# Patient Record
Sex: FEMALE | Race: WHITE
[De-identification: names, ages, dates, MRNs, and addresses within clinical notes are randomized per-mention and may not be internally consistent; named-entity substitution may affect disease eponyms.]

---

## 2017-09-11 ENCOUNTER — HOSPITAL ENCOUNTER (OUTPATIENT)
Dept: HOSPITAL 22 - ER | Age: 57
Setting detail: OBSERVATION
LOS: 1 days | Discharge: HOME | End: 2017-09-12
Attending: INTERNAL MEDICINE | Admitting: INTERNAL MEDICINE
Payer: MEDICAID

## 2017-09-11 VITALS — SYSTOLIC BLOOD PRESSURE: 133 MMHG | DIASTOLIC BLOOD PRESSURE: 64 MMHG

## 2017-09-11 VITALS — SYSTOLIC BLOOD PRESSURE: 133 MMHG | DIASTOLIC BLOOD PRESSURE: 69 MMHG

## 2017-09-11 VITALS — SYSTOLIC BLOOD PRESSURE: 124 MMHG | DIASTOLIC BLOOD PRESSURE: 55 MMHG

## 2017-09-11 VITALS — DIASTOLIC BLOOD PRESSURE: 65 MMHG | SYSTOLIC BLOOD PRESSURE: 149 MMHG

## 2017-09-11 VITALS — DIASTOLIC BLOOD PRESSURE: 55 MMHG | SYSTOLIC BLOOD PRESSURE: 124 MMHG

## 2017-09-11 VITALS — DIASTOLIC BLOOD PRESSURE: 64 MMHG | SYSTOLIC BLOOD PRESSURE: 133 MMHG

## 2017-09-11 VITALS — BODY MASS INDEX: 35.85 KG/M2 | WEIGHT: 228.44 LBS | HEIGHT: 67 IN

## 2017-09-11 DIAGNOSIS — Z72.0: ICD-10-CM

## 2017-09-11 DIAGNOSIS — E87.6: ICD-10-CM

## 2017-09-11 DIAGNOSIS — J02.9: ICD-10-CM

## 2017-09-11 DIAGNOSIS — J20.9: ICD-10-CM

## 2017-09-11 DIAGNOSIS — J44.0: Primary | ICD-10-CM

## 2017-09-11 LAB
BASOPHILS # BLD AUTO: 3.1 K/MM3 (ref 0.7–4.5)
BUN: 25 MG/DL (ref 7–18)
EOSINOPHIL NFR BLD AUTO: 36.7 % (ref 10–50)
GFR SERPLBLD BASED ON 1.73 SQ M-ARVRAT: 52 ML/MIN (ref 59–?)
HCT VFR BLD CALC: 17.4 G/DL (ref 12.2–16.2)

## 2017-09-11 PROCEDURE — G0378 HOSPITAL OBSERVATION PER HR: HCPCS

## 2017-09-11 NOTE — EMERGENCY ROOM REPORT
History of Present Illness
Time Seen by MD [id number]
Presenting Problem in Triage
Pt arrived:Ambulance Stretcher    
Presenting Problem:PT HAS HX OF COPD AND WAS DIAGNOSED WITH BRONCHITIS EARLIER 
THIS WEEK. ADVISES THIS AM SHE STARTED HAVING INCREASED SOA. EMS REPORTS INITIAL
SATS OF 82% AFTER DUONEB SATS INCREASED TO 98% 
Onset of symptoms date/time:/ or onset unknown for:MEDICAL HX UNKNOWN
Treatment Prior to Arrival: V/S WNL 20G IN THE LEFT AC LABS DRAWN DUONEB AND 
SATS INCREASED   PTA Provided by:PARAMEDIC
 
Sepsis Risk Assessment: 
Temp: 98.4 B/P: 149/65 MAP: 93 Pulse: 70 Resp: 20
Recent fever? N Clinical Suspician of Infection? N 
Mental Status: 1 - Regular (Normal Baseline)   Sepsis Risk:Low Sepsis Risk 
 
Have you (or family members/close friends) recently traveled outside the [place]? N
If Yes, where/when: 
Have you had exposure to infectious disease within the past month? N TB? 
Other?  Specify: 
Patient of Dr. Moe, smoker, with hx COPD, has had tight cough and wheezing, 
no fever. Usually does not use nebulizer treatments but used one this AM. EMS 
stated she did better with duoneb PTA. No calf pain or recent travel. No chest 
pain. 
ALLERGIES
Coded Allergies:
No Known Allergies (07/07/16)
 
Home Medications
Active Scripts
BENZONATATE (Benzonatate) 100 MG PO TID 
     #15 CAP 
     Prov:      10/02/16
Prednisone (Prednisone 20MG*****) 20 MG PO BID 
     #10 TAB 
     Prov:      10/02/16
 
Reported Medications
Levothyroxine Sodium (Synthroid 0.05MG*****) 0.05 MG PO DAILY 
ASPIRIN (Aspirin) 81 MG PO DAILY 
HYDROCHLOROTHIAZIDE (Hydrochlorothiazide) 25 MG PO DAILY 
     #30 
Atorvastatin Calcium 40 MG PO QHS 
     #30 
HYDROCODONE/IBUPROFEN (Hydrocodone-Ibuprofen 7.5-200) 1 TAB PO QID 
     #120 
Albuterol Sulfate (Ventolin Hfa) 0.09 MG IH Q4HP PRN SOA
ALBUTEROL (Albuterol 0.083%  Neb*****) 2.5 MG INH PRN PRN SOA
Metoprolol Tartrate (Lopressor*****) 25 MG FT BID 
     #60 
DOXYCYCLINE MONOHYDRATE (Doxycycline Monohydrate) 100 MG PO DAILY 
     #20 
 
 
 
History
 
Medical History
General
   CAD? No
   Angina: No
   MI: No
   Hypertension? Yes
   Hyperlipidemia? Yes
   CHF? No
   DVT? No
   PE? No
   COPD? Yes
   Asthma? No
   Anemia? No
   GERD? No
   Gastric ulcers? No
   GI Bleed? No
   Hernia? No
   Thyroid Problems? Yes
   Hypothyroidism? Yes
   CVA? No
   Seizures? No
   Diabetes? No
   Insulin Dependent: No
   Insulin Pump: No
   Home FSBS? No
   Renal Insuffiency? No
   End Stage Renal Disease? No
   UTI? No
   Stones? Yes
   BPH? No
   GB Disease: Yes
   Nephritic Syndrome? No
   Asplenia? No
   Hepatitis? No
   Sickle Cell Disease? No
   Arthritis? No
   Migraines? No
   Cataracts? No
   Glaucoma? No
   MRSA? No
   HIV? No
   TB? No
   Anxiety? No
   Depression? No
   Cancer? No
   More? No
Immunization Hx
   DT/Tetanus 1-4 Years Ago
Surgical Hx
Previous Surgery?Y  HYSTERECTOMY   TUBUAL   LITHOTRIPSY   
              
            
 
GYN Hx
   LMP N/A
 
Social History
Smoking Hx
   Smoker: Current Every Day Smoker
   Tobacco: Yes
   Type Cigarettes
   Packs/day 1 1/2 - 2 Packs
Alcohol
   Alcohol: No
 
Review of Systems
All Other Systems Reviewed and Negative
Respiratory
see HPI
 
Physical Exam
Vital Signs
 Vital Signs
 
 
Date Time Temp Pulse Resp B/P Pulse O2 O2 Flow FiO2
 
     Ox Delivery Rate 
 
09/11 1058  65 20 140/74 93  2 
 
09/11 0946 98.4 70 20 149/65 95   
 
 
General Appearance normal appearance, WD/WN, no apparent distress
Eye Exam
   -
   bilateral eye normal exam, bilateral eye PERRL, bilateral eye EOMI
Neck normal inspection, non-tender, supple, full range of motion
Respiratory Status
Yes: trachea midline, chest symmetrical, non tender chest, non productive cough.
 No: respiratory distress, tender on palpation, use of accessory muscles, pain 
on inspiration, pain on expiration, productive cough. 
Lung Sounds
bilateral: normal breath sounds, lungs clear, decreased breath sounds. 
Cardiovascular normal exam, regular rate/rhythm, no peripheral edema, no gallop,
no JVD, no murmur, no rub
Gastrointestinal normal bowel sounds, normal exam, non tender, soft, no 
organomegaly, no pulsatile mass, no guarding, no rebound
Extremities non-tender, normal range of motion, no calf tenderness, no pedal 
edema
Strength
5 Upper Ext (L), 5 Upper Ext (R), 5 Lower Ext (L), 5 Lower Ext (R)
Neurologic alert, normal exam, no motor/sensory deficits, oriented x 3 (speech 
clear and fluent)
Glascow Coma Scale
 
 
Glascow Coma Scale Response Value
 
EYE response: 4 Spontaneously 4
 
MOTOR response: 6 OBEYS 6
 
VERBAL response: 5 Oriented & Converses 5
 
Total   15
 
 
Skin intact, normal color, warm/dry
Lymphatic no adenopathy
 
Medical Decision Making
 
LABS/Meds/Orders
Pt receiving controlled substance in ED? No
Results/Orders
 Laboratory Tests
 
 
09/11/17 1010:
Lactic Acid 1.6
 
09/11/17 0950:
Sodium 140, Potassium 2.9 *L, Chloride 101, Carbon Dioxide 32, BUN 25  H, 
Creatinine 1.1  H, Estimated Creat Clear 90, Estimated GFR (MDRD) 52  L, Glucose
148  H, Calcium 8.4  L, Total Bilirubin 0.7, AST 28,   H, Alkaline 
Phosphatase 128  H, Total Protein 7.6, Albumin 3.4, Globulin 4.2  H, Albumin/
Globulin Ratio 0.8  L, WBC 8.5, RBC 5.67  H, Hgb 17.4  H, Hct 51.5  H, MCV 90.8,
RDW 13.9, Plt Count 206, MPV 7.5, Gran % 53.9, Gran # 4.6, Lymphocytes % 36.7, 
Monocytes % 4.9, Eosinophils % 3.7, Basophils % 0.8, Lymphocytes # 3.1, 
Monocytes # 0.4, Eosinophils # 0.3, Basophils # 0.1, PUBS MCHC 33.8, MCH 30.7
 Current Medication Orders
 
 
  Sig/Kimberly Start time  Last
 
Medication Dose Route Stop Time Status Admin
 
Ceftriaxone Sodium 1 GM ONCE ONE 09/11 1130 AC 
 
Sodium Chloride 50 ML IV 09/11 1159  
 
Methylprednisolone  0 .STK-MED ONE 09/11 1113 DC 
 
Sodium Succinate  .ROUTE   
 
Potassium Chloride 0 .STK-MED ONE 09/11 1113 DC 
 
  PO   
 
Potassium Chloride 20 MEQ ONCE ONE 09/11 1030 DC 09/11
 
  PO 09/11 1031  1115
 
Albuterol 2.5 MG ONCE ONE 09/11 1000 DC 
 
  INH 09/11 1001  
 
Albuterol/Ipratropium 3 ML ONCE ONE 09/11 1000 DC 
 
  INH 09/11 1001  
 
Methylprednisolone  125 MG ONCE ONE 09/11 1000 DC 09/11
 
Sodium Succinate  IV 09/11 1001  1115
 
Sodium Chloride 10 ML PRN PRN 09/11 1000 AC 
 
  IV 09/12 0951  
 
Albuterol/Ipratropium 0 .STK-MED ONE 09/11 0958 DC 
 
  INH   
 
 
 Orders
 
 
Procedure Date/time Status
 
Decision to admit 09/11 1125 Active
 
ELECTROCARDIOGRAM REQUEST 09/11 1029 Active
 
RT REQUEST ALBUTEROL NEB 09/11 0959 Active
 
RT REQUEST DUONEB 09/11 0952 Active
 
CHEST(2 VIEWS-NOT PORTABLE)*** 09/11 0952 Active
 
IV SALINE LOCK 09/11 0952 Active
 
CULTURE, BLOOD 09/11 0952 Active
 
LACTIC ACID 09/11 0952 Complete
 
CBC WITH AUTO DIFF 09/11 0952 Complete
 
CHEM 12 PROFILE 09/11 0952 Complete
 
12 LEAD EKG-MEI (INITIAL) 09/11  UNK Active
 
 
 
CM/EKG
CM/EKG
   EKG rate, rhythm, no evid. of ischemic chgs, no ectopy, normal QRS, normal CT
, normal EKG (SB 59; normal T w; nl QTc)
 
XRAY/CT/US
XRAY/CT/US
   XRAY chest
   XR interpretation by reviewed by me
   Xray Results no infiltrates, normal heart size, normal lung inflation vane (
COPD, older perihilar scarring)
 
Consult MD
Physician Consult
   Time Called 1119
   Reason Pt. Condition, Admission
 
Progress
ED Progress Notes
   Date 09/11/17
   Time 1119
   Comment
Taken off of oxygen, which had been placed on arrival, and has had sats of 89 to
90 per cent while on room air; Dr. Mei thompson. 
 
Departure
 
Departure
Time of Disposition 1120
Disposition Still a Patient
Clinical Impression
Primary Impression: COPD with exacerbation
Secondary Impressions: Hypokalemia
Condition STABLE
Referrals
Harsi Moe MD (Family)
 
ED Critical Care
   Critical Care Yes
   Time spent < 30 min
   Vital system(s) involved: Circulatory Failure (hypoxia)
   I was present at bedside for Coordinating pt's care, Interpreting EKGs/Strips
, During my initial exam, Reviewing lab results, Discussing pt condition, For re
-examinations, Examining radiographs
 
Electronically Signed by Mariah Kurtz MD on 09/11/17 at 1141

## 2017-09-11 NOTE — HISTORY AND PHYSICAL REPORT
Demographics:
Admit date: 09/11/17
Chief complaint:
Cough/sore throat
PRIMARY DIAGNOSIS: COPD EXACERBATION HYPOKALEMIA
Allergies:
Coded Allergies:
No Known Allergies (07/07/16)
 
 
History of present illness:
History of present illness:
56-year-old white female, ex-smoker recently, with emphysema, recently treated 
for exacerbation with doxycycline and prednisone who came to the emergency 
department this morning with shortness of air and sore throat, found to have low
potassium, chronic obstructive pulmonary disease exacerbation hypoxia, admitted 
to hospital.
 
Past medical history:
Family HX
   Family Hx Insignificant Yes
Immunization HX
   DT/Tetanus 1-4 Years Ago
   Pneumonia Never Had
   TB Test in last year No
General
   CAD? No
   Angina: No
   MI: No
   Hypertension? Yes
   Hyperlipidemia? Yes
   CHF? No
   DVT? No
   PE? No
   COPD? Yes
   Asthma? No
   Anemia? No
   GERD? No
   Gastric ulcers? No
   GI Bleed? No
   Hernia? No
   Thyroid Problems? Yes
   Hypothyroidism? Yes
   CVA? No
   Seizures? No
   Diabetes? No
   Insulin Dependent: No
   Insulin Pump: No
   Home FSBS? No
   Renal Insuffiency? No
   UTI? No
   Stones? Yes
   BPH? No
   GB Disease: Yes
   Nephritic Syndrome? No
   Asplenia? No
   Hepatitis? No
   Sickle Cell Disease? No
   Arthritis? No
   Migraines? No
   Cataracts? No
   Glaucoma? No
   MRSA? No
   HIV? No
   TB? No
   Anxiety? No
   Depression? No
   Cancer? No
   More? No
Past Surgical HX
Previous Surgery?Y  HYSTERECTOMY   TUBUAL   LITHOTRIPSY   
              
            
 
Current home meds:
Active Scripts
BENZONATATE (Benzonatate) 100 MG PO TID 
     #15 CAP 
     Prov:      10/02/16
Prednisone (Prednisone 20MG*****) 20 MG PO BID 
     #10 TAB 
     Prov:      10/02/16
 
Reported Medications
FLUTICASONE/VILANTEROL (Breo Ellipta 100-25 Mcg INH) 1 POW IH DAILY 
NICOTINE (Nicotine Patch) 21 MG TD DAILY 
Levothyroxine Sodium (Synthroid 0.05MG*****) 0.05 MG PO DAILY 
ASPIRIN (Aspirin) 81 MG PO DAILY 
HYDROCHLOROTHIAZIDE (Hydrochlorothiazide) 25 MG PO DAILY 
     #30 
HYDROCODONE/IBUPROFEN (Hydrocodone-Ibuprofen 7.5-200) 1 TAB PO QID 
     #120 
Albuterol Sulfate (Ventolin Hfa) 0.09 MG IH Q4HP PRN SOA
ALBUTEROL (Albuterol 0.083%  Neb*****) 2.5 MG INH PRN PRN SOA
Metoprolol Tartrate (Lopressor*****) 25 MG FT BID 
     #60 
DOXYCYCLINE MONOHYDRATE (Doxycycline Monohydrate) 100 MG PO DAILY 
     #20 
 
 
 
Social Hx:
Smoking HX
   Tobacco Yes
   Type Cigarettes
   Packs/day < 1 PACK
   Are you/the child exposed to second-hand smoke: Yes
Alcohol
   Alcohol: No
Hx of Drug Use
   Drug Use? No
Patien't marital status is single
Patient's support system is excellent
 
Review of systems:
Constitutional
fever, weakness. 
Respiratory
cough, shortness of breath, SOB with excertion. 
Cardiovascular
No no symptoms reported
Gastrointestinal/Abdominal
No no symptoms reported
Genitourinary
No: no symptoms reported. 
Musculoskeletal
No: no symptoms reported. 
Neurological
No: see HPI. 
 
Exam:
Lab data for last 24 hours:
Laboratory Tests
 
 
09/11/17 1010:
Lactic Acid 1.6
 
09/11/17 0950:
Sodium 140, Potassium 2.9 *L, Chloride 101, Carbon Dioxide 32, BUN 25  H, 
Creatinine 1.1  H, Estimated Creat Clear 90, Estimated GFR (MDRD) 52  L, Glucose
148  H, Calcium 8.4  L, Total Bilirubin 0.7, AST 28,   H, Alkaline 
Phosphatase 128  H, Total Protein 7.6, Albumin 3.4, Globulin 4.2  H, Albumin/
Globulin Ratio 0.8  L, WBC 8.5, RBC 5.67  H, Hgb 17.4  H, Hct 51.5  H, MCV 90.8,
RDW 13.9, Plt Count 206, MPV 7.5, Gran % 53.9, Gran # 4.6, Lymphocytes % 36.7, 
Monocytes % 4.9, Eosinophils % 3.7, Basophils % 0.8, Lymphocytes # 3.1, 
Monocytes # 0.4, Eosinophils # 0.3, Basophils # 0.1, PUBS MCHC 33.8, MCH 30.7
Microbiology
09/11 1010  BLOOD: Anaerobic Blood Culture - RECD
09/11 1010  BLOOD: Aerobic Blood Culture - RECD
09/11 1010  BLOOD: Anaerobic Blood Culture - RECD
09/11 1010  BLOOD: Aerobic Blood Culture - RECD
 
 
Admission vital signs:
1ST Vital Signs
 
 
 Result Date Time
 
Pulse Ox 95 09/11 0946
 
B/P 149/65 09/11 0946
 
Temp 98.4 09/11 0946
 
Pulse 70 09/11 0946
 
Resp 20 09/11 0946
 
O2 Flow Rate 2 09/11 1058
 
O2 Delivery ROOM AIR 09/11 1320
 
 
 
Additional information:
Very intensely red pharynx, exudate in the RIGHT tonsillar pillar. Tender 
cervical adenitis noted. Lungs have rhonchi, some crackles in the RIGHT lower 
lung field. No wheezing. Fairly good air entry. Low oxygen levels noted. Heart 
rate regular. Abdomen soft, patient's alert, pleasant. Oriented 3.
 
Plan:
Problem List
 1. COPD (chronic obstructive pulmonary disease) with acute bronchitis
 
 2. Hypokalemia
 
 3. COPD with exacerbation
 
 4. Pharyngitis
 
Plan:
Agree with admission, short-term treatment with pulmonary toilet, IV steroids, 
IV antibiotics. Check throat cultures.
 
Electronically Signed by Haris Moe MD on 09/11/17 at 1348

## 2017-09-11 NOTE — RADIOLOGY REPORT PS360
CHEST(2 VIEWS-NOT PORTABLE)***
 
HISTORY: Shortness of air, COPD
SOA
ORDERING PHYSICIAN: Mariah Kurtz MD
PATIENT AGE:  56 years
 
 
COMPARISON: 5/28/2017
 
FINDINGS:
The cardiomediastinal silhouette and pulmonary vascularity are within normal
limits. 
Previously noted atelectatic changes in the left midlung have improved. No lobar
consolidation or collapse. There is thickening of the major fissure inferiorly
seen on the lateral view.. 
No acute bony abnormalities. 
   
 
IMPRESSION:
 
No acute finding.

## 2017-09-11 NOTE — EMERGENCY ROOM REPORT
History of Present Illness
Time Seen by MD [id number]
Presenting Problem in Triage
Pt arrived:Ambulance Stretcher    
Presenting Problem:PT HAS HX OF COPD AND WAS DIAGNOSED WITH BRONCHITIS EARLIER 
THIS WEEK. ADVISES THIS AM SHE STARTED HAVING INCREASED SOA. EMS REPORTS INITIAL
SATS OF 82% AFTER DUONEB SATS INCREASED TO 98% 
Onset of symptoms date/time:/ or onset unknown for:MEDICAL HX UNKNOWN
Treatment Prior to Arrival: V/S WNL 20G IN THE LEFT AC LABS DRAWN DUONEB AND 
SATS INCREASED   PTA Provided by:PARAMEDIC
 
Sepsis Risk Assessment: 
Temp: 98.4 B/P: 149/65 MAP: 93 Pulse: 70 Resp: 20
Recent fever? N Clinical Suspician of Infection? N 
Mental Status: 1 - Regular (Normal Baseline)   Sepsis Risk:Low Sepsis Risk 
 
Have you (or family members/close friends) recently traveled outside the [place]? N
If Yes, where/when: 
Have you had exposure to infectious disease within the past month? N TB? 
Other?  Specify: 
Patient of Dr. Moe, smoker, with hx COPD, has had tight cough and wheezing, 
no fever. Usually does not use nebulizer treatments but used one this AM. EMS 
stated she did better with duoneb PTA. No calf pain or recent travel. No chest 
pain. 
ALLERGIES
Coded Allergies:
No Known Allergies (07/07/16)
 
Home Medications
Active Scripts
BENZONATATE (Benzonatate) 100 MG PO TID 
     #15 CAP 
     Prov:      10/02/16
Prednisone (Prednisone 20MG*****) 20 MG PO BID 
     #10 TAB 
     Prov:      10/02/16
 
Reported Medications
Levothyroxine Sodium (Synthroid 0.05MG*****) 0.05 MG PO DAILY 
ASPIRIN (Aspirin) 81 MG PO DAILY 
HYDROCHLOROTHIAZIDE (Hydrochlorothiazide) 25 MG PO DAILY 
     #30 
Atorvastatin Calcium 40 MG PO QHS 
     #30 
HYDROCODONE/IBUPROFEN (Hydrocodone-Ibuprofen 7.5-200) 1 TAB PO QID 
     #120 
Albuterol Sulfate (Ventolin Hfa) 0.09 MG IH Q4HP PRN SOA
ALBUTEROL (Albuterol 0.083%  Neb*****) 2.5 MG INH PRN PRN SOA
Metoprolol Tartrate (Lopressor*****) 25 MG FT BID 
     #60 
DOXYCYCLINE MONOHYDRATE (Doxycycline Monohydrate) 100 MG PO DAILY 
     #20 
 
 
 
History
 
Medical History
General
   CAD? No
   Angina: No
   MI: No
   Hypertension? Yes
   Hyperlipidemia? Yes
   CHF? No
   DVT? No
   PE? No
   COPD? Yes
   Asthma? No
   Anemia? No
   GERD? No
   Gastric ulcers? No
   GI Bleed? No
   Hernia? No
   Thyroid Problems? Yes
   Hypothyroidism? Yes
   CVA? No
   Seizures? No
   Diabetes? No
   Insulin Dependent: No
   Insulin Pump: No
   Home FSBS? No
   Renal Insuffiency? No
   End Stage Renal Disease? No
   UTI? No
   Stones? Yes
   BPH? No
   GB Disease: Yes
   Nephritic Syndrome? No
   Asplenia? No
   Hepatitis? No
   Sickle Cell Disease? No
   Arthritis? No
   Migraines? No
   Cataracts? No
   Glaucoma? No
   MRSA? No
   HIV? No
   TB? No
   Anxiety? No
   Depression? No
   Cancer? No
   More? No
Immunization Hx
   DT/Tetanus 1-4 Years Ago
Surgical Hx
Previous Surgery?Y  HYSTERECTOMY   TUBUAL   LITHOTRIPSY   
              
            
 
GYN Hx
   LMP N/A
 
Social History
Smoking Hx
   Smoker: Current Every Day Smoker
   Tobacco: Yes
   Type Cigarettes
   Packs/day 1 1/2 - 2 Packs
Alcohol
   Alcohol: No
 
Review of Systems
All Other Systems Reviewed and Negative
Respiratory
see HPI
 
Physical Exam
Vital Signs
 Vital Signs
 
 
Date Time Temp Pulse Resp B/P Pulse O2 O2 Flow FiO2
 
     Ox Delivery Rate 
 
09/11 1058  65 20 140/74 93  2 
 
09/11 0946 98.4 70 20 149/65 95   
 
 
General Appearance normal appearance, WD/WN, no apparent distress
Eye Exam
   -
   bilateral eye normal exam, bilateral eye PERRL, bilateral eye EOMI
Neck normal inspection, non-tender, supple, full range of motion
Respiratory Status
Yes: trachea midline, chest symmetrical, non tender chest, non productive cough.
 No: respiratory distress, tender on palpation, use of accessory muscles, pain 
on inspiration, pain on expiration, productive cough. 
Lung Sounds
bilateral: normal breath sounds, lungs clear, decreased breath sounds. 
Cardiovascular normal exam, regular rate/rhythm, no peripheral edema, no gallop,
no JVD, no murmur, no rub
Gastrointestinal normal bowel sounds, normal exam, non tender, soft, no 
organomegaly, no pulsatile mass, no guarding, no rebound
Extremities non-tender, normal range of motion, no calf tenderness, no pedal 
edema
Strength
5 Upper Ext (L), 5 Upper Ext (R), 5 Lower Ext (L), 5 Lower Ext (R)
Neurologic alert, normal exam, no motor/sensory deficits, oriented x 3 (speech 
clear and fluent)
Glascow Coma Scale
 
 
Glascow Coma Scale Response Value
 
EYE response: 4 Spontaneously 4
 
MOTOR response: 6 OBEYS 6
 
VERBAL response: 5 Oriented & Converses 5
 
Total   15
 
 
Skin intact, normal color, warm/dry
Lymphatic no adenopathy
 
Medical Decision Making
 
LABS/Meds/Orders
Pt receiving controlled substance in ED? No
Results/Orders
 Laboratory Tests
 
 
09/11/17 1010:
Lactic Acid 1.6
 
09/11/17 0950:
Sodium 140, Potassium 2.9 *L, Chloride 101, Carbon Dioxide 32, BUN 25  H, 
Creatinine 1.1  H, Estimated Creat Clear 90, Estimated GFR (MDRD) 52  L, Glucose
148  H, Calcium 8.4  L, Total Bilirubin 0.7, AST 28,   H, Alkaline 
Phosphatase 128  H, Total Protein 7.6, Albumin 3.4, Globulin 4.2  H, Albumin/
Globulin Ratio 0.8  L, WBC 8.5, RBC 5.67  H, Hgb 17.4  H, Hct 51.5  H, MCV 90.8,
RDW 13.9, Plt Count 206, MPV 7.5, Gran % 53.9, Gran # 4.6, Lymphocytes % 36.7, 
Monocytes % 4.9, Eosinophils % 3.7, Basophils % 0.8, Lymphocytes # 3.1, 
Monocytes # 0.4, Eosinophils # 0.3, Basophils # 0.1, PUBS MCHC 33.8, MCH 30.7
 Current Medication Orders
 
 
  Sig/Kimberly Start time  Last
 
Medication Dose Route Stop Time Status Admin
 
Ceftriaxone Sodium 1 GM ONCE ONE 09/11 1130 AC 
 
Sodium Chloride 50 ML IV 09/11 1159  
 
Methylprednisolone  0 .STK-MED ONE 09/11 1113 DC 
 
Sodium Succinate  .ROUTE   
 
Potassium Chloride 0 .STK-MED ONE 09/11 1113 DC 
 
  PO   
 
Potassium Chloride 20 MEQ ONCE ONE 09/11 1030 DC 09/11
 
  PO 09/11 1031  1115
 
Albuterol 2.5 MG ONCE ONE 09/11 1000 DC 
 
  INH 09/11 1001  
 
Albuterol/Ipratropium 3 ML ONCE ONE 09/11 1000 DC 
 
  INH 09/11 1001  
 
Methylprednisolone  125 MG ONCE ONE 09/11 1000 DC 09/11
 
Sodium Succinate  IV 09/11 1001  1115
 
Sodium Chloride 10 ML PRN PRN 09/11 1000 AC 
 
  IV 09/12 0951  
 
Albuterol/Ipratropium 0 .STK-MED ONE 09/11 0958 DC 
 
  INH   
 
 
 Orders
 
 
Procedure Date/time Status
 
Decision to admit 09/11 1125 Active
 
ELECTROCARDIOGRAM REQUEST 09/11 1029 Active
 
RT REQUEST ALBUTEROL NEB 09/11 0959 Active
 
RT REQUEST DUONEB 09/11 0952 Active
 
CHEST(2 VIEWS-NOT PORTABLE)*** 09/11 0952 Active
 
IV SALINE LOCK 09/11 0952 Active
 
CULTURE, BLOOD 09/11 0952 Active
 
LACTIC ACID 09/11 0952 Complete
 
CBC WITH AUTO DIFF 09/11 0952 Complete
 
CHEM 12 PROFILE 09/11 0952 Complete
 
12 LEAD EKG-MEI (INITIAL) 09/11  UNK Active
 
 
 
CM/EKG
CM/EKG
   EKG rate, rhythm, no evid. of ischemic chgs, no ectopy, normal QRS, normal IA
, normal EKG (SB 59; normal T w; nl QTc)
 
XRAY/CT/US
XRAY/CT/US
   XRAY chest
   XR interpretation by reviewed by me
   Xray Results no infiltrates, normal heart size, normal lung inflation vane (
COPD, older perihilar scarring)
 
Consult MD
Physician Consult
   Time Called 1119
   Reason Pt. Condition, Admission
 
Progress
ED Progress Notes
   Date 09/11/17
   Time 1119
   Comment
Taken off of oxygen, which had been placed on arrival, and has had sats of 89 to
90 per cent while on room air; Dr. Mei thompson. 
 
Departure
 
Departure
Time of Disposition 1120
Disposition Still a Patient
Clinical Impression
Primary Impression: COPD with exacerbation
Secondary Impressions: Hypokalemia
Condition STABLE
Referrals
Haris Moe MD (Family)
 
ED Critical Care
   Critical Care Yes
   Time spent < 30 min
   Vital system(s) involved: Circulatory Failure (hypoxia)
   I was present at bedside for Coordinating pt's care, Interpreting EKGs/Strips
, During my initial exam, Reviewing lab results, Discussing pt condition, For re
-examinations, Examining radiographs
 
Electronically Signed by Mariah Kurtz MD on 09/11/17 at 1145

## 2017-09-12 VITALS — SYSTOLIC BLOOD PRESSURE: 123 MMHG | DIASTOLIC BLOOD PRESSURE: 57 MMHG

## 2017-09-12 VITALS — SYSTOLIC BLOOD PRESSURE: 128 MMHG | DIASTOLIC BLOOD PRESSURE: 56 MMHG

## 2017-09-12 LAB
BASOPHILS # BLD AUTO: 1 K/MM3 (ref 0.7–4.5)
BUN: 28 MG/DL (ref 7–18)
EOSINOPHIL NFR BLD AUTO: 6.5 % (ref 10–50)
GFR SERPLBLD BASED ON 1.73 SQ M-ARVRAT: 58 ML/MIN (ref 59–?)
HCT VFR BLD CALC: 15.8 G/DL (ref 12.2–16.2)
LYMPHOCYTES NFR SPEC AUTO: 87 % (ref 42–76)

## 2017-09-12 NOTE — ACUTE CARE PROGRESS NOTE (QUA)
Progress Notes
 
Subjective
Date 17
Time 0740
Note
Patient is sitting up eating breakfast.  Shortness of breath has improved.  
Potassium is normal this morning.  She desires to go home.
 
Alert and oriented x3. Rate and rhythm regular.  Pharynx erythematous with 
exudate on right tonsillar pillar. Tender cervical adenitis noted. Lungs have 
rhonchi, some crackles in the right lower lung field. No wheezing, good air 
movement.  
Patient/family reports: feeling better
Nursing reports: no complaints
 
Objective
Findings
Last VS-Temp:98.3 B/P:123/57  Pulse:80 Resp:20 SaO2:83    ROOM AIR 
Last weight lbs:228 oz:7 [id number] Method:Bed Scales 
 
 
Reviewed: medications, vital signs, lab results, radiology report
 
Assessment/Plan
Problem List
 1. COPD (chronic obstructive pulmonary disease) with acute bronchitis
 
 2. Hypokalemia
 
 3. COPD with exacerbation
 
 4. Pharyngitis
 
Patient condition Improving
Plan: initiate discharge plan
This inpt stay is expected to cross 2 MNs from start of care No
Comments:
Discharge home on zithromax, cefdinir and prednisone.  Will arrange for home 
oxygen as she continues to have low room air saturations.
 
Electronically Signed by BRENDA SULLIVAN on 17 at 0818

## 2017-09-12 NOTE — DISCHARGE SUMMARY STANDARD
Demographics
Admit date: 09/11/17
Discharge date: 09/12/17
 
History of present illness
History of present illness
56-year-old white female, ex-smoker recently, with emphysema, recently treated 
for exacerbation with doxycycline and prednisone who came to the emergency 
department this morning with shortness of air and sore throat, found to have low
potassium, chronic obstructive pulmonary disease exacerbation hypoxia, admitted 
to hospital.
 
Hospital Course
Hospital Course:
Patient was admitted to acute care.  She was given IV antibiotics, steroids and 
potassium replacement.  Potassium was normal at 3.7 this morning.  Her shortness
of breath has improved. Room air saturations remain low at 83%.  Patient desires
to discharge home on oxygen with short term follow up.
 
Discharge home on cefdinir, zithromax and prednisone.  See medication 
reconciliation for complete list.  Short term FU with Dr. Moe in [place] 
office on Thursday.
 
Discharge diagnoses
Problem List
 1. COPD (chronic obstructive pulmonary disease) with acute bronchitis
 
 2. Hypokalemia
 
 3. COPD with exacerbation
 
 4. Pharyngitis
 
 
Medications
Medications:
Discharge meds are as noted.
 
 
Follow up
Follow up in office in: 2 DAYS
with:
Haris Moe MD
 
Electronically Signed by BRENDA SULLIVAN on 09/12/17 at 4867

## 2017-10-09 NOTE — EXTERNAL MEDICAL SUMMARY RPT
Patient Summary
 Created on: 10/06/2017
 
LUIS MANUEL BYRD
External Reference #: [id number]
: 62
Sex: Female
 
Demographics
 
 
 
 Address  [address] [place] [address]
 
 Home Phone  [phone]
 
 Preferred Language  English
 
 Marital Status  Unknown
 
 Scientologist Affiliation  Unknown
 
 Race  White
 
 Ethnic Group  Unknown
 
 
Author
 
 
 
 Author            ,            NINA LEYVA
 
 Address  Unknown
 
 Phone  [email or web address]
 
                                            
 
Purpose
                      Continuity of Care Document - 2017 through 10-06-
2017                                                                            
                     
 
Problems
                      
 
 
 Code         Diagnosis    DOS          Provider     Status     
 
                                                               
 
               
 
 E87.6        HYPOKALEMIA                                       
 
                                                                
 
                                                  
 
              
 
 J40          BRONCHITIS,                                       
 
              NOT                                        
 
  SPECIFIED                                        
 
  AS ACUTE OR             
 
   CHRONIC      
 
                
 
         
 
 J44.0        CHRONIC                                        
 
              OBSTRUCTIVE                                       
 
     PULMON                                        
 
  DISEASE W              
 
  ACUTE LOWER   
 
   RESP INFCT   
 
                
 
            
 
 J44.1        CHRONIC                                        
 
              OBSTRUCTIVE                                       
 
     PULMONARY                                        
 
  DISEASE W              
 
  (ACUTE)    
 
  EXACERBATIO   
 
  N             
 
             
 
 N20.0        CALCULUS OF                                       
 
               KIDNEY                                           
 
                                                      
 
                  
 
 R07.9        CHEST PAIN,                                       
 
                                                      
 
    UNSPECIFIED                                       
 
                          
 
            
 
 S82.61XA     DISP FX OF                                        
 
              LATERAL                                        
 
       MALLEOLUS                                        
 
  OF RIGHT              
 
  FIBULA,    
 
  INIT          
 
                
 
 Z72.0        TOBACCO USE                                       
 
                                                                
 
                                                  
 
              
 
                                                                                
                                                                                
      
 
Results
                      
 
 
 Labs                
 
 
 
 
 Lab   Lab   Date    Result  Refere  Interp  Status  Commen
 
 Order   Detail                  nces   retati          t     
 
                                 Range   on                    
 
                                                            
 
                                  
 
                
 
 
 
 
 Differential panel, method unspecified - (2017 06:15)
 
                 
 
 
 
 
          LYMPH      8 %      10% -  Low      complet
 
                   017             50%            ed     
 
                 06:15                                      
 
                              
 
                  
 
         
 
          Platele    NORMAL                     complet
 
          ts   017                              ed     
 
        [Presen  06:15                                      
 
  ce] in                                       
 
  Blood                              
 
  by             
 
  Light      
 
  microsc     
 
  opy         
 
              
 
              
 
 
 
 
 Streptococcus pyogenes Ag [Presence] in Unspecified specimen 
 
 (2017 15:42)                
 
 
 
 
          Strepto    NEGATIV                    complet
 
          coccus   017   E                          ed     
 
        pyogene  15:42                                      
 
  s Ag                                         
 
  [Presen                             
 
  ce] in             
 
  Unspeci     
 
  fied      
 
  specime     
 
  n           
 
              
 
            
 
 
 
 
 Gas panel in Arterial blood (2017 22:10)
 
                 
 
 
 
 
          Arteria    Y                          complet
 
          l   017                              ed     
 
        patency  22:10                                    
 
   Wrist                                   
 
  artery                              
 
  --pre             
 
  arteria     
 
  l      
 
  punctur     
 
  e           
 
              
 
            
 
          SOURCE     R/R                        complet
 
                   017                              ed     
 
                 22:10

## 2017-10-09 NOTE — EXTERNAL MEDICAL SUMMARY RPT
[facility] Patient Summary
 Created on: 10/05/2017
 
LUIS MANUEL BYRD
External Reference #: [id number]
: 62
Sex: Female
 
Demographics
 
 
 
 Address  [address] [place] [address]
 
 Home Phone  [phone]
 
 Preferred Language  Unknown
 
 Marital Status  Unknown
 
 [employer] Affiliation  Unknown
 
 Race  Unknown
 
 Ethnic Group  Unknown
 
 
Author
 
 
 
 Author  [facility], [facility] 
 
 Organization  [facility]
 
 Address  Unknown
 
 Phone  Unavailable
 
 
 
Results
 
 
 
 CBC W Auto Differential panel in Blood
 
 
 
 
 Observa  Value  Referen  Units  Interpr  Notes  Date
 
 tion   ce    etation  
 
   Range    
 
 
 
 
 Basophils  0 - 0.2  K/MM3  Normal  No   Sep 12 
 
       informati  2017 6:15
 
 [#/volume     on in    AM
 
 ] in      source  
 
 Blood by      data 
 
 Automated     
 
  count     
 
 Basophils  0.1 - 2.0  %  Normal  No   Sep 12 
 
 /100      informati  2017 6:15
 
 leukocyte     on in    AM
 
 s in      source  
 
 Blood by      data 
 
 Automated     
 
  count     
 
 Eosinophi  0.0 - 0.4  K/mm3  Normal  No   Sep 12 
 
 ls      informati  2017 6:15
 
 [#/volume     on in    AM
 
 ] in      source  
 
 Blood by      data 
 
 Automated     
 
  count     
 
 Eosinophi  0.1 -   %  Normal  No   Sep 12 
 
 ls/100   12.0    informati  2017 6:15
 
 leukocyte     on in    AM
 
 s in      source  
 
 Blood by      data 
 
 Automated     
 
  count     
 
 Granulocy  1.8 - 7.8  K/mm3  High  No   Sep 12 
 
 amber      informati  2017 6:15
 
 [#/volume     on in    AM
 
 ] in      source  
 
 Blood by      data 
 
 Automated     
 
  count     
 
 Granulocy  37.0 -   %  High  No   Sep 12 
 
 amber/100   80.0    informati  2017 6:15
 
 leukocyte     on in    AM
 
 s in      source  
 
 Blood by      data 
 
 Automated     
 
  count     
 
 Hematocri  37.0 -   %  High  No   Sep 12 
 
 t [Volume  47.0    informati  2017 6:15
 
       on in    AM
 
 Fraction]     source  
 
  of Blood     data 
 
 Hemoglobi  12.2 -   g/dL  Normal  No   Sep 12 
 
 n   16.2    informati  2017 6:15
 
 [Mass/vol     on in    AM
 
 ume] in      source  
 
 Blood     data 
 
 Lymphocyt  0.7 - 4.5  K/mm3  Normal  No   Sep 12 
 
 es      informati  2017 6:15
 
 [#/volume     on in    AM
 
 ] in      source  
 
 Unspecifi     data 
 
 ed      
 
 specimen      
 
 by      
 
 Automated     
 
  count     
 
 Lymphocyt  10 - 50.0  %  Low  No   Sep 12 
 
 es      informati  2017 6:15
 
 [#/volume     on in    AM
 
 ] in      source  
 
 Unspecifi     data 
 
 ed      
 
 specimen      
 
 by      
 
 Automated     
 
  count     
 
 Erythrocy  27 - 31.2  pg  Normal  No   Sep 12 
 
 te mean      informati  2017 6:15
 
 corpuscul     on in    AM
 
 ar      source  
 
 hemoglobi     data 
 
 n      
 
 [Entitic      
 
 mass]     
 
 Erythrocy  31.8 -   g/dl  Normal  No   Sep 12 
 
 te mean   35.4    informati  2017 6:15
 
 corpuscul     on in    AM
 
 ar      source  
 
 hemoglobi     data 
 
 n      
 
 concentra     
 
 tion      
 
 [Mass/vol     
 
 ume] by      
 
 Automated     
 
  count     
 
 Erythrocy  82.2 -   fl  Normal  No   Sep 12 
 
 te mean   97.8    informati  2017 6:15
 
 corpuscul     on in    AM
 
 ar volume     source  
 
  [Entitic     data 
 
  volume]      
 
 by      
 
 Automated     
 
  count     
 
 Monocytes  0.1 - 1.0  K/mm3  Normal  No   Sep 12 
 
       informati  2017 6:15
 
 [#/volume     on in    AM
 
 ] in      source  
 
 Blood by      data 
 
 Automated     
 
  count     
 
 Monocytes  1.7 - 9.3  %  Low  No   Sep 12 
 
 /100      informati  2017 6:15
 
 leukocyte     on in    AM
 
 s in      source  
 
 Blood by      data 
 
 Automated     
 
  count     
 
 Platelet   7.4 -   fl  Normal  No   Sep 12 
 
 mean   10.4    informati  2017 6:15
 
 volume      on in    AM
 
 [Entitic      source  
 
 volume]      data 
 
 in Blood      
 
 by      
 
 Automated     
 
  count     
 
 Platelets  142 - 424  K/mm3  Normal  No   Sep 12 
 
       informati  2017 6:15
 
 [#/volume     on in    AM
 
 ] in      source  
 
 Blood     data 
 
 Erythrocy  4.2 - 5.4  M/mm3  Normal  No   Sep 12 
 
 amber      informati  2017 6:15
 
 [#/volume     on in    AM
 
 ] in      source  
 
 Amniotic      data 
 
 fluid     
 
 Erythrocy  11.5 -   %  Normal  No   Sep 12 
 
 te   17.5    informati  2017 6:15
 
 distribut     on in    AM
 
 ion width     source  
 
  [Entitic     data 
 
  volume]      
 
 by      
 
 Automated     
 
  count     
 
 Leukocyte  4.8 -   K/MM3  High  No   Sep 12 
 
 s   10.8    informati  2017 6:15
 
 [#/volume     on in    AM
 
 ] in      source  
 
 Blood     data 
 
 
 
 
 Differential panel, method unspecified -
 
 
 
 
 Observa  Value  Referen  Units  Interpr  Notes  Date
 
 tion   ce    etation  
 
   Range    
 
 
 
 
 Neutrophi  0 - 8  %  Normal  No   Sep 12 
 
 ls.band      informati  2017 6:15
 
 form/100      on in    AM
 
 leukocyte     source  
 
 s in      data 
 
 Blood by      
 
 Automated     
 
  count     
 
 
 
 
 LYMPH  8  10 - 50  %  Low  No   Sep 12 
 
      informa  2017 
 
      tion in  6:15 AM
 
       source 
 
       data 
 
 
 
 
 Monocytes  2 - 9  %  Low  No   Sep 12 
 
 /100      informati  2017 6:15
 
 leukocyte     on in    AM
 
 s in      source  
 
 Blood by      data 
 
 Automated     
 
  count     
 
 
 
 
 Platele  NORMAL  No   No   No   No   Sep 12 
 
 ts    informa  informa  informa  informa  2017 
 
 [Presen   tion in  tion in  tion in  tion in  6:15 AM
 
 ce] in     source   source   source   source 
 
 Blood     data   data   data   data 
 
 by       
 
 Light       
 
 microsc      
 
 opy      
 
 
 
 
 Neutrophi  42 - 76  %  High  No   Sep 12 
 
 ls      informati  2017 6:15
 
 [#/volume     on in    AM
 
 ] in      source  
 
 Blood by      data 
 
 Automated     
 
  count     
 
 Cells   No   #CELLS  No   No   Sep 12 
 
 Counted   informati   informati  informati  2017 6:15
 
 Total [#]  on in    on in   on in    AM
 
  in Blood  source    source   source  
 
  data   data  data 
 
 
 
 
 Basic metabolic panel in Blood
 
 
 
 
 Observa  Value  Referen  Units  Interpr  Notes  Date
 
 tion   ce    etation  
 
   Range    
 
 
 
 
 Urea   7 - 18  mg/dL  High  No   Sep 12 
 
 nitrogen      informati  2017 6:15
 
 [Mass/vol     on in    AM
 
 ume] in      source  
 
 Serum or      data 
 
 Plasma     
 
 Calcium   8.5 -   mg/dL  Normal  No   Sep 12 
 
 [Mass/vol  10.1    informati  2017 6:15
 
 ume] in      on in    AM
 
 Serum or      source  
 
 Plasma     data 
 
 Chloride   98 - 107  mmoL/L  Normal  No   Sep 12 
 
 [Moles/vo     informati  2017 6:15
 
 lume] in      on in    AM
 
 Serum or      source  
 
 Plasma     data 
 
 Carbon   21.0 -   mmoL/L  Normal  No   Sep 12 
 
 dioxide,   32.0    informati  2017 6:15
 
 total      on in    AM
 
 [Moles/vo     source  
 
 lume] in      data 
 
 Serum or      
 
 Plasma     
 
 Creatinin  0.55 -   mg/dL  Normal  No   Sep 12 
 
 e   1.02    informati  2017 6:15
 
 [Mass/vol     on in    AM
 
 ume] in      source  
 
 Serum or      data 
 
 Plasma     
 
 Creatinin  50 - 200  ML/MIN  Normal  No   Sep 12 
 
 e renal      informati  2017 6:15
 
 clearance     on in    AM
 
       source  
 
 predicted     data 
 
  by      
 
 Cockcroft     
 
 -Gault      
 
 formula     
 
 Estimated  59-  ML/MIN  Low  REFERENCE  Sep 12 
 
        RANGE:   2017 6:15
 
 glomerula     >60    AM
 
 r      ML/MIN/1. 
 
 filtratio     73 SQUARE 
 
 n rate       METERSIf 
 
 (GF      this  
 
     patient  
 
     is  
 
     -A 
 
     merican,  
 
     then  
 
     multiply  
 
     theresult 
 
      by  
 
     1.210. 
 
 Glucose   74 - 106  mg/dL  High  No   Sep 12 
 
 [Mass/vol     informati  2017 6:15
 
 ume] in      on in    AM
 
 Serum or      source  
 
 Plasma     data 
 
 Potassium  3.5 - 5.1  mmoL/L  Normal  No   Sep 12 
 
       informati  2017 6:15
 
 [Moles/vo     on in    AM
 
 lume] in      source  
 
 Serum or      data 
 
 Plasma     
 
 Sodium   136 - 145  mmoL/L  Normal  No   Sep 12 
 
 [Moles/vo     informati  2017 6:15
 
 lume] in      on in    AM
 
 Serum or      source  
 
 Plasma     data 
 
 
 
 
 Streptococcus pyogenes Ag [Presence] in Unspecified specimen
 
 
 
 
 Observa  Value  Referen  Units  Interpr  Notes  Date
 
 tion   ce    etation  
 
   Range    
 
 
 
 
 Collected by nurse? Y
 
 Hold specimen in OE? N
 
 
 
 
 Strepto  NEGATIV  No   No   No   No   Sep 11 
 
 coccus   E  informa  informa  informa  informa  2017 
 
 pyogene   tion in  tion in  tion in  tion in  3:42 PM
 
 s Ag     source   source   source   source 
 
 [Presen    data   data   data   data 
 
 ce] in       
 
 Unspeci      
 
 fied       
 
 specime      
 
 n      
 
 
 
 
 Lactate [Moles/volume] in Blood
 
 
 
 
 Observa  Value  Referen  Units  Interpr  Notes  Date
 
 tion   ce    etation  
 
   Range    
 
 
 
 
 Lactate   0.4 - 2.0  mmol/L  Normal  No   Sep 11 
 
 [Moles/vo     informati  2017 
 
 lume] in      on in   10:10 AM
 
 Blood     source  
 
     data 
 
 
 
 
 Comprehensive metabolic 2000 panel in Serum or Plasma
 
 
 
 
 Observa  Value  Referen  Units  Interpr  Notes  Date
 
 tion   ce    etation  
 
   Range    
 
 
 
 
 Albumin/G  1.1 - 1.8  No   Low  No   Sep 11 
 
 lobulin    informati   informati  2017 9:50
 
 [Mass    on in    on in    AM
 
 ratio] in   source    source  
 
  Serum or   data   data 
 
  Plasma     
 
 Albumin   3.4 - 5.0  gm/dL  Normal  No   Sep 11 
 
 [Mass/vol     informati  2017 9:50
 
 ume] in      on in    AM
 
 Serum or      source  
 
 Plasma     data 
 
 Alkaline   46 - 116  U/L  High  No   Sep 11 
 
 phosphata     informati  2017 9:50
 
 se      on in    AM
 
 [Enzymati     source  
 
 c      data 
 
 activity/     
 
 volume]      
 
 in Serum      
 
 or Plasma     
 
 Bilirubin  0.2 - 1.0  mg/dL  Normal  No   Sep 11 
 
 .total      informati  2017 9:50
 
 [Mass/vol     on in    AM
 
 ume] in      source  
 
 Serum or      data 
 
 Plasma     
 
 Urea   7 - 18  mg/dL  High  No   Sep 11 
 
 nitrogen      informati  2017 9:50
 
 [Mass/vol     on in    AM
 
 ume] in      source  
 
 Serum or      data 
 
 Plasma     
 
 Calcium   8.5 -   mg/dL  Low  No   Sep 11 
 
 [Mass/vol  10.1    informati  2017 9:50
 
 ume] in      on in    AM
 
 Serum or      source  
 
 Plasma     data 
 
 Chloride   98 - 107  mmoL/L  Normal  No   Sep 11 
 
 [Moles/vo     informati  2017 9:50
 
 lume] in      on in    AM
 
 Serum or      source  
 
 Plasma     data 
 
 Carbon   21.0 -   mmoL/L  Normal  No   Sep 11 
 
 dioxide,   32.0    informati  2017 9:50
 
 total      on in    AM
 
 [Moles/vo     source  
 
 lume] in      data 
 
 Serum or      
 
 Plasma     
 
 Creatinin  0.55 -   mg/dL  High  No   Sep 11 
 
 e   1.02    informati  2017 9:50
 
 [Mass/vol     on in    AM
 
 ume] in      source  
 
 Serum or      data 
 
 Plasma     
 
 Creatinin  50 - 200  ML/MIN  Normal  No   Sep 11 
 
 e renal      informati  2017 9:50
 
 clearance     on in    AM
 
       source  
 
 predicted     data 
 
  by      
 
 Cockcroft     
 
 -Gault      
 
 formula     
 
 Estimated  59-  ML/MIN  Low  REFERENCE  Sep 11 
 
        RANGE:    9:50
 
 glomerula     >60    AM
 
 r      ML/MIN/1. 
 
 filtratio     73 SQUARE 
 
 n rate       METERSIf 
 
 (GF      this  
 
     patient  
 
     is  
 
     -A 
 
     merican,  
 
     then  
 
     multiply  
 
     theresult 
 
      by  
 
     1.210. 
 
 Globulin   1.3 - 3.2  gm/dL  High  No   Sep 11 
 
 [Mass/vol     informati   9:50
 
 ume] in      on in    AM
 
 Serum     source  
 
     data 
 
 Glucose   74 - 106  mg/dL  High  No   Sep 11 
 
 [Mass/vol     informati  2017 9:50
 
 ume] in      on in    AM
 
 Serum or      source  
 
 Plasma     data 
 
 Potassium  3.5 - 5.1  mmoL/L  Low alert  *********  Sep 11 
 
       ****    9:50
 
 [Moles/vo     CRITICAL    AM
 
 lume] in      RESULTS** 
 
 Serum or      ********* 
 
 Plasma     *****RESU 
 
     LTS  
 
     CALLED  
 
     TO:  
 
     CHRISTOPHE  
 
     17  
 
     1027  
 
     Kris Heard NOTE 2+ 
 
       
 
     HEMOLYSIS 
 
      MAY HAVE 
 
      SLIGHTLY 
 
      ELEVATED 
 
      K RESULT 
 
 Sodium   136 - 145  mmoL/L  Normal  No   Sep 11 
 
 [Moles/vo     informati  2017 9:50
 
 lume] in      on in    AM
 
 Serum or      source  
 
 Plasma     data 
 
 Aspartate  15 - 37  U/L  Normal  PLEASE   Sep 11 
 
       NOTE 2+    9:50
 
 aminotran     HEMOLYSIS   AM
 
 sferase       MAY HAVE 
 
 [Enzymati      SLIGHTLY 
 
 c       ELEVATED 
 
 activity/      AST 
 
 volume]      
 
 in Serum      
 
 or Plasma     
 
 Alanine   12 - 78  U/L  High  No   Sep 11 
 
 aminotran     informati  2017 9:50
 
 sferase      on in    AM
 
 [Enzymati     source  
 
 c      data 
 
 activity/     
 
 volume]      
 
 in Serum      
 
 or Plasma     
 
 Protein   6.4 - 8.2  gm/dL  Normal  No   Sep 11 
 
 [Mass/vol     informati  2017 9:50
 
 ume] in      on in    AM
 
 Serum or      source  
 
 Plasma     data 
 
 
 
 
 CBC W Auto Differential panel in Blood
 
 
 
 
 Observa  Value  Referen  Units  Interpr  Notes  Date
 
 tion   ce    etation  
 
   Range    
 
 
 
 
 Basophils  0 - 0.2  K/MM3  Normal  No   Sep 11 
 
       informati   9:50
 
 [#/volume     on in    AM
 
 ] in      source  
 
 Blood by      data 
 
 Automated     
 
  count     
 
 Basophils  0.1 - 2.0  %  Normal  No   Sep 11 
 
 /100      informati  2017 9:50
 
 leukocyte     on in    AM
 
 s in      source  
 
 Blood by      data 
 
 Automated     
 
  count     
 
 Eosinophi  0.0 - 0.4  K/mm3  Normal  No   Sep 11 
 
 ls      informati   9:50
 
 [#/volume     on in    AM
 
 ] in      source  
 
 Blood by      data 
 
 Automated     
 
  count     
 
 Eosinophi  0.1 -   %  Normal  No   Sep 11 
 
 ls/100   12.0    informati   9:50
 
 leukocyte     on in    AM
 
 s in      source  
 
 Blood by      data 
 
 Automated     
 
  count     
 
 Granulocy  1.8 - 7.8  K/mm3  Normal  No   Sep 11 
 
 amber      informati   9:50
 
 [#/volume     on in    AM
 
 ] in      source  
 
 Blood by      data 
 
 Automated     
 
  count     
 
 Granulocy  37.0 -   %  Normal  No   Sep 11 
 
 amber/100   80.0    informati  2017 9:50
 
 leukocyte     on in    AM
 
 s in      source  
 
 Blood by      data 
 
 Automated     
 
  count     
 
 Hematocri  37.0 -   %  High  No   Sep 11 
 
 t [Volume  47.0    informati   9:50
 
       on in    AM
 
 Fraction]     source  
 
  of Blood     data 
 
 Hemoglobi  12.2 -   g/dL  High  No   Sep 11 
 
 n   16.2    informati   9:50
 
 [Mass/vol     on in    AM
 
 ume] in      source  
 
 Blood     data 
 
 Lymphocyt  0.7 - 4.5  K/mm3  Normal  No   Sep 11 
 
 es      informati  2017 9:50
 
 [#/volume     on in    AM
 
 ] in      source  
 
 Unspecifi     data 
 
 ed      
 
 specimen      
 
 by      
 
 Automated     
 
  count     
 
 Lymphocyt  10 - 50.0  %  Normal  No   Sep 11 
 
 es      informati  2017 9:50
 
 [#/volume     on in    AM
 
 ] in      source  
 
 Unspecifi     data 
 
 ed      
 
 specimen      
 
 by      
 
 Automated     
 
  count     
 
 Erythrocy  27 - 31.2  pg  Normal  No   Sep 11 
 
 te mean      informati  2017 9:50
 
 corpuscul     on in    AM
 
 ar      source  
 
 hemoglobi     data 
 
 n      
 
 [Entitic      
 
 mass]     
 
 Erythrocy  31.8 -   g/dl  Normal  No   Sep 11 
 
 te mean   35.4    informati   9:50
 
 corpuscul     on in    AM
 
 ar      source  
 
 hemoglobi     data 
 
 n      
 
 concentra     
 
 tion      
 
 [Mass/vol     
 
 ume] by      
 
 Automated     
 
  count     
 
 Erythrocy  82.2 -   fl  Normal  No   Sep 11 
 
 te mean   97.8    informati   9:50
 
 corpuscul     on in    AM
 
 ar volume     source  
 
  [Entitic     data 
 
  volume]      
 
 by      
 
 Automated     
 
  count     
 
 Monocytes  0.1 - 1.0  K/mm3  Normal  No   Sep 11 
 
       informati   9:50
 
 [#/volume     on in    AM
 
 ] in      source  
 
 Blood by      data 
 
 Automated     
 
  count     
 
 Monocytes  1.7 - 9.3  %  Normal  No   Sep 11 
 
 /100      informati  2017 9:50
 
 leukocyte     on in    AM
 
 s in      source  
 
 Blood by      data 
 
 Automated     
 
  count     
 
 Platelet   7.4 -   fl  Normal  No   Sep 11 
 
 mean   10.4    inform2017 9:50
 
 volume      on in    AM
 
 [Entitic      source  
 
 volume]      data 
 
 in Blood      
 
 by      
 
 Automated     
 
  count     
 
 Platelets  142 - 424  K/mm3  Normal  No   Sep 11 
 
       2017 9:50
 
 [#/volume     on in    AM
 
 ] in      source  
 
 Blood     data 
 
 Erythrocy  4.2 - 5.4  M/mm3  High  No   Sep 11 
 
 amber      2017 9:50
 
 [#/volume     on in    AM
 
 ] in      source  
 
 Amniotic      data 
 
 fluid     
 
 Erythrocy  11.5 -   %  Normal  No   Sep 11 
 
 te   17.5    inform2017 9:50
 
 distribut     on in    AM
 
 ion width     source  
 
  [Entitic     data 
 
  volume]      
 
 by      
 
 Automated     
 
  count     
 
 Leukocyte  4.8 -   K/MM3  Normal  No   Sep 11 
 
 s   10.8    2017 9:50
 
 [#/volume     on in    AM
 
 ] in      source  
 
 Blood     data 
 
 
 
 
 Gas panel in Arterial blood
 
 
 
 
 Observa  Value  Referen  Units  Interpr  Notes  Date
 
 tion   ce    etation  
 
   Range    
 
 
 
 
 Base   -2.4-+2.3  MMOL/L  High  No   May 28 
 
 excess in     2017 
 
  Arterial     on in   10:10 PM
 
  blood     source  
 
     data 
 
 
 
 
 Arteria  Y  No   No   No   No   May 28 
 
 l    informa  informa  informa  informa  2017 
 
 patency   tion in  tion in  tion in  tion in  10:10 
 
  Wrist     source   source   source   source  PM
 
 artery     data   data   data   data 
 
 --pre       
 
 arteria      
 
 l       
 
 punctur      
 
 e      
 
 
 
 
 Bicarbona  22.0 -   MMOL/L  High  No   May 28 
 
 te   26.0    2017 
 
 [Moles/vo     on in   10:10 PM
 
 lume] in      source  
 
 Arterial      data 
 
 blood     
 
 Oxygen   No   No   No   No   May 28 
 
 content   informati  informati  informati  2017 
 
 in   on in   on in   on in   on in   10:10 PM
 
 Arterial   source   source   source   source  
 
 blood  data  data  data  data 
 
 Carbon   35.0 -   MMHG  Normal  No   May 28 
 
 dioxide   45.0    2017 
 
 [Partial      on in   10:10 PM
 
 pressure]     source  
 
  in      data 
 
 Arterial      
 
 blood     
 
 pH of   7.35 -   MMOL/L  High  No   May 28 
 
 Arterial   7.45    2017 
 
 blood     on in   10:10 PM
 
     source  
 
     data 
 
 Oxygen   80 - 100  MMHG  Low  No   May 28 
 
 [Partial      inform2017 
 
 pressure]     on in   10:10 PM
 
  in      source  
 
 Arterial      data 
 
 blood     
 
 Oxygen   90 - 100  %  Normal  No   May 28 
 
 saturatio       2017 
 
 n.calcula     on in   10:10 PM
 
 rené from      source  
 
 oxygen      data 
 
 partial      
 
 pressure      
 
 in      
 
 Arterial      
 
 blood     
 
 
 
 
 SOURCE  R/R  No   No   No   No   May 28 
 
   informa  informa  informa  informa   
 
   tion in  tion in  tion in  tion in  10:10 
 
    source   source   source   source  PM
 
    data   data   data   data 
 
 
 
 
 Carbon   23 - 27  MMOL/L  High  No   May 28 
 
 dioxide,      ati   
 
 total      on in   10:10 PM
 
 [Moles/vo     source  
 
 lume] in      data 
 
 Arterial      
 
 blood     
 
 
 
 
 Lactate [Moles/volume] in Blood
 
 
 
 
 Observa  Value  Referen  Units  Interpr  Notes  Date
 
 tion   ce    etation  
 
   Range    
 
 
 
 
 Lactate   0.4 - 2.0  mmol/L  Normal  No   May 28 
 
 [Moles/vo     informati   
 
 lume] in      on in   10:00 PM
 
 Blood     source  
 
     data 
 
 
 
 
 CBC W Auto Differential panel in Blood
 
 
 
 
 Observa  Value  Referen  Units  Interpr  Notes  Date
 
    ce    etation  
 
   Range    
 
 
 
 
 Basophils  0 - 0.2  K/MM3  Normal  No   May 28 
 
       informati  2017 
 
 [#/volume     on in   10:00 PM
 
 ] in      source  
 
 Blood by      data 
 
 Automated     
 
  count     
 
 Basophils  0.1 - 2.0  %  Normal  No   May 28 
 
 /2017 
 
 leukocyte     on in   10:00 PM
 
 s in      source  
 
 Blood by      data 
 
 Automated     
 
  count     
 
 Eosinophi  0.0 - 0.4  K/mm3  Normal  No   May 28 
 
 ls      ati   
 
 [#/volume     on in   10:00 PM
 
 ] in      source  
 
 Blood by      data 
 
 Automated     
 
  count     
 
 Eosinophi  0.1 -   %  Normal  No   May 28 
 
 ls/100   12.0    2017 
 
 leukocyte     on in   10:00 PM
 
 s in      source  
 
 Blood by      data 
 
 Automated     
 
  count     
 
 Granulocy  1.8 - 7.8  K/mm3  Normal  No   May 28 
 
 amber      2017 
 
 [#/volume     on in   10:00 PM
 
 ] in      source  
 
 Blood by      data 
 
 Automated     
 
  count     
 
 Granulocy  37.0 -   %  Normal  No   May 28 
 
 amber/100   80.0    2017 
 
 leukocyte     on in   10:00 PM
 
 s in      source  
 
 Blood by      data 
 
 Automated     
 
  count     
 
 Hematocri  37.0 -   %  High  No   May 28 
 
 t [Volume  47.0    ati   
 
       on in   10:00 PM
 
 Fraction]     source  
 
  of Blood     data 
 
 Hemoglobi  12.2 -   g/dL  High  No   May 28 
 
 n   16.2    2017 
 
 [Mass/vol     on in   10:00 PM
 
 ume] in      source  
 
 Blood     data 
 
 Lymphocyt  0.7 - 4.5  K/mm3  Normal  No   May 28 
 
 es      2017 
 
 [#/volume     on in   10:00 PM
 
 ] in      source  
 
 Unspecifi     data 
 
 ed      
 
 specimen      
 
 by      
 
 Automated     
 
  count     
 
 Lymphocyt  10 - 50.0  %  Normal  No   May 28 
 
 es      informati   
 
 [#/volume     on in   10:00 PM
 
 ] in      source  
 
 Unspecifi     data 
 
 ed      
 
 specimen      
 
 by      
 
 Automated     
 
  count     
 
 Erythrocy  27 - 31.2  pg  Normal  No   May 28 
 
 te mean      inform2017 
 
 corpuscul     on in   10:00 PM
 
 ar      source  
 
 hemoglobi     data 
 
 n      
 
 [Entitic      
 
 mass]     
 
 Erythrocy  31.8 -   g/dl  Normal  No   May 28 
 
 te mean   35.4    inform2017 
 
 corpuscul     on in   10:00 PM
 
 ar      source  
 
 hemoglobi     data 
 
 n      
 
 concentra     
 
 tion      
 
 [Mass/vol     
 
 ume] by      
 
 Automated     
 
  count     
 
 Erythrocy  82.2 -   fl  Normal  No   May 28 
 
 te mean   97.8    inform2017 
 
 corpuscul     on in   10:00 PM
 
 ar volume     source  
 
  [Entitic     data 
 
  volume]      
 
 by      
 
 Automated     
 
  count     
 
 Monocytes  0.1 - 1.0  K/mm3  Normal  No   May 28 
 
       informati   
 
 [#/volume     on in   10:00 PM
 
 ] in      source  
 
 Blood by      data 
 
 Automated     
 
  count     
 
 Monocytes  1.7 - 9.3  %  Normal  No   May 28 
 
 /100      inform2017 
 
 leukocyte     on in   10:00 PM
 
 s in      source  
 
 Blood by      data 
 
 Automated     
 
  count     
 
 Platelet   7.4 -   fl  Low  No   May 28 
 
 mean   10.4    informati   
 
 volume      on in   10:00 PM
 
 [Entitic      source  
 
 volume]      data 
 
 in Blood      
 
 by      
 
 Automated     
 
  count     
 
 Platelets  142 - 424  K/mm3  Normal  No   May 28 
 
       informati   
 
 [#/volume     on in   10:00 PM
 
 ] in      source  
 
 Blood     data 
 
 Erythrocy  4.2 - 5.4  M/mm3  High  No   May 28 
 
 amber      informati   
 
 [#/volume     on in   10:00 PM
 
 ] in      source  
 
 Amniotic      data 
 
 fluid     
 
 Erythrocy  11.5 -   %  Normal  No   May 28 
 
 te   17.5    informati   
 
 distribut     on in   10:00 PM
 
 ion width     source  
 
  [Entitic     data 
 
  volume]      
 
 by      
 
 Automated     
 
  count     
 
 Leukocyte  4.8 -   K/MM3  Normal  No   May 28 
 
 s   10.8    informati   
 
 [#/volume     on in   10:00 PM
 
 ] in      source  
 
 Blood     data

## 2017-10-09 NOTE — EXTERNAL MEDICAL SUMMARY RPT
Patient Summary
 Created on: 10/06/2017
 
LUIS MANUEL BYRD
External Reference #: [id number]
: 62
Sex: Female
 
Demographics
 
 
 
 Address  [address] [place] [address]
 
 Home Phone  [phone]
 
 Preferred Language  English
 
 Marital Status  Unknown
 
 Tenriism Affiliation  Unknown
 
 Race  White
 
 Ethnic Group  Unknown
 
 
Author
 
 
 
 Author            ,            NINA LEYVA
 
 Address  Unknown
 
 Phone  [email or web address]
 
                                            
 
Purpose
                      Continuity of Care Document - 2017 through 10-06-
2017                                                                            
                     
 
Problems
                      
 
 
 Code         Diagnosis    DOS          Provider     Status     
 
                                                               
 
               
 
 E87.6        HYPOKALEMIA                                       
 
                                                                
 
                                                  
 
              
 
 J40          BRONCHITIS,                                       
 
              NOT                                        
 
  SPECIFIED                                        
 
  AS ACUTE OR             
 
   CHRONIC      
 
                
 
         
 
 J44.0        CHRONIC                                        
 
              OBSTRUCTIVE                                       
 
     PULMON                                        
 
  DISEASE W              
 
  ACUTE LOWER   
 
   RESP INFCT   
 
                
 
            
 
 J44.1        CHRONIC                                        
 
              OBSTRUCTIVE                                       
 
     PULMONARY                                        
 
  DISEASE W              
 
  (ACUTE)    
 
  EXACERBATIO   
 
  N             
 
             
 
 N20.0        CALCULUS OF                                       
 
               KIDNEY                                           
 
                                                      
 
                  
 
 R07.9        CHEST PAIN,                                       
 
                                                      
 
    UNSPECIFIED                                       
 
                          
 
            
 
 S82.61XA     DISP FX OF                                        
 
              LATERAL                                        
 
       MALLEOLUS                                        
 
  OF RIGHT              
 
  FIBULA,    
 
  INIT          
 
                
 
 Z72.0        TOBACCO USE                                       
 
                                                                
 
                                                  
 
              
 
                                                                                
                                                                                
      
 
Results
                      
 
 
 Labs                
 
 
 
 
 Lab   Lab   Date    Result  Refere  Interp  Status  Commen
 
 Order   Detail                  nces   retati          t     
 
                                 Range   on                    
 
                                                            
 
                                  
 
                
 
 
 
 
 Differential panel, method unspecified - (2017 06:15)
 
                 
 
 
 
 
          LYMPH      8 %      10% -  Low      complet
 
                   017             50%            ed     
 
                 06:15                                      
 
                              
 
                  
 
         
 
          Platele    NORMAL                     complet
 
          ts   017                              ed     
 
        [Presen  06:15                                      
 
  ce] in                                       
 
  Blood                              
 
  by             
 
  Light      
 
  microsc     
 
  opy         
 
              
 
              
 
 
 
 
 Streptococcus pyogenes Ag [Presence] in Unspecified specimen 
 
 (2017 15:42)                
 
 
 
 
          Strepto    NEGATIV                    complet
 
          coccus   017   E                          ed     
 
        pyogene  15:42                                      
 
  s Ag                                         
 
  [Presen                             
 
  ce] in             
 
  Unspeci     
 
  fied      
 
  specime     
 
  n           
 
              
 
            
 
 
 
 
 Gas panel in Arterial blood (2017 22:10)
 
                 
 
 
 
 
          Arteria    Y                          complet
 
          l   017                              ed     
 
        patency  22:10                                    
 
   Wrist                                   
 
  artery                              
 
  --pre             
 
  arteria     
 
  l      
 
  punctur     
 
  e           
 
              
 
            
 
          SOURCE     R/R                        complet
 
                   017                              ed     
 
                 22:10

## 2017-10-09 NOTE — EXTERNAL MEDICAL SUMMARY RPT
Patient Summary
 Created on: 10/06/2017
 
LUIS MANUEL BYRD
External Reference #: [id number]
: 62
Sex: Female
 
Demographics
 
 
 
 Address  [address] [place] [address]
 
 Preferred Language  English
 
 Marital Status  Unknown
 
 Spiritism Affiliation  Unknown
 
 Race  W
 
 Ethnic Group  Unknown
 
 
Author
 
 
 
 Author  [employer]
 
 Organization  [employer]
 
 Address  Unknown
 
 Phone  Unavailable
 
                                            
 
Purpose
                      Continuity of Care Document - 1900 through 10-06-
2017

## 2017-10-09 NOTE — EXTERNAL MEDICAL SUMMARY RPT
Patient Summary
 Created on: 10/06/2017
 
LUIS MANUEL BYRD
External Reference #: [id number]
: 62
Sex: Female
 
Demographics
 
 
 
 Address  [address] [place] [address]  [address]
 
 Home Phone  [phone]
 
 Preferred Language  English
 
 Marital Status  Unknown
 
 [employer] Affiliation  Unknown
 
 Race  Unknown
 
 Ethnic Group  Unknown
 
 
Author
 
 
 
 Author            ,            NINA LEYVA
 
 Address  Unknown
 
 Phone  [email or web address]
 
                                                                
 
Immunization
                      
 
 
 Name  Date  Rout  CVX   Reac  Dose  Comm  Prov  Is   Faci
 
          e           tion        ent   ider  Refu  lity
 
       Give                                      sed       
 
       n                                                   
 
                                                           
 
                                                   
 
                           
 
               
 
 Infl  09-2  Intr              0.5   Hist  PD20  No    PD20
 
 uenz  8-20  amus              mL    oric  256         256 
 
 a   17    cula                    al                   
 
 Quad        r                       Info                  
 
                                  rmat                  
 
 W/Pr                                ion             
 
 es                             -      
 
                          Sour   
 
                      ce    
 
            Unsp   
 
            ecif   
 
       ied

## 2017-10-09 NOTE — EXTERNAL MEDICAL SUMMARY RPT
Patient Summary
 Created on: 10/06/2017
 
LUIS MANUEL BRYD
External Reference #: [id number]
: 62
Sex: Female
 
Demographics
 
 
 
 Address  [address] [place] [address]
 
 Preferred Language  English
 
 Marital Status  Unknown
 
 Judaism Affiliation  Unknown
 
 Race  W
 
 Ethnic Group  Unknown
 
 
Author
 
 
 
 Author  [employer]
 
 Organization  [employer]
 
 Address  Unknown
 
 Phone  Unavailable
 
                                            
 
Purpose
                      Continuity of Care Document - 1900 through 10-06-
2017

## 2017-10-09 NOTE — EXTERNAL MEDICAL SUMMARY RPT
Patient Summary
 Created on: 10/06/2017
 
LUIS MANUEL BYRD
External Reference #: [id number]
: 62
Sex: Female
 
Demographics
 
 
 
 Address  [address] [place] [address]  [address]
 
 Home Phone  [phone]
 
 Preferred Language  English
 
 Marital Status  Unknown
 
 Restorationism Affiliation  Unknown
 
 Race  Unknown
 
 Ethnic Group  Unknown
 
 
Author
 
 
 
 Author            ,            NINA LEYVA
 
 Address  Unknown
 
 Phone  [email or web address]
 
                                                                
 
Immunization
                      
 
 
 Name  Date  Rout  CVX   Reac  Dose  Comm  Prov  Is   Faci
 
          e           tion        ent   ider  Refu  lity
 
       Give                                      sed       
 
       n                                                   
 
                                                           
 
                                                   
 
                           
 
               
 
 Infl  09-2  Intr              0.5   Hist  PD20  No    PD20
 
 uenz  8-20  amus              mL    oric  256         256 
 
 a   17    cula                    al                   
 
 Quad        r                       Info                  
 
                                  rmat                  
 
 W/Pr                                ion             
 
 es                             -      
 
                          Sour   
 
                      ce    
 
            Unsp   
 
            ecif   
 
       ied

## 2017-10-09 NOTE — EXTERNAL MEDICAL SUMMARY RPT
[facility] Patient Summary
 Created on: 10/05/2017
 
LUIS MANUEL BYRD
External Reference #: [id number]
: 62
Sex: Female
 
Demographics
 
 
 
 Address  [address] [place] [address]
 
 Home Phone  [phone]
 
 Preferred Language  Unknown
 
 Marital Status  Unknown
 
 Rastafarian Affiliation  Unknown
 
 Race  Unknown
 
 Ethnic Group  Unknown
 
 
Author
 
 
 
 Author  [facility], [facility] 
 
 Organization  [facility]
 
 Address  Unknown
 
 Phone  Unavailable
 
 
 
Results
 
 
 
 CBC W Auto Differential panel in Blood
 
 
 
 
 Observa  Value  Referen  Units  Interpr  Notes  Date
 
 tion   ce    etation  
 
   Range    
 
 
 
 
 Basophils  0 - 0.2  K/MM3  Normal  No   Sep 12 
 
       informati  2017 6:15
 
 [#/volume     on in    AM
 
 ] in      source  
 
 Blood by      data 
 
 Automated     
 
  count     
 
 Basophils  0.1 - 2.0  %  Normal  No   Sep 12 
 
 /100      informati  2017 6:15
 
 leukocyte     on in    AM
 
 s in      source  
 
 Blood by      data 
 
 Automated     
 
  count     
 
 Eosinophi  0.0 - 0.4  K/mm3  Normal  No   Sep 12 
 
 ls      informati  2017 6:15
 
 [#/volume     on in    AM
 
 ] in      source  
 
 Blood by      data 
 
 Automated     
 
  count     
 
 Eosinophi  0.1 -   %  Normal  No   Sep 12 
 
 ls/100   12.0    informati  2017 6:15
 
 leukocyte     on in    AM
 
 s in      source  
 
 Blood by      data 
 
 Automated     
 
  count     
 
 Granulocy  1.8 - 7.8  K/mm3  High  No   Sep 12 
 
 amber      informati  2017 6:15
 
 [#/volume     on in    AM
 
 ] in      source  
 
 Blood by      data 
 
 Automated     
 
  count     
 
 Granulocy  37.0 -   %  High  No   Sep 12 
 
 amber/100   80.0    informati  2017 6:15
 
 leukocyte     on in    AM
 
 s in      source  
 
 Blood by      data 
 
 Automated     
 
  count     
 
 Hematocri  37.0 -   %  High  No   Sep 12 
 
 t [Volume  47.0    informati  2017 6:15
 
       on in    AM
 
 Fraction]     source  
 
  of Blood     data 
 
 Hemoglobi  12.2 -   g/dL  Normal  No   Sep 12 
 
 n   16.2    informati  2017 6:15
 
 [Mass/vol     on in    AM
 
 ume] in      source  
 
 Blood     data 
 
 Lymphocyt  0.7 - 4.5  K/mm3  Normal  No   Sep 12 
 
 es      informati  2017 6:15
 
 [#/volume     on in    AM
 
 ] in      source  
 
 Unspecifi     data 
 
 ed      
 
 specimen      
 
 by      
 
 Automated     
 
  count     
 
 Lymphocyt  10 - 50.0  %  Low  No   Sep 12 
 
 es      informati  2017 6:15
 
 [#/volume     on in    AM
 
 ] in      source  
 
 Unspecifi     data 
 
 ed      
 
 specimen      
 
 by      
 
 Automated     
 
  count     
 
 Erythrocy  27 - 31.2  pg  Normal  No   Sep 12 
 
 te mean      informati  2017 6:15
 
 corpuscul     on in    AM
 
 ar      source  
 
 hemoglobi     data 
 
 n      
 
 [Entitic      
 
 mass]     
 
 Erythrocy  31.8 -   g/dl  Normal  No   Sep 12 
 
 te mean   35.4    informati  2017 6:15
 
 corpuscul     on in    AM
 
 ar      source  
 
 hemoglobi     data 
 
 n      
 
 concentra     
 
 tion      
 
 [Mass/vol     
 
 ume] by      
 
 Automated     
 
  count     
 
 Erythrocy  82.2 -   fl  Normal  No   Sep 12 
 
 te mean   97.8    informati  2017 6:15
 
 corpuscul     on in    AM
 
 ar volume     source  
 
  [Entitic     data 
 
  volume]      
 
 by      
 
 Automated     
 
  count     
 
 Monocytes  0.1 - 1.0  K/mm3  Normal  No   Sep 12 
 
       informati  2017 6:15
 
 [#/volume     on in    AM
 
 ] in      source  
 
 Blood by      data 
 
 Automated     
 
  count     
 
 Monocytes  1.7 - 9.3  %  Low  No   Sep 12 
 
 /100      informati  2017 6:15
 
 leukocyte     on in    AM
 
 s in      source  
 
 Blood by      data 
 
 Automated     
 
  count     
 
 Platelet   7.4 -   fl  Normal  No   Sep 12 
 
 mean   10.4    informati  2017 6:15
 
 volume      on in    AM
 
 [Entitic      source  
 
 volume]      data 
 
 in Blood      
 
 by      
 
 Automated     
 
  count     
 
 Platelets  142 - 424  K/mm3  Normal  No   Sep 12 
 
       informati  2017 6:15
 
 [#/volume     on in    AM
 
 ] in      source  
 
 Blood     data 
 
 Erythrocy  4.2 - 5.4  M/mm3  Normal  No   Sep 12 
 
 amber      informati  2017 6:15
 
 [#/volume     on in    AM
 
 ] in      source  
 
 Amniotic      data 
 
 fluid     
 
 Erythrocy  11.5 -   %  Normal  No   Sep 12 
 
 te   17.5    informati  2017 6:15
 
 distribut     on in    AM
 
 ion width     source  
 
  [Entitic     data 
 
  volume]      
 
 by      
 
 Automated     
 
  count     
 
 Leukocyte  4.8 -   K/MM3  High  No   Sep 12 
 
 s   10.8    informati  2017 6:15
 
 [#/volume     on in    AM
 
 ] in      source  
 
 Blood     data 
 
 
 
 
 Differential panel, method unspecified -
 
 
 
 
 Observa  Value  Referen  Units  Interpr  Notes  Date
 
 tion   ce    etation  
 
   Range    
 
 
 
 
 Neutrophi  0 - 8  %  Normal  No   Sep 12 
 
 ls.band      informati  2017 6:15
 
 form/100      on in    AM
 
 leukocyte     source  
 
 s in      data 
 
 Blood by      
 
 Automated     
 
  count     
 
 
 
 
 LYMPH  8  10 - 50  %  Low  No   Sep 12 
 
      informa  2017 
 
      tion in  6:15 AM
 
       source 
 
       data 
 
 
 
 
 Monocytes  2 - 9  %  Low  No   Sep 12 
 
 /100      informati  2017 6:15
 
 leukocyte     on in    AM
 
 s in      source  
 
 Blood by      data 
 
 Automated     
 
  count     
 
 
 
 
 Platele  NORMAL  No   No   No   No   Sep 12 
 
 ts    informa  informa  informa  informa  2017 
 
 [Presen   tion in  tion in  tion in  tion in  6:15 AM
 
 ce] in     source   source   source   source 
 
 Blood     data   data   data   data 
 
 by       
 
 Light       
 
 microsc      
 
 opy      
 
 
 
 
 Neutrophi  42 - 76  %  High  No   Sep 12 
 
 ls      informati  2017 6:15
 
 [#/volume     on in    AM
 
 ] in      source  
 
 Blood by      data 
 
 Automated     
 
  count     
 
 Cells   No   #CELLS  No   No   Sep 12 
 
 Counted   informati   informati  informati  2017 6:15
 
 Total [#]  on in    on in   on in    AM
 
  in Blood  source    source   source  
 
  data   data  data 
 
 
 
 
 Basic metabolic panel in Blood
 
 
 
 
 Observa  Value  Referen  Units  Interpr  Notes  Date
 
 tion   ce    etation  
 
   Range    
 
 
 
 
 Urea   7 - 18  mg/dL  High  No   Sep 12 
 
 nitrogen      informati  2017 6:15
 
 [Mass/vol     on in    AM
 
 ume] in      source  
 
 Serum or      data 
 
 Plasma     
 
 Calcium   8.5 -   mg/dL  Normal  No   Sep 12 
 
 [Mass/vol  10.1    informati  2017 6:15
 
 ume] in      on in    AM
 
 Serum or      source  
 
 Plasma     data 
 
 Chloride   98 - 107  mmoL/L  Normal  No   Sep 12 
 
 [Moles/vo     informati  2017 6:15
 
 lume] in      on in    AM
 
 Serum or      source  
 
 Plasma     data 
 
 Carbon   21.0 -   mmoL/L  Normal  No   Sep 12 
 
 dioxide,   32.0    informati  2017 6:15
 
 total      on in    AM
 
 [Moles/vo     source  
 
 lume] in      data 
 
 Serum or      
 
 Plasma     
 
 Creatinin  0.55 -   mg/dL  Normal  No   Sep 12 
 
 e   1.02    informati  2017 6:15
 
 [Mass/vol     on in    AM
 
 ume] in      source  
 
 Serum or      data 
 
 Plasma     
 
 Creatinin  50 - 200  ML/MIN  Normal  No   Sep 12 
 
 e renal      informati  2017 6:15
 
 clearance     on in    AM
 
       source  
 
 predicted     data 
 
  by      
 
 Cockcroft     
 
 -Gault      
 
 formula     
 
 Estimated  59-  ML/MIN  Low  REFERENCE  Sep 12 
 
        RANGE:   2017 6:15
 
 glomerula     >60    AM
 
 r      ML/MIN/1. 
 
 filtratio     73 SQUARE 
 
 n rate       METERSIf 
 
 (GF      this  
 
     patient  
 
     is  
 
     -A 
 
     merican,  
 
     then  
 
     multiply  
 
     theresult 
 
      by  
 
     1.210. 
 
 Glucose   74 - 106  mg/dL  High  No   Sep 12 
 
 [Mass/vol     informati  2017 6:15
 
 ume] in      on in    AM
 
 Serum or      source  
 
 Plasma     data 
 
 Potassium  3.5 - 5.1  mmoL/L  Normal  No   Sep 12 
 
       informati  2017 6:15
 
 [Moles/vo     on in    AM
 
 lume] in      source  
 
 Serum or      data 
 
 Plasma     
 
 Sodium   136 - 145  mmoL/L  Normal  No   Sep 12 
 
 [Moles/vo     informati  2017 6:15
 
 lume] in      on in    AM
 
 Serum or      source  
 
 Plasma     data 
 
 
 
 
 Streptococcus pyogenes Ag [Presence] in Unspecified specimen
 
 
 
 
 Observa  Value  Referen  Units  Interpr  Notes  Date
 
 tion   ce    etation  
 
   Range    
 
 
 
 
 Collected by nurse? Y
 
 Hold specimen in OE? N
 
 
 
 
 Strepto  NEGATIV  No   No   No   No   Sep 11 
 
 coccus   E  informa  informa  informa  informa  2017 
 
 pyogene   tion in  tion in  tion in  tion in  3:42 PM
 
 s Ag     source   source   source   source 
 
 [Presen    data   data   data   data 
 
 ce] in       
 
 Unspeci      
 
 fied       
 
 specime      
 
 n      
 
 
 
 
 Lactate [Moles/volume] in Blood
 
 
 
 
 Observa  Value  Referen  Units  Interpr  Notes  Date
 
 tion   ce    etation  
 
   Range    
 
 
 
 
 Lactate   0.4 - 2.0  mmol/L  Normal  No   Sep 11 
 
 [Moles/vo     informati  2017 
 
 lume] in      on in   10:10 AM
 
 Blood     source  
 
     data 
 
 
 
 
 Comprehensive metabolic 2000 panel in Serum or Plasma
 
 
 
 
 Observa  Value  Referen  Units  Interpr  Notes  Date
 
 tion   ce    etation  
 
   Range    
 
 
 
 
 Albumin/G  1.1 - 1.8  No   Low  No   Sep 11 
 
 lobulin    informati   informati  2017 9:50
 
 [Mass    on in    on in    AM
 
 ratio] in   source    source  
 
  Serum or   data   data 
 
  Plasma     
 
 Albumin   3.4 - 5.0  gm/dL  Normal  No   Sep 11 
 
 [Mass/vol     informati  2017 9:50
 
 ume] in      on in    AM
 
 Serum or      source  
 
 Plasma     data 
 
 Alkaline   46 - 116  U/L  High  No   Sep 11 
 
 phosphata     informati  2017 9:50
 
 se      on in    AM
 
 [Enzymati     source  
 
 c      data 
 
 activity/     
 
 volume]      
 
 in Serum      
 
 or Plasma     
 
 Bilirubin  0.2 - 1.0  mg/dL  Normal  No   Sep 11 
 
 .total      informati  2017 9:50
 
 [Mass/vol     on in    AM
 
 ume] in      source  
 
 Serum or      data 
 
 Plasma     
 
 Urea   7 - 18  mg/dL  High  No   Sep 11 
 
 nitrogen      informati  2017 9:50
 
 [Mass/vol     on in    AM
 
 ume] in      source  
 
 Serum or      data 
 
 Plasma     
 
 Calcium   8.5 -   mg/dL  Low  No   Sep 11 
 
 [Mass/vol  10.1    informati  2017 9:50
 
 ume] in      on in    AM
 
 Serum or      source  
 
 Plasma     data 
 
 Chloride   98 - 107  mmoL/L  Normal  No   Sep 11 
 
 [Moles/vo     informati  2017 9:50
 
 lume] in      on in    AM
 
 Serum or      source  
 
 Plasma     data 
 
 Carbon   21.0 -   mmoL/L  Normal  No   Sep 11 
 
 dioxide,   32.0    informati  2017 9:50
 
 total      on in    AM
 
 [Moles/vo     source  
 
 lume] in      data 
 
 Serum or      
 
 Plasma     
 
 Creatinin  0.55 -   mg/dL  High  No   Sep 11 
 
 e   1.02    informati  2017 9:50
 
 [Mass/vol     on in    AM
 
 ume] in      source  
 
 Serum or      data 
 
 Plasma     
 
 Creatinin  50 - 200  ML/MIN  Normal  No   Sep 11 
 
 e renal      informati  2017 9:50
 
 clearance     on in    AM
 
       source  
 
 predicted     data 
 
  by      
 
 Cockcroft     
 
 -Gault      
 
 formula     
 
 Estimated  59-  ML/MIN  Low  REFERENCE  Sep 11 
 
        RANGE:    9:50
 
 glomerula     >60    AM
 
 r      ML/MIN/1. 
 
 filtratio     73 SQUARE 
 
 n rate       METERSIf 
 
 (GF      this  
 
     patient  
 
     is  
 
     -A 
 
     merican,  
 
     then  
 
     multiply  
 
     theresult 
 
      by  
 
     1.210. 
 
 Globulin   1.3 - 3.2  gm/dL  High  No   Sep 11 
 
 [Mass/vol     informati   9:50
 
 ume] in      on in    AM
 
 Serum     source  
 
     data 
 
 Glucose   74 - 106  mg/dL  High  No   Sep 11 
 
 [Mass/vol     informati  2017 9:50
 
 ume] in      on in    AM
 
 Serum or      source  
 
 Plasma     data 
 
 Potassium  3.5 - 5.1  mmoL/L  Low alert  *********  Sep 11 
 
       ****    9:50
 
 [Moles/vo     CRITICAL    AM
 
 lume] in      RESULTS** 
 
 Serum or      ********* 
 
 Plasma     *****RESU 
 
     LTS  
 
     CALLED  
 
     TO:  
 
     CHRISTOPHE  
 
     17  
 
     1027  
 
     Kris Heard NOTE 2+ 
 
       
 
     HEMOLYSIS 
 
      MAY HAVE 
 
      SLIGHTLY 
 
      ELEVATED 
 
      K RESULT 
 
 Sodium   136 - 145  mmoL/L  Normal  No   Sep 11 
 
 [Moles/vo     informati  2017 9:50
 
 lume] in      on in    AM
 
 Serum or      source  
 
 Plasma     data 
 
 Aspartate  15 - 37  U/L  Normal  PLEASE   Sep 11 
 
       NOTE 2+    9:50
 
 aminotran     HEMOLYSIS   AM
 
 sferase       MAY HAVE 
 
 [Enzymati      SLIGHTLY 
 
 c       ELEVATED 
 
 activity/      AST 
 
 volume]      
 
 in Serum      
 
 or Plasma     
 
 Alanine   12 - 78  U/L  High  No   Sep 11 
 
 aminotran     informati  2017 9:50
 
 sferase      on in    AM
 
 [Enzymati     source  
 
 c      data 
 
 activity/     
 
 volume]      
 
 in Serum      
 
 or Plasma     
 
 Protein   6.4 - 8.2  gm/dL  Normal  No   Sep 11 
 
 [Mass/vol     informati  2017 9:50
 
 ume] in      on in    AM
 
 Serum or      source  
 
 Plasma     data 
 
 
 
 
 CBC W Auto Differential panel in Blood
 
 
 
 
 Observa  Value  Referen  Units  Interpr  Notes  Date
 
 tion   ce    etation  
 
   Range    
 
 
 
 
 Basophils  0 - 0.2  K/MM3  Normal  No   Sep 11 
 
       informati   9:50
 
 [#/volume     on in    AM
 
 ] in      source  
 
 Blood by      data 
 
 Automated     
 
  count     
 
 Basophils  0.1 - 2.0  %  Normal  No   Sep 11 
 
 /100      informati  2017 9:50
 
 leukocyte     on in    AM
 
 s in      source  
 
 Blood by      data 
 
 Automated     
 
  count     
 
 Eosinophi  0.0 - 0.4  K/mm3  Normal  No   Sep 11 
 
 ls      informati   9:50
 
 [#/volume     on in    AM
 
 ] in      source  
 
 Blood by      data 
 
 Automated     
 
  count     
 
 Eosinophi  0.1 -   %  Normal  No   Sep 11 
 
 ls/100   12.0    informati   9:50
 
 leukocyte     on in    AM
 
 s in      source  
 
 Blood by      data 
 
 Automated     
 
  count     
 
 Granulocy  1.8 - 7.8  K/mm3  Normal  No   Sep 11 
 
 amber      informati   9:50
 
 [#/volume     on in    AM
 
 ] in      source  
 
 Blood by      data 
 
 Automated     
 
  count     
 
 Granulocy  37.0 -   %  Normal  No   Sep 11 
 
 amber/100   80.0    informati  2017 9:50
 
 leukocyte     on in    AM
 
 s in      source  
 
 Blood by      data 
 
 Automated     
 
  count     
 
 Hematocri  37.0 -   %  High  No   Sep 11 
 
 t [Volume  47.0    informati   9:50
 
       on in    AM
 
 Fraction]     source  
 
  of Blood     data 
 
 Hemoglobi  12.2 -   g/dL  High  No   Sep 11 
 
 n   16.2    informati   9:50
 
 [Mass/vol     on in    AM
 
 ume] in      source  
 
 Blood     data 
 
 Lymphocyt  0.7 - 4.5  K/mm3  Normal  No   Sep 11 
 
 es      informati  2017 9:50
 
 [#/volume     on in    AM
 
 ] in      source  
 
 Unspecifi     data 
 
 ed      
 
 specimen      
 
 by      
 
 Automated     
 
  count     
 
 Lymphocyt  10 - 50.0  %  Normal  No   Sep 11 
 
 es      informati  2017 9:50
 
 [#/volume     on in    AM
 
 ] in      source  
 
 Unspecifi     data 
 
 ed      
 
 specimen      
 
 by      
 
 Automated     
 
  count     
 
 Erythrocy  27 - 31.2  pg  Normal  No   Sep 11 
 
 te mean      informati  2017 9:50
 
 corpuscul     on in    AM
 
 ar      source  
 
 hemoglobi     data 
 
 n      
 
 [Entitic      
 
 mass]     
 
 Erythrocy  31.8 -   g/dl  Normal  No   Sep 11 
 
 te mean   35.4    informati   9:50
 
 corpuscul     on in    AM
 
 ar      source  
 
 hemoglobi     data 
 
 n      
 
 concentra     
 
 tion      
 
 [Mass/vol     
 
 ume] by      
 
 Automated     
 
  count     
 
 Erythrocy  82.2 -   fl  Normal  No   Sep 11 
 
 te mean   97.8    informati   9:50
 
 corpuscul     on in    AM
 
 ar volume     source  
 
  [Entitic     data 
 
  volume]      
 
 by      
 
 Automated     
 
  count     
 
 Monocytes  0.1 - 1.0  K/mm3  Normal  No   Sep 11 
 
       informati   9:50
 
 [#/volume     on in    AM
 
 ] in      source  
 
 Blood by      data 
 
 Automated     
 
  count     
 
 Monocytes  1.7 - 9.3  %  Normal  No   Sep 11 
 
 /100      informati  2017 9:50
 
 leukocyte     on in    AM
 
 s in      source  
 
 Blood by      data 
 
 Automated     
 
  count     
 
 Platelet   7.4 -   fl  Normal  No   Sep 11 
 
 mean   10.4    inform2017 9:50
 
 volume      on in    AM
 
 [Entitic      source  
 
 volume]      data 
 
 in Blood      
 
 by      
 
 Automated     
 
  count     
 
 Platelets  142 - 424  K/mm3  Normal  No   Sep 11 
 
       2017 9:50
 
 [#/volume     on in    AM
 
 ] in      source  
 
 Blood     data 
 
 Erythrocy  4.2 - 5.4  M/mm3  High  No   Sep 11 
 
 amber      2017 9:50
 
 [#/volume     on in    AM
 
 ] in      source  
 
 Amniotic      data 
 
 fluid     
 
 Erythrocy  11.5 -   %  Normal  No   Sep 11 
 
 te   17.5    inform2017 9:50
 
 distribut     on in    AM
 
 ion width     source  
 
  [Entitic     data 
 
  volume]      
 
 by      
 
 Automated     
 
  count     
 
 Leukocyte  4.8 -   K/MM3  Normal  No   Sep 11 
 
 s   10.8    2017 9:50
 
 [#/volume     on in    AM
 
 ] in      source  
 
 Blood     data 
 
 
 
 
 Gas panel in Arterial blood
 
 
 
 
 Observa  Value  Referen  Units  Interpr  Notes  Date
 
 tion   ce    etation  
 
   Range    
 
 
 
 
 Base   -2.4-+2.3  MMOL/L  High  No   May 28 
 
 excess in     2017 
 
  Arterial     on in   10:10 PM
 
  blood     source  
 
     data 
 
 
 
 
 Arteria  Y  No   No   No   No   May 28 
 
 l    informa  informa  informa  informa  2017 
 
 patency   tion in  tion in  tion in  tion in  10:10 
 
  Wrist     source   source   source   source  PM
 
 artery     data   data   data   data 
 
 --pre       
 
 arteria      
 
 l       
 
 punctur      
 
 e      
 
 
 
 
 Bicarbona  22.0 -   MMOL/L  High  No   May 28 
 
 te   26.0    2017 
 
 [Moles/vo     on in   10:10 PM
 
 lume] in      source  
 
 Arterial      data 
 
 blood     
 
 Oxygen   No   No   No   No   May 28 
 
 content   informati  informati  informati  2017 
 
 in   on in   on in   on in   on in   10:10 PM
 
 Arterial   source   source   source   source  
 
 blood  data  data  data  data 
 
 Carbon   35.0 -   MMHG  Normal  No   May 28 
 
 dioxide   45.0    2017 
 
 [Partial      on in   10:10 PM
 
 pressure]     source  
 
  in      data 
 
 Arterial      
 
 blood     
 
 pH of   7.35 -   MMOL/L  High  No   May 28 
 
 Arterial   7.45    2017 
 
 blood     on in   10:10 PM
 
     source  
 
     data 
 
 Oxygen   80 - 100  MMHG  Low  No   May 28 
 
 [Partial      inform2017 
 
 pressure]     on in   10:10 PM
 
  in      source  
 
 Arterial      data 
 
 blood     
 
 Oxygen   90 - 100  %  Normal  No   May 28 
 
 saturatio       2017 
 
 n.calcula     on in   10:10 PM
 
 rené from      source  
 
 oxygen      data 
 
 partial      
 
 pressure      
 
 in      
 
 Arterial      
 
 blood     
 
 
 
 
 SOURCE  R/R  No   No   No   No   May 28 
 
   informa  informa  informa  informa   
 
   tion in  tion in  tion in  tion in  10:10 
 
    source   source   source   source  PM
 
    data   data   data   data 
 
 
 
 
 Carbon   23 - 27  MMOL/L  High  No   May 28 
 
 dioxide,      ati   
 
 total      on in   10:10 PM
 
 [Moles/vo     source  
 
 lume] in      data 
 
 Arterial      
 
 blood     
 
 
 
 
 Lactate [Moles/volume] in Blood
 
 
 
 
 Observa  Value  Referen  Units  Interpr  Notes  Date
 
 tion   ce    etation  
 
   Range    
 
 
 
 
 Lactate   0.4 - 2.0  mmol/L  Normal  No   May 28 
 
 [Moles/vo     informati   
 
 lume] in      on in   10:00 PM
 
 Blood     source  
 
     data 
 
 
 
 
 CBC W Auto Differential panel in Blood
 
 
 
 
 Observa  Value  Referen  Units  Interpr  Notes  Date
 
    ce    etation  
 
   Range    
 
 
 
 
 Basophils  0 - 0.2  K/MM3  Normal  No   May 28 
 
       informati  2017 
 
 [#/volume     on in   10:00 PM
 
 ] in      source  
 
 Blood by      data 
 
 Automated     
 
  count     
 
 Basophils  0.1 - 2.0  %  Normal  No   May 28 
 
 /2017 
 
 leukocyte     on in   10:00 PM
 
 s in      source  
 
 Blood by      data 
 
 Automated     
 
  count     
 
 Eosinophi  0.0 - 0.4  K/mm3  Normal  No   May 28 
 
 ls      ati   
 
 [#/volume     on in   10:00 PM
 
 ] in      source  
 
 Blood by      data 
 
 Automated     
 
  count     
 
 Eosinophi  0.1 -   %  Normal  No   May 28 
 
 ls/100   12.0    2017 
 
 leukocyte     on in   10:00 PM
 
 s in      source  
 
 Blood by      data 
 
 Automated     
 
  count     
 
 Granulocy  1.8 - 7.8  K/mm3  Normal  No   May 28 
 
 amber      2017 
 
 [#/volume     on in   10:00 PM
 
 ] in      source  
 
 Blood by      data 
 
 Automated     
 
  count     
 
 Granulocy  37.0 -   %  Normal  No   May 28 
 
 amber/100   80.0    2017 
 
 leukocyte     on in   10:00 PM
 
 s in      source  
 
 Blood by      data 
 
 Automated     
 
  count     
 
 Hematocri  37.0 -   %  High  No   May 28 
 
 t [Volume  47.0    ati   
 
       on in   10:00 PM
 
 Fraction]     source  
 
  of Blood     data 
 
 Hemoglobi  12.2 -   g/dL  High  No   May 28 
 
 n   16.2    2017 
 
 [Mass/vol     on in   10:00 PM
 
 ume] in      source  
 
 Blood     data 
 
 Lymphocyt  0.7 - 4.5  K/mm3  Normal  No   May 28 
 
 es      2017 
 
 [#/volume     on in   10:00 PM
 
 ] in      source  
 
 Unspecifi     data 
 
 ed      
 
 specimen      
 
 by      
 
 Automated     
 
  count     
 
 Lymphocyt  10 - 50.0  %  Normal  No   May 28 
 
 es      informati   
 
 [#/volume     on in   10:00 PM
 
 ] in      source  
 
 Unspecifi     data 
 
 ed      
 
 specimen      
 
 by      
 
 Automated     
 
  count     
 
 Erythrocy  27 - 31.2  pg  Normal  No   May 28 
 
 te mean      inform2017 
 
 corpuscul     on in   10:00 PM
 
 ar      source  
 
 hemoglobi     data 
 
 n      
 
 [Entitic      
 
 mass]     
 
 Erythrocy  31.8 -   g/dl  Normal  No   May 28 
 
 te mean   35.4    inform2017 
 
 corpuscul     on in   10:00 PM
 
 ar      source  
 
 hemoglobi     data 
 
 n      
 
 concentra     
 
 tion      
 
 [Mass/vol     
 
 ume] by      
 
 Automated     
 
  count     
 
 Erythrocy  82.2 -   fl  Normal  No   May 28 
 
 te mean   97.8    inform2017 
 
 corpuscul     on in   10:00 PM
 
 ar volume     source  
 
  [Entitic     data 
 
  volume]      
 
 by      
 
 Automated     
 
  count     
 
 Monocytes  0.1 - 1.0  K/mm3  Normal  No   May 28 
 
       informati   
 
 [#/volume     on in   10:00 PM
 
 ] in      source  
 
 Blood by      data 
 
 Automated     
 
  count     
 
 Monocytes  1.7 - 9.3  %  Normal  No   May 28 
 
 /100      inform2017 
 
 leukocyte     on in   10:00 PM
 
 s in      source  
 
 Blood by      data 
 
 Automated     
 
  count     
 
 Platelet   7.4 -   fl  Low  No   May 28 
 
 mean   10.4    informati   
 
 volume      on in   10:00 PM
 
 [Entitic      source  
 
 volume]      data 
 
 in Blood      
 
 by      
 
 Automated     
 
  count     
 
 Platelets  142 - 424  K/mm3  Normal  No   May 28 
 
       informati   
 
 [#/volume     on in   10:00 PM
 
 ] in      source  
 
 Blood     data 
 
 Erythrocy  4.2 - 5.4  M/mm3  High  No   May 28 
 
 amber      informati   
 
 [#/volume     on in   10:00 PM
 
 ] in      source  
 
 Amniotic      data 
 
 fluid     
 
 Erythrocy  11.5 -   %  Normal  No   May 28 
 
 te   17.5    informati   
 
 distribut     on in   10:00 PM
 
 ion width     source  
 
  [Entitic     data 
 
  volume]      
 
 by      
 
 Automated     
 
  count     
 
 Leukocyte  4.8 -   K/MM3  Normal  No   May 28 
 
 s   10.8    informati   
 
 [#/volume     on in   10:00 PM
 
 ] in      source  
 
 Blood     data

## 2018-09-12 ENCOUNTER — HOSPITAL ENCOUNTER (OUTPATIENT)
Age: 58
End: 2018-09-12
Payer: MEDICAID

## 2018-09-12 DIAGNOSIS — E78.5: ICD-10-CM

## 2018-09-12 DIAGNOSIS — E03.9: ICD-10-CM

## 2018-09-12 DIAGNOSIS — M54.5: ICD-10-CM

## 2018-09-12 DIAGNOSIS — J43.1: Primary | ICD-10-CM

## 2018-09-12 DIAGNOSIS — G89.29: ICD-10-CM

## 2018-09-12 LAB
ALBUMIN LEVEL: 3.4 GM/DL (ref 3.4–5)
ALBUMIN/GLOB SERPL: 0.9 {RATIO} (ref 1.1–1.8)
ALP ISO SERPL-ACNC: 123 U/L (ref 46–116)
ALT SERPLBLD-CCNC: 35 U/L (ref 12–78)
ANION GAP SERPL CALC-SCNC: 10.3 MEQ/L (ref 5–15)
AST SERPL QL: 14 U/L (ref 15–37)
BACTERIA URNS QL MICRO: (no result) /LPF
BILIRUBIN,TOTAL: 1 MG/DL (ref 0.2–1)
BUN SERPL-MCNC: 16 MG/DL (ref 7–18)
CALCIUM SPEC-MCNC: 8.6 MG/DL (ref 8.5–10.1)
CHLORIDE SPEC-SCNC: 106 MMOL/L (ref 98–107)
CHOLEST SPEC-SCNC: 241 MG/DL (ref 140–200)
CO2 SERPL-SCNC: 33 MMOL/L (ref 21–32)
COLOR UR: YELLOW
CREAT BLD-SCNC: 0.94 MG/DL (ref 0.55–1.02)
ESTIMATED GLOMERULAR FILT RATE: 62 ML/MIN (ref 60–?)
GFR (AFRICAN AMERICAN): 75 ML/MIN (ref 60–?)
GLOBULIN SER CALC-MCNC: 3.7 GM/DL (ref 1.3–3.2)
GLUCOSE: 97 MG/DL (ref 74–106)
HCT VFR BLD CALC: 50.8 % (ref 37–47)
HDLC SERPL-MCNC: 38 MG/DL (ref 29–89)
HGB BLD-MCNC: 16.2 G/DL (ref 12.2–16.2)
MCHC RBC-ENTMCNC: 31.9 G/DL (ref 31.8–35.4)
MCV RBC: 95 FL (ref 81–99)
MEAN CORPUSCULAR HEMOGLOBIN: 30.3 PG (ref 27–31.2)
MICRO URNS: (no result)
PH UR: 6.5 [PH] (ref 5–8.5)
PLATELET # BLD: 218 K/MM3 (ref 142–424)
POTASSIUM: 4.3 MMOL/L (ref 3.5–5.1)
PROT SERPL-MCNC: 7.1 GM/DL (ref 6.4–8.2)
RBC # BLD AUTO: 5.35 M/MM3 (ref 4.2–5.4)
RBC #/AREA URNS HPF: (no result) #/HPF (ref 0–3)
SODIUM SPEC-SCNC: 145 MMOL/L (ref 136–145)
SP GR UR: 1.02 (ref 1–1.03)
SQUAMOUS URNS QL MICRO: (no result) #/HPF (ref 0–5)
TRIGLYCERIDES: 159 MG/DL (ref 30–200)
TSH SERPL-ACNC: 2.21 UIU/ML (ref 0.36–3.74)
UROBILINOGEN UR QL: 0.2 EU/DL
WBC # BLD AUTO: 6.1 K/MM3 (ref 4.8–10.8)
WBC # UR: (no result) #/HPF (ref 0–3)

## 2018-09-12 PROCEDURE — 87086 URINE CULTURE/COLONY COUNT: CPT

## 2018-09-12 PROCEDURE — 80053 COMPREHEN METABOLIC PANEL: CPT

## 2018-09-12 PROCEDURE — 84443 ASSAY THYROID STIM HORMONE: CPT

## 2018-09-12 PROCEDURE — 85025 COMPLETE CBC W/AUTO DIFF WBC: CPT

## 2018-09-12 PROCEDURE — 36415 COLL VENOUS BLD VENIPUNCTURE: CPT

## 2018-09-12 PROCEDURE — 81001 URINALYSIS AUTO W/SCOPE: CPT

## 2018-09-12 PROCEDURE — 80305 DRUG TEST PRSMV DIR OPT OBS: CPT

## 2018-09-12 PROCEDURE — 80061 LIPID PANEL: CPT

## 2019-01-12 ENCOUNTER — APPOINTMENT (OUTPATIENT)
Dept: GENERAL RADIOLOGY | Facility: HOSPITAL | Age: 59
End: 2019-01-12

## 2019-01-12 ENCOUNTER — HOSPITAL ENCOUNTER (INPATIENT)
Facility: HOSPITAL | Age: 59
LOS: 12 days | Discharge: NURSING, REHAB OR LONG-TERM CARE | End: 2019-01-24
Attending: EMERGENCY MEDICINE | Admitting: INTERNAL MEDICINE

## 2019-01-12 ENCOUNTER — APPOINTMENT (OUTPATIENT)
Dept: CT IMAGING | Facility: HOSPITAL | Age: 59
End: 2019-01-12

## 2019-01-12 DIAGNOSIS — N28.82 LEFT URETER DILATED: Primary | ICD-10-CM

## 2019-01-12 DIAGNOSIS — Z74.09 IMPAIRED MOBILITY AND ADLS: ICD-10-CM

## 2019-01-12 DIAGNOSIS — Z74.09 IMPAIRED FUNCTIONAL MOBILITY, BALANCE, GAIT, AND ENDURANCE: ICD-10-CM

## 2019-01-12 DIAGNOSIS — Z78.9 IMPAIRED MOBILITY AND ADLS: ICD-10-CM

## 2019-01-12 DIAGNOSIS — J96.21 ACUTE ON CHRONIC RESPIRATORY FAILURE WITH HYPOXIA (HCC): ICD-10-CM

## 2019-01-12 DIAGNOSIS — R09.02 HYPOXIA: ICD-10-CM

## 2019-01-12 DIAGNOSIS — R53.1 WEAKNESS: ICD-10-CM

## 2019-01-12 DIAGNOSIS — J44.1 COPD EXACERBATION (HCC): ICD-10-CM

## 2019-01-12 DIAGNOSIS — R13.13 PHARYNGEAL DYSPHAGIA: ICD-10-CM

## 2019-01-12 PROBLEM — E03.9 HYPOTHYROIDISM (ACQUIRED): Status: ACTIVE | Noted: 2019-01-12

## 2019-01-12 PROBLEM — N13.5 OBSTRUCTION OF LEFT URETER: Status: ACTIVE | Noted: 2019-01-12

## 2019-01-12 PROBLEM — I10 ESSENTIAL HYPERTENSION: Status: ACTIVE | Noted: 2019-01-12

## 2019-01-12 PROBLEM — F32.A DEPRESSION: Status: ACTIVE | Noted: 2019-01-12

## 2019-01-12 PROBLEM — G58.9 PINCHED NERVE IN NECK: Status: ACTIVE | Noted: 2019-01-12

## 2019-01-12 LAB
ALBUMIN SERPL-MCNC: 4.1 G/DL (ref 3.2–4.8)
ALBUMIN/GLOB SERPL: 1.5 G/DL (ref 1.5–2.5)
ALP SERPL-CCNC: 104 U/L (ref 25–100)
ALT SERPL W P-5'-P-CCNC: 32 U/L (ref 7–40)
ANION GAP SERPL CALCULATED.3IONS-SCNC: 8 MMOL/L (ref 3–11)
ARTERIAL PATENCY WRIST A: ABNORMAL
AST SERPL-CCNC: 23 U/L (ref 0–33)
ATMOSPHERIC PRESS: ABNORMAL MMHG
BASE EXCESS BLDA CALC-SCNC: 8.2 MMOL/L (ref 0–2)
BASOPHILS # BLD AUTO: 0.05 10*3/MM3 (ref 0–0.2)
BASOPHILS NFR BLD AUTO: 0.5 % (ref 0–1)
BDY SITE: ABNORMAL
BILIRUB SERPL-MCNC: 1.3 MG/DL (ref 0.3–1.2)
BILIRUB UR QL STRIP: NEGATIVE
BNP SERPL-MCNC: 9 PG/ML (ref 0–100)
BODY TEMPERATURE: 37 C
BUN BLD-MCNC: 20 MG/DL (ref 9–23)
BUN/CREAT SERPL: 27.4 (ref 7–25)
CALCIUM SPEC-SCNC: 9.3 MG/DL (ref 8.7–10.4)
CHLORIDE SERPL-SCNC: 97 MMOL/L (ref 99–109)
CLARITY UR: CLEAR
CO2 BLDA-SCNC: 35.5 MMOL/L (ref 23–27)
CO2 SERPL-SCNC: 31 MMOL/L (ref 20–31)
COHGB MFR BLD: 3 % (ref 0–2)
COLOR UR: YELLOW
CREAT BLD-MCNC: 0.73 MG/DL (ref 0.6–1.3)
DEPRECATED RDW RBC AUTO: 48.6 FL (ref 37–54)
EOSINOPHIL # BLD AUTO: 0.12 10*3/MM3 (ref 0–0.3)
EOSINOPHIL NFR BLD AUTO: 1.2 % (ref 0–3)
ERYTHROCYTE [DISTWIDTH] IN BLOOD BY AUTOMATED COUNT: 14.3 % (ref 11.3–14.5)
GFR SERPL CREATININE-BSD FRML MDRD: 82 ML/MIN/1.73
GLOBULIN UR ELPH-MCNC: 2.7 GM/DL
GLUCOSE BLD-MCNC: 101 MG/DL (ref 70–100)
GLUCOSE UR STRIP-MCNC: NEGATIVE MG/DL
HCO3 BLDA-SCNC: 34 MMOL/L (ref 20–26)
HCT VFR BLD AUTO: 52.9 % (ref 34.5–44)
HCT VFR BLD CALC: 53.8 %
HGB BLD-MCNC: 17.4 G/DL (ref 11.5–15.5)
HGB BLDA-MCNC: 17.5 G/DL (ref 14–18)
HGB UR QL STRIP.AUTO: NEGATIVE
HOROWITZ INDEX BLD+IHG-RTO: 36 %
IMM GRANULOCYTES # BLD AUTO: 0.05 10*3/MM3 (ref 0–0.03)
IMM GRANULOCYTES NFR BLD AUTO: 0.5 % (ref 0–0.6)
KETONES UR QL STRIP: NEGATIVE
LEUKOCYTE ESTERASE UR QL STRIP.AUTO: NEGATIVE
LYMPHOCYTES # BLD AUTO: 1.91 10*3/MM3 (ref 0.6–4.8)
LYMPHOCYTES NFR BLD AUTO: 18.7 % (ref 24–44)
MCH RBC QN AUTO: 30.3 PG (ref 27–31)
MCHC RBC AUTO-ENTMCNC: 32.9 G/DL (ref 32–36)
MCV RBC AUTO: 92.2 FL (ref 80–99)
METHGB BLD QL: 0.9 % (ref 0–1.5)
MODALITY: ABNORMAL
MONOCYTES # BLD AUTO: 0.53 10*3/MM3 (ref 0–1)
MONOCYTES NFR BLD AUTO: 5.2 % (ref 0–12)
NEUTROPHILS # BLD AUTO: 7.6 10*3/MM3 (ref 1.5–8.3)
NEUTROPHILS NFR BLD AUTO: 74.4 % (ref 41–71)
NITRITE UR QL STRIP: NEGATIVE
NOTE: ABNORMAL
OXYHGB MFR BLDV: 88.6 % (ref 94–99)
PCO2 BLDA: 48.9 MM HG (ref 35–45)
PCO2 TEMP ADJ BLD: 48.9 MM HG (ref 35–45)
PH BLDA: 7.45 PH UNITS (ref 7.35–7.45)
PH UR STRIP.AUTO: 6.5 [PH] (ref 5–8)
PH, TEMP CORRECTED: 7.45 PH UNITS
PLATELET # BLD AUTO: 223 10*3/MM3 (ref 150–450)
PMV BLD AUTO: 10.5 FL (ref 6–12)
PO2 BLDA: 59.4 MM HG (ref 83–108)
PO2 TEMP ADJ BLD: 59.4 MM HG (ref 83–108)
POTASSIUM BLD-SCNC: 3.3 MMOL/L (ref 3.5–5.5)
PROT SERPL-MCNC: 6.8 G/DL (ref 5.7–8.2)
PROT UR QL STRIP: NEGATIVE
RBC # BLD AUTO: 5.74 10*6/MM3 (ref 3.89–5.14)
SODIUM BLD-SCNC: 136 MMOL/L (ref 132–146)
SP GR UR STRIP: 1.01 (ref 1–1.03)
TROPONIN I SERPL-MCNC: 0 NG/ML (ref 0–0.07)
UROBILINOGEN UR QL STRIP: NORMAL
VENTILATOR MODE: ABNORMAL
WBC NRBC COR # BLD: 10.21 10*3/MM3 (ref 3.5–10.8)

## 2019-01-12 PROCEDURE — 0 IOPAMIDOL PER 1 ML: Performed by: EMERGENCY MEDICINE

## 2019-01-12 PROCEDURE — 80053 COMPREHEN METABOLIC PANEL: CPT | Performed by: EMERGENCY MEDICINE

## 2019-01-12 PROCEDURE — 94660 CPAP INITIATION&MGMT: CPT

## 2019-01-12 PROCEDURE — 81003 URINALYSIS AUTO W/O SCOPE: CPT | Performed by: EMERGENCY MEDICINE

## 2019-01-12 PROCEDURE — 83880 ASSAY OF NATRIURETIC PEPTIDE: CPT | Performed by: EMERGENCY MEDICINE

## 2019-01-12 PROCEDURE — 25010000002 ONDANSETRON PER 1 MG: Performed by: EMERGENCY MEDICINE

## 2019-01-12 PROCEDURE — 25010000002 HYDROMORPHONE PER 4 MG: Performed by: EMERGENCY MEDICINE

## 2019-01-12 PROCEDURE — 94799 UNLISTED PULMONARY SVC/PX: CPT

## 2019-01-12 PROCEDURE — 94640 AIRWAY INHALATION TREATMENT: CPT

## 2019-01-12 PROCEDURE — 94760 N-INVAS EAR/PLS OXIMETRY 1: CPT

## 2019-01-12 PROCEDURE — 99285 EMERGENCY DEPT VISIT HI MDM: CPT

## 2019-01-12 PROCEDURE — 84484 ASSAY OF TROPONIN QUANT: CPT

## 2019-01-12 PROCEDURE — 71275 CT ANGIOGRAPHY CHEST: CPT

## 2019-01-12 PROCEDURE — 25010000002 METHYLPREDNISOLONE PER 125 MG: Performed by: EMERGENCY MEDICINE

## 2019-01-12 PROCEDURE — 99223 1ST HOSP IP/OBS HIGH 75: CPT | Performed by: INTERNAL MEDICINE

## 2019-01-12 PROCEDURE — 5A09357 ASSISTANCE WITH RESPIRATORY VENTILATION, LESS THAN 24 CONSECUTIVE HOURS, CONTINUOUS POSITIVE AIRWAY PRESSURE: ICD-10-PCS | Performed by: INTERNAL MEDICINE

## 2019-01-12 PROCEDURE — 93005 ELECTROCARDIOGRAM TRACING: CPT | Performed by: EMERGENCY MEDICINE

## 2019-01-12 PROCEDURE — 71045 X-RAY EXAM CHEST 1 VIEW: CPT

## 2019-01-12 PROCEDURE — 85025 COMPLETE CBC W/AUTO DIFF WBC: CPT | Performed by: EMERGENCY MEDICINE

## 2019-01-12 PROCEDURE — 82805 BLOOD GASES W/O2 SATURATION: CPT

## 2019-01-12 PROCEDURE — 36600 WITHDRAWAL OF ARTERIAL BLOOD: CPT

## 2019-01-12 RX ORDER — SODIUM CHLORIDE 9 MG/ML
100 INJECTION, SOLUTION INTRAVENOUS CONTINUOUS
Status: DISCONTINUED | OUTPATIENT
Start: 2019-01-12 | End: 2019-01-14

## 2019-01-12 RX ORDER — LEVOTHYROXINE SODIUM 0.1 MG/1
100 TABLET ORAL DAILY
Status: DISCONTINUED | OUTPATIENT
Start: 2019-01-13 | End: 2019-01-24 | Stop reason: HOSPADM

## 2019-01-12 RX ORDER — LEVOTHYROXINE SODIUM 0.1 MG/1
100 TABLET ORAL DAILY
COMMUNITY

## 2019-01-12 RX ORDER — GUAIFENESIN 600 MG/1
600 TABLET, EXTENDED RELEASE ORAL EVERY 12 HOURS SCHEDULED
Status: DISCONTINUED | OUTPATIENT
Start: 2019-01-12 | End: 2019-01-16

## 2019-01-12 RX ORDER — POTASSIUM CHLORIDE 750 MG/1
40 CAPSULE, EXTENDED RELEASE ORAL AS NEEDED
Status: DISCONTINUED | OUTPATIENT
Start: 2019-01-12 | End: 2019-01-16

## 2019-01-12 RX ORDER — ASPIRIN 81 MG/1
81 TABLET, CHEWABLE ORAL DAILY
Status: DISCONTINUED | OUTPATIENT
Start: 2019-01-13 | End: 2019-01-24 | Stop reason: HOSPADM

## 2019-01-12 RX ORDER — BUDESONIDE 0.5 MG/2ML
0.5 INHALANT ORAL
Status: DISCONTINUED | OUTPATIENT
Start: 2019-01-13 | End: 2019-01-16

## 2019-01-12 RX ORDER — SODIUM CHLORIDE 0.9 % (FLUSH) 0.9 %
10 SYRINGE (ML) INJECTION AS NEEDED
Status: DISCONTINUED | OUTPATIENT
Start: 2019-01-12 | End: 2019-01-24 | Stop reason: HOSPADM

## 2019-01-12 RX ORDER — ACETAMINOPHEN 325 MG/1
650 TABLET ORAL EVERY 4 HOURS PRN
Status: DISCONTINUED | OUTPATIENT
Start: 2019-01-12 | End: 2019-01-24 | Stop reason: HOSPADM

## 2019-01-12 RX ORDER — METHYLPREDNISOLONE SODIUM SUCCINATE 125 MG/2ML
60 INJECTION, POWDER, LYOPHILIZED, FOR SOLUTION INTRAMUSCULAR; INTRAVENOUS EVERY 8 HOURS
Status: DISCONTINUED | OUTPATIENT
Start: 2019-01-13 | End: 2019-01-16

## 2019-01-12 RX ORDER — HYDROMORPHONE HYDROCHLORIDE 1 MG/ML
0.5 INJECTION, SOLUTION INTRAMUSCULAR; INTRAVENOUS; SUBCUTANEOUS ONCE
Status: COMPLETED | OUTPATIENT
Start: 2019-01-12 | End: 2019-01-12

## 2019-01-12 RX ORDER — HYDROCODONE BITARTRATE AND ACETAMINOPHEN 7.5; 325 MG/1; MG/1
1 TABLET ORAL EVERY 6 HOURS PRN
Status: ON HOLD | COMMUNITY
End: 2019-01-14

## 2019-01-12 RX ORDER — ESCITALOPRAM OXALATE 10 MG/1
10 TABLET ORAL DAILY
COMMUNITY

## 2019-01-12 RX ORDER — ESCITALOPRAM OXALATE 20 MG/1
10 TABLET ORAL DAILY
Status: DISCONTINUED | OUTPATIENT
Start: 2019-01-13 | End: 2019-01-24 | Stop reason: HOSPADM

## 2019-01-12 RX ORDER — HYDROCODONE BITARTRATE AND ACETAMINOPHEN 5; 325 MG/1; MG/1
1 TABLET ORAL EVERY 4 HOURS PRN
Status: DISCONTINUED | OUTPATIENT
Start: 2019-01-12 | End: 2019-01-12

## 2019-01-12 RX ORDER — POTASSIUM CHLORIDE 1.5 G/1.77G
40 POWDER, FOR SOLUTION ORAL AS NEEDED
Status: DISCONTINUED | OUTPATIENT
Start: 2019-01-12 | End: 2019-01-24 | Stop reason: HOSPADM

## 2019-01-12 RX ORDER — METHYLPREDNISOLONE SODIUM SUCCINATE 125 MG/2ML
125 INJECTION, POWDER, LYOPHILIZED, FOR SOLUTION INTRAMUSCULAR; INTRAVENOUS ONCE
Status: COMPLETED | OUTPATIENT
Start: 2019-01-12 | End: 2019-01-12

## 2019-01-12 RX ORDER — HYDROCHLOROTHIAZIDE 25 MG/1
25 TABLET ORAL DAILY
Status: DISCONTINUED | OUTPATIENT
Start: 2019-01-13 | End: 2019-01-24 | Stop reason: HOSPADM

## 2019-01-12 RX ORDER — HYDROCODONE BITARTRATE AND ACETAMINOPHEN 7.5; 325 MG/1; MG/1
1 TABLET ORAL EVERY 6 HOURS PRN
Status: DISCONTINUED | OUTPATIENT
Start: 2019-01-12 | End: 2019-01-16

## 2019-01-12 RX ORDER — IPRATROPIUM BROMIDE AND ALBUTEROL SULFATE 2.5; .5 MG/3ML; MG/3ML
3 SOLUTION RESPIRATORY (INHALATION)
Status: DISCONTINUED | OUTPATIENT
Start: 2019-01-12 | End: 2019-01-24 | Stop reason: HOSPADM

## 2019-01-12 RX ORDER — METOPROLOL SUCCINATE 25 MG/1
25 TABLET, EXTENDED RELEASE ORAL DAILY
Status: DISCONTINUED | OUTPATIENT
Start: 2019-01-13 | End: 2019-01-24 | Stop reason: HOSPADM

## 2019-01-12 RX ORDER — ASPIRIN 81 MG/1
81 TABLET, CHEWABLE ORAL DAILY
COMMUNITY

## 2019-01-12 RX ORDER — METOPROLOL SUCCINATE 25 MG/1
25 TABLET, EXTENDED RELEASE ORAL DAILY
Status: ON HOLD | COMMUNITY
End: 2019-01-14

## 2019-01-12 RX ORDER — POTASSIUM CHLORIDE 7.45 MG/ML
10 INJECTION INTRAVENOUS
Status: DISCONTINUED | OUTPATIENT
Start: 2019-01-12 | End: 2019-01-24 | Stop reason: HOSPADM

## 2019-01-12 RX ORDER — HYDROMORPHONE HYDROCHLORIDE 1 MG/ML
0.5 INJECTION, SOLUTION INTRAMUSCULAR; INTRAVENOUS; SUBCUTANEOUS EVERY 4 HOURS PRN
Status: DISCONTINUED | OUTPATIENT
Start: 2019-01-12 | End: 2019-01-12

## 2019-01-12 RX ORDER — HYDROCHLOROTHIAZIDE 25 MG/1
25 TABLET ORAL DAILY
COMMUNITY

## 2019-01-12 RX ORDER — GABAPENTIN 100 MG/1
100 CAPSULE ORAL 3 TIMES DAILY
Status: DISCONTINUED | OUTPATIENT
Start: 2019-01-12 | End: 2019-01-17

## 2019-01-12 RX ORDER — IPRATROPIUM BROMIDE AND ALBUTEROL SULFATE 2.5; .5 MG/3ML; MG/3ML
3 SOLUTION RESPIRATORY (INHALATION) ONCE
Status: COMPLETED | OUTPATIENT
Start: 2019-01-12 | End: 2019-01-12

## 2019-01-12 RX ORDER — ONDANSETRON 2 MG/ML
4 INJECTION INTRAMUSCULAR; INTRAVENOUS ONCE
Status: COMPLETED | OUTPATIENT
Start: 2019-01-12 | End: 2019-01-12

## 2019-01-12 RX ADMIN — METHYLPREDNISOLONE SODIUM SUCCINATE 125 MG: 125 INJECTION, POWDER, FOR SOLUTION INTRAMUSCULAR; INTRAVENOUS at 17:32

## 2019-01-12 RX ADMIN — ONDANSETRON 4 MG: 2 INJECTION INTRAMUSCULAR; INTRAVENOUS at 13:44

## 2019-01-12 RX ADMIN — HYDROCODONE BITARTRATE AND ACETAMINOPHEN 1 TABLET: 7.5; 325 TABLET ORAL at 20:47

## 2019-01-12 RX ADMIN — POTASSIUM CHLORIDE 40 MEQ: 750 CAPSULE, EXTENDED RELEASE ORAL at 23:22

## 2019-01-12 RX ADMIN — METHYLPREDNISOLONE SODIUM SUCCINATE 60 MG: 125 INJECTION, POWDER, FOR SOLUTION INTRAMUSCULAR; INTRAVENOUS at 23:22

## 2019-01-12 RX ADMIN — IOPAMIDOL 62.7 ML: 755 INJECTION, SOLUTION INTRAVENOUS at 16:18

## 2019-01-12 RX ADMIN — GABAPENTIN 100 MG: 100 CAPSULE ORAL at 23:22

## 2019-01-12 RX ADMIN — GUAIFENESIN 600 MG: 600 TABLET, EXTENDED RELEASE ORAL at 23:22

## 2019-01-12 RX ADMIN — HYDROMORPHONE HYDROCHLORIDE 0.5 MG: 1 INJECTION, SOLUTION INTRAMUSCULAR; INTRAVENOUS; SUBCUTANEOUS at 13:44

## 2019-01-12 RX ADMIN — IPRATROPIUM BROMIDE AND ALBUTEROL SULFATE 3 ML: 2.5; .5 SOLUTION RESPIRATORY (INHALATION) at 13:42

## 2019-01-12 RX ADMIN — HYDROMORPHONE HYDROCHLORIDE 0.5 MG: 1 INJECTION, SOLUTION INTRAMUSCULAR; INTRAVENOUS; SUBCUTANEOUS at 17:29

## 2019-01-12 RX ADMIN — SODIUM CHLORIDE 100 ML/HR: 9 INJECTION, SOLUTION INTRAVENOUS at 23:22

## 2019-01-12 RX ADMIN — HYDROMORPHONE HYDROCHLORIDE 0.5 MG: 1 INJECTION, SOLUTION INTRAMUSCULAR; INTRAVENOUS; SUBCUTANEOUS at 15:12

## 2019-01-12 RX ADMIN — IPRATROPIUM BROMIDE AND ALBUTEROL SULFATE 3 ML: 2.5; .5 SOLUTION RESPIRATORY (INHALATION) at 23:43

## 2019-01-12 NOTE — ED PROVIDER NOTES
Subjective   Cough. Soa. For several days. Admitted to [facility] on [place] and dx with bilateral pneumonia. There for several days. After DC pt started to have bilateral arm and leg numbness and pain. Somehow ended up at  for a MRI of her neck / brain and told it was a pinched nerve. Family report she was seen by a neurologist and cleared to go home. Has had worsening soa, difficulty walking, etc.    No cp. No abd pain.    Hx of copd. Typically on 2L NC. Worsening soa recently. Sats in the upper 80s on NC with minimal exertion.        Cough   Cough characteristics:  Non-productive  Sputum characteristics:  Nondescript  Severity:  Moderate  Onset quality:  Gradual  Duration:  1 week  Timing:  Constant  Progression:  Unchanged  Chronicity:  New  Smoker: yes    Relieved by:  Nothing  Worsened by:  Activity  Associated symptoms: wheezing    Associated symptoms: no chest pain, no chills, no diaphoresis, no fever, no shortness of breath and no sore throat        Review of Systems   Constitutional: Negative for chills, diaphoresis and fever.   HENT: Negative for congestion and sore throat.    Respiratory: Positive for cough and wheezing. Negative for choking, chest tightness and shortness of breath.    Cardiovascular: Negative for chest pain and leg swelling.   Gastrointestinal: Negative for abdominal distention, abdominal pain, anal bleeding, blood in stool, constipation, diarrhea, nausea and vomiting.   Genitourinary: Negative for difficulty urinating, dysuria, flank pain, frequency, hematuria and urgency.   All other systems reviewed and are negative.      Past Medical History:   Diagnosis Date   • COPD (chronic obstructive pulmonary disease) (CMS/HCC)    • Depression    • Disease of thyroid gland    • Hypertension        No Known Allergies    History reviewed. No pertinent surgical history.    History reviewed. No pertinent family history.    Social History     Socioeconomic History   • Marital status:       Spouse name: Not on file   • Number of children: Not on file   • Years of education: Not on file   • Highest education level: Not on file   Tobacco Use   • Smoking status: Current Every Day Smoker   Substance and Sexual Activity   • Alcohol use: No     Frequency: Never   • Drug use: No   • Sexual activity: Defer           Objective   Physical Exam   Constitutional: She is oriented to person, place, and time. She appears well-developed and well-nourished.   HENT:   Head: Normocephalic and atraumatic.   Right Ear: External ear normal.   Left Ear: External ear normal.   Nose: Nose normal.   Mouth/Throat: Oropharynx is clear and moist.   Eyes: Conjunctivae and EOM are normal. Pupils are equal, round, and reactive to light.   Neck: Normal range of motion. Neck supple.   Cardiovascular: Normal rate, regular rhythm, normal heart sounds and intact distal pulses.   Pulmonary/Chest: Effort normal. She has wheezes.   Abdominal: Soft. Bowel sounds are normal.   Musculoskeletal: Normal range of motion.   Neurological: She is alert and oriented to person, place, and time.   Skin: Skin is warm and dry.   Psychiatric: She has a normal mood and affect. Her behavior is normal. Judgment and thought content normal.       Procedures           ED Course  ED Course as of Jan 12 1754   Sat Jan 12, 2019   1714 Hospitalist paged  [JM]   1750 I spoke with Dr. Rider who will admit.   [JM]   1754 Left renal obstruction. I spoke with dr. Connors who recommends no CT now for eval as her contrast would obscure this. Will get ct ap tomorrow.  [SERJIO]      ED Course User Index  [JM] Silvio Maloney APRN          CT Angiogram Chest With Contrast   Final Result   1. No evidence for pulmonary embolism or other acute cardiopulmonary   findings.   2. Left renal collecting system obstruction. Correlation with urinalysis   and CT urogram recommended after the contrast from this bolus has   cleared.       DICTATED:   1/12/2019   EDITED/ls :   1/12/2019         This report was finalized on 1/12/2019 5:22 PM by Javier Connors.          XR Chest 1 View   Final Result   No acute finding.       DICTATED:   1/12/2019   EDITED/ls :   1/12/2019        This report was finalized on 1/12/2019 3:06 PM by Javier Connors.                    [facility]      Final diagnoses:   Left ureter dilated   Hypoxia   COPD exacerbation (CMS/[facility])   Weakness            Silvio Maloney APRN  01/12/19 1754       Silvio Maloney APRN  01/12/19 1754

## 2019-01-13 LAB
ANION GAP SERPL CALCULATED.3IONS-SCNC: 7 MMOL/L (ref 3–11)
BASOPHILS # BLD AUTO: 0 10*3/MM3 (ref 0–0.2)
BASOPHILS NFR BLD AUTO: 0 % (ref 0–1)
BUN BLD-MCNC: 24 MG/DL (ref 9–23)
BUN/CREAT SERPL: 35.3 (ref 7–25)
CALCIUM SPEC-SCNC: 9 MG/DL (ref 8.7–10.4)
CHLORIDE SERPL-SCNC: 102 MMOL/L (ref 99–109)
CO2 SERPL-SCNC: 29 MMOL/L (ref 20–31)
CREAT BLD-MCNC: 0.68 MG/DL (ref 0.6–1.3)
DEPRECATED RDW RBC AUTO: 47.8 FL (ref 37–54)
EOSINOPHIL # BLD AUTO: 0 10*3/MM3 (ref 0–0.3)
EOSINOPHIL NFR BLD AUTO: 0 % (ref 0–3)
ERYTHROCYTE [DISTWIDTH] IN BLOOD BY AUTOMATED COUNT: 14.1 % (ref 11.3–14.5)
GFR SERPL CREATININE-BSD FRML MDRD: 89 ML/MIN/1.73
GLUCOSE BLD-MCNC: 141 MG/DL (ref 70–100)
HBA1C MFR BLD: 6.4 % (ref 4.8–5.6)
HCT VFR BLD AUTO: 53 % (ref 34.5–44)
HGB BLD-MCNC: 17.1 G/DL (ref 11.5–15.5)
IMM GRANULOCYTES # BLD AUTO: 0.02 10*3/MM3 (ref 0–0.03)
IMM GRANULOCYTES NFR BLD AUTO: 0.3 % (ref 0–0.6)
LYMPHOCYTES # BLD AUTO: 0.73 10*3/MM3 (ref 0.6–4.8)
LYMPHOCYTES NFR BLD AUTO: 9.7 % (ref 24–44)
MAGNESIUM SERPL-MCNC: 2.1 MG/DL (ref 1.3–2.7)
MCH RBC QN AUTO: 30 PG (ref 27–31)
MCHC RBC AUTO-ENTMCNC: 32.3 G/DL (ref 32–36)
MCV RBC AUTO: 93 FL (ref 80–99)
MONOCYTES # BLD AUTO: 0.08 10*3/MM3 (ref 0–1)
MONOCYTES NFR BLD AUTO: 1.1 % (ref 0–12)
NEUTROPHILS # BLD AUTO: 6.68 10*3/MM3 (ref 1.5–8.3)
NEUTROPHILS NFR BLD AUTO: 89.2 % (ref 41–71)
PLATELET # BLD AUTO: 203 10*3/MM3 (ref 150–450)
PMV BLD AUTO: 10.4 FL (ref 6–12)
POTASSIUM BLD-SCNC: 4.1 MMOL/L (ref 3.5–5.5)
RBC # BLD AUTO: 5.7 10*6/MM3 (ref 3.89–5.14)
SODIUM BLD-SCNC: 138 MMOL/L (ref 132–146)
TSH SERPL DL<=0.05 MIU/L-ACNC: 0.51 MIU/ML (ref 0.35–5.35)
VIT B12 BLD-MCNC: 690 PG/ML (ref 211–911)
WBC NRBC COR # BLD: 7.49 10*3/MM3 (ref 3.5–10.8)

## 2019-01-13 PROCEDURE — 83735 ASSAY OF MAGNESIUM: CPT | Performed by: NURSE PRACTITIONER

## 2019-01-13 PROCEDURE — 82607 VITAMIN B-12: CPT | Performed by: INTERNAL MEDICINE

## 2019-01-13 PROCEDURE — 97530 THERAPEUTIC ACTIVITIES: CPT

## 2019-01-13 PROCEDURE — 84443 ASSAY THYROID STIM HORMONE: CPT | Performed by: NURSE PRACTITIONER

## 2019-01-13 PROCEDURE — 25010000002 METHYLPREDNISOLONE PER 125 MG: Performed by: NURSE PRACTITIONER

## 2019-01-13 PROCEDURE — 97162 PT EVAL MOD COMPLEX 30 MIN: CPT

## 2019-01-13 PROCEDURE — 94799 UNLISTED PULMONARY SVC/PX: CPT

## 2019-01-13 PROCEDURE — 94640 AIRWAY INHALATION TREATMENT: CPT

## 2019-01-13 PROCEDURE — 99233 SBSQ HOSP IP/OBS HIGH 50: CPT | Performed by: HOSPITALIST

## 2019-01-13 PROCEDURE — 83036 HEMOGLOBIN GLYCOSYLATED A1C: CPT | Performed by: INTERNAL MEDICINE

## 2019-01-13 PROCEDURE — 94760 N-INVAS EAR/PLS OXIMETRY 1: CPT

## 2019-01-13 PROCEDURE — 25010000002 ENOXAPARIN PER 10 MG: Performed by: HOSPITALIST

## 2019-01-13 PROCEDURE — 36415 COLL VENOUS BLD VENIPUNCTURE: CPT | Performed by: NURSE PRACTITIONER

## 2019-01-13 PROCEDURE — 80048 BASIC METABOLIC PNL TOTAL CA: CPT | Performed by: NURSE PRACTITIONER

## 2019-01-13 PROCEDURE — 85025 COMPLETE CBC W/AUTO DIFF WBC: CPT | Performed by: NURSE PRACTITIONER

## 2019-01-13 RX ORDER — NICOTINE 21 MG/24HR
1 PATCH, TRANSDERMAL 24 HOURS TRANSDERMAL DAILY PRN
Status: DISCONTINUED | OUTPATIENT
Start: 2019-01-13 | End: 2019-01-24 | Stop reason: HOSPADM

## 2019-01-13 RX ORDER — DOXYCYCLINE 100 MG/1
100 CAPSULE ORAL EVERY 12 HOURS SCHEDULED
Status: DISCONTINUED | OUTPATIENT
Start: 2019-01-13 | End: 2019-01-16

## 2019-01-13 RX ADMIN — IPRATROPIUM BROMIDE AND ALBUTEROL SULFATE 3 ML: 2.5; .5 SOLUTION RESPIRATORY (INHALATION) at 12:12

## 2019-01-13 RX ADMIN — LEVOTHYROXINE SODIUM 100 MCG: 100 TABLET ORAL at 06:41

## 2019-01-13 RX ADMIN — SODIUM CHLORIDE 100 ML/HR: 9 INJECTION, SOLUTION INTRAVENOUS at 18:27

## 2019-01-13 RX ADMIN — DOXYCYCLINE 100 MG: 100 CAPSULE ORAL at 12:08

## 2019-01-13 RX ADMIN — HYDROCODONE BITARTRATE AND ACETAMINOPHEN 1 TABLET: 7.5; 325 TABLET ORAL at 02:42

## 2019-01-13 RX ADMIN — HYDROCODONE BITARTRATE AND ACETAMINOPHEN 1 TABLET: 7.5; 325 TABLET ORAL at 15:16

## 2019-01-13 RX ADMIN — IPRATROPIUM BROMIDE AND ALBUTEROL SULFATE 3 ML: 2.5; .5 SOLUTION RESPIRATORY (INHALATION) at 16:15

## 2019-01-13 RX ADMIN — HYDROCHLOROTHIAZIDE 25 MG: 25 TABLET ORAL at 08:17

## 2019-01-13 RX ADMIN — BUDESONIDE 0.5 MG: 0.5 INHALANT RESPIRATORY (INHALATION) at 07:34

## 2019-01-13 RX ADMIN — GUAIFENESIN 600 MG: 600 TABLET, EXTENDED RELEASE ORAL at 20:57

## 2019-01-13 RX ADMIN — ASPIRIN 81 MG CHEWABLE TABLET 81 MG: 81 TABLET CHEWABLE at 08:16

## 2019-01-13 RX ADMIN — DOXYCYCLINE 100 MG: 100 CAPSULE ORAL at 20:57

## 2019-01-13 RX ADMIN — ACETAMINOPHEN 650 MG: 325 TABLET ORAL at 06:41

## 2019-01-13 RX ADMIN — METHYLPREDNISOLONE SODIUM SUCCINATE 60 MG: 125 INJECTION, POWDER, FOR SOLUTION INTRAMUSCULAR; INTRAVENOUS at 15:16

## 2019-01-13 RX ADMIN — IPRATROPIUM BROMIDE AND ALBUTEROL SULFATE 3 ML: 2.5; .5 SOLUTION RESPIRATORY (INHALATION) at 07:34

## 2019-01-13 RX ADMIN — SODIUM CHLORIDE 100 ML/HR: 9 INJECTION, SOLUTION INTRAVENOUS at 08:20

## 2019-01-13 RX ADMIN — METOPROLOL SUCCINATE 25 MG: 25 TABLET, EXTENDED RELEASE ORAL at 08:16

## 2019-01-13 RX ADMIN — GABAPENTIN 100 MG: 100 CAPSULE ORAL at 15:16

## 2019-01-13 RX ADMIN — GUAIFENESIN 600 MG: 600 TABLET, EXTENDED RELEASE ORAL at 08:16

## 2019-01-13 RX ADMIN — HYDROCODONE BITARTRATE AND ACETAMINOPHEN 1 TABLET: 7.5; 325 TABLET ORAL at 20:57

## 2019-01-13 RX ADMIN — ESCITALOPRAM OXALATE 10 MG: 20 TABLET, FILM COATED ORAL at 08:16

## 2019-01-13 RX ADMIN — ENOXAPARIN SODIUM 40 MG: 40 INJECTION SUBCUTANEOUS at 12:08

## 2019-01-13 RX ADMIN — GABAPENTIN 100 MG: 100 CAPSULE ORAL at 08:16

## 2019-01-13 RX ADMIN — METHYLPREDNISOLONE SODIUM SUCCINATE 60 MG: 125 INJECTION, POWDER, FOR SOLUTION INTRAMUSCULAR; INTRAVENOUS at 08:17

## 2019-01-13 RX ADMIN — NICOTINE 1 PATCH: 21 PATCH, EXTENDED RELEASE TRANSDERMAL at 12:09

## 2019-01-13 RX ADMIN — IPRATROPIUM BROMIDE AND ALBUTEROL SULFATE 3 ML: 2.5; .5 SOLUTION RESPIRATORY (INHALATION) at 21:16

## 2019-01-13 RX ADMIN — HYDROCODONE BITARTRATE AND ACETAMINOPHEN 1 TABLET: 7.5; 325 TABLET ORAL at 08:16

## 2019-01-13 RX ADMIN — GABAPENTIN 100 MG: 100 CAPSULE ORAL at 20:58

## 2019-01-13 NOTE — PLAN OF CARE
Problem: Patient Care Overview  Goal: Plan of Care Review  Outcome: Ongoing (interventions implemented as appropriate)   01/12/19 1901   Coping/Psychosocial   Plan of Care Reviewed With patient   Plan of Care Review   Progress declining   OTHER   Outcome Summary Stage 1 injury to coccyx-present on admission. COPD exacerbation-now on 4L NC     Goal: Individualization and Mutuality  Outcome: Ongoing (interventions implemented as appropriate)   01/12/19 1901   Individualization   Patient Specific Interventions Monitor resp status q1h and prn       Problem: Fall Risk (Adult)  Goal: Identify Related Risk Factors and Signs and Symptoms  Outcome: Ongoing (interventions implemented as appropriate)   01/12/19 1901   Fall Risk (Adult)   Related Risk Factors (Fall Risk) gait/mobility problems   Signs and Symptoms (Fall Risk) presence of risk factors     Goal: Absence of Fall  Outcome: Ongoing (interventions implemented as appropriate)   01/12/19 1901   Fall Risk (Adult)   Absence of Fall making progress toward outcome       Problem: Chronic Obstructive Pulmonary Disease (Adult)  Goal: Signs and Symptoms of Listed Potential Problems Will be Absent, Minimized or Managed (Chronic Obstructive Pulmonary Disease)  Outcome: Ongoing (interventions implemented as appropriate)   01/12/19 1901   Goal/Outcome Evaluation   Problems Assessed (Chronic Obstructive Pulmonary Disease (COPD)) all   Problems Present (COPD, Bronch/Emphy) respiratory compromise

## 2019-01-13 NOTE — THERAPY EVALUATION
Acute Care - Physical Therapy Initial Evaluation  [facility]     Patient Name: Kristina Heck  : 1960  MRN: [id number]  Today's Date: 2019   Onset of Illness/Injury or Date of Surgery: 19  Date of Referral to PT: 19  Referring Physician: ISMAEL Pelayo DO      Admit Date: 2019    Visit Dx:     ICD-10-CM ICD-9-CM   1. Left ureter dilated N28.82 593.89   2. Hypoxia R09.02 799.02   3. COPD exacerbation (CMS/HCC) J44.1 491.21   4. Weakness R53.1 780.79   5. Impaired functional mobility, balance, gait, and endurance Z74.09 V49.89     Patient Active Problem List   Diagnosis   • Acute on chronic respiratory failure with hypoxia (CMS/HCC)   • COPD with acute exacerbation (CMS/HCC)   • Hypothyroidism (acquired)   • Essential hypertension   • Depression   • Pinched nerve in neck   • left renal collecting system obstruction   • Generalized weakness     Past Medical History:   Diagnosis Date   • COPD (chronic obstructive pulmonary disease) (CMS/HCC)    • Depression    • Disease of thyroid gland    • Hypertension      History reviewed. No pertinent surgical history.     PT ASSESSMENT (last 12 hours)      Physical Therapy Evaluation     Row Name 19 0877          PT Evaluation Time/Intention    Subjective Information  complains of;weakness;fatigue;pain;dyspnea;numbness  -MB     Document Type  evaluation  -MB     Mode of Treatment  physical therapy  -MB     Patient Effort  good  -MB     Symptoms Noted During/After Treatment  fatigue;shortness of breath  -MB     Row Name 19 0830          General Information    Patient Profile Reviewed?  yes  -MB     Onset of Illness/Injury or Date of Surgery  19  -MB     Referring Physician  ISMAEL Pelayo DO  -MB     Patient Observations  alert;cooperative;agree to therapy  -MB     Patient/Family Observations  No family present at bedside.  -MB     General Observations of Patient  Pt. supine, on 4L/min O2, telemetry, IV intact R UE.  RN consent for PT.  -MB  "    Prior Level of Function  independent:;all household mobility;community mobility;gait;transfer;bed mobility;ADL's;home management;cooking;cleaning;driving;using stairs  -MB     Equipment Currently Used at Home  none  -MB     Pertinent History of Current Functional Problem  Pt. is a 57 yo female who presented to ED w/ HE and cough.  Pt. recently adm to OSH w/ B PNA and developed B LE and UE numbness a few days later.  Pt. presented to [facility], diagnosed w/ \"pinched nerve in her neck\", cleared by neuro and D/C'd home.  Pt. developed increased SOA over next few days (on chronic 2L) w/ progressive BLE and BUE weakness.  Pt. adm w/ A/C respiratory failure w/ hypoxia and hypercapnia, COPD exacerbation, generalized weakness.  PMH: HTN, depression, COPD.  -MB     Existing Precautions/Restrictions  fall;oxygen therapy device and L/min  -MB     Limitations/Impairments  insensate body part;sensory  -MB     Risks Reviewed  patient:;LOB;nausea/vomiting;dizziness;increased discomfort;change in vital signs;lines disloged  -MB     Benefits Reviewed  patient:;improve function;increase independence;increase strength;increase balance;decrease pain;decrease risk of DVT;improve skin integrity;increase knowledge  -MB     Barriers to Rehab  medically complex  -MB     Row Name 01/13/19 0830          Relationship/Environment    Primary Source of Support/Comfort  child(selam)  -MB     Lives With  child(selam), dependent 11 and 15 yo grandchildren live w/ pt.  Dtrs live nearby.  -MB     Primary Roles/Responsibilities  caregiver for other(s)  -MB     Row Name 01/13/19 0830          Resource/Environmental Concerns    Current Living Arrangements  home/apartment/condo  -MB     Row Name 01/13/19 0830          Home Main Entrance    Number of Stairs, Main Entrance  two  -MB     Stair Railings, Main Entrance  railing on right side (ascending)  -MB     Row Name 01/13/19 0830          Cognitive Assessment/Intervention- PT/OT    Orientation Status " (Cognition)  oriented x 4  -MB     Follows Commands (Cognition)  follows one step commands;over 90% accuracy;verbal cues/prompting required  -MB     Safety Deficit (Cognitive)  mild deficit;insight into deficits/self awareness;safety precautions awareness  -MB     Row Name 01/13/19 0830          Safety Issues, Functional Mobility    Safety Issues Affecting Function (Mobility)  insight into deficits/self awareness;safety precaution awareness;sequencing abilities  -MB     Impairments Affecting Function (Mobility)  balance;coordination;endurance/activity tolerance;motor planning;sensation/sensory awareness;strength;shortness of breath  -MB     Row Name 01/13/19 0830          Bed Mobility Assessment/Treatment    Bed Mobility Assessment/Treatment  rolling left;sidelying-sit  -MB     Rolling Left [place] (Bed Mobility)  minimum assist (75% patient effort);verbal cues  -MB     Sidelying-Sit [place] (Bed Mobility)  moderate assist (50% patient effort);verbal cues  -MB     Bed Mobility, Safety Issues  decreased use of arms for pushing/pulling;decreased use of legs for bridging/pushing  -MB     Assistive Device (Bed Mobility)  bed rails;head of bed elevated  -MB     Comment (Bed Mobility)  VCs for log roll technique and sequencing, and assist to raise trunk/shoulders to upright sitting EOB.    -MB     Row Name 01/13/19 0830          Transfer Assessment/Treatment    Transfer Assessment/Treatment  sit-stand transfer;stand-sit transfer;toilet transfer;stand pivot/stand step transfer  -MB     Comment (Transfers)  Pt. transferred sit to stand from EOB and BSC, and performed stand pivot transfer bed -> BSC, BSC -> chair.  Pt. very unsteady w/ poor BLE proprioception and coordination.  Pt's R knee tends to buckle.  Recommended lift room to RN for increased safety w/ transfers.   -MB     Sit-Stand [place] (Transfers)  maximum assist (25% patient effort);2 person assist;verbal cues;nonverbal cues (demo/gesture)  -MB      Stand-Sit [place] (Transfers)  maximum assist (25% patient effort);2 person assist;verbal cues;nonverbal cues (demo/gesture)  -MB     [place] Level (Toilet Transfer)  maximum assist (25% patient effort);2 person assist;verbal cues;nonverbal cues (demo/gesture)  -MB     Assistive Device (Toilet Transfer)  commode, bedside without drop arms gait belt, BUE support  -MB     Row Name 01/13/19 0830          Sit-Stand Transfer    Assistive Device (Sit-Stand Transfers)  other (see comments) gait belt, BUE support  -MB     Row Name 01/13/19 0830          Stand-Sit Transfer    Assistive Device (Stand-Sit Transfers)  other (see comments) gait belt, BUE support  -MB     Row Name 01/13/19 0830          Stand Pivot/Stand Step Transfer    Stand Pivot/Stand Step [place]  maximum assist (25% patient effort);2 person assist;verbal cues;nonverbal cues (demo/gesture)  -MB     Assistive Device (Stand Pivot Stand Step Transfer)  other (see comments) gait belt, BUE support  -MB     Row Name 01/13/19 0830          Toilet Transfer    Type (Toilet Transfer)  sit-stand;stand-sit  -[facility] Name 01/13/19 0830          Gait/Stairs Assessment/Training    [place] Level (Gait)  not tested;unable to assess  -[facility] Name 01/13/19 0830          General ROM    GENERAL ROM COMMENTS  BLE and BUE ROM WFL.  -[facility] Name 01/13/19 0830          MMT (Manual Muscle Testing)    General MMT Comments  R LE grossly 4-/5, LLE 3+/5. R  strength>L.    -MB     Row Name 01/13/19 0830          Motor Assessment/Intervention    Additional Documentation  Balance (Group);Fine Motor Testing & Training (Group);Gross Motor Coordination (Group)  -MB     Row Name 01/13/19 0830          Gross Motor Coordination    Gross Motor Impairments  coordination;finger to nose;strength;motor control;sensation;balance  -MB     Gross Motor Skill, Impairments Detail  Finger to nose, moderately impaired BUE; heel to shin moderately impaired BLEs.  -[facility]  Name 01/13/19 0830          Balance    Balance  static standing balance  -[facility] Name 01/13/19 0830          Static Standing Balance    Level of [place] (Supported Standing, Static Balance)  maximal assist, 25 to 49% patient effort  -MB     Time Able to Maintain Position (Supported Standing, Static Balance)  15 to 30 seconds  -MB     Assistive Device Utilized (Supported Standing, Static Balance)  other (see comments) B UE support  -[facility] Name 01/13/19 0830          Sensory Assessment/Intervention    Sensory General Assessment  light touch sensation deficits identified;proprioception deficits identified  -[facility] Name 01/13/19 0830          Light Touch Sensation Assessment    Left Upper Extremity: Light Touch Sensation Assessment  moderate impairment, 50 to 74% correct responses hand/fingers  -MB     Right Upper Extremity: Light Touch Sensation Assessment  moderate impairment, 50 to 74% correct responses hand/fingers  -MB     Left Lower Extremity: Light Touch Sensation Assessment  moderate impairment, 50 to 74% correct responses  -MB     Right Lower Extremity: Light Touch Sensation Assessment  moderate impairment, 50 to 74% correct responses  -[facility] Name 01/13/19 0830          Proprioception Assessment    Left Lower Extremity: Proprioception Assessment  severe impairment, less than 50% correct responses  -MB     Right Lower Extremity: Proprioception Assessment  severe impairment, less than 50% correct responses  -[facility] Name 01/13/19 0830          Pain Assessment    Additional Documentation  Pain Scale: Numbers Pre/Post-Treatment (Group)  -[facility] Name 01/13/19 0830          Pain Scale: Numbers Pre/Post-Treatment    Pain Scale: Numbers, Pretreatment  5/10  -MB     Pain Scale: Numbers, Post-Treatment  5/10  -MB     Pain Location  head headache  -MB     Pre/Post Treatment Pain Comment  tolerated  -MB     Pain Intervention(s)  Repositioned;Ambulation/increased activity  -[facility] Name              Wound 01/12/19 1845 upper coccyx pressure injury    Wound - Properties Group Date first assessed: 01/12/19  -MS Time first assessed: 1845  -MS Present On Admission : yes  -MS Orientation: upper  -MS Location: coccyx  -MS Type: pressure injury  -MS Stage, Pressure Injury: Stage 1  -MS    Row Name 01/13/19 0830          Plan of Care Review    Plan of Care Reviewed With  patient  -MB     Row Name 01/13/19 0830          Physical Therapy Clinical Impression    Date of Referral to PT  01/12/19  -MB     PT Diagnosis (PT Clinical Impression)  Impaired balance, mobility, gait  -MB     Functional Level at Time of Evaluation (PT Clinical Impression)  max A x 2 for transfers  -MB     Patient/Family Goals Statement (PT Clinical Impression)  Pt. agreeable to IPR.  -MB     Criteria for Skilled Interventions Met (PT Clinical Impression)  yes;treatment indicated  -MB     Rehab Potential (PT Clinical Summary)  good, to achieve stated therapy goals  -MB     Care Plan Review (PT)  evaluation/treatment results reviewed;care plan/treatment goals reviewed;risks/benefits reviewed;current/potential barriers reviewed;patient/other agree to care plan  -MB     Row Name 01/13/19 0830          Vital Signs    Pre Systolic BP Rehab  159  -MB     Pre Treatment Diastolic BP  79  -MB     Pretreatment Heart Rate (beats/min)  86  -MB     Posttreatment Heart Rate (beats/min)  88  -MB     Pre SpO2 (%)  92  -MB     O2 Delivery Pre Treatment  supplemental O2 4L  -MB     Intra SpO2 (%)  88  -MB     O2 Delivery Intra Treatment  supplemental O2  -MB     Post SpO2 (%)  92  -MB     O2 Delivery Post Treatment  supplemental O2  -MB     Pre Patient Position  Supine  -MB     Intra Patient Position  Standing  -MB     Post Patient Position  Standing  -MB     Row Name 01/13/19 0830          Physical Therapy Goals    Bed Mobility Goal Selection (PT)  bed mobility, PT goal 1  -MB     Transfer Goal Selection (PT)  transfer, PT goal 1  -MB     Gait Training Goal  Selection (PT)  gait training, PT goal 1  -MB     Row Name 01/13/19 0830          Bed Mobility Goal 1 (PT)    Activity/Assistive Device (Bed Mobility Goal 1, PT)  rolling to left;rolling to right;sidelying to sit/sit to sidelying  -MB     [place] Level/Cues Needed (Bed Mobility Goal 1, PT)  contact guard assist  -MB     Time Frame (Bed Mobility Goal 1, PT)  10 days;2 weeks  -MB     Progress/Outcomes (Bed Mobility Goal 1, PT)  goal ongoing  -MB     Row Name 01/13/19 0830          Transfer Goal 1 (PT)    Activity/Assistive Device (Transfer Goal 1, PT)  sit-to-stand/stand-to-sit;bed-to-chair/chair-to-bed;walker, rolling  -MB     [place] Level/Cues Needed (Transfer Goal 1, PT)  minimum assist (75% or more patient effort)  -MB     Time Frame (Transfer Goal 1, PT)  2 weeks  -MB     Progress/Outcome (Transfer Goal 1, PT)  goal ongoing  -MB     Row Name 01/13/19 0830          Gait Training Goal 1 (PT)    Activity/Assistive Device (Gait Training Goal 1, PT)  gait (walking locomotion);walker, rolling  -MB     [place] Level (Gait Training Goal 1, PT)  moderate assist (50-74% patient effort)  -MB     Distance (Gait Goal 1, PT)  50  -MB     Time Frame (Gait Training Goal 1, PT)  2 weeks  -MB     Progress/Outcome (Gait Training Goal 1, PT)  goal ongoing  -MB     Row Name 01/13/19 0830          Patient Education Goal (PT)    Activity (Patient Education Goal, PT)  HEP for strengthening, coordination, balance  -MB     [place]/Cues/Accuracy (Memory Goal 2, PT)  demonstrates adequately;verbalizes understanding  -MB     Time Frame (Patient Education Goal, PT)  1 week  -MB     Progress/Outcome (Patient Education Goal, PT)  goal ongoing  -MB     Row Name 01/13/19 0830          Positioning and Restraints    Pre-Treatment Position  in bed  -MB     Post Treatment Position  chair  -MB     In Chair  notified nsg;reclined;call light within reach;encouraged to call for assist;exit alarm on;legs elevated;heels elevated  -MB      Row Name 01/13/19 0830          Living Environment    Home Accessibility  stairs to enter home walk in shower  -MB       User Key  (r) = Recorded By, (t) = Taken By, (c) = Cosigned By    Initials Name Provider Type    Bronwyn Swanson, PT Physical Therapist    Fabienne Jim, RN Registered Nurse        Physical Therapy Education     Title: PT OT SLP Therapies (Done)     Topic: Physical Therapy (Done)     Point: Mobility training (Done)     Learning Progress Summary           Patient Acceptance, E,D, VU,NR by MB at 1/13/2019  9:55 AM    Comment:  log roll technique, transfer mechanics/safety, POC progression, benefits of mobility, home safety                   Point: Home exercise program (Done)     Learning Progress Summary           Patient Acceptance, E,D, VU,NR by MB at 1/13/2019  9:55 AM    Comment:  log roll technique, transfer mechanics/safety, POC progression, benefits of mobility, home safety                   Point: Body mechanics (Done)     Learning Progress Summary           Patient Acceptance, E,D, VU,NR by MB at 1/13/2019  9:55 AM    Comment:  log roll technique, transfer mechanics/safety, POC progression, benefits of mobility, home safety                   Point: Precautions (Done)     Learning Progress Summary           Patient Acceptance, E,D, VU,NR by MB at 1/13/2019  9:55 AM    Comment:  log roll technique, transfer mechanics/safety, POC progression, benefits of mobility, home safety                               User Key     Initials Effective Dates Name Provider Type Arnoldo LAGUNAS 03/14/16 -  Bronwyn Calvillo, PT Physical Therapist PT              PT Recommendation and Plan  Anticipated Discharge Disposition (PT): inpatient rehabilitation facility  Planned Therapy Interventions (PT Eval): balance training, bed mobility training, gait training, home exercise program, patient/family education, neuromuscular re-education, motor coordination training, strengthening, transfer  training  Therapy Frequency (PT Clinical Impression): daily  Outcome Summary/Treatment Plan (PT)  Anticipated Equipment Needs at Discharge (PT): (Defer to Rehab.)  Anticipated Discharge Disposition (PT): inpatient rehabilitation facility  Plan of Care Reviewed With: patient  Outcome Summary: PT eval completed.  Pt. presents w/ evolving symptoms including decreased strength, impaired sensation and poor BLE proprioception, impaired balance, and gait instability.  Pt. performed bed mobility w/ mod A and transfers w/ max A x 2 w/ R knee buckling and signficant difficulty shifting weight.  Recommended to RN lift room for improved safety and independence w/ transfers.  Pt. will benefit from skilled IPPT to address impairments and promote return to PLOF.  Recommend IP rehab at D/C for best outcome.   Outcome Measures     Row Name 01/13/19 0830             How much help from another person do you currently need...    Turning from your back to your side while in flat bed without using bedrails?  3  -MB      Moving from lying on back to sitting on the side of a flat bed without bedrails?  2  -MB      Moving to and from a bed to a chair (including a wheelchair)?  2  -MB      Standing up from a chair using your arms (e.g., wheelchair, bedside chair)?  2  -MB      Climbing 3-5 steps with a railing?  1  -MB      To walk in hospital room?  1  -MB      AM-PAC 6 Clicks Score  11  -MB         Functional Assessment    Outcome Measure Options  AM-PAC 6 Clicks Basic Mobility (PT)  -MB        User Key  (r) = Recorded By, (t) = Taken By, (c) = Cosigned By    Initials Name Provider Type    Bronwyn Swanson, PT Physical Therapist         Time Calculation:   PT Charges     Row Name 01/13/19 1001             Time Calculation    Start Time  0830  -MB      PT Received On  01/13/19  -MB      PT Goal Re-Cert Due Date  01/23/19  -MB         Time Calculation- PT    Total Timed Code Minutes- PT  15 minute(s)  -MB         Timed Charges    18837  - PT Therapeutic Exercise Minutes  15  -MB        User Key  (r) = Recorded By, (t) = Taken By, (c) = Cosigned By    Initials Name Provider Type    Bronwyn Swanson, PT Physical Therapist        Therapy Suggested Charges     Code   Minutes Charges    48698 (CPT®) Hc Pt Neuromusc Re Education Ea 15 Min      20891 (CPT®) Hc Pt Ther Proc Ea 15 Min 15 1    55670 (CPT®) Hc Gait Training Ea 15 Min      63555 (CPT®) Hc Pt Therapeutic Act Ea 15 Min      12011 (CPT®) Hc Pt Manual Therapy Ea 15 Min      17031 (CPT®) Hc Pt Iontophoresis Ea 15 Min      44988 (CPT®) Hc Pt Elec Stim Ea-Per 15 Min      15964 (CPT®) Hc Pt Ultrasound Ea 15 Min      61548 (CPT®) Hc Pt Self Care/Mgmt/Train Ea 15 Min      17862 (CPT®) Hc Pt Prosthetic (S) Train Initial Encounter, Each 15 Min      49092 (CPT®) Hc Pt Orthotic(S)/Prosthetic(S) Encounter, Each 15 Min      76221 (CPT®) Hc Orthotic(S) Mgmt/Train Initial Encounter, Each 15min      Total  15 1        Therapy Charges for Today     Code Description Service Date Service Provider Modifiers Qty    65049581396 HC PT EVAL MOD COMPLEXITY 4 1/13/2019 Bronwyn Calvillo, PT GP 1    34860595916 HC PT THERAPEUTIC ACT EA 15 MIN 1/13/2019 Bronwyn Calvillo, PT GP 1          PT G-Codes  Outcome Measure Options: AM-PAC 6 Clicks Basic Mobility (PT)  AM-PAC 6 Clicks Score: 11      Bronwyn Calvillo, PT  1/13/2019

## 2019-01-13 NOTE — PROGRESS NOTES
[facility] Medicine Services  PROGRESS NOTE    Patient Name: Kristina Heck  : 1960  MRN: [id number]    Date of Admission: 2019  Length of Stay: 1  Primary Care Physician: Gio Henderson MD    Subjective   Subjective     CC:  F/u dyspnea    HPI:  Pt sitting up in chair when seen this morning. She reports improved dyspnea and cough productive of purulent sputum last night. Last abx were azithromycin and augmentin.  No fevers or chills. Took off BiPAP after coughing spell, she does use hers at home every night.      She notes persistent bilateral hand and foot numbness with weakness and inability to stand. Onset acute and on . No falls or injury. Symptoms have been stable since onset. Within this past week, pt was able to walk with assistance but that has since worsened.     Review of Systems  Gen- No fevers, chills  CV- No chest pain, palpitations  Resp- As above  GI- No N/V/D, abd pain    Otherwise ROS is negative except as mentioned in the HPI.    Objective   Objective     Vital Signs:   Temp:  [98.2 °F (36.8 °C)-98.5 °F (36.9 °C)] 98.4 °F (36.9 °C)  Heart Rate:  [73-92] 86  Resp:  [17-24] 20  BP: (138-159)/(71-89) 159/79  FiO2 (%):  [40 %-50 %] 50 %     Physical Exam:  Constitutional: No acute distress, awake, alert  HENT: NCAT, mucous membranes moist  Respiratory: No wheezing, diminished breath sounds bilaterally, poor air movement, respiratory effort slightly increased   Cardiovascular: RRR, no murmurs, rubs, or gallops, palpable pedal pulses bilaterally  Gastrointestinal: Positive bowel sounds, soft, nontender, nondistended, obese  Musculoskeletal: No bilateral ankle edema  Psychiatric: Appropriate affect, cooperative  Neurologic: Oriented x 3, strength 4/5 in all extremities, Cranial Nerves grossly intact to confrontation, speech clear. Sensation to fine touch diminished in stocking/glove distribution bilaterally. Toes downgoing bilaterally. Position sense  intact in feet and hands. DTRs absent diffusely.  Skin: No rashes    Results Reviewed:  I have personally reviewed current lab, radiology, and data and agree.    Results from last 7 days   Lab Units  01/13/19   0718  01/12/19   1346   WBC 10*3/mm3  7.49  10.21   HEMOGLOBIN g/dL  17.1*  17.4*   HEMATOCRIT %  53.0*  52.9*   PLATELETS 10*3/mm3  203  223     Results from last 7 days   Lab Units  01/13/19   0718  01/12/19   1419  01/12/19   1346   SODIUM mmol/L  138   --   136   POTASSIUM mmol/L  4.1   --   3.3*   CHLORIDE mmol/L  102   --   97*   CO2 mmol/L  29.0   --   31.0   BUN mg/dL  24*   --   20   CREATININE mg/dL  0.68   --   0.73   GLUCOSE mg/dL  141*   --   101*   CALCIUM mg/dL  9.0   --   9.3   ALT (SGPT) U/L   --    --   32   AST (SGOT) U/L   --    --   23   TROPONIN I ng/mL   --   0.00   --      Estimated Creatinine Clearance: 106.9 mL/min (by C-G formula based on SCr of 0.68 mg/dL).    BNP   Date Value Ref Range Status   01/12/2019 9.0 0.0 - 100.0 pg/mL Final     Comment:     Results may be falsely decreased if patient taking Biotin.       Microbiology Results Abnormal     None          Imaging Results (last 24 hours)     Procedure Component Value Units Date/Time    CT Angiogram Chest With Contrast [[id number]] Collected:  01/12/19 1705     Updated:  01/12/19 1724    Narrative:       EXAMINATION: CT ANGIOGRAM CHEST W/CONTRAST - 1/12/2019      INDICATION: Evaluate for pulmonary embolus.      TECHNIQUE:  Axial CT data of the chest were obtained helically per PE  protocol following IV contrast administration.  Multiplanar reformatted  images and 2D reconstructions (MIPs) were generated and reviewed.   Computer aided detection was utilized in the interpretation. The  radiation dose reduction device was turned on for each scan per the  ALARA (As Low as Reasonably Achievable) protocol     COMPARISONS:  None.     FINDINGS:   No acute pulmonary embolism is seen. Central airways are  patent without endobronchial  lesion or debris.  Heart size is within  normal limits. No suspicious lymph node. The lungs are clear without  focal opacity aside from a couple of calcified granulomas in the left  lower lobe.  No pleural effusion or pneumothorax. Chest wall soft  tissues are normal. Thoracic vertebral body heights are normal. There is  left-sided hydronephrosis and proximal hydroureter, moderate in degree.  There is a nonobstructing right renal calculus and a tiny cyst in the  superior posterior interpolar cortex of the right kidney.       Impression:       1. No evidence for pulmonary embolism or other acute cardiopulmonary  findings.  2. Left renal collecting system obstruction. Correlation with urinalysis  and CT urogram recommended after the contrast from this bolus has  cleared.     DICTATED:   1/12/2019  EDITED/ls :   1/12/2019      This report was finalized on 1/12/2019 5:22 PM by Javier Connors.       XR Chest 1 View [[id number]] Collected:  01/12/19 1419     Updated:  01/12/19 1508    Narrative:       EXAMINATION: XR CHEST 1 VW - 1/12/2019     INDICATION: Shortness of air.     TECHNIQUE: Single-view frontal chest.     COMPARISONS: None.     FINDINGS:  Lungs are without focal abnormality. No pleural effusion or  pneumothorax. Cardiomediastinal silhouette is within normal limits.       Impression:       No acute finding.     DICTATED:   1/12/2019  EDITED/ls :   1/12/2019      This report was finalized on 1/12/2019 3:06 PM by Javier Connors.                  I have reviewed the medications:    Current Facility-Administered Medications:   •  acetaminophen (TYLENOL) tablet 650 mg, 650 mg, Oral, Q4H PRN, Harjinder, Gena, APRN, 650 mg at 01/13/19 0641  •  aspirin chewable tablet 81 mg, 81 mg, Oral, Daily, Harjinder, Gena, APRN, 81 mg at 01/13/19 0816  •  budesonide (PULMICORT) nebulizer solution 0.5 mg, 0.5 mg, Nebulization, Daily - RT, Harjinder, Gena, APRN, 0.5 mg at 01/13/19 0734  •  escitalopram (LEXAPRO) tablet 10 mg, 10 mg,  Oral, Daily, Harjinder, Gena, APRN, 10 mg at 01/13/19 0816  •  gabapentin (NEURONTIN) capsule 100 mg, 100 mg, Oral, TID, Mary Pelayo, DO, 100 mg at 01/13/19 0816  •  guaiFENesin (MUCINEX) 12 hr tablet 600 mg, 600 mg, Oral, Q12H, Mary Pelayo, DO, 600 mg at 01/13/19 0816  •  hydrochlorothiazide (HYDRODIURIL) tablet 25 mg, 25 mg, Oral, Daily, Harjinder, Gena, APRN, 25 mg at 01/13/19 0817  •  HYDROcodone-acetaminophen (NORCO) 7.5-325 MG per tablet 1 tablet, 1 tablet, Oral, Q6H PRN, Subha Rider MD, 1 tablet at 01/13/19 0816  •  ipratropium-albuterol (DUO-NEB) nebulizer solution 3 mL, 3 mL, Nebulization, 4x Daily - RT, Harjinder, Gena, APRN, 3 mL at 01/13/19 0734  •  levothyroxine (SYNTHROID, LEVOTHROID) tablet 100 mcg, 100 mcg, Oral, Daily, Harjinder, Gena, APRN, 100 mcg at 01/13/19 0641  •  methylPREDNISolone sodium succinate (SOLU-Medrol) injection 60 mg, 60 mg, Intravenous, Q8H, Harjinder, Gena, APRN, 60 mg at 01/13/19 0817  •  metoprolol succinate XL (TOPROL-XL) 24 hr tablet 25 mg, 25 mg, Oral, Daily, Harjinder, Gena, APRN, 25 mg at 01/13/19 0816  •  Pharmacy Consult - [place], , Does not apply, Daily, Soha Jerome, [facility], Stopped at 01/13/19 1045  •  potassium chloride (MICRO-K) CR capsule 40 mEq, 40 mEq, Oral, PRN, 40 mEq at 01/12/19 4962 **OR** potassium chloride (KLOR-CON) packet 40 mEq, 40 mEq, Oral, PRN **OR** potassium chloride 10 mEq in 100 mL IVPB, 10 mEq, Intravenous, Q1H PRN, Harjinder, Gena, APRN  •  [COMPLETED] Insert peripheral IV, , , Once **AND** sodium chloride 0.9 % flush 10 mL, 10 mL, Intravenous, PRN, Prasanna, Dequan Maynard MD  •  Sodium chloride 0.9 % infusion, 100 mL/hr, Intravenous, Continuous, Gena Grande APRN, Last Rate: 100 mL/hr at 01/13/19 0820, 100 mL/hr at 01/13/19 0820      Assessment/Plan   Assessment / Plan     Active Hospital Problems    Diagnosis Date Noted   • Acute on chronic respiratory failure with hypoxia (CMS/HCC) [J96.21] 01/12/2019   • COPD with acute  exacerbation (CMS/HCC) [J44.1] 01/12/2019   • Hypothyroidism (acquired) [E03.9] 01/12/2019   • Essential hypertension [I10] 01/12/2019   • Depression [F32.9] 01/12/2019   • Pinched nerve in neck [G58.8] 01/12/2019   • left renal collecting system obstruction [N13.5] 01/12/2019   • Generalized weakness [R53.1] 01/12/2019       Brief Hospital Course to date:  Mrs. Heck is a 58 year-old F with a past medical hx of COPD, depression, hypertension, active smoker, recent dx of pinched nerve in neck, and hypothyroidism who presented with exertional dyspnea and non-productive cough in the setting of recent admission at [facility] on 12/25 for bilateral pneumonia.     Acute on Chronic Hypoxemic Hypercapneic Respiratory Failure (baseline 2LNC)  COPD with Acute Exacerbation  - Continue IV solumedrol, nebs, add doxy for 7 day course   - No infiltrate or PE on CTA Chest   - BiPAP qhs and prn     Bilateral Upper and Lower extremity Numbness, onset 1/2, high risk due to persistence and significant impairment of ability to do ADLs  - Records requested from [facility] (pt had an MRI of brain and spine at  within past several days), ? Cervical myelopathy (pt was told she had a pinched nerve)  - Gabapentin   - Anticipate need for Neurology vs. Neurosurgery consult depending on results of outside imaging    Moderate Left-sided hydronephrosis and hyroureter  - CT urogram  - Anticipate need for Urology consult     Hypokalemia  - replaced, Mg sufficient    Hypertension  - BP reviewed, will monitor and adjust meds tomorrow if no better    Secondary Erythrocytosis   - due to COPD, chronic tobacco abuse    Active Tobacco Abuse   - pt declines nicotine patch at present, available prn    Hx of Nephrolithiasis   Hypothyroidism, controlled, continue home synthroid, TSH normal    DVT Prophylaxis:  SQ lovenox  Disposition: I expect the patient to be discharged home with [facility] vs. Rehab later this week depending on assessment and plan of  bilateral weakness.    CODE STATUS:   Code Status and Medical Interventions:   Ordered at: 01/12/19 2142     Level Of Support Discussed With:    Patient     Code Status:    CPR     Medical Interventions (Level of Support Prior to Arrest):    Full         Electronically signed by Vel Leiva MD, 01/13/19, 10:57 AM.

## 2019-01-13 NOTE — PAYOR COMM NOTE
"Kristina Heck (58 y.o. Female) I    Initial notification and clincals for inpatient admit   please call or fax inpatient AUTH to  [facility]   Case Management   [phone]  [phone]        Date of Birth Social Security Number Address Home Phone MRN    1960  [address] [address] [phone] [id number]    Anabaptist Marital Status          None        Admission Date Admission Type Admitting Provider Attending Provider Department, Room/Bed    19 Emergency Subha Rider MD Roesch, Lyndsey, DO [facility] 2F, S207/    Discharge Date Discharge Disposition Discharge Destination                       Attending Provider:  Mary Pelayo DO    Allergies:  No Known Allergies    Isolation:  None   Infection:  None   Code Status:  CPR    Ht:  170.2 cm (67\")   Wt:  97.5 kg (215 lb)    Admission Cmt:  None   Principal Problem:  None                Active Insurance as of 2019     Primary Coverage     Payor Plan Insurance Group Employer/Plan Group    PASSPORT PASSPORT MEDICAID     Payor Plan Address Payor Plan Phone Number Payor Plan Fax Number Effective Dates    [address] [phone]  1997 - None Entered    [facility] [id number]       Subscriber Name Subscriber Birth Date Member ID       KRISTINA HECK 1960 [id number]                 Emergency Contacts      (Rel.) Home Phone Work Phone Mobile Phone    JOSUE LORA (Daughter) [phone] -- [phone]    Pattie Heck (Daughter) -- -- [phone]               History & Physical      Mary Pelayo DO at 2019  9:25 PM          [facility] Hospital Medicine Services  HISTORY AND PHYSICAL    Patient Name: Kristina Heck  : 1960  MRN: [id number]  Primary Care Physician: Gio Henderson MD    Subjective   Subjective     Chief Complaint:  Cough, shortness of air    HPI:  Kristina Heck is a 58 y.o. female with a past medical history " significant for essential hypertension, hypothyroidism, depression and COPD who presents to the ED with complaints of exertional dyspnea and dry no-productive cough.  Patient states that on Decmeber 25th she was admitted to [facility] with diagnosis of bilateral pneumonia. At discharge she states she began feeling better but a few days later began having lower extremity and upper extremity numbness and presented to [facility].  There she had a MRI of head/neck to rule out CVA.  She was diagnosed with a pinched nerve.  She states she was evaluated by a neurologist whom cleared for discharge.  Over the couple of days she has had increased shortness of air and dry cough.  At baseline she wears 2L of oxygen at home but has had to increase to 4L.  She denies any fever, chills, nausea, vomiting, abdominal pain, dysuria, hematuria, melena or chest pain.  She however notes that prior to being hospitalized she was independent using no assistive devices for ambulation and now cannot walk.  In addition she reports that she feels that the numbness is progressing making her short of breath.  She states that her PCP recommended being evaluated at [facility] by neurosurgery.  On arrival to the ED ABG revealed hypoxia and hypercapnia.  Patient will be admitted to [facility] Under the care of the Hospitalist for further evaluation and treatment.      Review of Systems   Constitutional: Positive for activity change and appetite change. Negative for chills, diaphoresis, fatigue, fever and unexpected weight change.   HENT: Negative.    Eyes: Negative for photophobia and visual disturbance.   Respiratory: Positive for cough and shortness of breath.    Cardiovascular: Negative for chest pain, palpitations and leg swelling.   Gastrointestinal: Negative for abdominal distention, abdominal pain, blood in stool, constipation, diarrhea, nausea and vomiting.   Genitourinary: Negative for difficulty urinating, dysuria, flank pain, frequency and  hematuria.   Musculoskeletal: Positive for back pain and gait problem. Negative for neck pain and neck stiffness.   Skin: Negative.    Neurological: Positive for weakness and numbness. Negative for dizziness, syncope, facial asymmetry, speech difficulty, light-headedness and headaches.   Psychiatric/Behavioral: Negative.         Otherwise 10-system ROS reviewed and is negative except as mentioned in the HPI.    Personal History     Past Medical History:   Diagnosis Date   • COPD (chronic obstructive pulmonary disease) (CMS/HCC)    • Depression    • Disease of thyroid gland    • Hypertension        History reviewed. No pertinent surgical history.    Family History: family history includes Cancer in her father; Heart disease in her mother.     Social History:  reports that she has been smoking cigarettes.  She has been smoking about 0.25 packs per day. she has never used smokeless tobacco. She reports that she does not drink alcohol or use drugs.    Medications:  Medications Prior to Admission   Medication Sig Dispense Refill Last Dose   • aspirin 81 MG chewable tablet Chew 81 mg Daily.      • escitalopram (LEXAPRO) 10 MG tablet Take 10 mg by mouth Daily.      • hydrochlorothiazide (HYDRODIURIL) 25 MG tablet Take 25 mg by mouth Daily.      • HYDROcodone-acetaminophen (NORCO) 7.5-325 MG per tablet Take 1 tablet by mouth Every 6 (Six) Hours As Needed for Moderate Pain .      • levothyroxine (SYNTHROID, LEVOTHROID) 100 MCG tablet Take 100 mcg by mouth Daily.      • metoprolol succinate XL (TOPROL-XL) 25 MG 24 hr tablet Take 25 mg by mouth Daily.          No Known Allergies    Objective   Objective     Vital Signs:   Temp:  [98.2 °F (36.8 °C)-98.3 °F (36.8 °C)] 98.2 °F (36.8 °C)  Heart Rate:  [75-81] 78  Resp:  [20-24] 22  BP: (138-158)/(71-89) 151/89        Physical Exam   Constitutional: She is oriented to person, place, and time. She appears well-developed and well-nourished. No distress.   Alert and oriented x4    HENT:   Head: Normocephalic and atraumatic.   Eyes: Conjunctivae and EOM are normal. Pupils are equal, round, and reactive to light. Right eye exhibits no discharge. Left eye exhibits no discharge. No scleral icterus.   Neck: Normal range of motion. Neck supple. No JVD present. No tracheal deviation present. No thyromegaly present.   Cardiovascular: Normal rate, regular rhythm, normal heart sounds and intact distal pulses. Exam reveals no gallop and no friction rub.   No murmur heard.  Pulmonary/Chest: Effort normal. No stridor. No respiratory distress. She has wheezes. She has no rales. She exhibits no tenderness.   Abdominal: Soft. Bowel sounds are normal. She exhibits no distension and no mass. There is no tenderness. There is no rebound and no guarding. No hernia.   Musculoskeletal: Normal range of motion. She exhibits no edema, tenderness or deformity.   Lymphadenopathy:     She has no cervical adenopathy.   Neurological: She is alert and oriented to person, place, and time.    equal, no pronator drift, speech clear, no facial droop.    Skin: Skin is warm and dry. No rash noted. She is not diaphoretic. No erythema. No pallor.   Psychiatric: She has a normal mood and affect. Her behavior is normal. Judgment and thought content normal.   Nursing note and vitals reviewed.       Results Reviewed:  I have personally reviewed current lab, radiology, and data and agree.    Results from last 7 days   Lab Units  01/12/19   1346   WBC 10*3/mm3  10.21   HEMOGLOBIN g/dL  17.4*   HEMATOCRIT %  52.9*   PLATELETS 10*3/mm3  223     Results from last 7 days   Lab Units  01/12/19   1346   SODIUM mmol/L  136   POTASSIUM mmol/L  3.3*   CHLORIDE mmol/L  97*   CO2 mmol/L  31.0   BUN mg/dL  20   CREATININE mg/dL  0.73   GLUCOSE mg/dL  101*   CALCIUM mg/dL  9.3   ALT (SGPT) U/L  32   AST (SGOT) U/L  23     BNP   Date Value Ref Range Status   01/12/2019 9.0 0.0 - 100.0 pg/mL Final     Comment:     Results may be falsely  decreased if patient taking Biotin.     pH, Arterial   Date Value Ref Range Status   01/12/2019 7.451 (H) 7.350 - 7.450 pH units Final     Comment:     83 Value above reference range     Imaging Results (last 24 hours)     Procedure Component Value Units Date/Time    CT Angiogram Chest With Contrast [[id number]] Collected:  01/12/19 1705     Updated:  01/12/19 1724    Narrative:       EXAMINATION: CT ANGIOGRAM CHEST W/CONTRAST - 1/12/2019      INDICATION: Evaluate for pulmonary embolus.      TECHNIQUE:  Axial CT data of the chest were obtained helically per PE  protocol following IV contrast administration.  Multiplanar reformatted  images and 2D reconstructions (MIPs) were generated and reviewed.   Computer aided detection was utilized in the interpretation. The  radiation dose reduction device was turned on for each scan per the  ALARA (As Low as Reasonably Achievable) protocol     COMPARISONS:  None.     FINDINGS:   No acute pulmonary embolism is seen. Central airways are  patent without endobronchial lesion or debris.  Heart size is within  normal limits. No suspicious lymph node. The lungs are clear without  focal opacity aside from a couple of calcified granulomas in the left  lower lobe.  No pleural effusion or pneumothorax. Chest wall soft  tissues are normal. Thoracic vertebral body heights are normal. There is  left-sided hydronephrosis and proximal hydroureter, moderate in degree.  There is a nonobstructing right renal calculus and a tiny cyst in the  superior posterior interpolar cortex of the right kidney.       Impression:       1. No evidence for pulmonary embolism or other acute cardiopulmonary  findings.  2. Left renal collecting system obstruction. Correlation with urinalysis  and CT urogram recommended after the contrast from this bolus has  cleared.     DICTATED:   1/12/2019  EDITED/ls :   1/12/2019      This report was finalized on 1/12/2019 5:22 PM by Javier Connors.       XR Chest 1 View  [[id number]] Collected:  01/12/19 1419     Updated:  01/12/19 1508    Narrative:       EXAMINATION: XR CHEST 1 VW - 1/12/2019     INDICATION: Shortness of air.     TECHNIQUE: Single-view frontal chest.     COMPARISONS: None.     FINDINGS:  Lungs are without focal abnormality. No pleural effusion or  pneumothorax. Cardiomediastinal silhouette is within normal limits.       Impression:       No acute finding.     DICTATED:   1/12/2019  EDITED/ls :   1/12/2019      This report was finalized on 1/12/2019 3:06 PM by Javier Connors.                Assessment/Plan   Assessment / Plan     Active Hospital Problems    Diagnosis   • Acute on chronic respiratory failure with hypoxia (CMS/HCC)   • COPD with acute exacerbation (CMS/HCC)   • Hypothyroidism (acquired)   • Essential hypertension   • Depression   • Pinched nerve in neck   • left renal collecting system obstruction   • Generalized weakness         Assessment & Plan:  58 year old female presents to the ED with complaints of worsening shortness of air, cough, and generalized weakness.     1) Acute on chronic respiratory failure with hypoxia and hypercapnia.  -ABG as noted above  -CXR unremarkable, likely secondary to COPD exacerbation  -CTA of chest no PE  -will continue IV solumedrol  -continuous pulse ox  -Bipap at HS  -scheduled duo-nebs  -keep o2 sat >90%    2) COPD exacerbation  -CXR unremarkable  -hold abx for now, afebrile, no leukocytosis  -scheduled duo-nebs; mucinex   -keep o2 sat >90%  -continuous pulse ox    3) Generalized weakness/neuropathy   -etiology not completely clear  -obtain records from [facility]  +/- neuro/neurosurg consult  -fall precautions  -TSH, B12 and A1C; consider EMG   - Trial gabapentin 100 mg TID    4) Left renal collecting system obstruction  -noted on CTA  -case discussed by ED physician with Dr. Connors whom recommends CT abdomen and pelvis with contrast in AM  -will obtain CT abd/pelvis in am, may need further assistance from urology     5)  Hypokalemia  -replace per protocol  -check mag    6) essential hypertension  -continue home medications    7) hypothyroidism  -continue synthroid    8) Depression  -continue lexapro     DVT prophylaxis:teds/scds only for now     CODE STATUS:  Full code   There are no questions and answers to display.       Admission Status:  I believe this patient meets INPATIENT status due to the need for care which can only be reasonably provided in an hospital setting such as possible need for aggressive/expedited ancillary services and/or consultation services, IV medications, close physician monitoring, and/or procedures.  In such, I feel patient’s risk for adverse outcomes and need for care warrant INPATIENT evaluation and predict the patient’s care encounter to likely last beyond 2 midnights.    СВЕТЛАНА Carmona   01/12/19   9:25 PM       Brief Attending Admission Attestation     I have seen and examined the patient, performing an independent face-to-face diagnostic evaluation with plan of care reviewed and developed with the advanced practice clinician (APC).      Brief Summary Statement/HPI:   Kristina Heck is a 58 y.o. female with history of COPD, hypothyroid, HTN who presented for increase in SOA, wheezing and cough. Recently treated at [facility] for PNA. She improved after this but developed numbness/tingling in hands and feet. She went to  and was evaluated by neurology and had an MRI and per patient was told that she had a pinched nerve and discharged. States breathing was doing ok until a few days ago when she developed worsening of her cough, wheezing. She has also noted progressive pain/numbness of her hands/feet. States she is weak and has decreased mobility since this time. Per patient, was evaluated by her PCP and told to come to [facility] for NS eval. Patient denies history of DM or ETOH. States her oral pain medication she has been taking is not helping.     Patient requiring 4L of O2 when baseline  is 2L. CT PE showed no PE, no consolidation but finding of left hydro and CT urogram recommended for the AM.     Attending Physical Exam:  Constitutional: No acute distress, awake, alert  Eyes: PERRLA, sclerae anicteric, no conjunctival injection  HENT: NCAT, mucous membranes moist  Neck: Supple, no thyromegaly, no lymphadenopathy, trachea midline  Respiratory: Coarse breath sounds; bilateral wheezing   Cardiovascular: RRR, no murmurs, rubs, or gallops, palpable pedal pulses bilaterally  Gastrointestinal: Positive bowel sounds, soft, nontender, nondistended  Musculoskeletal: No bilateral ankle edema, no clubbing or cyanosis to extremities  Psychiatric: Appropriate affect, cooperative  Neurologic: Oriented x 3, strength symmetric in all extremities, 5/5 upper and lower strength testing when patient in the bed, Cranial Nerves grossly intact to confrontation, speech clear  Skin: No rashes      Brief Assessment/Plan :  See above for further detailed assessment and plan developed with APC which I have reviewed and/or edited.      Electronically signed by Mary Pelayo DO, 01/12/19, 10:51 PM.               Electronically signed by Mary Pelayo DO at 1/12/2019 11:07 PM          Emergency Department Notes      Silvio Maloney APRN at 1/12/2019  1:34 PM     Attestation signed by Dequan Mims MD at 1/12/2019  8:38 PM          For this patient encounter, I reviewed the NP or PA documentation, treatment plan, and medical decision making. Dequan Mims MD 1/12/2019 8:38 PM                  Subjective   Cough. Soa. For several days. Admitted to [facility] on [facility] and dx with bilateral pneumonia. There for several days. After DC pt started to have bilateral arm and leg numbness and pain. Somehow ended up at  for a MRI of her neck / brain and told it was a pinched nerve. Family report she was seen by a neurologist and cleared to go home. Has had worsening soa, difficulty walking, etc.    No cp. No  abd pain.    Hx of copd. Typically on 2L NC. Worsening soa recently. Sats in the upper 80s on NC with minimal exertion.        Cough   Cough characteristics:  Non-productive  Sputum characteristics:  Nondescript  Severity:  Moderate  Onset quality:  Gradual  Duration:  1 week  Timing:  Constant  Progression:  Unchanged  Chronicity:  New  Smoker: yes    Relieved by:  Nothing  Worsened by:  Activity  Associated symptoms: wheezing    Associated symptoms: no chest pain, no chills, no diaphoresis, no fever, no shortness of breath and no sore throat        Review of Systems   Constitutional: Negative for chills, diaphoresis and fever.   HENT: Negative for congestion and sore throat.    Respiratory: Positive for cough and wheezing. Negative for choking, chest tightness and shortness of breath.    Cardiovascular: Negative for chest pain and leg swelling.   Gastrointestinal: Negative for abdominal distention, abdominal pain, anal bleeding, blood in stool, constipation, diarrhea, nausea and vomiting.   Genitourinary: Negative for difficulty urinating, dysuria, flank pain, frequency, hematuria and urgency.   All other systems reviewed and are negative.      Past Medical History:   Diagnosis Date   • COPD (chronic obstructive pulmonary disease) (CMS/formerly [facility])    • Depression    • Disease of thyroid gland    • Hypertension        No Known Allergies    History reviewed. No pertinent surgical history.    History reviewed. No pertinent family history.    Social History     Socioeconomic History   • Marital status:      Spouse name: Not on file   • Number of children: Not on file   • Years of education: Not on file   • Highest education level: Not on file   Tobacco Use   • Smoking status: Current Every Day Smoker   Substance and Sexual Activity   • Alcohol use: No     Frequency: Never   • Drug use: No   • Sexual activity: Defer           Objective   Physical Exam   Constitutional: She is oriented to person, place, and time. She  appears well-developed and well-nourished.   HENT:   Head: Normocephalic and atraumatic.   Right Ear: External ear normal.   Left Ear: External ear normal.   Nose: Nose normal.   Mouth/Throat: Oropharynx is clear and moist.   Eyes: Conjunctivae and EOM are normal. Pupils are equal, round, and reactive to light.   Neck: Normal range of motion. Neck supple.   Cardiovascular: Normal rate, regular rhythm, normal heart sounds and intact distal pulses.   Pulmonary/Chest: Effort normal. She has wheezes.   Abdominal: Soft. Bowel sounds are normal.   Musculoskeletal: Normal range of motion.   Neurological: She is alert and oriented to person, place, and time.   Skin: Skin is warm and dry.   Psychiatric: She has a normal mood and affect. Her behavior is normal. Judgment and thought content normal.       Procedures          ED Course  ED Course as of Jan 12 1754   Sat Jan 12, 2019   1714 Hospitalist paged  [JM]   1750 I spoke with Dr. Rider who will admit.   [SERJIO]   1751 Left renal obstruction. I spoke with dr. Connors who recommends no CT now for eval as her contrast would obscure this. Will get ct ap tomorrow.  []      ED Course User Index  [JM] Silvio Maloney APRN          CT Angiogram Chest With Contrast   Final Result   1. No evidence for pulmonary embolism or other acute cardiopulmonary   findings.   2. Left renal collecting system obstruction. Correlation with urinalysis   and CT urogram recommended after the contrast from this bolus has   cleared.       DICTATED:   1/12/2019   EDITED/ls :   1/12/2019        This report was finalized on 1/12/2019 5:22 PM by Javier Connors.          XR Chest 1 View   Final Result   No acute finding.       DICTATED:   1/12/2019   EDITED/ls :   1/12/2019        This report was finalized on 1/12/2019 3:06 PM by Javier Connors.                    [facility]      Final diagnoses:   Left ureter dilated   Hypoxia   COPD exacerbation (CMS/HCC)   Weakness            Silvio Maloney APRN  01/12/19 7924       Haider  "Silvio, APRN  01/12/19 1754      Electronically signed by Dequan Mims MD at 1/12/2019  8:38 PM       ICU Vital Signs     Row Name 01/12/19 2352 01/12/19 2343 01/12/19 2047 01/12/19 1847 01/12/19 1845       Height and Weight    Weight  --  --  --  97.5 kg (215 lb)  --    Weight Method  --  --  --  Bed scale  --       Vitals    Temp  --  --  --  98.2 °F (36.8 °C)  --    Temp src  --  --  --  Oral  --    Pulse  78  73  --  78  --    Heart Rate Source  Monitor  Monitor  --  Monitor  --    Resp  21  22  --  22  --    Resp Rate Source  Monitor  Visual  --  Visual  --    Resp Rate (Observed) Vent  21  --  --  --  --    BP  --  --  --  151/89  --    Noninvasive MAP (mmHg)  --  --  --  112  --    BP Location  --  --  --  Right arm  --    BP Method  --  --  --  Automatic  --    Patient Position  --  --  --  Lying  --       Oxygen Therapy    SpO2  86 %  (Abnormal)   88 %  (Abnormal)   --  91 %  --    Pulse Oximetry Type  Continuous  Continuous  --  --  --    Device (Oxygen Therapy)  CPAP  nasal cannula;humidified  nasal cannula  --  nasal cannula    Flow (L/min)  --  4  4  --  4    Oxygen Concentration (%)  40  --  --  --  --    Row Name 01/12/19 1800 01/12/19 17:18:42 01/12/19 1530 01/12/19 1401 01/12/19 1400       Vitals    Pulse  75  81  78  76  --    BP  158/79  156/85  140/81  --  140/71    Noninvasive MAP (mmHg)  110  --  96  --  --       Oxygen Therapy    SpO2  90 %  91 %  90 %  91 %  --    Device (Oxygen Therapy)  --  room air  --  --  --    Flow (L/min)  --  4  --  --  --    Row Name 01/12/19 1342 01/12/19 1258 01/12/19 1256             Height and Weight    Height  --  --  170.2 cm (67\")      Height Method  --  --  Stated      Weight  --  --  95.3 kg (210 lb)      Weight Method  --  --  Stated      Ideal Body Weight (IBW) (kg)  --  --  61.86      BSA (Calculated - sq m)  --  --  2.06 sq meters      BMI (Calculated)  --  --  32.9      Weight in (lb) to have BMI = 25  --  --  159.3         Vitals    Temp  --  --  " 98.3 °F (36.8 °C)      Temp src  --  --  Oral      Pulse  75 post HR-79  --  80      Resp  24  --  20      BP  --  138/85  --         Oxygen Therapy    SpO2  91 %  --  91 %      Pulse Oximetry Type  Continuous  --  --      Device (Oxygen Therapy)  nasal cannula  --  nasal cannula      Flow (L/min)  4  --  2          Intake & Output (last day)       01/12 0701 - 01/13 0700         Urine Unmeasured Occurrence 1 x        Lines, Drains & Airways    Active LDAs     Name:   Placement date:   Placement time:   Site:   Days:    Peripheral IV 01/12/19 1304 Left Hand   01/12/19    1304    Hand   less than 1    Peripheral IV 01/12/19 1537 Right Antecubital   01/12/19    1537    Antecubital   less than 1         Inactive LDAs     None                Hospital Medications (all)       Dose Frequency Start End    acetaminophen (TYLENOL) tablet 650 mg 650 mg Every 4 Hours PRN 1/12/2019     Sig - Route: Take 2 tablets by mouth Every 4 (Four) Hours As Needed for Mild Pain . - Oral    aspirin chewable tablet 81 mg 81 mg Daily 1/13/2019     Sig - Route: Chew 1 tablet Daily. - Oral    budesonide (PULMICORT) nebulizer solution 0.5 mg 0.5 mg Daily - RT 1/13/2019     Sig - Route: Take 2 mL by nebulization Daily. - Nebulization    escitalopram (LEXAPRO) tablet 10 mg 10 mg Daily 1/13/2019     Sig - Route: Take 1 tablet by mouth Daily. - Oral    gabapentin (NEURONTIN) capsule 100 mg 100 mg 3 Times Daily 1/12/2019     Sig - Route: Take 1 capsule by mouth 3 (Three) Times a Day. - Oral    guaiFENesin (MUCINEX) 12 hr tablet 600 mg 600 mg Every 12 Hours Scheduled 1/12/2019     Sig - Route: Take 1 tablet by mouth Every 12 (Twelve) Hours. - Oral    hydrochlorothiazide (HYDRODIURIL) tablet 25 mg 25 mg Daily 1/13/2019     Sig - Route: Take 1 tablet by mouth Daily. - Oral    HYDROcodone-acetaminophen (NORCO) 7.5-325 MG per tablet 1 tablet 1 tablet Every 6 Hours PRN 1/12/2019 1/22/2019    Sig - Route: Take 1 tablet by mouth Every 6 (Six) Hours As Needed  for Moderate Pain . - Oral    HYDROmorphone (DILAUDID) injection 0.5 mg 0.5 mg Once 1/12/2019 1/12/2019    Sig - Route: Infuse 0.5 mL into a venous catheter 1 (One) Time. - Intravenous    HYDROmorphone (DILAUDID) injection 0.5 mg 0.5 mg Once 1/12/2019 1/12/2019    Sig - Route: Infuse 0.5 mL into a venous catheter 1 (One) Time. - Intravenous    HYDROmorphone (DILAUDID) injection 0.5 mg 0.5 mg Once 1/12/2019 1/12/2019    Sig - Route: Infuse 0.5 mL into a venous catheter 1 (One) Time. - Intravenous    iopamidol (ISOVUE-370) 76 % injection 100 mL 100 mL Once in Imaging 1/12/2019 1/12/2019    Sig - Route: Infuse 100 mL into a venous catheter Once. - Intravenous    ipratropium-albuterol (DUO-NEB) nebulizer solution 3 mL 3 mL Once 1/12/2019 1/12/2019    Sig - Route: Take 3 mL by nebulization 1 (One) Time. - Nebulization    ipratropium-albuterol (DUO-NEB) nebulizer solution 3 mL 3 mL 4 Times Daily - RT 1/12/2019     Sig - Route: Take 3 mL by nebulization 4 (Four) Times a Day. - Nebulization    levothyroxine (SYNTHROID, LEVOTHROID) tablet 100 mcg 100 mcg Daily 1/13/2019     Sig - Route: Take 1 tablet by mouth Daily. - Oral    methylPREDNISolone sodium succinate (SOLU-Medrol) injection 125 mg 125 mg Once 1/12/2019 1/12/2019    Sig - Route: Infuse 2 mL into a venous catheter 1 (One) Time. - Intravenous    methylPREDNISolone sodium succinate (SOLU-Medrol) injection 60 mg 60 mg Every 8 Hours 1/13/2019     Sig - Route: Infuse 0.96 mL into a venous catheter Every 8 (Eight) Hours. - Intravenous    metoprolol succinate XL (TOPROL-XL) 24 hr tablet 25 mg 25 mg Daily 1/13/2019     Sig - Route: Take 1 tablet by mouth Daily. - Oral    ondansetron (ZOFRAN) injection 4 mg 4 mg Once 1/12/2019 1/12/2019    Sig - Route: Infuse 2 mL into a venous catheter 1 (One) Time. - Intravenous    potassium chloride (KLOR-CON) packet 40 mEq 40 mEq As Needed 1/12/2019     Sig - Route: Take 40 mEq by mouth As Needed (potassium replacement, see admin  "instructions). - Oral    Linked Group 1:  \"Or\" Linked Group Details        potassium chloride (MICRO-K) CR capsule 40 mEq 40 mEq As Needed 1/12/2019     Sig - Route: Take 4 capsules by mouth As Needed (potassium replacement.  see admin instructions). - Oral    Linked Group 1:  \"Or\" Linked Group Details        potassium chloride 10 mEq in 100 mL IVPB 10 mEq Every 1 Hour PRN 1/12/2019     Sig - Route: Infuse 100 mL into a venous catheter Every 1 (One) Hour As Needed (potassium protocol PERIPHERAL - see admin instructions). - Intravenous    Linked Group 1:  \"Or\" Linked Group Details        sodium chloride 0.9 % flush 10 mL 10 mL As Needed 1/12/2019     Sig - Route: Infuse 10 mL into a venous catheter As Needed for Line Care. - Intravenous    Linked Group 2:  \"And\" Linked Group Details        Sodium chloride 0.9 % infusion 100 mL/hr Continuous 1/12/2019     Sig - Route: Infuse 100 mL/hr into a venous catheter Continuous. - Intravenous    HYDROcodone-acetaminophen (NORCO) 5-325 MG per tablet 1 tablet (Discontinued) 1 tablet Every 4 Hours PRN 1/12/2019 1/12/2019    Sig - Route: Take 1 tablet by mouth Every 4 (Four) Hours As Needed for Moderate Pain . - Oral    HYDROmorphone (DILAUDID) injection 0.5 mg (Discontinued) 0.5 mg Every 4 Hours PRN 1/12/2019 1/12/2019    Sig - Route: Infuse 0.5 mL into a venous catheter Every 4 (Four) Hours As Needed for Severe Pain . - Intravenous            Lab Results (last 24 hours)     Procedure Component Value Units Date/Time    POC Troponin, Rapid [[id number]]  (Normal) Collected:  01/12/19 1419    Specimen:  Blood Updated:  01/12/19 1434     Troponin I 0.00 ng/mL      Comment: Serial Number: [id number]:  [id number]       BNP [[id number]]  (Normal) Collected:  01/12/19 1346    Specimen:  Blood Updated:  01/12/19 1421     BNP 9.0 pg/mL      Comment: Results may be falsely decreased if patient taking Biotin.       Urinalysis With Microscopic If Indicated (No Culture) - Urine, Clean Catch " [[id number]]  (Normal) Collected:  01/12/19 1341    Specimen:  Urine, Clean Catch Updated:  01/12/19 1418     Color, UA Yellow     Appearance, UA Clear     pH, UA 6.5     Specific Gravity, UA 1.014     Glucose, UA Negative     Ketones, UA Negative     Bilirubin, UA Negative     Blood, UA Negative     Protein, UA Negative     Leuk Esterase, UA Negative     Nitrite, UA Negative     Urobilinogen, UA 0.2 E.U./dL    Narrative:       Urine microscopic not indicated.    Comprehensive Metabolic Panel [[id number]]  (Abnormal) Collected:  01/12/19 1346    Specimen:  Blood Updated:  01/12/19 1413     Glucose 101 mg/dL      BUN 20 mg/dL      Creatinine 0.73 mg/dL      Sodium 136 mmol/L      Potassium 3.3 mmol/L      Chloride 97 mmol/L      CO2 31.0 mmol/L      Calcium 9.3 mg/dL      Total Protein 6.8 g/dL      Albumin 4.10 g/dL      ALT (SGPT) 32 U/L      AST (SGOT) 23 U/L      Alkaline Phosphatase 104 U/L      Total Bilirubin 1.3 mg/dL      eGFR Non African Amer 82 mL/min/1.73      Globulin 2.7 gm/dL      A/G Ratio 1.5 g/dL      BUN/Creatinine Ratio 27.4     Anion Gap 8.0 mmol/L     Narrative:       [employer] Guidelines    Stage     Description        GFR  1         Normal or High     90+  2         Mild decrease      60-89  3         Moderate decrease  30-59  4         Severe decrease    15-29  5         Kidney failure     <15    The MDRD GFR formula is only valid for adults with stable renal function between ages 18 and 70.    Blood Gas, Arterial With Co-Ox [[id number]]  (Abnormal) Collected:  01/12/19 1409    Specimen:  Arterial Blood Updated:  01/12/19 1411     Site Right Radial     Héctor's Test N/A     pH, Arterial 7.451 pH units      Comment: 83 Value above reference range        pCO2, Arterial 48.9 mm Hg      Comment: 83 Value above reference range        pO2, Arterial 59.4 mm Hg      Comment: 84 Value below reference range        HCO3, Arterial 34.0 mmol/L      Base Excess, Arterial 8.2 mmol/L       Hemoglobin, Blood Gas 17.5 g/dL      Comment: 83 Value above reference range        Hematocrit, Blood Gas 53.8 %      Oxyhemoglobin 88.6 %      Comment: 84 Value below reference range        Methemoglobin 0.90 %      Carboxyhemoglobin 3.0 %      Comment: 83 Value above reference range        CO2 Content 35.5 mmol/L      Temperature 37.0 C      Barometric Pressure for Blood Gas -- mmHg      Comment: N/A        Modality Nasal Cannula     FIO2 36 %      Ventilator Mode       Comment: Meter: [id number]     :  [id number]        Note --     pH, Temp Corrected 7.451 pH Units      pCO2, Temperature Corrected 48.9 mm Hg      pO2, Temperature Corrected 59.4 mm Hg     CBC & Differential [[id number]] Collected:  01/12/19 1346    Specimen:  Blood Updated:  01/12/19 1359    Narrative:       The following orders were created for panel order CBC & Differential.  Procedure                               Abnormality         Status                     ---------                               -----------         ------                     CBC Auto Differential[[id number]]        Abnormal            Final result                 Please view results for these tests on the individual orders.    CBC Auto Differential [[id number]]  (Abnormal) Collected:  01/12/19 1346    Specimen:  Blood Updated:  01/12/19 1359     WBC 10.21 10*3/mm3      RBC 5.74 10*6/mm3      Hemoglobin 17.4 g/dL      Hematocrit 52.9 %      MCV 92.2 fL      MCH 30.3 pg      MCHC 32.9 g/dL      RDW 14.3 %      RDW-SD 48.6 fl      MPV 10.5 fL      Platelets 223 10*3/mm3      Neutrophil % 74.4 %      Lymphocyte % 18.7 %      Monocyte % 5.2 %      Eosinophil % 1.2 %      Basophil % 0.5 %      Immature Grans % 0.5 %      Neutrophils, Absolute 7.60 10*3/mm3      Lymphocytes, Absolute 1.91 10*3/mm3      Monocytes, Absolute 0.53 10*3/mm3      Eosinophils, Absolute 0.12 10*3/mm3      Basophils, Absolute 0.05 10*3/mm3      Immature Grans, Absolute 0.05 10*3/mm3          Imaging Results (last 24 hours)     Procedure Component Value Units Date/Time    CT Angiogram Chest With Contrast [[id number]] Collected:  01/12/19 1705     Updated:  01/12/19 1724    Narrative:       EXAMINATION: CT ANGIOGRAM CHEST W/CONTRAST - 1/12/2019      INDICATION: Evaluate for pulmonary embolus.      TECHNIQUE:  Axial CT data of the chest were obtained helically per PE  protocol following IV contrast administration.  Multiplanar reformatted  images and 2D reconstructions (MIPs) were generated and reviewed.   Computer aided detection was utilized in the interpretation. The  radiation dose reduction device was turned on for each scan per the  ALARA (As Low as Reasonably Achievable) protocol     COMPARISONS:  None.     FINDINGS:   No acute pulmonary embolism is seen. Central airways are  patent without endobronchial lesion or debris.  Heart size is within  normal limits. No suspicious lymph node. The lungs are clear without  focal opacity aside from a couple of calcified granulomas in the left  lower lobe.  No pleural effusion or pneumothorax. Chest wall soft  tissues are normal. Thoracic vertebral body heights are normal. There is  left-sided hydronephrosis and proximal hydroureter, moderate in degree.  There is a nonobstructing right renal calculus and a tiny cyst in the  superior posterior interpolar cortex of the right kidney.       Impression:       1. No evidence for pulmonary embolism or other acute cardiopulmonary  findings.  2. Left renal collecting system obstruction. Correlation with urinalysis  and CT urogram recommended after the contrast from this bolus has  cleared.     DICTATED:   1/12/2019  EDITED/ls :   1/12/2019      This report was finalized on 1/12/2019 5:22 PM by Javier Connors.       XR Chest 1 View [[id number]] Collected:  01/12/19 1419     Updated:  01/12/19 1508    Narrative:       EXAMINATION: XR CHEST 1 VW - 1/12/2019     INDICATION: Shortness of air.     TECHNIQUE: Single-view  frontal chest.     COMPARISONS: None.     FINDINGS:  Lungs are without focal abnormality. No pleural effusion or  pneumothorax. Cardiomediastinal silhouette is within normal limits.       Impression:       No acute finding.     DICTATED:   1/12/2019  EDITED/ls :   1/12/2019      This report was finalized on 1/12/2019 3:06 PM by Javier Connors.           ECG/EMG Results (last 24 hours)     Procedure Component Value Units Date/Time    ECG 12 Lead [[id number]] Collected:  01/12/19 1339     Updated:  01/12/19 1341          Orders (last 24 hrs)     Start     Ordered    01/13/19 0930  budesonide (PULMICORT) nebulizer solution 0.5 mg  Daily - RT      01/12/19 2147 01/13/19 0900  aspirin chewable tablet 81 mg  Daily      01/12/19 2147 01/13/19 0900  escitalopram (LEXAPRO) tablet 10 mg  Daily      01/12/19 2147 01/13/19 0900  hydrochlorothiazide (HYDRODIURIL) tablet 25 mg  Daily      01/12/19 2147 01/13/19 0900  metoprolol succinate XL (TOPROL-XL) 24 hr tablet 25 mg  Daily      01/12/19 2147 01/13/19 0700  CT Abdomen Pelvis Without Contrast  1 Time Imaging,   Status:  Canceled     Comments:  Urogram    01/12/19 2210 01/13/19 0600  levothyroxine (SYNTHROID, LEVOTHROID) tablet 100 mcg  Daily      01/12/19 2147 01/13/19 0600  CBC Auto Differential  Morning Draw      01/12/19 2147 01/13/19 0600  Basic Metabolic Panel  Morning Draw      01/12/19 2147 01/13/19 0600  TSH  Morning Draw      01/12/19 2147 01/13/19 0600  CT Abdomen Pelvis With & Without Contrast  1 Time Imaging,   Status:  Canceled      01/12/19 2147 01/13/19 0600  Magnesium  Morning Draw      01/12/19 2150 01/13/19 0600  Hemoglobin A1c  Morning Draw      01/12/19 2249    01/13/19 0600  Vitamin B12  Morning Draw      01/12/19 2307    01/13/19 0000  Vital Signs  Every 4 Hours      01/12/19 2147    01/13/19 0000  methylPREDNISolone sodium succinate (SOLU-Medrol) injection 60 mg  Every 8 Hours      01/12/19 2147 01/12/19 2960   gabapentin (NEURONTIN) capsule 100 mg  3 Times Daily      01/12/19 2251 01/12/19 2345  guaiFENesin (MUCINEX) 12 hr tablet 600 mg  Every 12 Hours Scheduled      01/12/19 2255 01/12/19 2329  CT Abdomen Pelvis With & Without Contrast  1 Time Imaging     Comments:  Urogram    01/12/19 2210 01/12/19 2251  PT Consult: Eval & Treat  Once      01/12/19 2250 01/12/19 2245  Sodium chloride 0.9 % infusion  Continuous      01/12/19 2147 01/12/19 2245  ipratropium-albuterol (DUO-NEB) nebulizer solution 3 mL  4 Times Daily - RT      01/12/19 2147 01/12/19 2200  Strict Intake & Output  Every Hour      01/12/19 2147 01/12/19 2151  NIPPV (CPAP or BIPAP)  At Bedtime & As Needed-RT      01/12/19 2150 01/12/19 2149  potassium chloride (MICRO-K) CR capsule 40 mEq  As Needed      01/12/19 2150 01/12/19 2149  potassium chloride (KLOR-CON) packet 40 mEq  As Needed      01/12/19 2150 01/12/19 2149  potassium chloride 10 mEq in 100 mL IVPB  Every 1 Hour PRN      01/12/19 2150 01/12/19 2148  HYDROcodone-acetaminophen (NORCO) 5-325 MG per tablet 1 tablet  Every 4 Hours PRN,   Status:  Discontinued      01/12/19 2148 01/12/19 2148  HYDROmorphone (DILAUDID) injection 0.5 mg  Every 4 Hours PRN,   Status:  Discontinued      01/12/19 2148 01/12/19 2148  Daily Weights  Daily      01/12/19 2147 01/12/19 2146  Diet Regular; Cardiac  Diet Effective Now      01/12/19 2147 01/12/19 2143  acetaminophen (TYLENOL) tablet 650 mg  Every 4 Hours PRN      01/12/19 2147 01/12/19 2142  Code Status and Medical Interventions:  Continuous      01/12/19 2147 01/12/19 2142  Intake & Output  Every Shift      01/12/19 2147 01/12/19 2142  Weigh Patient  Once      01/12/19 2147 01/12/19 2142  Oxygen Therapy- Nasal Cannula; Titrate for SPO2: 90%  Continuous      01/12/19 2147 01/12/19 2142  Insert Peripheral IV  Once      01/12/19 2147 01/12/19 2142  Place Sequential Compression Device  Once      01/12/19 2147     01/12/19 2142  Maintain Sequential Compression Device  Continuous      01/12/19 2147    01/12/19 2142  Cardiac Monitoring  Continuous      01/12/19 2147    01/12/19 2142  Pulse Oximetry, Continuous  Continuous      01/12/19 2147    01/12/19 2142  Follow Standard Precautions  Once      01/12/19 2147    01/12/19 2142  Fall Precautions  Continuous      01/12/19 2147    01/12/19 2042  HYDROcodone-acetaminophen (NORCO) 7.5-325 MG per tablet 1 tablet  Every 6 Hours PRN      01/12/19 2043    01/12/19 1856  Case Management  Consult  Once     Provider:  (Not yet assigned)    01/12/19 1856    01/12/19 1853  Wound Ostomy Eval & Treat  Once      01/12/19 1853    01/12/19 1754  Inpatient Admission  Once      01/12/19 1753    01/12/19 1754  Tele Bed Request  Once      01/12/19 1753    01/12/19 1715  methylPREDNISolone sodium succinate (SOLU-Medrol) injection 125 mg  Once      01/12/19 1713    01/12/19 1715  HYDROmorphone (DILAUDID) injection 0.5 mg  Once      01/12/19 1713    01/12/19 1620  iopamidol (ISOVUE-370) 76 % injection 100 mL  Once in Imaging      01/12/19 1618    01/12/19 1508  HYDROmorphone (DILAUDID) injection 0.5 mg  Once      01/12/19 1506    01/12/19 1435  CT Angiogram Chest With Contrast  1 Time Imaging      01/12/19 1434    01/12/19 1435  POC Troponin, Rapid  Once      01/12/19 1419    01/12/19 1412  Blood Gas, Arterial With Co-Ox  Once      01/12/19 1409    01/12/19 1340  Blood Gas, Arterial  STAT      01/12/19 1339    01/12/19 1330  ipratropium-albuterol (DUO-NEB) nebulizer solution 3 mL  Once      01/12/19 1328    01/12/19 1330  HYDROmorphone (DILAUDID) injection 0.5 mg  Once      01/12/19 1328    01/12/19 1330  ondansetron (ZOFRAN) injection 4 mg  Once      01/12/19 1328    01/12/19 1328  POCT Troponin, rapid  Once      01/12/19 1328    01/12/19 1327  Urinalysis With Microscopic If Indicated (No Culture) - Urine, Clean Catch  Once      01/12/19 1328    01/12/19 1327  Comprehensive Metabolic  Panel  Once      01/12/19 1328    01/12/19 1327  Insert peripheral IV  Once      01/12/19 1328    01/12/19 1327  BNP  Once      01/12/19 1328    01/12/19 1327  ECG 12 Lead  Once      01/12/19 1328    01/12/19 1327  XR Chest 1 View  1 Time Imaging      01/12/19 1328    01/12/19 1326  sodium chloride 0.9 % flush 10 mL  As Needed      01/12/19 1328    01/12/19 1326  CBC & Differential  Once      01/12/19 1328    01/12/19 1326  CBC Auto Differential  PROCEDURE ONCE      01/12/19 1328    Unscheduled  Up With Assistance  As Needed      01/12/19 2147    Unscheduled  Magnesium  As Needed      01/12/19 2150    Unscheduled  Potassium  As Needed      01/12/19 2150    --  hydrochlorothiazide (HYDRODIURIL) 25 MG tablet  Daily      01/12/19 1303    --  HYDROcodone-acetaminophen (NORCO) 7.5-325 MG per tablet  Every 6 Hours PRN      01/12/19 1303    --  aspirin 81 MG chewable tablet  Daily      01/12/19 1303    --  metoprolol succinate XL (TOPROL-XL) 25 MG 24 hr tablet  Daily      01/12/19 1303    --  escitalopram (LEXAPRO) 10 MG tablet  Daily      01/12/19 1303    --  levothyroxine (SYNTHROID, LEVOTHROID) 100 MCG tablet  Daily      01/12/19 1303            Operative/Procedure Notes (last 24 hours) (Notes from 1/12/2019 12:06 AM through 1/13/2019 12:06 AM)     No notes of this type exist for this encounter.        Physician Progress Notes (last 24 hours) (Notes from 1/12/2019 12:06 AM through 1/13/2019 12:06 AM)     No notes of this type exist for this encounter.        Consult Notes (last 24 hours) (Notes from 1/12/2019 12:06 AM through 1/13/2019 12:06 AM)     No notes of this type exist for this encounter.           Nursing Assessments (last 24 hours)      Adult PCS Body System     Row Name 01/12/19 2047 01/12/19 1845                Pain/Comfort/Sleep    Presence of Pain  complains of pain/discomfort  denies pain/discomfort       Preferred Pain Scale  number (Numeric Rating Pain Scale)  number (Numeric Rating Pain Scale)        Pain Body Location - Orientation  generalized  --       POSS ([place] Opioid-Induced Sed Scale)  1 - Awake and alert  1 - Awake and alert       Sleep/Rest/Relaxation  awake  awake          Coping/Psychosocial    Observed Emotional State  cooperative;calm;quiet  cooperative;calm       Verbalized Emotional State  acceptance  acceptance       Plan of Care Reviewed With  patient  patient          Psychosocial Support    Trust Relationship/Rapport  care explained;thoughts/feelings acknowledged  care explained       Diversional Activities  television  --          Involvement in Care    Family/Support System, Persons  --  family       Involvement in Care  not present at bedside  not present at bedside          HEENT    HEENT WDL  WDL  WDL          Mouth/Teeth WDL    Mouth/Teeth WDL  WDL  WDL          Cognitive    Cognitive/Neuro/Behavioral WDL  WDL  WDL          [place] Coma Scale    Best Eye Response  4-->(E4) spontaneous  --       Best Motor Response  6-->(M6) obeys commands  --       Best Verbal Response  5-->(V5) oriented  --       [place] Coma Scale Score  15  --          Respiratory    Respiratory WDL  WDL except;breath sounds  WDL except;breath sounds       Cough Type  fair;nonproductive  --          Breath Sounds    Breath Sounds  All Fields  All Fields       All Lung Fields Breath Sounds  Anterior:;Lateral:;coarse  diminished          Oxygen Therapy    Flow (L/min)  4  4       Device (Oxygen Therapy)  nasal cannula  nasal cannula          Cardiac    Cardiac WDL  WDL  WDL       Additional Documentation  --  ECG (Group)          ECG    Lead Monitored  Lead II  Lead II       Rhythm  normal sinus rhythm  normal sinus rhythm          Peripheral Neurovascular    Peripheral Neurovascular WDL  WDL  WDL          Neurovascular Assessment    Neurovascular Assessment  LUE;RUE;LLE;RLE  LUE;RUE;LLE;RLE       LUE Temperature  warm  warm       LUE Color  no discoloration  no discoloration       LUE Sensation  numbness present   numbness present       RUE Temperature  warm  warm       RUE Color  no discoloration  no discoloration       RUE Sensation  numbness present  numbness present       LLE Temperature  warm  warm       LLE Color  no discoloration  no discoloration       LLE Sensation  numbness present  numbness present       RLE Temperature  warm  warm       RLE Color  no discoloration  no discoloration       RLE Sensation  numbness present  numbness present          Pulse Radial    Left Radial Pulse  2+ (normal);palpation  --       Right Radial Pulse  2+ (normal);palpation  --          Gastrointestinal    GI WDL  WDL  WDL       Last Bowel Movement  --  01/09/19          Genitourinary    Genitourinary WDL  WDL  WDL          Skin    Skin WDL  WDL except;characteristics  WDL except;characteristics       Skin Temperature  warm  warm       Skin Moisture  dry;flaky  dry;flaky       Skin Elasticity  quick return to original state  quick return to original state       Skin Integrity  pressure injury  pressure injury          [place] Risk Assessment (If [place] score </= 18, add the appropriate CPG to the care plan)    Sensory Perception  4-->no impairment  4-->no impairment       Moisture  3-->occasionally moist  3-->occasionally moist       Activity  1-->bedfast  1-->bedfast       Mobility  2-->very limited  2-->very limited       Nutrition  3-->adequate  3-->adequate       Friction and Shear  1-->problem  1-->problem       [place] Score  14  14          Wound 01/12/19 1845 upper coccyx pressure injury    Wound - Properties Group Date first assessed: 01/12/19 Time first assessed: 1845 Present On Admission : yes Orientation: upper Location: coccyx Type: pressure injury Stage, Pressure Injury: Stage 1    Dressing Appearance  dry;intact  dry;intact       Closure  Open to air  Open to air       Base  clean;dry  clean;dry          Musculoskeletal    Musculoskeletal WDL  WDL except;mobility  WDL except;mobility       General Mobility  generalized  weakness  generalized weakness          Functional Screen (every 3 days/change)    Ambulation  4 - completely dependent  4 - completely dependent       Transferring  4 - completely dependent  4 - completely dependent       Toileting  4 - completely dependent  4 - completely dependent       Bathing  2 - assistive person  2 - assistive person       Dressing  2 - assistive person  2 - assistive person       Eating  0 - independent  0 - independent       Communication  0 - understands/communicates without difficulty  0 - understands/communicates without difficulty       Swallowing  0 - swallows foods/liquids without difficulty  0 - swallows foods/liquids without difficulty          Peripheral IV 01/12/19 1304 Left Hand    IV Properties Placement Date: 01/12/19 Placement Time: 1304 Placed by External Staff?: EMS Size (Gauge): 20 G Orientation: Left Location: Hand    Site Assessment  Dry;Clean;Intact  Dry;Clean;Intact       Dressing Type  Transparent  Transparent       Line Status  Saline locked  Saline locked       Dressing Status  Clean;Dry;Intact  Clean;Dry;Intact       Reason Not Rotated  Not due  --       Phlebitis  0-->no symptoms  --          Peripheral IV 01/12/19 1537 Right Antecubital    IV Properties Placement Date: 01/12/19 Placement Time: 1537 Hand Hygiene Completed: Yes Size (Gauge): 20 G Orientation: Right Location: Antecubital Site Prep: Chlorhexidine Local Anesthetic: None Technique: Anatomical landmarks Total insertion attempts: 1    Site Assessment  Clean;Dry;Intact  Clean;Dry;Intact       Dressing Type  Transparent  Transparent       Line Status  Saline locked  Saline locked       Dressing Status  Clean;Dry;Intact  Clean;Dry;Intact       Reason Not Rotated  Not due  --       Phlebitis  0-->no symptoms  --          Sepsis Screening Tool    Previous screen positive?  no  no       Are 2 or > of the above criteria present?  no: STOP/negative screen  no: STOP/negative screen          Safety    Safety WDL   WDL  WDL       Safety Factors  ID band on;upper side rails raised x 2;call light in reach;wheels locked;bed in low position  ID band on;upper side rails raised x 2;call light in reach;wheels locked       All Alarms  alarm(s) activated and audible  alarm(s) activated and audible       Safety/Security Measures  bed alarm set  bed alarm set          [facility] High Risk Falls Assessment (If Fall score is >/=13, add the Fall Risk CPG to the care plan)     Fallen in past 6 months  5--> Yes  5--> Yes       Mental Status  0--> no mental status change  0--> no mental status change       Elimination  0--> No elimination issues  0--> No elimination issues       Mobility  2--> Requires assistance- transfer, walker, etc.  2--> Requires assistance- transfer, walker, etc.       Medications  1--> Narcotics  0--> No meds       Nurses' Clinical Judgement  10  10       Total Fall Risk Score  18  17          Safety Management    Safety Promotion/Fall Prevention  safety round/check completed;nonskid shoes/slippers when out of bed;toileting scheduled;fall prevention program maintained;activity supervised  safety round/check completed;nonskid shoes/slippers when out of bed;fall prevention program maintained;activity supervised       Medication Review/Management  medications reviewed  --       Environmental Safety Modification  assistive device/personal items within reach;clutter free environment maintained;lighting adjusted  assistive device/personal items within reach;clutter free environment maintained          Daily Care    Activity Management  activity adjusted per tolerance  bedrest       Activity Assistance Provided  assistance, 2 people  assistance, 3 or more people          Positioning    Body Position  supine;weight shift assistance provided  --           Patient Observations (last day)     None        ADL Documentation (last day)     Date/Time Presence of Pain Transferring Toileting Bathing Dressing Eating Communication  Swallowing    01/12/19 2047  complains of pain/discomfort  4 - completely dependent  4 - completely dependent  2 - assistive person  2 - assistive person  0 - independent  0 - understands/communicates without difficulty  0 - swallows foods/liquids without difficulty    01/12/19 1845  denies pain/discomfort  4 - completely dependent  4 - completely dependent  2 - assistive person  2 - assistive person  0 - independent  0 - understands/communicates without difficulty  0 - swallows foods/liquids without difficulty    01/12/19 1258  complains of pain/discomfort  --  --  --  --  --  --  --

## 2019-01-13 NOTE — H&P
[facility] Medicine Services  HISTORY AND PHYSICAL    Patient Name: Kristina Heck  : 1960  MRN: [id number]  Primary Care Physician: Gio Henderson MD    Subjective   Subjective     Chief Complaint:  Cough, shortness of air    HPI:  Kristina Heck is a 58 y.o. female with a past medical history significant for essential hypertension, hypothyroidism, depression and COPD who presents to the ED with complaints of exertional dyspnea and dry no-productive cough.  Patient states that on  she was admitted to [facility] with diagnosis of bilateral pneumonia. At discharge she states she began feeling better but a few days later began having lower extremity and upper extremity numbness and presented to [facility].  There she had a MRI of head/neck to rule out CVA.  She was diagnosed with a pinched nerve.  She states she was evaluated by a neurologist whom cleared for discharge.  Over the couple of days she has had increased shortness of air and dry cough.  At baseline she wears 2L of oxygen at home but has had to increase to 4L.  She denies any fever, chills, nausea, vomiting, abdominal pain, dysuria, hematuria, melena or chest pain.  She however notes that prior to being hospitalized she was independent using no assistive devices for ambulation and now cannot walk.  In addition she reports that she feels that the numbness is progressing making her short of breath.  She states that her PCP recommended being evaluated at [facility] by neurosurgery.  On arrival to the ED ABG revealed hypoxia and hypercapnia.  Patient will be admitted to [facility] Under the care of the Hospitalist for further evaluation and treatment.      Review of Systems   Constitutional: Positive for activity change and appetite change. Negative for chills, diaphoresis, fatigue, fever and unexpected weight change.   HENT: Negative.    Eyes: Negative for photophobia and visual disturbance.   Respiratory: Positive  for cough and shortness of breath.    Cardiovascular: Negative for chest pain, palpitations and leg swelling.   Gastrointestinal: Negative for abdominal distention, abdominal pain, blood in stool, constipation, diarrhea, nausea and vomiting.   Genitourinary: Negative for difficulty urinating, dysuria, flank pain, frequency and hematuria.   Musculoskeletal: Positive for back pain and gait problem. Negative for neck pain and neck stiffness.   Skin: Negative.    Neurological: Positive for weakness and numbness. Negative for dizziness, syncope, facial asymmetry, speech difficulty, light-headedness and headaches.   Psychiatric/Behavioral: Negative.         Otherwise 10-system ROS reviewed and is negative except as mentioned in the HPI.    Personal History     Past Medical History:   Diagnosis Date   • COPD (chronic obstructive pulmonary disease) (CMS/HCC)    • Depression    • Disease of thyroid gland    • Hypertension        History reviewed. No pertinent surgical history.    Family History: family history includes Cancer in her father; Heart disease in her mother.     Social History:  reports that she has been smoking cigarettes.  She has been smoking about 0.25 packs per day. she has never used smokeless tobacco. She reports that she does not drink alcohol or use drugs.    Medications:  Medications Prior to Admission   Medication Sig Dispense Refill Last Dose   • aspirin 81 MG chewable tablet Chew 81 mg Daily.      • escitalopram (LEXAPRO) 10 MG tablet Take 10 mg by mouth Daily.      • hydrochlorothiazide (HYDRODIURIL) 25 MG tablet Take 25 mg by mouth Daily.      • HYDROcodone-acetaminophen (NORCO) 7.5-325 MG per tablet Take 1 tablet by mouth Every 6 (Six) Hours As Needed for Moderate Pain .      • levothyroxine (SYNTHROID, LEVOTHROID) 100 MCG tablet Take 100 mcg by mouth Daily.      • metoprolol succinate XL (TOPROL-XL) 25 MG 24 hr tablet Take 25 mg by mouth Daily.          No Known Allergies    Objective   Objective      Vital Signs:   Temp:  [98.2 °F (36.8 °C)-98.3 °F (36.8 °C)] 98.2 °F (36.8 °C)  Heart Rate:  [75-81] 78  Resp:  [20-24] 22  BP: (138-158)/(71-89) 151/89        Physical Exam   Constitutional: She is oriented to person, place, and time. She appears well-developed and well-nourished. No distress.   Alert and oriented x4   HENT:   Head: Normocephalic and atraumatic.   Eyes: Conjunctivae and EOM are normal. Pupils are equal, round, and reactive to light. Right eye exhibits no discharge. Left eye exhibits no discharge. No scleral icterus.   Neck: Normal range of motion. Neck supple. No JVD present. No tracheal deviation present. No thyromegaly present.   Cardiovascular: Normal rate, regular rhythm, normal heart sounds and intact distal pulses. Exam reveals no gallop and no friction rub.   No murmur heard.  Pulmonary/Chest: Effort normal. No stridor. No respiratory distress. She has wheezes. She has no rales. She exhibits no tenderness.   Abdominal: Soft. Bowel sounds are normal. She exhibits no distension and no mass. There is no tenderness. There is no rebound and no guarding. No hernia.   Musculoskeletal: Normal range of motion. She exhibits no edema, tenderness or deformity.   Lymphadenopathy:     She has no cervical adenopathy.   Neurological: She is alert and oriented to person, place, and time.    equal, no pronator drift, speech clear, no facial droop.    Skin: Skin is warm and dry. No rash noted. She is not diaphoretic. No erythema. No pallor.   Psychiatric: She has a normal mood and affect. Her behavior is normal. Judgment and thought content normal.   Nursing note and vitals reviewed.       Results Reviewed:  I have personally reviewed current lab, radiology, and data and agree.    Results from last 7 days   Lab Units  01/12/19   1346   WBC 10*3/mm3  10.21   HEMOGLOBIN g/dL  17.4*   HEMATOCRIT %  52.9*   PLATELETS 10*3/mm3  223     Results from last 7 days   Lab Units  01/12/19   1346   SODIUM mmol/L   136   POTASSIUM mmol/L  3.3*   CHLORIDE mmol/L  97*   CO2 mmol/L  31.0   BUN mg/dL  20   CREATININE mg/dL  0.73   GLUCOSE mg/dL  101*   CALCIUM mg/dL  9.3   ALT (SGPT) U/L  32   AST (SGOT) U/L  23     BNP   Date Value Ref Range Status   01/12/2019 9.0 0.0 - 100.0 pg/mL Final     Comment:     Results may be falsely decreased if patient taking Biotin.     pH, Arterial   Date Value Ref Range Status   01/12/2019 7.451 (H) 7.350 - 7.450 pH units Final     Comment:     83 Value above reference range     Imaging Results (last 24 hours)     Procedure Component Value Units Date/Time    CT Angiogram Chest With Contrast [[id number]] Collected:  01/12/19 1705     Updated:  01/12/19 1724    Narrative:       EXAMINATION: CT ANGIOGRAM CHEST W/CONTRAST - 1/12/2019      INDICATION: Evaluate for pulmonary embolus.      TECHNIQUE:  Axial CT data of the chest were obtained helically per PE  protocol following IV contrast administration.  Multiplanar reformatted  images and 2D reconstructions (MIPs) were generated and reviewed.   Computer aided detection was utilized in the interpretation. The  radiation dose reduction device was turned on for each scan per the  ALARA (As Low as Reasonably Achievable) protocol     COMPARISONS:  None.     FINDINGS:   No acute pulmonary embolism is seen. Central airways are  patent without endobronchial lesion or debris.  Heart size is within  normal limits. No suspicious lymph node. The lungs are clear without  focal opacity aside from a couple of calcified granulomas in the left  lower lobe.  No pleural effusion or pneumothorax. Chest wall soft  tissues are normal. Thoracic vertebral body heights are normal. There is  left-sided hydronephrosis and proximal hydroureter, moderate in degree.  There is a nonobstructing right renal calculus and a tiny cyst in the  superior posterior interpolar cortex of the right kidney.       Impression:       1. No evidence for pulmonary embolism or other acute  cardiopulmonary  findings.  2. Left renal collecting system obstruction. Correlation with urinalysis  and CT urogram recommended after the contrast from this bolus has  cleared.     DICTATED:   1/12/2019  EDITED/ls :   1/12/2019      This report was finalized on 1/12/2019 5:22 PM by Javier Connors.       XR Chest 1 View [[id number]] Collected:  01/12/19 1419     Updated:  01/12/19 1508    Narrative:       EXAMINATION: XR CHEST 1 VW - 1/12/2019     INDICATION: Shortness of air.     TECHNIQUE: Single-view frontal chest.     COMPARISONS: None.     FINDINGS:  Lungs are without focal abnormality. No pleural effusion or  pneumothorax. Cardiomediastinal silhouette is within normal limits.       Impression:       No acute finding.     DICTATED:   1/12/2019  EDITED/ls :   1/12/2019      This report was finalized on 1/12/2019 3:06 PM by Javier Connors.                Assessment/Plan   Assessment / Plan     Active Hospital Problems    Diagnosis   • Acute on chronic respiratory failure with hypoxia (CMS/HCC)   • COPD with acute exacerbation (CMS/HCC)   • Hypothyroidism (acquired)   • Essential hypertension   • Depression   • Pinched nerve in neck   • left renal collecting system obstruction   • Generalized weakness         Assessment & Plan:  58 year old female presents to the ED with complaints of worsening shortness of air, cough, and generalized weakness.     1) Acute on chronic respiratory failure with hypoxia and hypercapnia.  -ABG as noted above  -CXR unremarkable, likely secondary to COPD exacerbation  -CTA of chest no PE  -will continue IV solumedrol  -continuous pulse ox  -Bipap at HS  -scheduled duo-nebs  -keep o2 sat >90%    2) COPD exacerbation  -CXR unremarkable  -hold abx for now, afebrile, no leukocytosis  -scheduled duo-nebs; mucinex   -keep o2 sat >90%  -continuous pulse ox    3) Generalized weakness/neuropathy   -etiology not completely clear  -obtain records from [facility]  +/- neuro/neurosurg consult  -fall  precautions  -TSH, B12 and A1C; consider EMG   - Trial gabapentin 100 mg TID    4) Left renal collecting system obstruction  -noted on CTA  -case discussed by ED physician with Dr. Connors whom recommends CT abdomen and pelvis with contrast in AM  -will obtain CT abd/pelvis in am, may need further assistance from urology     5) Hypokalemia  -replace per protocol  -check mag    6) essential hypertension  -continue home medications    7) hypothyroidism  -continue synthroid    8) Depression  -continue lexapro     DVT prophylaxis:teds/scds only for now     CODE STATUS:  Full code   There are no questions and answers to display.       Admission Status:  I believe this patient meets INPATIENT status due to the need for care which can only be reasonably provided in an hospital setting such as possible need for aggressive/expedited ancillary services and/or consultation services, IV medications, close physician monitoring, and/or procedures.  In such, I feel patient’s risk for adverse outcomes and need for care warrant INPATIENT evaluation and predict the patient’s care encounter to likely last beyond 2 midnights.    СВЕТЛАНА Carmona   01/12/19   9:25 PM       Brief Attending Admission Attestation     I have seen and examined the patient, performing an independent face-to-face diagnostic evaluation with plan of care reviewed and developed with the advanced practice clinician (APC).      Brief Summary Statement/HPI:   Kristina Heck is a 58 y.o. female with history of COPD, hypothyroid, HTN who presented for increase in SOA, wheezing and cough. Recently treated at [facility] for PNA. She improved after this but developed numbness/tingling in hands and feet. She went to  and was evaluated by neurology and had an MRI and per patient was told that she had a pinched nerve and discharged. States breathing was doing ok until a few days ago when she developed worsening of her cough, wheezing. She has also noted  progressive pain/numbness of her hands/feet. States she is weak and has decreased mobility since this time. Per patient, was evaluated by her PCP and told to come to [facility] for NS eval. Patient denies history of DM or ETOH. States her oral pain medication she has been taking is not helping.     Patient requiring 4L of O2 when baseline is 2L. CT PE showed no PE, no consolidation but finding of left hydro and CT urogram recommended for the AM.     Attending Physical Exam:  Constitutional: No acute distress, awake, alert  Eyes: PERRLA, sclerae anicteric, no conjunctival injection  HENT: NCAT, mucous membranes moist  Neck: Supple, no thyromegaly, no lymphadenopathy, trachea midline  Respiratory: Coarse breath sounds; bilateral wheezing   Cardiovascular: RRR, no murmurs, rubs, or gallops, palpable pedal pulses bilaterally  Gastrointestinal: Positive bowel sounds, soft, nontender, nondistended  Musculoskeletal: No bilateral ankle edema, no clubbing or cyanosis to extremities  Psychiatric: Appropriate affect, cooperative  Neurologic: Oriented x 3, strength symmetric in all extremities, 5/5 upper and lower strength testing when patient in the bed, Cranial Nerves grossly intact to confrontation, speech clear  Skin: No rashes      Brief Assessment/Plan :  See above for further detailed assessment and plan developed with APC which I have reviewed and/or edited.      Electronically signed by Mary Pelayo DO, 01/12/19, 10:51 PM.

## 2019-01-13 NOTE — PLAN OF CARE
Problem: Patient Care Overview  Goal: Plan of Care Review  Outcome: Ongoing (interventions implemented as appropriate)   01/13/19 1509   Coping/Psychosocial   Plan of Care Reviewed With patient;daughter   Plan of Care Review   Progress no change       Problem: Fall Risk (Adult)  Goal: Identify Related Risk Factors and Signs and Symptoms  Outcome: Ongoing (interventions implemented as appropriate)   01/13/19 1509   Fall Risk (Adult)   Related Risk Factors (Fall Risk) age-related changes;gait/mobility problems;history of falls;fatigue/slow reaction;inadequate lighting   Signs and Symptoms (Fall Risk) presence of risk factors       Problem: Chronic Obstructive Pulmonary Disease (Adult)  Goal: Signs and Symptoms of Listed Potential Problems Will be Absent, Minimized or Managed (Chronic Obstructive Pulmonary Disease)  Outcome: Ongoing (interventions implemented as appropriate)   01/13/19 1509   Goal/Outcome Evaluation   Problems Assessed (Chronic Obstructive Pulmonary Disease (COPD)) all   Problems Present (COPD, Bronch/Emphy) respiratory compromise

## 2019-01-13 NOTE — PLAN OF CARE
Problem: Patient Care Overview  Goal: Plan of Care Review  Outcome: Ongoing (interventions implemented as appropriate)   01/13/19 0929   Coping/Psychosocial   Plan of Care Reviewed With patient   OTHER   Outcome Summary PT sadiq completed. Pt. presents w/ evolving symptoms including decreased strength, impaired sensation and poor BLE proprioception, impaired balance, and gait instability. Pt. performed bed mobility w/ mod A and transfers w/ max A x 2 w/ R knee buckling and signficant difficulty shifting weight. Recommend to RN lift room for improved safety and independence w/ transfers. Pt. will benefit from skilled IPPT to address impairments and promote return to PLOF. Recommend IP rehab at D/C for best outcome.       01/13/19 0918   Coping/Psychosocial   Plan of Care Reviewed With patient   OTHER   Outcome Summary PT sadiq completed. Pt. presents w/ evolving symptoms including decreased strength, impaired sensation and poor BLE proprioception, impaired balance, and gait instability. Pt. performed bed mobility w/ mod A and transfers w/ max A x 2 w/ R knee buckling and signficant difficulty shifting weight. Recommended to RN lift room for improved safety and independence w/ transfers. Pt. will benefit from skilled IPPT to address impairments and promote return to PLOF. Recommend IP rehab at D/C for best outcome.

## 2019-01-14 ENCOUNTER — APPOINTMENT (OUTPATIENT)
Dept: CT IMAGING | Facility: HOSPITAL | Age: 59
End: 2019-01-14

## 2019-01-14 LAB
ANION GAP SERPL CALCULATED.3IONS-SCNC: 5 MMOL/L (ref 3–11)
BUN BLD-MCNC: 24 MG/DL (ref 9–23)
BUN/CREAT SERPL: 35.3 (ref 7–25)
CALCIUM SPEC-SCNC: 8.9 MG/DL (ref 8.7–10.4)
CHLORIDE SERPL-SCNC: 104 MMOL/L (ref 99–109)
CO2 SERPL-SCNC: 30 MMOL/L (ref 20–31)
CREAT BLD-MCNC: 0.68 MG/DL (ref 0.6–1.3)
GFR SERPL CREATININE-BSD FRML MDRD: 89 ML/MIN/1.73
GLUCOSE BLD-MCNC: 129 MG/DL (ref 70–100)
POTASSIUM BLD-SCNC: 3.4 MMOL/L (ref 3.5–5.5)
SODIUM BLD-SCNC: 139 MMOL/L (ref 132–146)

## 2019-01-14 PROCEDURE — 94760 N-INVAS EAR/PLS OXIMETRY 1: CPT

## 2019-01-14 PROCEDURE — 94799 UNLISTED PULMONARY SVC/PX: CPT

## 2019-01-14 PROCEDURE — 25010000002 LORAZEPAM PER 2 MG: Performed by: FAMILY MEDICINE

## 2019-01-14 PROCEDURE — 0 IOPAMIDOL PER 1 ML: Performed by: HOSPITALIST

## 2019-01-14 PROCEDURE — 94640 AIRWAY INHALATION TREATMENT: CPT

## 2019-01-14 PROCEDURE — 74178 CT ABD&PLV WO CNTR FLWD CNTR: CPT

## 2019-01-14 PROCEDURE — 99255 IP/OBS CONSLTJ NEW/EST HI 80: CPT | Performed by: PSYCHIATRY & NEUROLOGY

## 2019-01-14 PROCEDURE — 25010000002 ENOXAPARIN PER 10 MG: Performed by: HOSPITALIST

## 2019-01-14 PROCEDURE — 25010000002 METHYLPREDNISOLONE PER 125 MG: Performed by: NURSE PRACTITIONER

## 2019-01-14 PROCEDURE — 97530 THERAPEUTIC ACTIVITIES: CPT

## 2019-01-14 PROCEDURE — 99233 SBSQ HOSP IP/OBS HIGH 50: CPT | Performed by: HOSPITALIST

## 2019-01-14 PROCEDURE — 92610 EVALUATE SWALLOWING FUNCTION: CPT

## 2019-01-14 PROCEDURE — 80048 BASIC METABOLIC PNL TOTAL CA: CPT | Performed by: HOSPITALIST

## 2019-01-14 RX ORDER — HYDROCODONE BITARTRATE AND IBUPROFEN 7.5; 2 MG/1; MG/1
1 TABLET, FILM COATED ORAL EVERY 6 HOURS PRN
COMMUNITY
End: 2019-01-24 | Stop reason: HOSPADM

## 2019-01-14 RX ORDER — LORAZEPAM 2 MG/ML
0.5 INJECTION INTRAMUSCULAR ONCE
Status: COMPLETED | OUTPATIENT
Start: 2019-01-14 | End: 2019-01-14

## 2019-01-14 RX ORDER — HYDROXYZINE HYDROCHLORIDE 25 MG/1
25 TABLET, FILM COATED ORAL EVERY 6 HOURS PRN
COMMUNITY

## 2019-01-14 RX ORDER — LISINOPRIL 10 MG/1
10 TABLET ORAL
Status: DISCONTINUED | OUTPATIENT
Start: 2019-01-14 | End: 2019-01-24 | Stop reason: HOSPADM

## 2019-01-14 RX ORDER — POTASSIUM CHLORIDE 20 MEQ/1
20 TABLET, EXTENDED RELEASE ORAL 2 TIMES DAILY
COMMUNITY
End: 2019-01-24 | Stop reason: HOSPADM

## 2019-01-14 RX ORDER — ALBUTEROL SULFATE 90 UG/1
2 AEROSOL, METERED RESPIRATORY (INHALATION) EVERY 4 HOURS PRN
COMMUNITY

## 2019-01-14 RX ADMIN — POTASSIUM CHLORIDE 40 MEQ: 1.5 POWDER, FOR SOLUTION ORAL at 23:40

## 2019-01-14 RX ADMIN — ESCITALOPRAM OXALATE 10 MG: 20 TABLET, FILM COATED ORAL at 08:58

## 2019-01-14 RX ADMIN — DOXYCYCLINE 100 MG: 100 CAPSULE ORAL at 21:14

## 2019-01-14 RX ADMIN — HYDROCODONE BITARTRATE AND ACETAMINOPHEN 1 TABLET: 7.5; 325 TABLET ORAL at 03:02

## 2019-01-14 RX ADMIN — GUAIFENESIN 600 MG: 600 TABLET, EXTENDED RELEASE ORAL at 21:14

## 2019-01-14 RX ADMIN — IPRATROPIUM BROMIDE AND ALBUTEROL SULFATE 3 ML: 2.5; .5 SOLUTION RESPIRATORY (INHALATION) at 12:19

## 2019-01-14 RX ADMIN — GABAPENTIN 100 MG: 100 CAPSULE ORAL at 17:10

## 2019-01-14 RX ADMIN — DOXYCYCLINE 100 MG: 100 CAPSULE ORAL at 08:58

## 2019-01-14 RX ADMIN — HYDROCODONE BITARTRATE AND ACETAMINOPHEN 1 TABLET: 7.5; 325 TABLET ORAL at 08:58

## 2019-01-14 RX ADMIN — POTASSIUM CHLORIDE 40 MEQ: 750 CAPSULE, EXTENDED RELEASE ORAL at 17:10

## 2019-01-14 RX ADMIN — LISINOPRIL 10 MG: 10 TABLET ORAL at 11:58

## 2019-01-14 RX ADMIN — SODIUM CHLORIDE 100 ML/HR: 9 INJECTION, SOLUTION INTRAVENOUS at 03:00

## 2019-01-14 RX ADMIN — IPRATROPIUM BROMIDE AND ALBUTEROL SULFATE 3 ML: 2.5; .5 SOLUTION RESPIRATORY (INHALATION) at 07:30

## 2019-01-14 RX ADMIN — BUDESONIDE 0.5 MG: 0.5 INHALANT RESPIRATORY (INHALATION) at 07:31

## 2019-01-14 RX ADMIN — GABAPENTIN 100 MG: 100 CAPSULE ORAL at 21:14

## 2019-01-14 RX ADMIN — METOPROLOL SUCCINATE 25 MG: 25 TABLET, EXTENDED RELEASE ORAL at 08:58

## 2019-01-14 RX ADMIN — GUAIFENESIN 600 MG: 600 TABLET, EXTENDED RELEASE ORAL at 08:58

## 2019-01-14 RX ADMIN — GABAPENTIN 100 MG: 100 CAPSULE ORAL at 08:58

## 2019-01-14 RX ADMIN — IOPAMIDOL 150 ML: 755 INJECTION, SOLUTION INTRAVENOUS at 15:59

## 2019-01-14 RX ADMIN — HYDROCODONE BITARTRATE AND ACETAMINOPHEN 1 TABLET: 7.5; 325 TABLET ORAL at 21:14

## 2019-01-14 RX ADMIN — LEVOTHYROXINE SODIUM 100 MCG: 100 TABLET ORAL at 08:58

## 2019-01-14 RX ADMIN — ENOXAPARIN SODIUM 40 MG: 40 INJECTION SUBCUTANEOUS at 11:58

## 2019-01-14 RX ADMIN — METHYLPREDNISOLONE SODIUM SUCCINATE 60 MG: 125 INJECTION, POWDER, FOR SOLUTION INTRAMUSCULAR; INTRAVENOUS at 03:00

## 2019-01-14 RX ADMIN — METHYLPREDNISOLONE SODIUM SUCCINATE 60 MG: 125 INJECTION, POWDER, FOR SOLUTION INTRAMUSCULAR; INTRAVENOUS at 17:10

## 2019-01-14 RX ADMIN — ASPIRIN 81 MG CHEWABLE TABLET 81 MG: 81 TABLET CHEWABLE at 08:58

## 2019-01-14 RX ADMIN — LORAZEPAM 0.5 MG: 2 INJECTION INTRAMUSCULAR; INTRAVENOUS at 22:08

## 2019-01-14 RX ADMIN — HYDROCODONE BITARTRATE AND ACETAMINOPHEN 1 TABLET: 7.5; 325 TABLET ORAL at 15:03

## 2019-01-14 RX ADMIN — METHYLPREDNISOLONE SODIUM SUCCINATE 60 MG: 125 INJECTION, POWDER, FOR SOLUTION INTRAMUSCULAR; INTRAVENOUS at 08:58

## 2019-01-14 RX ADMIN — IPRATROPIUM BROMIDE AND ALBUTEROL SULFATE 3 ML: 2.5; .5 SOLUTION RESPIRATORY (INHALATION) at 17:00

## 2019-01-14 RX ADMIN — METHYLPREDNISOLONE SODIUM SUCCINATE 60 MG: 125 INJECTION, POWDER, FOR SOLUTION INTRAMUSCULAR; INTRAVENOUS at 23:55

## 2019-01-14 RX ADMIN — HYDROCHLOROTHIAZIDE 25 MG: 25 TABLET ORAL at 08:58

## 2019-01-14 RX ADMIN — IPRATROPIUM BROMIDE AND ALBUTEROL SULFATE 3 ML: 2.5; .5 SOLUTION RESPIRATORY (INHALATION) at 19:10

## 2019-01-14 NOTE — THERAPY EVALUATION
Acute Care - Speech Language Pathology   Swallow Initial Evaluation [facility]   Clinical Swallow Evaluation     Patient Name: Kristina Heck  : 1960  MRN: [id number]  Today's Date: 2019  Onset of Illness/Injury or Date of Surgery: 19     Referring Physician: ISMAEL Pelayo DO      Admit Date: 2019    Visit Dx:     ICD-10-CM ICD-9-CM   1. Left ureter dilated N28.82 593.89   2. Hypoxia R09.02 799.02   3. COPD exacerbation (CMS/HCC) J44.1 491.21   4. Weakness R53.1 780.79   5. Impaired functional mobility, balance, gait, and endurance Z74.09 V49.89   6. Acute on chronic respiratory failure with hypoxia (CMS/HCC) J96.21 518.84     799.02     Patient Active Problem List   Diagnosis   • Acute on chronic respiratory failure with hypoxia (CMS/HCC)   • COPD with acute exacerbation (CMS/HCC)   • Hypothyroidism (acquired)   • Essential hypertension   • Depression   • Pinched nerve in neck   • left renal collecting system obstruction   • Generalized weakness     Past Medical History:   Diagnosis Date   • COPD (chronic obstructive pulmonary disease) (CMS/HCC)    • Depression    • Disease of thyroid gland    • Hypertension      History reviewed. No pertinent surgical history.     SWALLOW EVALUATION (last 72 hours)      SLP Adult Swallow Evaluation     Row Name 19 0915                   Rehab Evaluation    Document Type  evaluation  -AC        Subjective Information  complains of;weakness  -AC        Patient Observations  alert;cooperative  -AC        Patient/Family Observations  No family present.  -AC        Patient Effort  good  -AC           General Information    Patient Profile Reviewed  yes  -AC        Pertinent History Of Current Problem  57yo adm w/ a/c resp failure. Recent hosp for pna 2 wks ago. Hx chronic bronchitis, COPD, pinched nerve neck. Pt reporting difficulty getting food/liq down, occasional nasal regurg, choking. CXR negative.   -AC        Current Method of Nutrition  regular  textures;thin liquids  -AC        Precautions/Limitations, Vision  WFL;for purposes of eval  -AC        Precautions/Limitations, Hearing  WFL;for purposes of eval  -AC        Prior Level of Function-Communication  WFL  -AC        Prior Level of Function-Swallowing  no diet consistency restrictions  -AC        Plans/Goals Discussed with  patient;agreed upon  -AC        Barriers to Rehab  none identified  -AC        Patient's Goals for Discharge  eat/drink without coughing/choking  -AC           Pain Assessment    Additional Documentation  Pain Scale: Numbers Pre/Post-Treatment (Group)  -AC           Pain Scale: Numbers Pre/Post-Treatment    Pain Scale: Numbers, Pretreatment  0/10 - no pain  -AC        Pain Scale: Numbers, Post-Treatment  0/10 - no pain  -AC           Oral Motor and Function    Dentition Assessment  natural, present and adequate  -AC        Secretion Management  WNL/WFL  -AC        Mucosal Quality  dry  -AC        Volitional Swallow  WFL  -AC        Volitional Cough  WFL  -AC           Oral Musculature and Cranial Nerve Assessment    Oral Motor General Assessment  WFL  -AC           General Eating/Swallowing Observations    Respiratory Support Currently in Use  nasal cannula;other (see comments) HFNC  -AC        Eating/Swallowing Skills  fed by SLP;self-fed;other (see comments) pt experiencing hand wknss/numbness  -AC        Positioning During Eating  upright 90 degree;upright in bed  -AC        Utensils Used  spoon;cup;straw  -AC        Consistencies Trialed  thin liquids;nectar/syrup-thick liquids;regular textures mixed consistency  -AC           Respiratory    Respiratory Status  increase in respiratory rate;during swallowing/eating  -AC        Respiratory Pattern  decrease control/incoordination of breathing swallow  -AC           Clinical Swallow Eval    Oral Prep Phase  WFL  -AC        Oral Transit  WFL  -AC        Oral Residue  WFL  -AC        Pharyngeal Phase  suspected pharyngeal impairment   -AC           Pharyngeal Phase Concerns    Pharyngeal Phase Concerns  cough  -AC        Cough  thin  -AC        Pharyngeal Phase Concerns, Comment  Pt wearing HFNC during eval. Pt demonstrated wet sounding cough @ baseline. Coughing appeared to increase w/ thin liquid trials. No immediate coughing/throat clearing noted w/ nectar-thick liquid, puree, mixed consistency, or solid. Given pt c/o swallowing difficulty in presence of resp failure, rec'd modified diet/thickened liquids until instrumental swallow study completed. Pt agreed.  -AC           Clinical Impression    SLP Swallowing Diagnosis  suspected pharyngeal dysfunction;other (see comments) r/o pharyngeal dysphagia  -        Functional Impact  risk of aspiration/pneumonia  -        Swallow Criteria for Skilled Therapeutic Interventions Met  demonstrates skilled criteria  -AC           Recommendations    Predicted Duration Therapy Intervention (Days)  until discharge  -AC        SLP Diet Recommendation  mechanical soft with no mixed consistencies;nectar thick liquids  -        Recommended Diagnostics  FEES  -AC        Recommended Precautions and Strategies  upright posture during/after eating;small bites of food and sips of liquid;other (see comments) general aspiration precautions, oral care BID/PRN  -AC        SLP Rec. for Method of Medication Administration  meds whole;with pudding or applesauce;as tolerated  -        Monitor for Signs of Aspiration  yes;notify SLP if any concerns  -AC        Anticipated Dischage Disposition  unknown  -          User Key  (r) = Recorded By, (t) = Taken By, (c) = Cosigned By    Initials Name Effective Dates     Gail Gonzales MS CCC-SLP 07/27/17 -           EDUCATION  The patient has been educated in the following areas:   Dysphagia (Swallowing Impairment) Oral Care/Hydration Modified Diet Instruction.    SLP Recommendation and Plan  SLP Swallowing Diagnosis: suspected pharyngeal dysfunction, other (see  comments)(r/o pharyngeal dysphagia)  SLP Diet Recommendation: mechanical soft with no mixed consistencies, nectar thick liquids  Recommended Precautions and Strategies: upright posture during/after eating, small bites of food and sips of liquid, other (see comments)(general aspiration precautions, oral care BID/PRN)     Monitor for Signs of Aspiration: yes, notify SLP if any concerns  Recommended Diagnostics: FEES  Swallow Criteria for Skilled Therapeutic Interventions Met: demonstrates skilled criteria  Anticipated Dischage Disposition: unknown        Predicted Duration Therapy Intervention (Days): until discharge       Plan of Care Reviewed With: patient  Plan of Care Review  Plan of Care Reviewed With: patient           Time Calculation:   Time Calculation- SLP     Row Name 01/14/19 1550             Time Calculation- SLP    SLP Start Time  0915  -      SLP Received On  01/14/19  -        User Key  (r) = Recorded By, (t) = Taken By, (c) = Cosigned By    Initials Name Provider Type     Gail Gonzales MS CCC-SLP Speech and Language Pathologist          Therapy Charges for Today     Code Description Service Date Service Provider Modifiers Qty    [id number]  ST EVAL ORAL PHARYNG SWALLOW 4 1/14/2019 Gail Gonzales MS CCC-SLP GN 1               Gail Gonzales MS CCC-SLP  1/14/2019

## 2019-01-14 NOTE — PLAN OF CARE
Problem: Patient Care Overview  Goal: Plan of Care Review  Outcome: Ongoing (interventions implemented as appropriate)   01/14/19 1007   Coping/Psychosocial   Plan of Care Reviewed With patient;family   OTHER   Outcome Summary Pt presents with weakness limiting functional mobility, requires min assist supine to sit but is max assist x 2 for tranfers, recommend pt be moved to room with lift system. Anticipate inpatient rehab for d/c planning

## 2019-01-14 NOTE — CONSULTS
Referring Provider: Dr. erazo     Reason for Consultation: Generalized weakness    Patient Care Team:  Gio Henderson MD as PCP - General (Adolescent Medicine)    Chief complaint increasing limb weakness    Subjective .     History of present illness:  58-year-old woman with COPD and recent pneumonia treated at [facility], reports that she first noticed numbness in both her hands on 12/31/9018 and the next day noted some numbness in her feet.  Since that time she has noted progressive weakness of arms and legs to the point where she has been unable to walk for the past 5 days. She chronically has some problems with emptying her bladder but today cannot empty at all, and had catheter with 1100 out.  She has been short of breath since the pneumonia but with increasing oxygen requirements.  Swallow eval today lead to thickened liquids diet, denies speech difficulty or any vision changes such as double or blurred vision.  Evaluation at  was apparently unrevealing including MRI C-spine and outpatient follow-up was planned.  No trauma, no back pain, no recent GI illness although she is mildly nauseated now with postnasal drip.    Review of Systems  Pertinent items are noted in HPI, all other systems reviewed and negative     History  Past Medical History:   Diagnosis Date   • COPD (chronic obstructive pulmonary disease) (CMS/HCC)    • Depression    • Disease of thyroid gland    • Hypertension    , History reviewed. No pertinent surgical history.,   Family History   Problem Relation Age of Onset   • Heart disease Mother    • Cancer Father    ,   Social History     Tobacco Use   • Smoking status: Current Every Day Smoker     Packs/day: 0.25     Types: Cigarettes   • Smokeless tobacco: Never Used   Substance Use Topics   • Alcohol use: No     Frequency: Never   • Drug use: No   ,   Medications Prior to Admission   Medication Sig Dispense Refill Last Dose   • albuterol sulfate  (90 Base)  MCG/ACT inhaler Inhale 2 puffs Every 4 (Four) Hours As Needed for Wheezing.      • aspirin 81 MG chewable tablet Chew 81 mg Daily.      • escitalopram (LEXAPRO) 10 MG tablet Take 10 mg by mouth Daily.      • Fluticasone Furoate-Vilanterol (BREO ELLIPTA) 200-25 MCG/INH aerosol powder  inhaler Inhale 1 puff Daily.      • hydrochlorothiazide (HYDRODIURIL) 25 MG tablet Take 25 mg by mouth Daily.      • HYDROcodone-ibuprofen (VICOPROFEN) 7.5-200 MG per tablet Take 1 tablet by mouth Every 6 (Six) Hours As Needed for Moderate Pain .      • hydrOXYzine (ATARAX) 25 MG tablet Take 25 mg by mouth Every 6 (Six) Hours As Needed for Anxiety.      • levothyroxine (SYNTHROID, LEVOTHROID) 100 MCG tablet Take 100 mcg by mouth Daily.      • metoprolol tartrate (LOPRESSOR) 25 MG tablet Take 25 mg by mouth 2 (Two) Times a Day.      • potassium chloride (K-DUR,KLOR-CON) 20 MEQ CR tablet Take 20 mEq by mouth 2 (Two) Times a Day.      • Umeclidinium Bromide (INCRUSE ELLIPTA) 62.5 MCG/INH aerosol powder  Inhale 1 puff Daily.      , Scheduled Meds:    aspirin 81 mg Oral Daily   budesonide 0.5 mg Nebulization Daily - RT   doxycycline 100 mg Oral Q12H   enoxaparin 40 mg Subcutaneous Q24H   escitalopram 10 mg Oral Daily   gabapentin 100 mg Oral TID   guaiFENesin 600 mg Oral Q12H   hydrochlorothiazide 25 mg Oral Daily   ipratropium-albuterol 3 mL Nebulization 4x Daily - RT   levothyroxine 100 mcg Oral Daily   lisinopril 10 mg Oral Q24H   methylPREDNISolone sodium succinate 60 mg Intravenous Q8H   metoprolol succinate XL 25 mg Oral Daily   pharmacy consult - MTM  Does not apply Daily   , Continuous Infusions:   , PRN Meds:  •  acetaminophen  •  HYDROcodone-acetaminophen  •  nicotine  •  potassium chloride **OR** potassium chloride **OR** potassium chloride  •  [COMPLETED] Insert peripheral IV **AND** sodium chloride and Allergies:  Patient has no known allergies.    Objective     Vital Signs   Blood pressure 124/82, pulse 77, temperature 98.1 °F  "(36.7 °C), temperature source Oral, resp. rate 18, height 170.2 cm (67\"), weight 94.2 kg (207 lb 11.2 oz), SpO2 (!) 87 %.    Physical Exam:   Middle-aged white woman lying in hospital bed in no acute distress, although somewhat anxious discussing progressive symptoms and lack of diagnosis.  She is alert and fully oriented, with normal language, attention, memory and fund of knowledge.  No dysarthria.  Pupils 3 mm and reactive, no papilledema appreciated.  Visual fields full to confrontation, extraocular movements intact, normal facial sensation and movement and able to puff cheeks and bury lashes normally.  No weakness of shoulder shrug appreciated, palate elevates symmetrically, tongue protrudes midline and no weakness of lateral tongue  movement appreciated.  Hearing intact to finger rub  Motor: She has generalized diffuse weakness of 3+ to 4 minus throughout, no upper motor neuron pattern.  Muscle tone is not increased, coordination is commensurate with strength  DTRs could not be obtained, no response to plantar stim  She has stocking decrease in pinprick symmetrically to below the knees, otherwise intact.  No spine tenderness, neck supple  Unable to ambulate  Heart RRR no murmur appreciated  Abdomen soft, NT, ND with positive bowel sounds    Results Review:   I reviewed the patient's new clinical results.  I reviewed the patient's new imaging results and agree with the interpretation.  I reviewed the patient's other test results and agree with the interpretation  CTA of chest shows no evidence for PE, chest x-ray shows no acute findings  Labs reviewed, H&H 17 and 53, CBC otherwise unremarkable, glucose 129, potassium 3.4, TSH 0.507  Hemoglobin A1c 6.4, B12 690    Assessment/Plan       Acute on chronic respiratory failure with hypoxia (CMS/HCC)    COPD with acute exacerbation (CMS/HCC)    Hypothyroidism (acquired)    Essential hypertension    Depression    Pinched nerve in neck    left renal collecting system " obstruction    Generalized weakness      Generalized weakness with urinary retention, dysphagia, areflexia, and  minimal sensory findings concerning for peripheral nerve process, e.g. GBS.  However, need to rule out central process first we'll plan on MRI of spine tonight, then proceed to nerve conduction and EMG in the morning, and likely spinal fluid exam after that pending results as they come in.    I discussed the patients findings and my recommendations with patient, nursing staff and primary care team    Tere Galvez MD  01/14/19  6:27 PM

## 2019-01-14 NOTE — THERAPY TREATMENT NOTE
Acute Care - Physical Therapy Treatment Note  [facility]     Patient Name: Kristina Heck  : 1960  MRN: [id number]  Today's Date: 2019  Onset of Illness/Injury or Date of Surgery: 19  Date of Referral to PT: 19  Referring Physician: ISMAEL Pelayo DO    Admit Date: 2019    Visit Dx:    ICD-10-CM ICD-9-CM   1. Left ureter dilated N28.82 593.89   2. Hypoxia R09.02 799.02   3. COPD exacerbation (CMS/HCC) J44.1 491.21   4. Weakness R53.1 780.79   5. Impaired functional mobility, balance, gait, and endurance Z74.09 V49.89   6. Acute on chronic respiratory failure with hypoxia (CMS/HCC) J96.21 518.84     799.02     Patient Active Problem List   Diagnosis   • Acute on chronic respiratory failure with hypoxia (CMS/HCC)   • COPD with acute exacerbation (CMS/HCC)   • Hypothyroidism (acquired)   • Essential hypertension   • Depression   • Pinched nerve in neck   • left renal collecting system obstruction   • Generalized weakness       Therapy Treatment    Rehabilitation Treatment Summary     Row Name 19 1007             Treatment Time/Intention    Discipline  physical therapist  -KM      Document Type  therapy note (daily note)  -KM      Subjective Information  complains of;weakness asking to use bedside commode  -KM      Mode of Treatment  physical therapy  -KM      Patient/Family Observations  eliseoioloy present  -KM      Therapy Frequency (PT Clinical Impression)  daily  -KM      Patient Effort  adequate  -KM      Existing Precautions/Restrictions  fall;oxygen therapy device and L/min high flow nasal cannula  -KM      Recorded by [KM] Magaly Ireland, PT 19 1114      Row Name 19 1007             Vital Signs    Pre SpO2 (%)  96  -KM      O2 Delivery Pre Treatment  supplemental O2 hi isamar nasal cannula  -KM      Post SpO2 (%)  90  -KM      O2 Delivery Post Treatment  supplemental O2  -KM      Recorded by [KM] Magaly Ireland, PT 19 1114      Row Name 19 1007              Cognitive Assessment/Intervention- PT/OT    Orientation Status (Cognition)  oriented x 4  -KM      Follows Commands (Cognition)  WFL;verbal cues/prompting required;physical/tactile prompts required  -KM      Recorded by [KM] Magaly Ireland, PT 01/14/19 1114      Row Name 01/14/19 1007             Bed Mobility Assessment/Treatment    Bed Mobility Assessment/Treatment  rolling left;supine-sit;sit-supine  -KM      Rolling Left [place] (Bed Mobility)  verbal cues;minimum assist (75% patient effort)  -KM      Supine-Sit [place] (Bed Mobility)  minimum assist (75% patient effort)  -KM      Sit-Supine [place] (Bed Mobility)  moderate assist (50% patient effort)  -KM      Sidelying-Sit [place] (Bed Mobility)  minimum assist (75% patient effort)  -KM      Bed Mobility, Safety Issues  decreased use of arms for pushing/pulling;decreased use of legs for bridging/pushing  -KM      Assistive Device (Bed Mobility)  bed rails;draw sheet;head of bed elevated  -KM      Recorded by [KM] Magaly Ireland, PT 01/14/19 1114      Row Name 01/14/19 1007             Transfer Assessment/Treatment    Transfer Assessment/Treatment  sit-stand transfer  -KM      Recorded by [KM] Magaly Ireland, PT 01/14/19 1114      Row Name 01/14/19 1007             Sit-Stand Transfer    Sit-Stand [place] (Transfers)  maximum assist (25% patient effort);2 person assist  -KM      Assistive Device (Sit-Stand Transfers)  -- gait belt, B u/e support, blocking knees  -KM      Recorded by [KM] Magaly Ireland, PT 01/14/19 1114      Row Name 01/14/19 1007             Stand Pivot/Stand Step Transfer    Stand Pivot/Stand Step [place]  maximum assist (25% patient effort);2 person assist pt required sheet lift to bed after lowering to floor  -KM      Assistive Device (Stand Pivot Stand Step Transfer)  -- knees flexed, gradually lowered to floor with gt belt  -KM      Recorded by [KM] Beka  Magaly ZAMORA, PT 01/14/19 1114      Row Name 01/14/19 1007             Motor Skills Assessment/Interventions    Additional Documentation  Balance (Group);Therapeutic Exercise (Group);Therapeutic Exercise Interventions (Group)  -KM      Recorded by [KM] Magaly Ireland, PT 01/14/19 1114      Row Name 01/14/19 1007             Therapeutic Exercise    60514 - PT Therapeutic Activity Minutes  24  -KM      Recorded by [KM] Magaly Ireland, PT 01/14/19 1114      Row Name 01/14/19 1007             Balance    Balance  static sitting balance  -KM      Recorded by [KM] Magaly Ireland, PT 01/14/19 1114      Row Name 01/14/19 1007             Static Sitting Balance    Level of [place] (Unsupported Sitting, Static Balance)  contact guard assist  -KM      Sitting Position (Unsupported Sitting, Static Balance)  sitting on edge of bed  -KM      Recorded by [KM] Magaly Ireland, PT 01/14/19 1114      Row Name 01/14/19 1007             Static Standing Balance    Level of [place] (Supported Standing, Static Balance)  dependent  -KM      Recorded by [KM] Magaly Ireland, PT 01/14/19 1114      Row Name 01/14/19 1007             Positioning and Restraints    Pre-Treatment Position  in bed  -KM      Post Treatment Position  bed  -KM      In Bed  notified nsg;supine;call light within reach;encouraged to call for assist;exit alarm on;with family/caregiver  -KM      Recorded by [KM] Magaly Ireland, PT 01/14/19 1114      Row Name 01/14/19 1007             Pain Scale: Numbers Pre/Post-Treatment    Pain Scale: Numbers, Pretreatment  0/10 - no pain  -KM      Pain Scale: Numbers, Post-Treatment  0/10 - no pain  -KM      Recorded by [KM] Magaly Ireland, PT 01/14/19 1114      Row Name                Wound 01/12/19 1845 upper coccyx pressure injury    Wound - Properties Group Date first assessed: 01/12/19 [MS] Time first assessed: 1845 [MS] Present On Admission : yes [MS] Orientation: upper  [MS] Location: coccyx [MS] Type: pressure injury [MS] Stage, Pressure Injury: Stage 1 [MS] Recorded by:  [MS] Fabienne Long, RN 01/12/19 1854    Row Name 01/14/19 1007             Plan of Care Review    Plan of Care Reviewed With  patient;family  -KM      Recorded by [KM] Magaly Ireland, PT 01/14/19 1114        User Key  (r) = Recorded By, (t) = Taken By, (c) = Cosigned By    Initials Name Effective Dates Discipline    KM Magaly Ireland, PT 06/19/15 -  PT    MS Long Fbaienne FUCHS, RN 11/01/16 -  Nurse          Wound 01/12/19 1845 upper coccyx pressure injury (Active)   Dressing Appearance dry;intact 1/14/2019  8:00 AM   Closure Open to air 1/14/2019  8:00 AM   Base clean;dry 1/14/2019  8:00 AM   Drainage Amount none 1/14/2019  8:00 AM   Care, Wound soap and water 1/14/2019  8:00 AM           Physical Therapy Education     Title: PT OT SLP Therapies (Done)     Topic: Physical Therapy (Done)     Point: Mobility training (Done)     Learning Progress Summary           Patient Acceptance, E, VU by REYNALDO at 1/14/2019 10:07 AM    Acceptance, E,D, VU,NR by MB at 1/13/2019  9:55 AM    Comment:  log roll technique, transfer mechanics/safety, POC progression, benefits of mobility, home safety                   Point: Home exercise program (Done)     Learning Progress Summary           Patient Acceptance, E, VU by REYNALDO at 1/14/2019 10:07 AM    Acceptance, E,D, VU,NR by MB at 1/13/2019  9:55 AM    Comment:  log roll technique, transfer mechanics/safety, POC progression, benefits of mobility, home safety                   Point: Body mechanics (Done)     Learning Progress Summary           Patient Acceptance, E, VU by REYNALDO at 1/14/2019 10:07 AM    Acceptance, E,D, VU,NR by MB at 1/13/2019  9:55 AM    Comment:  log roll technique, transfer mechanics/safety, POC progression, benefits of mobility, home safety                   Point: Precautions (Done)     Learning Progress Summary           Patient Acceptance, E, VU by REYNALDO at  1/14/2019 10:07 AM    Acceptance, SERA STUBBS, VU,NR by MB at 1/13/2019  9:55 AM    Comment:  log roll technique, transfer mechanics/safety, POC progression, benefits of mobility, home safety                               User Key     Initials Effective Dates Name Provider Type Discipline     06/19/15 -  Magaly Ireland, PT Physical Therapist PT    MB 03/14/16 -  Bronwyn Calvillo, PT Physical Therapist PT                PT Recommendation and Plan  Therapy Frequency (PT Clinical Impression): daily  Plan of Care Reviewed With: patient, family  Outcome Summary: Pt presents with weakness limiting functional mobility, requires min assist supine to sit but is max assist x 2 for tranfers, recommend pt be moved to room with lift system. Anticipate inpatient rehab for d/c planning  Outcome Measures     Row Name 01/14/19 1007 01/13/19 0830          How much help from another person do you currently need...    Turning from your back to your side while in flat bed without using bedrails?  3  -KM  3  -MB     Moving from lying on back to sitting on the side of a flat bed without bedrails?  3  -KM  2  -MB     Moving to and from a bed to a chair (including a wheelchair)?  2  -KM  2  -MB     Standing up from a chair using your arms (e.g., wheelchair, bedside chair)?  2  -KM  2  -MB     Climbing 3-5 steps with a railing?  1  -KM  1  -MB     To walk in hospital room?  1  -KM  1  -MB     AM-PAC 6 Clicks Score  12  -KM  11  -MB        Functional Assessment    Outcome Measure Options  AM-PAC 6 Clicks Basic Mobility (PT)  -KM  AM-PAC 6 Clicks Basic Mobility (PT)  -MB       User Key  (r) = Recorded By, (t) = Taken By, (c) = Cosigned By    Initials Name Provider Type    Magaly Apodaca, PT Physical Therapist    Bronwyn Swanson, PT Physical Therapist         Time Calculation:   PT Charges     Row Name 01/14/19 1007             Time Calculation    Start Time  1007  -KM      PT Received On  01/14/19  -KM      PT Goal  Re-Cert Due Date  01/23/19  -KM         Time Calculation- PT    Total Timed Code Minutes- PT  24 minute(s)  -KM         Timed Charges    10266 - PT Therapeutic Activity Minutes  24  -KM        User Key  (r) = Recorded By, (t) = Taken By, (c) = Cosigned By    Initials Name Provider Type     Magaly Ireland, PT Physical Therapist        Therapy Suggested Charges     Code   Minutes Charges    44658 (CPT®) Hc Pt Neuromusc Re Education Ea 15 Min      11467 (CPT®) Hc Pt Ther Proc Ea 15 Min      16842 (CPT®) Hc Gait Training Ea 15 Min      70749 (CPT®) Hc Pt Therapeutic Act Ea 15 Min 24 2    65252 (CPT®) Hc Pt Manual Therapy Ea 15 Min      96787 (CPT®) Hc Pt Iontophoresis Ea 15 Min      98677 (CPT®) Hc Pt Elec Stim Ea-Per 15 Min      27050 (CPT®) Hc Pt Ultrasound Ea 15 Min      59111 (CPT®) Hc Pt Self Care/Mgmt/Train Ea 15 Min      79465 (CPT®) Hc Pt Prosthetic (S) Train Initial Encounter, Each 15 Min      52812 (CPT®) Hc Pt Orthotic(S)/Prosthetic(S) Encounter, Each 15 Min      11293 (CPT®) Hc Orthotic(S) Mgmt/Train Initial Encounter, Each 15min      Total  24 2        Therapy Charges for Today     Code Description Service Date Service Provider Modifiers Qty    86105745665 HC PT THERAPEUTIC ACT EA 15 MIN 1/14/2019 Magaly Ireland, PT GP 2    95052158017 HC PT THER SUPP EA 15 MIN 1/14/2019 Magaly Ireland, PT GP 2          PT G-Codes  Outcome Measure Options: AM-PAC 6 Clicks Basic Mobility (PT)  AM-PAC 6 Clicks Score: 12    Magaly Ireland, PT  1/14/2019

## 2019-01-14 NOTE — PROGRESS NOTES
[facility]  PROGRESS NOTE    Patient Name: Kristina Heck  : 1960  MRN: [id number]    Date of Admission: 2019  Length of Stay: 2  Primary Care Physician: Gio Henderson MD    Subjective   Subjective     CC:  F/u dyspnea    HPI:  Pt reports improved dyspnea with decreased cough and wheezing. Pt is on high flow O2 with sats in the low 90s on FiO2 45%. Bilateral arm and leg numbness, burning, and tingling are persistent. Dysphagia was reported last night and plan is for a swallowing evaluation today. She denies dysphonia but reports occasional diplopia.     Review of Systems  Gen- No fevers, chills  CV- No chest pain, palpitations  Resp- As above   GI- No N/V/D, abd pain    Otherwise ROS is negative except as mentioned in the HPI.    Objective   Objective     Vital Signs:   Temp:  [97.8 °F (36.6 °C)-98.2 °F (36.8 °C)] 98 °F (36.7 °C)  Heart Rate:  [63-84] 80  Resp:  [18-22] 18  BP: (118-164)/() 164/83     Physical Exam:  Constitutional: No acute distress, awake, alert  HENT: NCAT, mucous membranes moist  Respiratory: No wheezing, diminished breath sounds bilaterally, poor air movement, respiratory effort nonlabored (slighty improved)  Cardiovascular: RRR, no murmurs, rubs, or gallops, palpable pedal pulses bilaterally  Gastrointestinal: Positive bowel sounds, soft, nontender, nondistended, obese  Musculoskeletal: No bilateral ankle edema  Psychiatric: Appropriate affect, cooperative  Neurologic: Oriented x 3, strength 4/5 in all extremities, Cranial Nerves grossly intact to confrontation, speech clear.  Skin: No rashes    Results Reviewed:  I have personally reviewed current lab, radiology, and data and agree.    Results from last 7 days   Lab Units  19   0718  19   1346   WBC 10*3/mm3  7.49  10.21   HEMOGLOBIN g/dL  17.1*  17.4*   HEMATOCRIT %  53.0*  52.9*   PLATELETS 10*3/mm3  203  223     Results from last 7 days   Lab Units  19    0501  01/13/19   0718  01/12/19   1419  01/12/19   1346   SODIUM mmol/L  139  138   --   136   POTASSIUM mmol/L  3.4*  4.1   --   3.3*   CHLORIDE mmol/L  104  102   --   97*   CO2 mmol/L  30.0  29.0   --   31.0   BUN mg/dL  24*  24*   --   20   CREATININE mg/dL  0.68  0.68   --   0.73   GLUCOSE mg/dL  129*  141*   --   101*   CALCIUM mg/dL  8.9  9.0   --   9.3   ALT (SGPT) U/L   --    --    --   32   AST (SGOT) U/L   --    --    --   23   TROPONIN I ng/mL   --    --   0.00   --      Estimated Creatinine Clearance: 107.5 mL/min (by C-G formula based on SCr of 0.68 mg/dL).    BNP   Date Value Ref Range Status   01/12/2019 9.0 0.0 - 100.0 pg/mL Final     Comment:     Results may be falsely decreased if patient taking Biotin.       Microbiology Results Abnormal     None          Imaging Results (last 24 hours)     ** No results found for the last 24 hours. **               I have reviewed the medications:    Current Facility-Administered Medications:   •  acetaminophen (TYLENOL) tablet 650 mg, 650 mg, Oral, Q4H PRN, Harjinder, Gena, APRN, 650 mg at 01/13/19 0641  •  aspirin chewable tablet 81 mg, 81 mg, Oral, Daily, Harjinder, Gena, APRN, 81 mg at 01/14/19 0858  •  budesonide (PULMICORT) nebulizer solution 0.5 mg, 0.5 mg, Nebulization, Daily - RT, Harjinder, Gena, APRN, 0.5 mg at 01/14/19 0731  •  doxycycline (MONODOX) capsule 100 mg, 100 mg, Oral, Q12H, Vel Leiva MD, 100 mg at 01/14/19 0858  •  enoxaparin (LOVENOX) syringe 40 mg, 40 mg, Subcutaneous, Q24H, Vel Leiva MD, 40 mg at 01/13/19 1208  •  escitalopram (LEXAPRO) tablet 10 mg, 10 mg, Oral, Daily, Harjinder, Gena, APRN, 10 mg at 01/14/19 0858  •  gabapentin (NEURONTIN) capsule 100 mg, 100 mg, Oral, TID, Mary Pelayo DO, 100 mg at 01/14/19 0858  •  guaiFENesin (MUCINEX) 12 hr tablet 600 mg, 600 mg, Oral, Q12H, Mary Pelayo DO, 600 mg at 01/14/19 0858  •  hydrochlorothiazide (HYDRODIURIL) tablet 25 mg, 25 mg, Oral, Daily, Harjinder  Gena, APRN, 25 mg at 01/14/19 0858  •  HYDROcodone-acetaminophen (NORCO) 7.5-325 MG per tablet 1 tablet, 1 tablet, Oral, Q6H PRN, Subha Rider MD, 1 tablet at 01/14/19 0858  •  ipratropium-albuterol (DUO-NEB) nebulizer solution 3 mL, 3 mL, Nebulization, 4x Daily - RT, Harjinder, Gena, APRN, 3 mL at 01/14/19 0730  •  levothyroxine (SYNTHROID, LEVOTHROID) tablet 100 mcg, 100 mcg, Oral, Daily, Harjinder, Gena, APRN, 100 mcg at 01/14/19 0858  •  methylPREDNISolone sodium succinate (SOLU-Medrol) injection 60 mg, 60 mg, Intravenous, Q8H, Harjinder, Gena, APRN, 60 mg at 01/14/19 0858  •  metoprolol succinate XL (TOPROL-XL) 24 hr tablet 25 mg, 25 mg, Oral, Daily, Harjinder, Gena, APRN, 25 mg at 01/14/19 0858  •  nicotine (NICODERM CQ) 21 MG/24HR patch 1 patch, 1 patch, Transdermal, Daily PRN, Vel Leiva MD, 1 patch at 01/13/19 1209  •  Pharmacy Consult - [facility], , Does not apply, Daily, Soha Jerome, [facility], Stopped at 01/13/19 1045  •  potassium chloride (MICRO-K) CR capsule 40 mEq, 40 mEq, Oral, PRN, 40 mEq at 01/12/19 2322 **OR** potassium chloride (KLOR-CON) packet 40 mEq, 40 mEq, Oral, PRN **OR** potassium chloride 10 mEq in 100 mL IVPB, 10 mEq, Intravenous, Q1H PRN, Harjinder, Gena, APRN  •  [COMPLETED] Insert peripheral IV, , , Once **AND** sodium chloride 0.9 % flush 10 mL, 10 mL, Intravenous, PRN, Dequan Mims MD  •  Sodium chloride 0.9 % infusion, 100 mL/hr, Intravenous, Continuous, Harjinder, Gena, APRN, Last Rate: 100 mL/hr at 01/14/19 0300, 100 mL/hr at 01/14/19 0300      Assessment/Plan   Assessment / Plan     Active Hospital Problems    Diagnosis Date Noted   • Acute on chronic respiratory failure with hypoxia (CMS/HCC) [J96.21] 01/12/2019   • COPD with acute exacerbation (CMS/HCC) [J44.1] 01/12/2019   • Hypothyroidism (acquired) [E03.9] 01/12/2019   • Essential hypertension [I10] 01/12/2019   • Depression [F32.9] 01/12/2019   • Pinched nerve in neck [G58.8] 01/12/2019   • left  renal collecting system obstruction [N13.5] 01/12/2019   • Generalized weakness [R53.1] 01/12/2019       Brief Hospital Course to date:  Mrs. Heck is a 58 year-old F with a past medical hx of COPD, depression, hypertension, active smoker, recent dx of pinched nerve in neck, and hypothyroidism who presented with exertional dyspnea and non-productive cough in the setting of recent admission at [facility] on 12/25 for bilateral pneumonia.     Acute on Chronic Hypoxemic Hypercapneic Respiratory Failure (baseline 2LNC), persistent  COPD with Acute Exacerbation  - Continue IV solumedrol, nebs, doxy to complete 7 day course   - No infiltrate or PE on CTA Chest   - BiPAP qhs and prn, Wean O2 as able, pt still on much higher O2 demand that baseline. Is there a neuromuscular process contributing to dyspnea?      Bilateral Upper and Lower extremity Numbness, onset 1/2, remains high risk due to persistence and significant impairment of ability to do ADLs  - Pt now reports dysphagia and occasional diplopia, ? Neuromuscular disease  - MRI reports from [facility] are needed to help guide next step in evaluation. Is this a neuromuscular process or a myelopathic process? D/w RN who has called  for records again this AM. If nothing by midday, will repeat MRI C-spine and consult Neurology or Neurosurgery   - Gabapentin for neuropathic pain    Dysphagia  - SLP has seen, plan for FEES today     Moderate Left-sided hydronephrosis and hyroureter  - CT urogram pending  - Anticipate need for Urology consult depending on results. Renal function preserved. No flank pain.      Hypokalemia  - replace as per protocol, Mg sufficient    Hypertension  - BP reviewed, above goal, add lisinopril today     Secondary Erythrocytosis   - due to COPD, chronic tobacco abuse    Active Tobacco Abuse   - pt declines nicotine patch at present, available prn    Hx of Nephrolithiasis   Hypothyroidism, controlled, continue home synthroid, TSH normal    DVT  Prophylaxis:  SQ lovenox  Disposition: I expect the patient to be discharged home with Rehab later this week depending on assessment and plan of bilateral weakness.    Pt remains high risk due to ongoing bilateral weakness and acute respiratory failure.     CODE STATUS:   Code Status and Medical Interventions:   Ordered at: 01/12/19 4893     Level Of Support Discussed With:    Patient     Code Status:    CPR     Medical Interventions (Level of Support Prior to Arrest):    Full         Electronically signed by Vel Leiva MD, 01/14/19, 9:54 AM.

## 2019-01-14 NOTE — PROGRESS NOTES
Discharge Planning Assessment  [facility]     Patient Name: Kristina Heck  MRN: [id number]  Today's Date: 1/14/2019    Admit Date: 1/12/2019    Discharge Needs Assessment     Row Name 01/14/19 1345       Living Environment    Lives With  child(selam), adult    Name(s) of Who Lives With Patient  Daughters live nearby    Current Living Arrangements  home/apartment/condo    Primary Care Provided by  self    Provides Primary Care For  child(selam)    Family Caregiver if Needed  child(selam), adult    Family Caregiver Names  Daughters Anyi Demetris and Pattie Meyer    Quality of Family Relationships  supportive;involved;helpful    Able to Return to Prior Arrangements  yes       Resource/Environmental Concerns    Resource/Environmental Concerns  none    Transportation Concerns  car, none       Transition Planning    Patient/Family Anticipates Transition to  home with family    Transportation Anticipated  family or friend will provide       Discharge Needs Assessment    Concerns to be Addressed  no discharge needs identified    Equipment Currently Used at Home  oxygen;bipap/cpap;nebulizer;rollator Home O2 through [place] at 2L/NC at night and PRN    Anticipated Changes Related to Illness  none    Equipment Needed After Discharge  none        Discharge Plan     Row Name 01/14/19 8132       Plan    Plan  Plan is home at discharge,  Following for needs as they develop    Patient/Family in Agreement with Plan  yes    Plan Comments  Met with patient and daughters at bedside.  Lives alone with 2 children she takes care of.  Daughters live nearby.  Following for D/C needs related to hospital admission      Final Discharge Disposition Code  01 - home or self-care        Destination      No service coordination in this encounter.      Durable Medical Equipment      No service coordination in this encounter.      Dialysis/Infusion      No service coordination in this encounter.      Home Medical Care      No service coordination in  this encounter.      Community Resources      No service coordination in this encounter.          Demographic Summary     Row Name 01/14/19 1344       General Information    Admission Type  inpatient    Arrived From  emergency department    Referral Source  admission list    Reason for Consult  discharge planning    Preferred Language  English        Functional Status     Row Name 01/14/19 1342       Functional Status    Usual Activity Tolerance  moderate    Current Activity Tolerance  moderate    Functional Status Comments  Independent with ADL       Functional Status, IADL    Medications  independent    Meal Preparation  independent    Housekeeping  independent    Laundry  independent    Shopping  independent        Psychosocial    No documentation.       Abuse/Neglect    No documentation.       Legal    No documentation.       Substance Abuse    No documentation.       Patient Forms    No documentation.           Ewa Buck, RN

## 2019-01-14 NOTE — PLAN OF CARE
Problem: Patient Care Overview  Goal: Plan of Care Review  Outcome: Ongoing (interventions implemented as appropriate)   01/14/19 7720   Coping/Psychosocial   Plan of Care Reviewed With patient   SLP evaluation completed. Will address concern for pharyngeal dysphagia by completing FEES. Please see note for further details and recommendations.

## 2019-01-14 NOTE — PLAN OF CARE
Problem: Patient Care Overview  Goal: Plan of Care Review  Outcome: Ongoing (interventions implemented as appropriate)   01/14/19 0404   Coping/Psychosocial   Plan of Care Reviewed With patient   Plan of Care Review   Progress improving     Goal: Individualization and Mutuality  Outcome: Ongoing (interventions implemented as appropriate)      Problem: Fall Risk (Adult)  Goal: Identify Related Risk Factors and Signs and Symptoms  Outcome: Outcome(s) achieved Date Met: 01/14/19    Goal: Absence of Fall  Outcome: Ongoing (interventions implemented as appropriate)      Problem: Chronic Obstructive Pulmonary Disease (Adult)  Goal: Signs and Symptoms of Listed Potential Problems Will be Absent, Minimized or Managed (Chronic Obstructive Pulmonary Disease)  Outcome: Ongoing (interventions implemented as appropriate)      Problem: Wound (Includes Pressure Injury) (Adult)  Goal: Signs and Symptoms of Listed Potential Problems Will be Absent, Minimized or Managed (Wound)  Outcome: Ongoing (interventions implemented as appropriate)

## 2019-01-14 NOTE — NURSING NOTE
Charge RN reported to room after being notified that pt was lowered to floor by 2 PT staff. Multiple staff assisted pt back into bed.  Pt suffers no injury from being lowered to floor.

## 2019-01-14 NOTE — PLAN OF CARE
Problem: Patient Care Overview  Goal: Plan of Care Review  Outcome: Ongoing (interventions implemented as appropriate)   01/14/19 0820   Coping/Psychosocial   Plan of Care Reviewed With patient   Plan of Care Review   Progress no change   OTHER   Outcome Summary Consulted to assess possible pressure injury on patients coccyx POA. Assessment performed. At this time, some areas of reddened friction is noted on patients coccyx with all areas blanching. Dry flow pads in place. Cleaned area and applied barrier cream. Good general skin care and turning is all that is needed. See skin care orders. No further need for WOC nurse at this time. Will sign off. Thanks

## 2019-01-14 NOTE — PLAN OF CARE
Problem: Patient Care Overview  Goal: Plan of Care Review  Outcome: Ongoing (interventions implemented as appropriate)   01/14/19 1438   Coping/Psychosocial   Plan of Care Reviewed With patient;daughter   Plan of Care Review   Progress no change       Problem: Fall Risk (Adult)  Goal: Identify Related Risk Factors and Signs and Symptoms  Outcome: Ongoing (interventions implemented as appropriate)   01/14/19 1438   Fall Risk (Adult)   Related Risk Factors (Fall Risk) bladder function altered;fatigue/slow reaction;fear of falling;gait/mobility problems;neuro disease/injury   Signs and Symptoms (Fall Risk) presence of risk factors       Problem: Chronic Obstructive Pulmonary Disease (Adult)  Goal: Signs and Symptoms of Listed Potential Problems Will be Absent, Minimized or Managed (Chronic Obstructive Pulmonary Disease)  Outcome: Ongoing (interventions implemented as appropriate)   01/14/19 1438   Goal/Outcome Evaluation   Problems Assessed (Chronic Obstructive Pulmonary Disease (COPD)) all   Problems Present (COPD, Bronch/Emphy) functional deficit       Problem: Wound (Includes Pressure Injury) (Adult)  Goal: Signs and Symptoms of Listed Potential Problems Will be Absent, Minimized or Managed (Wound)  Outcome: Ongoing (interventions implemented as appropriate)

## 2019-01-14 NOTE — PROGRESS NOTES
FOLLOW UP NOTE    [facility] records obtained    Pt was seen in ER on 1/10 with MRI C-spine showing moderate spinal stenosis of C5-6, severe bilateral forminal stenosis with no cord compression or spinal lesions.     RPR, TSH, B12, NIF, VC all normal     Neurology saw and felt that pt's presentation was not consistent with GBS, recommended outpt f/u in Neurosurgery clinic.      I called and discussed case with Dr. Galvez who will consult.     Electronically signed by Vel Leiva MD, 01/14/19, 4:04 PM.

## 2019-01-15 ENCOUNTER — APPOINTMENT (OUTPATIENT)
Dept: MRI IMAGING | Facility: HOSPITAL | Age: 59
End: 2019-01-15

## 2019-01-15 ENCOUNTER — ANCILLARY PROCEDURE (OUTPATIENT)
Dept: SPEECH THERAPY | Facility: HOSPITAL | Age: 59
End: 2019-01-15
Attending: HOSPITALIST

## 2019-01-15 ENCOUNTER — APPOINTMENT (OUTPATIENT)
Dept: NEUROLOGY | Facility: HOSPITAL | Age: 59
End: 2019-01-15
Attending: PSYCHIATRY & NEUROLOGY

## 2019-01-15 ENCOUNTER — APPOINTMENT (OUTPATIENT)
Dept: GENERAL RADIOLOGY | Facility: HOSPITAL | Age: 59
End: 2019-01-15

## 2019-01-15 LAB
ANION GAP SERPL CALCULATED.3IONS-SCNC: 8 MMOL/L (ref 3–11)
APPEARANCE CSF: CLEAR
APPEARANCE CSF: CLEAR
BASOPHILS # BLD AUTO: 0 10*3/MM3 (ref 0–0.2)
BASOPHILS NFR BLD AUTO: 0 % (ref 0–1)
BUN BLD-MCNC: 28 MG/DL (ref 9–23)
BUN/CREAT SERPL: 36.8 (ref 7–25)
CALCIUM SPEC-SCNC: 9 MG/DL (ref 8.7–10.4)
CHLORIDE SERPL-SCNC: 105 MMOL/L (ref 99–109)
CO2 SERPL-SCNC: 25 MMOL/L (ref 20–31)
COLOR CSF: COLORLESS
COLOR CSF: COLORLESS
CREAT BLD-MCNC: 0.76 MG/DL (ref 0.6–1.3)
DEPRECATED RDW RBC AUTO: 48.6 FL (ref 37–54)
EOSINOPHIL # BLD AUTO: 0 10*3/MM3 (ref 0–0.3)
EOSINOPHIL NFR BLD AUTO: 0 % (ref 0–3)
ERYTHROCYTE [DISTWIDTH] IN BLOOD BY AUTOMATED COUNT: 14.3 % (ref 11.3–14.5)
GFR SERPL CREATININE-BSD FRML MDRD: 78 ML/MIN/1.73
GLUCOSE BLD-MCNC: 143 MG/DL (ref 70–100)
GLUCOSE CSF-MCNC: 95 MG/DL (ref 40–70)
HCT VFR BLD AUTO: 52.1 % (ref 34.5–44)
HGB BLD-MCNC: 16.8 G/DL (ref 11.5–15.5)
IMM GRANULOCYTES # BLD AUTO: 0.03 10*3/MM3 (ref 0–0.03)
IMM GRANULOCYTES NFR BLD AUTO: 0.3 % (ref 0–0.6)
LYMPHOCYTES # BLD AUTO: 0.76 10*3/MM3 (ref 0.6–4.8)
LYMPHOCYTES NFR BLD AUTO: 7.5 % (ref 24–44)
MCH RBC QN AUTO: 29.9 PG (ref 27–31)
MCHC RBC AUTO-ENTMCNC: 32.2 G/DL (ref 32–36)
MCV RBC AUTO: 92.9 FL (ref 80–99)
MONOCYTES # BLD AUTO: 0.49 10*3/MM3 (ref 0–1)
MONOCYTES NFR BLD AUTO: 4.8 % (ref 0–12)
NEUTROPHILS # BLD AUTO: 8.93 10*3/MM3 (ref 1.5–8.3)
NEUTROPHILS NFR BLD AUTO: 87.7 % (ref 41–71)
PLAT MORPH BLD: NORMAL
PLATELET # BLD AUTO: 212 10*3/MM3 (ref 150–450)
PMV BLD AUTO: 11.3 FL (ref 6–12)
POTASSIUM BLD-SCNC: 4.2 MMOL/L (ref 3.5–5.5)
PROT CSF-MCNC: 66 MG/DL (ref 15–45)
RBC # BLD AUTO: 5.61 10*6/MM3 (ref 3.89–5.14)
RBC # CSF MANUAL: 2 /MM3 (ref 0–5)
RBC # CSF MANUAL: 51 /MM3 (ref 0–5)
RBC MORPH BLD: NORMAL
SODIUM BLD-SCNC: 138 MMOL/L (ref 132–146)
SPECIMEN VOL CSF: 9 ML
SPECIMEN VOL CSF: 9 ML
TUBE # CSF: 1
TUBE # CSF: 4
WBC # CSF MANUAL: 1 /MM3 (ref 0–5)
WBC # CSF MANUAL: 2 /MM3 (ref 0–5)
WBC MORPH BLD: NORMAL
WBC NRBC COR # BLD: 10.18 10*3/MM3 (ref 3.5–10.8)

## 2019-01-15 PROCEDURE — A9577 INJ MULTIHANCE: HCPCS | Performed by: HOSPITALIST

## 2019-01-15 PROCEDURE — 95910 NRV CNDJ TEST 7-8 STUDIES: CPT

## 2019-01-15 PROCEDURE — 94799 UNLISTED PULMONARY SVC/PX: CPT

## 2019-01-15 PROCEDURE — 77003 FLUOROGUIDE FOR SPINE INJECT: CPT

## 2019-01-15 PROCEDURE — 63710000001 DIPHENHYDRAMINE PER 50 MG: Performed by: PSYCHIATRY & NEUROLOGY

## 2019-01-15 PROCEDURE — 009U3ZX DRAINAGE OF SPINAL CANAL, PERCUTANEOUS APPROACH, DIAGNOSTIC: ICD-10-PCS | Performed by: RADIOLOGY

## 2019-01-15 PROCEDURE — 25010000002 IMMUNE GLOBULIN (HUMAN) 20 GM/200ML SOLUTION: Performed by: PSYCHIATRY & NEUROLOGY

## 2019-01-15 PROCEDURE — 85025 COMPLETE CBC W/AUTO DIFF WBC: CPT | Performed by: PSYCHIATRY & NEUROLOGY

## 2019-01-15 PROCEDURE — 94660 CPAP INITIATION&MGMT: CPT

## 2019-01-15 PROCEDURE — B01BZZZ FLUOROSCOPY OF SPINAL CORD: ICD-10-PCS | Performed by: RADIOLOGY

## 2019-01-15 PROCEDURE — 25010000002 METHYLPREDNISOLONE PER 125 MG: Performed by: NURSE PRACTITIONER

## 2019-01-15 PROCEDURE — 82945 GLUCOSE OTHER FLUID: CPT | Performed by: PSYCHIATRY & NEUROLOGY

## 2019-01-15 PROCEDURE — 36415 COLL VENOUS BLD VENIPUNCTURE: CPT | Performed by: HOSPITALIST

## 2019-01-15 PROCEDURE — 25010000002 ENOXAPARIN PER 10 MG: Performed by: PSYCHIATRY & NEUROLOGY

## 2019-01-15 PROCEDURE — 95886 MUSC TEST DONE W/N TEST COMP: CPT

## 2019-01-15 PROCEDURE — 89050 BODY FLUID CELL COUNT: CPT | Performed by: PSYCHIATRY & NEUROLOGY

## 2019-01-15 PROCEDURE — 82784 ASSAY IGA/IGD/IGG/IGM EACH: CPT | Performed by: PSYCHIATRY & NEUROLOGY

## 2019-01-15 PROCEDURE — 94760 N-INVAS EAR/PLS OXIMETRY 1: CPT

## 2019-01-15 PROCEDURE — 0 GADOBENATE DIMEGLUMINE 529 MG/ML SOLUTION: Performed by: HOSPITALIST

## 2019-01-15 PROCEDURE — 99232 SBSQ HOSP IP/OBS MODERATE 35: CPT | Performed by: PSYCHIATRY & NEUROLOGY

## 2019-01-15 PROCEDURE — 99233 SBSQ HOSP IP/OBS HIGH 50: CPT | Performed by: INTERNAL MEDICINE

## 2019-01-15 PROCEDURE — 85007 BL SMEAR W/DIFF WBC COUNT: CPT | Performed by: PSYCHIATRY & NEUROLOGY

## 2019-01-15 PROCEDURE — 25010000002 LORAZEPAM PER 2 MG: Performed by: INTERNAL MEDICINE

## 2019-01-15 PROCEDURE — 84157 ASSAY OF PROTEIN OTHER: CPT | Performed by: PSYCHIATRY & NEUROLOGY

## 2019-01-15 PROCEDURE — 80048 BASIC METABOLIC PNL TOTAL CA: CPT | Performed by: HOSPITALIST

## 2019-01-15 PROCEDURE — 25010000002 LORAZEPAM PER 2 MG: Performed by: NURSE PRACTITIONER

## 2019-01-15 PROCEDURE — 94640 AIRWAY INHALATION TREATMENT: CPT

## 2019-01-15 PROCEDURE — 72156 MRI NECK SPINE W/O & W/DYE: CPT

## 2019-01-15 PROCEDURE — 72157 MRI CHEST SPINE W/O & W/DYE: CPT

## 2019-01-15 RX ORDER — LIDOCAINE HYDROCHLORIDE 10 MG/ML
10 INJECTION, SOLUTION INFILTRATION; PERINEURAL ONCE
Status: COMPLETED | OUTPATIENT
Start: 2019-01-15 | End: 2019-01-15

## 2019-01-15 RX ORDER — LORAZEPAM 2 MG/ML
0.5 INJECTION INTRAMUSCULAR ONCE
Status: COMPLETED | OUTPATIENT
Start: 2019-01-15 | End: 2019-01-15

## 2019-01-15 RX ORDER — ACETAMINOPHEN 325 MG/1
650 TABLET ORAL ONCE
Status: COMPLETED | OUTPATIENT
Start: 2019-01-15 | End: 2019-01-15

## 2019-01-15 RX ORDER — FAMOTIDINE 10 MG/ML
20 INJECTION, SOLUTION INTRAVENOUS ONCE AS NEEDED
Status: DISCONTINUED | OUTPATIENT
Start: 2019-01-15 | End: 2019-01-16

## 2019-01-15 RX ORDER — DIPHENHYDRAMINE HCL 25 MG
25 CAPSULE ORAL ONCE
Status: COMPLETED | OUTPATIENT
Start: 2019-01-15 | End: 2019-01-15

## 2019-01-15 RX ORDER — DIPHENHYDRAMINE HCL 25 MG
25 CAPSULE ORAL EVERY 6 HOURS PRN
Status: DISCONTINUED | OUTPATIENT
Start: 2019-01-15 | End: 2019-01-16

## 2019-01-15 RX ORDER — LORAZEPAM 2 MG/ML
1 INJECTION INTRAMUSCULAR EVERY 6 HOURS PRN
Status: DISCONTINUED | OUTPATIENT
Start: 2019-01-15 | End: 2019-01-17

## 2019-01-15 RX ADMIN — LORAZEPAM 0.5 MG: 2 INJECTION INTRAMUSCULAR; INTRAVENOUS at 02:24

## 2019-01-15 RX ADMIN — LORAZEPAM 1 MG: 2 INJECTION INTRAMUSCULAR; INTRAVENOUS at 10:46

## 2019-01-15 RX ADMIN — HYDROCHLOROTHIAZIDE 25 MG: 25 TABLET ORAL at 09:05

## 2019-01-15 RX ADMIN — LIDOCAINE HYDROCHLORIDE 10 ML: 10 INJECTION, SOLUTION INFILTRATION; PERINEURAL at 11:45

## 2019-01-15 RX ADMIN — LORAZEPAM 1 MG: 2 INJECTION INTRAMUSCULAR; INTRAVENOUS at 17:10

## 2019-01-15 RX ADMIN — GUAIFENESIN 600 MG: 600 TABLET, EXTENDED RELEASE ORAL at 09:06

## 2019-01-15 RX ADMIN — IPRATROPIUM BROMIDE AND ALBUTEROL SULFATE 3 ML: 2.5; .5 SOLUTION RESPIRATORY (INHALATION) at 20:06

## 2019-01-15 RX ADMIN — IPRATROPIUM BROMIDE AND ALBUTEROL SULFATE 3 ML: 2.5; .5 SOLUTION RESPIRATORY (INHALATION) at 09:38

## 2019-01-15 RX ADMIN — DOXYCYCLINE 100 MG: 100 CAPSULE ORAL at 09:05

## 2019-01-15 RX ADMIN — GUAIFENESIN 600 MG: 600 TABLET, EXTENDED RELEASE ORAL at 20:03

## 2019-01-15 RX ADMIN — METOPROLOL SUCCINATE 25 MG: 25 TABLET, EXTENDED RELEASE ORAL at 09:06

## 2019-01-15 RX ADMIN — IPRATROPIUM BROMIDE AND ALBUTEROL SULFATE 3 ML: 2.5; .5 SOLUTION RESPIRATORY (INHALATION) at 16:43

## 2019-01-15 RX ADMIN — HYDROCODONE BITARTRATE AND ACETAMINOPHEN 1 TABLET: 7.5; 325 TABLET ORAL at 22:10

## 2019-01-15 RX ADMIN — HYDROCODONE BITARTRATE AND ACETAMINOPHEN 1 TABLET: 7.5; 325 TABLET ORAL at 05:30

## 2019-01-15 RX ADMIN — LISINOPRIL 10 MG: 10 TABLET ORAL at 09:06

## 2019-01-15 RX ADMIN — GADOBENATE DIMEGLUMINE 19 ML: 529 INJECTION, SOLUTION INTRAVENOUS at 03:45

## 2019-01-15 RX ADMIN — ASPIRIN 81 MG CHEWABLE TABLET 81 MG: 81 TABLET CHEWABLE at 09:05

## 2019-01-15 RX ADMIN — METHYLPREDNISOLONE SODIUM SUCCINATE 60 MG: 125 INJECTION, POWDER, FOR SOLUTION INTRAMUSCULAR; INTRAVENOUS at 16:23

## 2019-01-15 RX ADMIN — ACETAMINOPHEN 650 MG: 325 TABLET ORAL at 16:23

## 2019-01-15 RX ADMIN — DOXYCYCLINE 100 MG: 100 CAPSULE ORAL at 20:03

## 2019-01-15 RX ADMIN — GABAPENTIN 100 MG: 100 CAPSULE ORAL at 20:03

## 2019-01-15 RX ADMIN — BUDESONIDE 0.5 MG: 0.5 INHALANT RESPIRATORY (INHALATION) at 09:38

## 2019-01-15 RX ADMIN — HYDROCODONE BITARTRATE AND ACETAMINOPHEN 1 TABLET: 7.5; 325 TABLET ORAL at 12:19

## 2019-01-15 RX ADMIN — IMMUNE GLOBULIN INFUSION (HUMAN) 20 G: 100 INJECTION, SOLUTION INTRAVENOUS; SUBCUTANEOUS at 16:24

## 2019-01-15 RX ADMIN — DIPHENHYDRAMINE HYDROCHLORIDE 25 MG: 25 CAPSULE ORAL at 16:23

## 2019-01-15 RX ADMIN — SODIUM CHLORIDE, PRESERVATIVE FREE 10 ML: 5 INJECTION INTRAVENOUS at 20:04

## 2019-01-15 RX ADMIN — GABAPENTIN 100 MG: 100 CAPSULE ORAL at 09:05

## 2019-01-15 RX ADMIN — LEVOTHYROXINE SODIUM 100 MCG: 100 TABLET ORAL at 05:30

## 2019-01-15 RX ADMIN — IMMUNE GLOBULIN INFUSION (HUMAN) 20 G: 100 INJECTION, SOLUTION INTRAVENOUS; SUBCUTANEOUS at 18:50

## 2019-01-15 RX ADMIN — GABAPENTIN 100 MG: 100 CAPSULE ORAL at 16:23

## 2019-01-15 RX ADMIN — ENOXAPARIN SODIUM 40 MG: 40 INJECTION SUBCUTANEOUS at 20:03

## 2019-01-15 RX ADMIN — METHYLPREDNISOLONE SODIUM SUCCINATE 60 MG: 125 INJECTION, POWDER, FOR SOLUTION INTRAMUSCULAR; INTRAVENOUS at 09:05

## 2019-01-15 RX ADMIN — ESCITALOPRAM OXALATE 10 MG: 20 TABLET, FILM COATED ORAL at 09:06

## 2019-01-15 NOTE — SIGNIFICANT NOTE
01/15/19 1343   SLP Deferred Reason   SLP Deferred Reason Patient unavailable for evaluation  (Attempted to see pt for FEES this date but pt was going down for LP, then will be on best rest, then nerve conduction testing per RN. F/u tomorrow for FEES exam. )

## 2019-01-15 NOTE — PROGRESS NOTES
"Neurology       Patient Care Team:  Gio Henderson MD as PCP - General (Adolescent Medicine)    Chief complaint weakness      Subjective .     History:  Feels arms and legs about the same, swallowing a little better.   No change in breathing    ROS: No fever, chest pain    Objective     Vital Signs   Blood pressure 155/80, pulse 81, temperature 99.2 °F (37.3 °C), temperature source Oral, resp. rate 20, height 170.2 cm (67.01\"), weight 94 kg (207 lb 3.2 oz), SpO2 93 %.    Physical Exam:              Neuro: Middle-aged white woman in no acute distress on oxygen, alert, appropriate, fluent with no dysarthria  EOMI face symmetric TML  Motor unchanged diffuse weakness of upper and lower extremities 3-4/5    Results Review:              NIF -- initially 15 with poor seal, repeated and 60; FVC 1.2  MRI thoracic and cervical spine images reviewed, no significant cord abnormality appreciated, report pending  EMG and nerve conduction consistent with severe mixed axonal demyelinating neuropathy with absent F waves, absent responses in upper extremities, decreased recruitment, markedly prolonged sural sensory latency with reduced amplitude    Assessment/Plan     Probable GBS-will obtain spinal fluid exam today, plan to initiate IVIG, monitor respiratory status.  Discussed results and further testing with patient and family at bedside .  May be at dee, but discussed may further decline.  Answered questions. Will restart dvt prophylaxis after LP.    I discussed the patients findings and my recommendations with patient, family, nursing staff and primary care team    Tere Galvez MD  01/15/19  9:47 AM    "

## 2019-01-15 NOTE — PROGRESS NOTES
[facility] Medicine Services  PROGRESS NOTE    Patient Name: Kristina Heck  : 1960  MRN: [id number]    Date of Admission: 2019  Length of Stay: 3  Primary Care Physician: Gio Henderson MD    Subjective   Subjective     CC:  F/u dyspnea    HPI:  Patient seen while on BiPAP. She reports she feels about the same. Understands she will get multiple tests today including EMG, LP. D/w neurology, plan to start IVIG today .     Review of Systems      Otherwise ROS is negative except as mentioned in the HPI.    Objective   Objective     Vital Signs:   Temp:  [98.1 °F (36.7 °C)-99.2 °F (37.3 °C)] 99.2 °F (37.3 °C)  Heart Rate:  [77-86] 81  Resp:  [18-24] 20  BP: (124-155)/(69-82) 155/80  FiO2 (%):  [50 %] 50 %     Physical Exam:  GEN- moderate resp distress, sitting in bed, BIPAP in place   HEENT- atraumatic, normocephlic, eomi  NECK- supple, trachea midline, no masses  RESP: increased effort, wob, on BIPAP, difficult to auscultate full exam due to BIPAP  CV: no murmurs, s1/s2, rrr  MSK: no edema noted, spontaneous movement of all extremities  NEURO: alert, oriented, 4/5 strength noted in all 4 extremities  SKIN: no rashes  PSYCH: appropriate mood and affect       Results Reviewed:  I have personally reviewed current lab, radiology, and data and agree.    Results from last 7 days   Lab Units  19   0718  19   1346   WBC 10*3/mm3  7.49  10.21   HEMOGLOBIN g/dL  17.1*  17.4*   HEMATOCRIT %  53.0*  52.9*   PLATELETS 10*3/mm3  203  223     Results from last 7 days   Lab Units  01/15/19   0601  19   0501  19   0718  19   1419  19   1346   SODIUM mmol/L  138  139  138   --   136   POTASSIUM mmol/L  4.2  3.4*  4.1   --   3.3*   CHLORIDE mmol/L  105  104  102   --   97*   CO2 mmol/L  25.0  30.0  29.0   --   31.0   BUN mg/dL  28*  24*  24*   --   20   CREATININE mg/dL  0.76  0.68  0.68   --   0.73   GLUCOSE mg/dL  143*  129*  141*   --   101*   CALCIUM  mg/dL  9.0  8.9  9.0   --   9.3   ALT (SGPT) U/L   --    --    --    --   32   AST (SGOT) U/L   --    --    --    --   23   TROPONIN I ng/mL   --    --    --   0.00   --      Estimated Creatinine Clearance: 96.2 mL/min (by C-G formula based on SCr of 0.76 mg/dL).    BNP   Date Value Ref Range Status   01/12/2019 9.0 0.0 - 100.0 pg/mL Final     Comment:     Results may be falsely decreased if patient taking Biotin.       Microbiology Results Abnormal     None          Imaging Results (last 24 hours)     Procedure Component Value Units Date/Time    MRI Thoracic Spine With & Without Contrast [[id number]] Collected:  01/15/19 1032     Updated:  01/15/19 1041    Narrative:       EXAMINATION: MRI THORACIC SPINE W WO CONTRAST-, MRI CERVICAL SPINE W WO  CONTRAST-      INDICATION: T/L-spine trauma, significant injury suspected, neurologic  deficits.     TECHNIQUE:  Multiplanar multisequence MRI of the cervical and thoracic  spine was performed without and with contrast.     COMPARISONS:  CT chest 01/12/2019, CT abdomen/pelvis 01/14/2019.     FINDINGS: CERVICAL SPINE: Sagittal images cover from clivus through T3  caudally.     The cervicomedullary junction is unremarkable.  The cervical cord is  normal in caliber and signal.  There is no abnormal enhancement in the  spinal canal or in the paraspinal soft tissues.  No paraspinal edema to  suggest ligamentous injury.     With regard to the marrow space, vertebral body heights are maintained.   There is maintenance of the usual lordosis without significant  spondylolisthesis.  Background marrow signal is normal.      DEGENERATIVE CHANGES ARE AS FOLLOWS:     C2-C3: No significant foraminal or spinal canal stenosis.     C3-C4: Shallow posterior disc osteophyte complex without significant  foraminal or spinal canal stenosis.     C4-C5: Shallow posterior disc osteophyte complex and right-sided  uncovertebral arthropathy. Mild right foraminal stenosis. Mild spinal  canal stenosis.      C5-C6: Prominent disc osteophyte complex and right greater than left  uncovertebral arthropathy. Moderate right foraminal stenosis. Moderate  spinal canal stenosis.     C6-C7: Prominent asymmetric disc osteophyte complex towards the right  and right greater than left uncovertebral arthropathy. Mild right  foraminal stenosis. Mild spinal canal stenosis.     C7-T1: No significant foraminal or spinal canal stenosis.     Incidental imaging of the head and cervical soft tissues is  unremarkable. Bilateral minor mastoid effusions.     THORACIC SPINE: Sagittal imaging covers from C7 through the superior  half of the L2 body caudally.     The imaged spinal cord is normal in caliber without intrinsic cord  signal abnormality identified.  There is no abnormal enhancement in the  spinal canal or in the paraspinal soft tissues. No paraspinal edema to  suggest ligamentous injury.     With regard to the marrow space, vertebral body heights are maintained.   There is maintenance of the usual kyphosis without significant  spondylolisthesis. Background marrow signal is normal.     As is commonly the case in the thoracic spine, degenerative changes are  negligible.  There is no significant foraminal or spinal canal stenosis  at any imaged thoracic level.     Incidental imaging of the thoracic soft tissues is unrevealing.  Obstructed left renal collecting system again noted.       Impression:       1. No evidence of acute osseous or ligamentous injury.  2. Moderate cervical spine degenerative change. No significant thoracic  spine degenerative change.  3. No abnormal enhancement regarding the cervical or thoracic spine.     D:  01/15/2019  E:  01/15/2019     This report was finalized on 1/15/2019 10:39 AM by Javier Connors.       MRI Cervical Spine With & Without Contrast [[id number]] Collected:  01/15/19 1032     Updated:  01/15/19 1041    Narrative:       EXAMINATION: MRI THORACIC SPINE W WO CONTRAST-, MRI CERVICAL SPINE W  WO  CONTRAST-      INDICATION: T/L-spine trauma, significant injury suspected, neurologic  deficits.     TECHNIQUE:  Multiplanar multisequence MRI of the cervical and thoracic  spine was performed without and with contrast.     COMPARISONS:  CT chest 01/12/2019, CT abdomen/pelvis 01/14/2019.     FINDINGS: CERVICAL SPINE: Sagittal images cover from clivus through T3  caudally.     The cervicomedullary junction is unremarkable.  The cervical cord is  normal in caliber and signal.  There is no abnormal enhancement in the  spinal canal or in the paraspinal soft tissues.  No paraspinal edema to  suggest ligamentous injury.     With regard to the marrow space, vertebral body heights are maintained.   There is maintenance of the usual lordosis without significant  spondylolisthesis.  Background marrow signal is normal.      DEGENERATIVE CHANGES ARE AS FOLLOWS:     C2-C3: No significant foraminal or spinal canal stenosis.     C3-C4: Shallow posterior disc osteophyte complex without significant  foraminal or spinal canal stenosis.     C4-C5: Shallow posterior disc osteophyte complex and right-sided  uncovertebral arthropathy. Mild right foraminal stenosis. Mild spinal  canal stenosis.     C5-C6: Prominent disc osteophyte complex and right greater than left  uncovertebral arthropathy. Moderate right foraminal stenosis. Moderate  spinal canal stenosis.     C6-C7: Prominent asymmetric disc osteophyte complex towards the right  and right greater than left uncovertebral arthropathy. Mild right  foraminal stenosis. Mild spinal canal stenosis.     C7-T1: No significant foraminal or spinal canal stenosis.     Incidental imaging of the head and cervical soft tissues is  unremarkable. Bilateral minor mastoid effusions.     THORACIC SPINE: Sagittal imaging covers from C7 through the superior  half of the L2 body caudally.     The imaged spinal cord is normal in caliber without intrinsic cord  signal abnormality identified.  There is no  abnormal enhancement in the  spinal canal or in the paraspinal soft tissues. No paraspinal edema to  suggest ligamentous injury.     With regard to the marrow space, vertebral body heights are maintained.   There is maintenance of the usual kyphosis without significant  spondylolisthesis. Background marrow signal is normal.     As is commonly the case in the thoracic spine, degenerative changes are  negligible.  There is no significant foraminal or spinal canal stenosis  at any imaged thoracic level.     Incidental imaging of the thoracic soft tissues is unrevealing.  Obstructed left renal collecting system again noted.       Impression:       1. No evidence of acute osseous or ligamentous injury.  2. Moderate cervical spine degenerative change. No significant thoracic  spine degenerative change.  3. No abnormal enhancement regarding the cervical or thoracic spine.     D:  01/15/2019  E:  01/15/2019     This report was finalized on 1/15/2019 10:39 AM by Javier Connors.       Fiberoptic Endo (fees) [[id number]] Resulted:  01/15/19 1002     Updated:  01/15/19 1002    CT Abdomen Pelvis With & Without Contrast [[id number]] Collected:  01/15/19 0946     Updated:  01/15/19 0946    Narrative:       EXAMINATION: CT ABDOMEN AND PELVIS W WO CONTRAST-      INDICATION: Left renal collecting system obstruction;  N28.82-Megaloureter; R09.02-Hypoxemia; J44.1-Chronic obstructive  pulmonary disease with (acute) exacerbation; R53.1-Weakness;  Z74.09-Other reduced mobility; J96.21-Acute and chronic respiratory  failure with hypoxia.     TECHNIQUE: CT scan of abdomen and pelvis performed with and without  intravenous contrast.     The radiation dose reduction device was turned on for each scan per the  ALARA (As Low as Reasonably Achievable) protocol.     COMPARISON: None.     FINDINGS: The most superior images demonstrate bibasilar airspace  disease, left greater than right.      The liver and spleen are normal.  There is no adrenal mass.  The pancreas  is normal.  There are small bilateral renal parenchymal stones. More  importantly, there is a 1 cm stone in the left ureteropelvic junction  producing moderately severe obstruction. Also there is a 1.2 cm area of  low density within the left renal pelvis. This is not calcified on  images obtained prior to contrast and may represent clot. There are also  simple cysts in both kidneys. There is no ascites, aneurysm or  retroperitoneal lymphadenopathy. There is no pelvic mass or fluid.   There is no bladder stone.       Impression:       1. There is a 1 cm stone in the left ureteropelvic junction producing  moderately severe obstruction of the left kidney. There is also a 12 mm  low density filling defect in the renal pelvis. On the unenhanced  examination this is not a calcified stone this may represent clot  related to the ureteral stone and obstruction and less likely a  noncalcified stone.   2. Lastly there is bibasilar airspace disease, left greater than right.     D:  01/15/2019  E:  01/15/2019                  I have reviewed the medications:    Current Facility-Administered Medications:   •  acetaminophen (TYLENOL) tablet 650 mg, 650 mg, Oral, Q4H PRN, Harjinder, Gena, APRN, 650 mg at 01/13/19 0641  •  aspirin chewable tablet 81 mg, 81 mg, Oral, Daily, Harjinder, Gena, APRN, 81 mg at 01/15/19 0905  •  budesonide (PULMICORT) nebulizer solution 0.5 mg, 0.5 mg, Nebulization, Daily - RT, Harjinder, Gena, APRN, 0.5 mg at 01/15/19 0938  •  doxycycline (MONODOX) capsule 100 mg, 100 mg, Oral, Q12H, Vel Leiva MD, 100 mg at 01/15/19 0905  •  enoxaparin (LOVENOX) syringe 40 mg, 40 mg, Subcutaneous, Q24H, Tere Galvez MD  •  escitalopram (LEXAPRO) tablet 10 mg, 10 mg, Oral, Daily, Harjinder, Gena, APRN, 10 mg at 01/15/19 0906  •  gabapentin (NEURONTIN) capsule 100 mg, 100 mg, Oral, TID, Mary Pelayo DO, 100 mg at 01/15/19 0905  •  guaiFENesin (MUCINEX) 12 hr tablet 600 mg, 600 mg,  Oral, Q12H, Mary Pelayo, , 600 mg at 01/15/19 0906  •  Hold medication, 1 each, Does not apply, Continuous PRN, Tere Galvez MD  •  hydrochlorothiazide (HYDRODIURIL) tablet 25 mg, 25 mg, Oral, Daily, Harjinder, Gena, APRN, 25 mg at 01/15/19 0905  •  HYDROcodone-acetaminophen (NORCO) 7.5-325 MG per tablet 1 tablet, 1 tablet, Oral, Q6H PRN, Subha Rider MD, 1 tablet at 01/15/19 0530  •  ipratropium-albuterol (DUO-NEB) nebulizer solution 3 mL, 3 mL, Nebulization, 4x Daily - RT, Harjinder, Gena, APRN, 3 mL at 01/15/19 0938  •  levothyroxine (SYNTHROID, LEVOTHROID) tablet 100 mcg, 100 mcg, Oral, Daily, Harjinder, Gena, APRN, 100 mcg at 01/15/19 0530  •  lisinopril (PRINIVIL,ZESTRIL) tablet 10 mg, 10 mg, Oral, Q24H, Vel Leiva MD, 10 mg at 01/15/19 0906  •  LORazepam (ATIVAN) injection 1 mg, 1 mg, Intravenous, Q6H PRN, Masha Caba MD, 1 mg at 01/15/19 1046  •  methylPREDNISolone sodium succinate (SOLU-Medrol) injection 60 mg, 60 mg, Intravenous, Q8H, Harjinder, Gena, APRN, 60 mg at 01/15/19 0905  •  metoprolol succinate XL (TOPROL-XL) 24 hr tablet 25 mg, 25 mg, Oral, Daily, Harjinder, Gena, APRN, 25 mg at 01/15/19 0906  •  nicotine (NICODERM CQ) 21 MG/24HR patch 1 patch, 1 patch, Transdermal, Daily PRN, Vel Leiva MD, 1 patch at 01/13/19 1209  •  Pharmacy Consult - [place], , Does not apply, Daily, Soha Jerome, [facility], Stopped at 01/13/19 1045  •  potassium chloride (MICRO-K) CR capsule 40 mEq, 40 mEq, Oral, PRN, 40 mEq at 01/14/19 1710 **OR** potassium chloride (KLOR-CON) packet 40 mEq, 40 mEq, Oral, PRN, 40 mEq at 01/14/19 2340 **OR** potassium chloride 10 mEq in 100 mL IVPB, 10 mEq, Intravenous, Q1H PRN, Gena Grande, APRN  •  [COMPLETED] Insert peripheral IV, , , Once **AND** sodium chloride 0.9 % flush 10 mL, 10 mL, Intravenous, PRN, Dequan Mims MD      Assessment/Plan   Assessment / Plan     Active Hospital Problems    Diagnosis Date Noted   • Acute on  chronic respiratory failure with hypoxia (CMS/HCC) [J96.21] 01/12/2019   • COPD with acute exacerbation (CMS/HCC) [J44.1] 01/12/2019   • Hypothyroidism (acquired) [E03.9] 01/12/2019   • Essential hypertension [I10] 01/12/2019   • Depression [F32.9] 01/12/2019   • Pinched nerve in neck [G58.8] 01/12/2019   • left renal collecting system obstruction [N13.5] 01/12/2019   • Generalized weakness [R53.1] 01/12/2019       Brief Hospital Course to date:  Mrs. Heck is a 58 year-old F with a past medical hx of COPD, depression, hypertension, active smoker, recent dx of pinched nerve in neck, and hypothyroidism who presented with exertional dyspnea and non-productive cough in the setting of recent admission at [facility] on 12/25 for bilateral pneumonia.     Acute on Chronic Hypoxemic Hypercapneic Respiratory Failure (baseline 2LNC), persistent  COPD with Acute Exacerbation  - Continue IV solumedrol, nebs, doxy to complete 7 day course   - No infiltrate or PE on CTA Chest   - BiPAP qhs and prn, Wean O2 as able, pt still on much higher O2 demand that baseline. Is there a neuromuscular process contributing to dyspnea?      Bilateral Upper and Lower extremity Numbness, onset 1/2, remains high risk due to persistence and significant impairment of ability to do ADLs  - Pt now reports dysphagia and occasional diplopia, ? Neuromuscular disease  - Obtained complete records from [facility], MRI demonstrated spinal stenosis of C5/6, neurology evaluated and did not feel presentation was c/w GBS, did recommend NSGY f/u in clinic-- please see note from 1/14 for full record summary  - neurology following here, plan for repeat MRI imaging (still pending), EMG, LP and plan for IVIG to start today  - Gabapentin for neuropathic pain  - ativan for anxiety regarding diagnostic tests today     Dysphagia  - SLP has seen, plan for FEES today     Moderate Left-sided hydronephrosis and hyroureter  - CT urogram pending  - Anticipate need for Urology  consult depending on results. Renal function preserved. No flank pain.      Hypokalemia  - replace as per protocol,     Hypertension  - BP reviewed, above goal, lisinopril added, monitor     Secondary Erythrocytosis   - due to COPD, chronic tobacco abuse    Active Tobacco Abuse   - pt declines nicotine patch at present, available prn    Hx of Nephrolithiasis   Hypothyroidism, controlled, continue home synthroid, TSH normal    DVT Prophylaxis:  SQ lovenox  Disposition: I expect the patient to be discharged home ? Pending final plan with neurology and clinical course, will likely need rehab.    Pt remains high risk due to ongoing bilateral weakness and acute respiratory failure.     CODE STATUS:   Code Status and Medical Interventions:   Ordered at: 01/12/19 4620     Level Of Support Discussed With:    Patient     Code Status:    CPR     Medical Interventions (Level of Support Prior to Arrest):    Full         Electronically signed by Masha Caba MD, 01/15/19, 10:57 AM.

## 2019-01-15 NOTE — POST-PROCEDURE NOTE
Radiology Procedure    Pre-procedure: procedure, risks discussed with patient. Patient indicated understanding and consented to procedure.     Procedure Performed: lumbar puncture     IV Sedation and/or Anesthesia:  No    Complications: none    Preliminary Findings: pending    Specimen Removed: 8cc clear, colorless CSF    Estimated Blood Loss:  0ml    Post-Procedure Diagnosis: pending    Post-Procedure Plan: encourage fluids, bed rest x 2 hours    Standard Discharge Instructions Given:yes     FLORES Dobson  01/15/19  11:54 AM

## 2019-01-15 NOTE — PLAN OF CARE
Problem: Patient Care Overview  Goal: Plan of Care Review  Outcome: Ongoing (interventions implemented as appropriate)   01/15/19 0111   Coping/Psychosocial   Plan of Care Reviewed With patient   Plan of Care Review   Progress no change   OTHER   Outcome Summary Pt. VSS. No reports of pain or discomfort. BiPAP. Resting well. Plan for MRI tongiht. Will continue to monitor.      Goal: Discharge Needs Assessment  Outcome: Ongoing (interventions implemented as appropriate)    Goal: Interprofessional Rounds/Family Conf  Outcome: Ongoing (interventions implemented as appropriate)      Problem: Fall Risk (Adult)  Goal: Absence of Fall  Outcome: Ongoing (interventions implemented as appropriate)      Problem: Wound (Includes Pressure Injury) (Adult)  Goal: Signs and Symptoms of Listed Potential Problems Will be Absent, Minimized or Managed (Wound)  Outcome: Ongoing (interventions implemented as appropriate)

## 2019-01-16 ENCOUNTER — ANCILLARY PROCEDURE (OUTPATIENT)
Dept: SPEECH THERAPY | Facility: HOSPITAL | Age: 59
End: 2019-01-16
Attending: INTERNAL MEDICINE

## 2019-01-16 ENCOUNTER — APPOINTMENT (OUTPATIENT)
Dept: GENERAL RADIOLOGY | Facility: HOSPITAL | Age: 59
End: 2019-01-16

## 2019-01-16 PROBLEM — G47.33 OSA ON CPAP: Status: ACTIVE | Noted: 2019-01-16

## 2019-01-16 PROBLEM — Z99.89 OSA ON CPAP: Status: ACTIVE | Noted: 2019-01-16

## 2019-01-16 PROBLEM — R13.10 DYSPHAGIA: Status: ACTIVE | Noted: 2019-01-16

## 2019-01-16 PROBLEM — G61.0 GUILLAIN-BARRE SYNDROME (HCC): Status: ACTIVE | Noted: 2019-01-16

## 2019-01-16 PROBLEM — Z72.0 TOBACCO ABUSE: Status: ACTIVE | Noted: 2019-01-16

## 2019-01-16 LAB
ANION GAP SERPL CALCULATED.3IONS-SCNC: 6 MMOL/L (ref 3–11)
ARTERIAL PATENCY WRIST A: ABNORMAL
ARTERIAL PATENCY WRIST A: ABNORMAL
ATMOSPHERIC PRESS: ABNORMAL MMHG
ATMOSPHERIC PRESS: ABNORMAL MMHG
BASE EXCESS BLDA CALC-SCNC: 6.7 MMOL/L (ref 0–2)
BASE EXCESS BLDA CALC-SCNC: 7.5 MMOL/L (ref 0–2)
BASOPHILS # BLD AUTO: 0 10*3/MM3 (ref 0–0.2)
BASOPHILS NFR BLD AUTO: 0 % (ref 0–1)
BDY SITE: ABNORMAL
BDY SITE: ABNORMAL
BODY TEMPERATURE: 37 C
BODY TEMPERATURE: 37 C
BUN BLD-MCNC: 28 MG/DL (ref 9–23)
BUN/CREAT SERPL: 41.8 (ref 7–25)
CALCIUM SPEC-SCNC: 8.8 MG/DL (ref 8.7–10.4)
CHLORIDE SERPL-SCNC: 101 MMOL/L (ref 99–109)
CO2 BLDA-SCNC: 33.3 MMOL/L (ref 23–27)
CO2 BLDA-SCNC: 33.9 MMOL/L (ref 23–27)
CO2 SERPL-SCNC: 29 MMOL/L (ref 20–31)
COHGB MFR BLD: 0.7 % (ref 0–2)
COHGB MFR BLD: 1.3 % (ref 0–2)
CREAT BLD-MCNC: 0.67 MG/DL (ref 0.6–1.3)
DEPRECATED RDW RBC AUTO: 48.6 FL (ref 37–54)
EOSINOPHIL # BLD AUTO: 0 10*3/MM3 (ref 0–0.3)
EOSINOPHIL NFR BLD AUTO: 0 % (ref 0–3)
EPAP: 0
ERYTHROCYTE [DISTWIDTH] IN BLOOD BY AUTOMATED COUNT: 14.2 % (ref 11.3–14.5)
GFR SERPL CREATININE-BSD FRML MDRD: 91 ML/MIN/1.73
GLUCOSE BLD-MCNC: 117 MG/DL (ref 70–100)
GLUCOSE BLDC GLUCOMTR-MCNC: 120 MG/DL (ref 70–130)
HCO3 BLDA-SCNC: 32 MMOL/L (ref 20–26)
HCO3 BLDA-SCNC: 32.4 MMOL/L (ref 20–26)
HCT VFR BLD AUTO: 52.4 % (ref 34.5–44)
HCT VFR BLD CALC: 52.7 %
HCT VFR BLD CALC: 53.4 %
HGB BLD-MCNC: 16.7 G/DL (ref 11.5–15.5)
HGB BLDA-MCNC: 17.2 G/DL (ref 14–18)
HGB BLDA-MCNC: 17.4 G/DL (ref 14–18)
HOROWITZ INDEX BLD+IHG-RTO: 100 %
HOROWITZ INDEX BLD+IHG-RTO: 52 %
IGA SERPL-MCNC: 169 MG/DL (ref 87–352)
IGG SERPL-MCNC: 936 MG/DL (ref 700–1600)
IGM SERPL-MCNC: 159 MG/DL (ref 26–217)
IMM GRANULOCYTES # BLD AUTO: 0.01 10*3/MM3 (ref 0–0.03)
IMM GRANULOCYTES NFR BLD AUTO: 0.1 % (ref 0–0.6)
IPAP: 0
LYMPHOCYTES # BLD AUTO: 0.43 10*3/MM3 (ref 0.6–4.8)
LYMPHOCYTES NFR BLD AUTO: 5.1 % (ref 24–44)
MCH RBC QN AUTO: 29.7 PG (ref 27–31)
MCHC RBC AUTO-ENTMCNC: 31.9 G/DL (ref 32–36)
MCV RBC AUTO: 93.2 FL (ref 80–99)
METHGB BLD QL: 0.7 % (ref 0–1.5)
METHGB BLD QL: 0.8 % (ref 0–1.5)
MODALITY: ABNORMAL
MODALITY: ABNORMAL
MONOCYTES # BLD AUTO: 0.37 10*3/MM3 (ref 0–1)
MONOCYTES NFR BLD AUTO: 4.4 % (ref 0–12)
NEUTROPHILS # BLD AUTO: 7.63 10*3/MM3 (ref 1.5–8.3)
NEUTROPHILS NFR BLD AUTO: 90.5 % (ref 41–71)
NOTE: ABNORMAL
NOTE: ABNORMAL
OXYHGB MFR BLDV: 90.1 % (ref 94–99)
OXYHGB MFR BLDV: 93.1 % (ref 94–99)
PAW @ PEAK INSP FLOW SETTING VENT: 0 CMH2O
PCO2 BLDA: 42.6 MM HG (ref 35–45)
PCO2 BLDA: 48.2 MM HG (ref 35–45)
PCO2 TEMP ADJ BLD: 42.6 MM HG (ref 35–45)
PCO2 TEMP ADJ BLD: 48.2 MM HG (ref 35–45)
PH BLDA: 7.44 PH UNITS (ref 7.35–7.45)
PH BLDA: 7.48 PH UNITS (ref 7.35–7.45)
PH, TEMP CORRECTED: 7.44 PH UNITS
PH, TEMP CORRECTED: 7.48 PH UNITS
PLATELET # BLD AUTO: 194 10*3/MM3 (ref 150–450)
PMV BLD AUTO: 11 FL (ref 6–12)
PO2 BLDA: 57 MM HG (ref 83–108)
PO2 BLDA: 69.4 MM HG (ref 83–108)
PO2 TEMP ADJ BLD: 57 MM HG (ref 83–108)
PO2 TEMP ADJ BLD: 69.4 MM HG (ref 83–108)
POTASSIUM BLD-SCNC: 3.7 MMOL/L (ref 3.5–5.5)
RBC # BLD AUTO: 5.62 10*6/MM3 (ref 3.89–5.14)
SODIUM BLD-SCNC: 136 MMOL/L (ref 132–146)
TOTAL RATE: 0 BREATHS/MINUTE
VENTILATOR MODE: ABNORMAL
WBC NRBC COR # BLD: 8.43 10*3/MM3 (ref 3.5–10.8)

## 2019-01-16 PROCEDURE — 94640 AIRWAY INHALATION TREATMENT: CPT

## 2019-01-16 PROCEDURE — 25010000002 METHYLPREDNISOLONE PER 40 MG: Performed by: INTERNAL MEDICINE

## 2019-01-16 PROCEDURE — 82805 BLOOD GASES W/O2 SATURATION: CPT

## 2019-01-16 PROCEDURE — 85025 COMPLETE CBC W/AUTO DIFF WBC: CPT | Performed by: INTERNAL MEDICINE

## 2019-01-16 PROCEDURE — 94799 UNLISTED PULMONARY SVC/PX: CPT

## 2019-01-16 PROCEDURE — 25010000002 IMMUNE GLOBULIN (HUMAN) 20 GM/200ML SOLUTION: Performed by: PSYCHIATRY & NEUROLOGY

## 2019-01-16 PROCEDURE — 25010000002 PIPERACILLIN SOD-TAZOBACTAM PER 1 G: Performed by: INTERNAL MEDICINE

## 2019-01-16 PROCEDURE — 25010000002 ENOXAPARIN PER 10 MG: Performed by: PSYCHIATRY & NEUROLOGY

## 2019-01-16 PROCEDURE — 94760 N-INVAS EAR/PLS OXIMETRY 1: CPT

## 2019-01-16 PROCEDURE — 25010000002 LORAZEPAM PER 2 MG: Performed by: INTERNAL MEDICINE

## 2019-01-16 PROCEDURE — 99232 SBSQ HOSP IP/OBS MODERATE 35: CPT | Performed by: PSYCHIATRY & NEUROLOGY

## 2019-01-16 PROCEDURE — 71045 X-RAY EXAM CHEST 1 VIEW: CPT

## 2019-01-16 PROCEDURE — 99291 CRITICAL CARE FIRST HOUR: CPT | Performed by: INTERNAL MEDICINE

## 2019-01-16 PROCEDURE — 92612 ENDOSCOPY SWALLOW (FEES) VID: CPT

## 2019-01-16 PROCEDURE — 82962 GLUCOSE BLOOD TEST: CPT

## 2019-01-16 PROCEDURE — 36600 WITHDRAWAL OF ARTERIAL BLOOD: CPT

## 2019-01-16 PROCEDURE — 92610 EVALUATE SWALLOWING FUNCTION: CPT

## 2019-01-16 PROCEDURE — 25010000002 METHYLPREDNISOLONE PER 125 MG: Performed by: NURSE PRACTITIONER

## 2019-01-16 PROCEDURE — 80048 BASIC METABOLIC PNL TOTAL CA: CPT | Performed by: INTERNAL MEDICINE

## 2019-01-16 PROCEDURE — 30233S1 TRANSFUSION OF NONAUTOLOGOUS GLOBULIN INTO PERIPHERAL VEIN, PERCUTANEOUS APPROACH: ICD-10-PCS | Performed by: INTERNAL MEDICINE

## 2019-01-16 PROCEDURE — 94660 CPAP INITIATION&MGMT: CPT

## 2019-01-16 RX ORDER — METHYLPREDNISOLONE SODIUM SUCCINATE 40 MG/ML
30 INJECTION, POWDER, LYOPHILIZED, FOR SOLUTION INTRAMUSCULAR; INTRAVENOUS EVERY 12 HOURS
Status: DISCONTINUED | OUTPATIENT
Start: 2019-01-16 | End: 2019-01-19

## 2019-01-16 RX ORDER — IPRATROPIUM BROMIDE AND ALBUTEROL SULFATE 2.5; .5 MG/3ML; MG/3ML
3 SOLUTION RESPIRATORY (INHALATION) EVERY 4 HOURS PRN
Status: DISCONTINUED | OUTPATIENT
Start: 2019-01-16 | End: 2019-01-16

## 2019-01-16 RX ORDER — OXYCODONE HYDROCHLORIDE AND ACETAMINOPHEN 5; 325 MG/1; MG/1
1 TABLET ORAL EVERY 6 HOURS PRN
Status: DISCONTINUED | OUTPATIENT
Start: 2019-01-16 | End: 2019-01-17

## 2019-01-16 RX ADMIN — TAZOBACTAM SODIUM AND PIPERACILLIN SODIUM 4.5 G: 500; 4 INJECTION, SOLUTION INTRAVENOUS at 14:27

## 2019-01-16 RX ADMIN — GABAPENTIN 100 MG: 100 CAPSULE ORAL at 21:01

## 2019-01-16 RX ADMIN — LEVOTHYROXINE SODIUM 100 MCG: 100 TABLET ORAL at 05:17

## 2019-01-16 RX ADMIN — IPRATROPIUM BROMIDE AND ALBUTEROL SULFATE 3 ML: 2.5; .5 SOLUTION RESPIRATORY (INHALATION) at 12:20

## 2019-01-16 RX ADMIN — ENOXAPARIN SODIUM 40 MG: 40 INJECTION SUBCUTANEOUS at 21:01

## 2019-01-16 RX ADMIN — TAZOBACTAM SODIUM AND PIPERACILLIN SODIUM 4.5 G: 500; 4 INJECTION, SOLUTION INTRAVENOUS at 22:31

## 2019-01-16 RX ADMIN — IMMUNE GLOBULIN INFUSION (HUMAN) 20 G: 100 INJECTION, SOLUTION INTRAVENOUS; SUBCUTANEOUS at 16:10

## 2019-01-16 RX ADMIN — METOPROLOL SUCCINATE 25 MG: 25 TABLET, EXTENDED RELEASE ORAL at 11:39

## 2019-01-16 RX ADMIN — LORAZEPAM 1 MG: 2 INJECTION INTRAMUSCULAR; INTRAVENOUS at 21:00

## 2019-01-16 RX ADMIN — METHYLPREDNISOLONE SODIUM SUCCINATE 30 MG: 40 INJECTION, POWDER, FOR SOLUTION INTRAMUSCULAR; INTRAVENOUS at 21:00

## 2019-01-16 RX ADMIN — LISINOPRIL 10 MG: 10 TABLET ORAL at 11:39

## 2019-01-16 RX ADMIN — IPRATROPIUM BROMIDE AND ALBUTEROL SULFATE 3 ML: 2.5; .5 SOLUTION RESPIRATORY (INHALATION) at 07:22

## 2019-01-16 RX ADMIN — DOXYCYCLINE 100 MG: 100 INJECTION, POWDER, LYOPHILIZED, FOR SOLUTION INTRAVENOUS at 21:02

## 2019-01-16 RX ADMIN — ESCITALOPRAM OXALATE 10 MG: 20 TABLET, FILM COATED ORAL at 11:40

## 2019-01-16 RX ADMIN — OXYCODONE HYDROCHLORIDE AND ACETAMINOPHEN 1 TABLET: 5; 325 TABLET ORAL at 21:00

## 2019-01-16 RX ADMIN — HYDROCHLOROTHIAZIDE 25 MG: 25 TABLET ORAL at 11:40

## 2019-01-16 RX ADMIN — DOXYCYCLINE 100 MG: 100 INJECTION, POWDER, LYOPHILIZED, FOR SOLUTION INTRAVENOUS at 08:16

## 2019-01-16 RX ADMIN — GABAPENTIN 100 MG: 100 CAPSULE ORAL at 16:02

## 2019-01-16 RX ADMIN — GABAPENTIN 100 MG: 100 CAPSULE ORAL at 11:38

## 2019-01-16 RX ADMIN — IMMUNE GLOBULIN INFUSION (HUMAN) 20 G: 100 INJECTION, SOLUTION INTRAVENOUS; SUBCUTANEOUS at 16:02

## 2019-01-16 RX ADMIN — ASPIRIN 81 MG CHEWABLE TABLET 81 MG: 81 TABLET CHEWABLE at 11:37

## 2019-01-16 RX ADMIN — NICOTINE 1 PATCH: 21 PATCH, EXTENDED RELEASE TRANSDERMAL at 11:28

## 2019-01-16 RX ADMIN — ACETAMINOPHEN 650 MG: 325 TABLET ORAL at 11:41

## 2019-01-16 RX ADMIN — METHYLPREDNISOLONE SODIUM SUCCINATE 60 MG: 125 INJECTION, POWDER, FOR SOLUTION INTRAMUSCULAR; INTRAVENOUS at 01:13

## 2019-01-16 RX ADMIN — IPRATROPIUM BROMIDE AND ALBUTEROL SULFATE 3 ML: 2.5; .5 SOLUTION RESPIRATORY (INHALATION) at 15:35

## 2019-01-16 RX ADMIN — TAZOBACTAM SODIUM AND PIPERACILLIN SODIUM 4.5 G: 500; 4 INJECTION, SOLUTION INTRAVENOUS at 08:20

## 2019-01-16 RX ADMIN — METHYLPREDNISOLONE SODIUM SUCCINATE 30 MG: 40 INJECTION, POWDER, FOR SOLUTION INTRAMUSCULAR; INTRAVENOUS at 08:29

## 2019-01-16 RX ADMIN — LORAZEPAM 1 MG: 2 INJECTION INTRAMUSCULAR; INTRAVENOUS at 03:35

## 2019-01-16 NOTE — SIGNIFICANT NOTE
01/16/19 0831   SLP Deferred Reason   SLP Deferred Reason Routine  (Pt scheduled for FEES today. Spoke w/ RN this AM. Pt not appropriate for FEES this AM 2' oxygenation requirements. RN requested SLP check back this PM. SLP will f/u)

## 2019-01-16 NOTE — PROGRESS NOTES
"INTENSIVIST   PROGRESS NOTE     Hospital:  LOS: 4 days      S     Ms. Kristina Heck, 58 y.o. female is followed for:      Guillain-M[place] syndrome    Acute on chronic respiratory failure       As an Intensivist, we provide an integrated approach to the ICU patient and family, medical management of comorbid conditions, including but not limited to electrolytes, glycemic control, organ dysfunction, lead interdisciplinary rounds and coordinate the care with all other services, including those from other specialists.     Interval History:    Kristina Heck is a 58 y.o. female was transferred from the floor for worsening hypoxemia and tachypnea.    12/31- Developed weakness/numbness in her hands with development of numbness in her feet on 1/1.  She has had worsening dyspnea, requiring her to increase her home O2 to 4L NC.  She had progressively rising weakness to the point where she was not able to walk since the 10th.      1/10 - Evaluated at [facility] ED.  MRI performed and it was felt patient had a \"pinched nerve\", discharged and told to follow up w/ outpatient Neurology.  At that time NIF & VC were reportedly normal.       1/12 - Presented to our ED for worsening SOA & cough.  Admitted by the Hospitalist.  CTA ruled out PE.       1/13 - continued to be dyspneic w/ productive cough.  Extremity weakness persisted.  Doxycycline started.    1/14 -urinary retention of 1100 mls.  Evaluated by Dr. Galvez w/ Neurology.  Placed on thickened liquid diet.  Respiratory status improved but still requiring HFNC.  Reported diplopia.         1/15-  LP performed (glucose 95, protein 66).  EMG results c/w GBS.  IVIG initiated.  FEES planned for next day.  NIF initially 15, 60 on repeat.  FVC 1.2.      1/16 - Patient was on HFNC @ 45% FiO2 when she had sudden hypoxia (SpO2 82%) requiring BIPAP w/ 100%.      Dr. Husain called to assume her care and admit her to the ICU.    Family is in her way.    She is tolerating BiPAP, with improvement in " her oxygenation, but remains tachypneic. Sputum is brown in color.     The patient's relevant past medical, surgical and social history were reviewed and updated in Epic as appropriate.     ROS:   Constitutional: Negative for fever.   Respiratory: Dyspnea.   Cardiovascular: Negative for chest pain.   Gastrointestinal: Negative for  nausea, vomiting and diarrhea.        O     Vitals:  Temp: 99.7 °F (37.6 °C) (01/16/19 0615) Temp  Min: 99.3 °F (37.4 °C)  Max: 99.7 °F (37.6 °C)   BP: 146/84 (01/16/19 0630) BP  Min: 145/88  Max: 155/81   Pulse: 86 (01/16/19 0630) Pulse  Min: 60  Max: 92   Resp: (!) 30 (01/16/19 0615) Resp  Min: 18  Max: 30   SpO2: 95 % (01/16/19 0630) SpO2  Min: 92 %  Max: 98 %   Device: NPPV/NIV (01/16/19 0623)    Flow Rate: 30 (01/16/19 0200) Flow (L/min)  Min: 30  Max: 40     Intake/Ouptut 24 hrs (7:00AM - 6:59 AM)  Intake/Output       01/15/19 0700 - 01/16/19 0659    Intake (ml) --    Output (ml) 2200    Net (ml) -2200    Last Weight  96 kg (211 lb 10.3 oz)           BiPAP  Settings: Observed:   Mode: BiPAP (01/16/19 0349)         Resp Rate (Set): 10 (01/16/19 0349) Resp Rate (Observed) Vent: 25 (01/16/19 0349)   FiO2 (%): (S) 100 % (01/16/19 0349)    PEEP/CPAP (cm H2O): 8 cm H20 (01/12/19 1912)      Minute Ventilation (L/min) (Obs): 15 L/min (01/15/19 0825)          Physical Examination  Telemetry:  Rhythm: (P) normal sinus rhythm (01/16/19 0606)  Atrial Rhythm: atrial fibrillation (01/14/19 0800)      Cardiovascular: Normal rate, regular and rhythm. Normal heart sounds.  No murmurs, gallop or rub.   Respiratory: No respiratory distress. Normal respiratory effort.  Rhonchi.    Abdominal:  Soft. No masses. Non-tender. No distension. No HSM.   Extremities: No digital cyanosis. No clubbing.  No edema.   Neurological:   Alert, but tired and weak.  Best Eye Response: 3-->(E3) to speech (01/16/19 0606)  Best Motor Response: 6-->(M6) obeys commands (01/16/19 0606)  Best Verbal Response: 5-->(V5) oriented  (01/16/19 0606)  Diane Coma Scale Score: 14 (01/16/19 0606)   Lines/Drains/Airways: Peripheral IV(s)  Franco     Hematology:  Results from last 7 days   Lab Units 01/16/19  0351 01/15/19  1529 01/13/19  0718   WBC 10*3/mm3 8.43 10.18 7.49   HEMOGLOBIN g/dL 16.7* 16.8* 17.1*   MCV fL 93.2 92.9 93.0   PLATELETS 10*3/mm3 194 212 203       Chemistry:  Estimated Creatinine Clearance: 110.3 mL/min (by C-G formula based on SCr of 0.67 mg/dL).    Results from last 7 days   Lab Units 01/16/19  0351 01/15/19  0601 01/14/19  0501   SODIUM mmol/L 136 138 139   POTASSIUM mmol/L 3.7 4.2 3.4*   CHLORIDE mmol/L 101 105 104   CO2 mmol/L 29.0 25.0 30.0   ANION GAP mmol/L 6.0 8.0 5.0   GLUCOSE mg/dL 117* 143* 129*   CALCIUM mg/dL 8.8 9.0 8.9     Results from last 7 days   Lab Units 01/16/19  0351 01/15/19  0601 01/14/19  0501   BUN mg/dL 28* 28* 24*   CREATININE mg/dL 0.67 0.76 0.68     Results from last 7 days   Lab Units 01/13/19  0718   MAGNESIUM mg/dL 2.1       Hepatic:  Results from last 7 days   Lab Units 01/12/19  1346   ALK PHOS U/L 104*   BILIRUBIN mg/dL 1.3*   ALT (SGPT) U/L 32   AST (SGOT) U/L 23   ALBUMIN g/dL 4.10       Arterial Blood Gases:  Results from last 7 days   Lab Units 01/16/19  0413 01/12/19  1409   PH, ARTERIAL pH units 7.437 7.451*   PCO2, ARTERIAL mm Hg 48.2* 48.9*   PO2 ART mm Hg 69.4* 59.4*   FIO2 % 100 36     CSF    Chemistry:  Results from last 7 days   Lab Units 01/15/19  1207   PROTEIN, TOTAL (CSF) mg/dL 66.0*   GLUCOSE CSF mg/dL 95*         Images:  Ct Abdomen Pelvis With & Without Contrast    Result Date: 1/15/2019  1. There is a 1 cm stone in the left ureteropelvic junction producing moderately severe obstruction of the left kidney. There is also a 12 mm low density filling defect in the renal pelvis. On the unenhanced examination this is not a calcified stone this may represent clot related to the ureteral stone and obstruction and less likely a noncalcified stone. 2. Lastly there is bibasilar  airspace disease, left greater than right.  D:  01/15/2019 E:  01/15/2019  This report was finalized on 1/15/2019 1:10 PM by Dr. Cyril Lanza MD.      Mri Cervical Spine With & Without Contrast    Result Date: 1/15/2019  1. No evidence of acute osseous or ligamentous injury. 2. Moderate cervical spine degenerative change. No significant thoracic spine degenerative change. 3. No abnormal enhancement regarding the cervical or thoracic spine.  D:  01/15/2019 E:  01/15/2019  This report was finalized on 1/15/2019 10:39 AM by Javier Connors.      Mri Thoracic Spine With & Without Contrast    Result Date: 1/15/2019  1. No evidence of acute osseous or ligamentous injury. 2. Moderate cervical spine degenerative change. No significant thoracic spine degenerative change. 3. No abnormal enhancement regarding the cervical or thoracic spine.  D:  01/15/2019 E:  01/15/2019  This report was finalized on 1/15/2019 10:39 AM by Javier Connors.      Ir Lumbar Puncture Diag/thera    Result Date: 1/15/2019  Successful fluoroscopic guided lumbar puncture as above with no immediate complications.    This report was finalized on 1/15/2019 3:22 PM by Javier Connors.      Results: Reviewed.  I reviewed the patient's new laboratory and imaging results.  I independently reviewed the patient's new images.    Medications: Reviewed.    Assessment/Plan   A / P         58 y.o.female, admitted on 1/12/2019 with Acute on chronic respiratory failure with hypoxia (CMS/HCC) [J96.21]:     1. Guillain-Barré Syndrome  1. LP (1/15/19) with an elevated protein and normal WBC  2. On IVIG per Neurology  3. VC is reduced but NIF is adequate.    Respiratory Mechanics Threshold   Negative Inspiratory Force (cm H2O): 50 (01/16/19 0646) NIF < -25 - -30 cm H2O   Vital Capacity (mL): 1.1 (01/16/19 0646) VC < 15-20 mL/kg       2. Respiratory failure, type 2, with worsening P/F ratio  1. May require intubation and mechancal ventilation  3. COPD and JURGEN per history  1. Tobacco  use  4. Dysphagia  5. Hypothyroidism on Rx  6. HTN on  b-blockers and ACEI started 1/15/19  7. Depression  8. Antibiotics: DOXYcycline   9. Glucose: At risk for DM (pre-diabetes) based on her HbA1c. [HbA1C 5.7%-6.4%, eA-139 mg/dL].            Lab Results   Lab Value Date/Time    HGBA1C 6.40 (H) 2019 0718       Diet: Diet Dysphagia; IV - Mechanical Soft No Mixed Consistencies; Nectar / Syrup Thick; Cardiac   Advance Directives: Code Status and Medical Interventions:   Ordered at: 19 6977     Level Of Support Discussed With:    Patient     Code Status:    CPR     Medical Interventions (Level of Support Prior to Arrest):    Full          Assessment / Plan:    1. Awaiting for family. May need intubation and mechanical ventilation  2. Follow mechanics  3. On high dose of steroids: SM 60 mg q8h, apparently for COPD. We will decrease the dose to 30 mg q 12 h.  4. Start Piperacillin-Tazobactam      Plan of care and goals reviewed during interdisciplinary rounds.  Level of Risk is High due to:  illness with threat to life or bodily function.   I discussed the patient's findings and my recommendations with patient and nursing staff    I have personally seen, interviewed and examined the patient and verified all the key components of the history, physical examination, assessment and plan with СВЕТЛАНА Yin and reviewed the note, which reflects my changes and contributions.    Time: was greater than 40 minutes.    (This excludes time spent performing separately reportable procedures and services).  Patient was at high risk of imminent or life-threatening deterioration in her condition due to Respiratory failure.     Miah Odonnell MD, FACP, FCCP, CNSC  Intensive Care Medicine, Nutrition Support and Pulmonary Medicine

## 2019-01-16 NOTE — PLAN OF CARE
Problem: Patient Care Overview  Goal: Plan of Care Review  Outcome: Ongoing (interventions implemented as appropriate)   01/16/19 7895   Coping/Psychosocial   Plan of Care Reviewed With patient   SLP FEES evaluation completed. Will continue to address dysphagia. Please see note for further details and recommendations.

## 2019-01-16 NOTE — PROGRESS NOTES
Continued Stay Note  [facility]     Patient Name: Kristina Heck  MRN: [id number]  Today's Date: 1/16/2019    Admit Date: 1/12/2019    Discharge Plan     Row Name 01/16/19 1437       Plan    Plan  Ongoing    Plan Comments  Patient now in ICU on HFNC.  Difficulty swallowing, speech at bedside, unable to speak with patiet at this time.  CM will continue to follow for discharge needs.        Discharge Codes    No documentation.       Expected Discharge Date and Time     Expected Discharge Date Expected Discharge Time    Jan 23, 2019             Xuan Echols RN

## 2019-01-16 NOTE — PROGRESS NOTES
"                  Clinical Nutrition     Nutrition Support Assessment  Reason for Visit:   MDR, nutrition risk factors per MDR/EMR      Patient Name: Kristina Heck  YOB: 1960  MRN: [id number]  Date of Encounter: 01/16/19 11:15 AM  Admission date: 1/12/2019      Nutrition Assessment   Assessment       Admission diagnosis  Guillain-[place]vid syndrome (CMS/HCC)  Acute on chronic respiratory failure with hypoxia (2L @ baseline)    Additional applicable diagnosis/conditions/procedures  BL upper extremity numbness   Dysphagia  (1/14) Bedside evaluation- mechanical soft with no mixed consistencies, nectar thick liquids  (1/15) S/p FEES (recs-Dys IV with NTL)  (1/16) ICU transfer for worsening hypoxia and tachycardia     Applicable PMH:  COPD with acute exacerbation (CMS/HCC)  Hypothyroidism (acquired)  Essential hypertension  Depression  Pinched nerve in neck  left renal collecting system obstruction  Tobacco abuse  JURGEN on CPAP      Reported/Observed/Food/Nutrition Related History:         Pt on HFNC at time of RD visit. Per Intensivist, pt may require mechanical ventilation. Pts family in room, they state pt has not eaten well since before cinthya when he first became ill, (they states eating very little)  they state pt has lost weight during this time. They state pt has not eaten much since admission. They include pt has no food allergies or intolerances.      RD spoke to pts RN later in the day who stated pt doing okay on HFNC, stated FEES pending today.       Anthropometrics     Height: 170.2 cm (67.01\")  Last filed wt: Weight: 96 kg (211 lb 10.3 oz) (01/16/19 0615)  Weight Method: Bed scale    BMI: BMI (Calculated): 33  Overweight: 25.0-29.9kg/m2     Ideal Body Weight (IBW) (kg): 61.88      UBW: 227lb  Wt change: 16lb x 4 weeks (7% loss) x 4 weeks     Labs reviewed     Results from last 7 days   Lab Units 01/16/19  0351 01/15/19  0601 01/14/19  0501 01/13/19  0718 01/12/19  1346   GLUCOSE mg/dL 117* 143* " 129* 141* 101*   BUN mg/dL 28* 28* 24* 24* 20   CREATININE mg/dL 0.67 0.76 0.68 0.68 0.73   SODIUM mmol/L 136 138 139 138 136   CHLORIDE mmol/L 101 105 104 102 97*   POTASSIUM mmol/L 3.7 4.2 3.4* 4.1 3.3*   MAGNESIUM mg/dL  --   --   --  2.1  --    ALT (SGPT) U/L  --   --   --   --  32     Results from last 7 days   Lab Units 01/12/19  1346   ALBUMIN g/dL 4.10           Lab Results   Lab Value Date/Time    HGBA1C 6.40 (H) 01/13/2019 0718         Medications reviewed   Pertinent:  Solumedrol, ABX        Nutrition Focused Physical Exam Findings      Unable to perform exam due to: Potential for patient discomfort-Resp status-not appropriate for NFPE at time of RD visit.     Needs Assessment -completed 1/16       Height used 170.2cm   Weight used 96kg    IBW: 61.4kg     Estimated need Method/Equation used Result    Energy/Calorie need   ~1400kcals/d   14kcals/kg actbw   1344kcals     MSJ 1578kcals    Protein   123g/day   2g/kg IBW          Current Nutrition Prescription     PO: Diet Dysphagia; IV - Mechanical Soft No Mixed Consistencies; Nectar / Syrup Thick; Cardiac             Nutrition Diagnosis     1/16  Problem Inadequate oral intake    Etiology Clinical status, Swallow fxn, respiratory decline   Signs/Symptoms Minimal PO intake x 4 weeks      Problem Malnutrition -Severe Acute Illness Malnutrition    Etiology Poor PO intake, unintentional wt loss   Signs/Symptoms 7% weight loss x 4 weeks, minimal PO intake x 4 weeks per family report       Nutrition Intervention   1.  Follow treatment progress, Care plan reviewed   2. If unable to start PO diet or to meet needs via PO recommend initiating Enteral nutrition regimen via NDT. Peptamen VHP @20mL/hr, increase by 20mL/hr Q8hrs as tolerated to goal rate of 70mL/hr. Free water @10mL/hr.        At Target Goal Volume     % Est needs   Volume  1400ml     Energy/kcals 1400kcals 100%   Protein 129g pro 105%   Fiber 5.6g         Fluid via EN 1176mL    Total Fluid  1376mL     Meets 100% RDI no    20hr goal volume used based on anticipated interruptions in EN delivery/administration in ICU patient.     3. RN holding PO trays. NPO diet entered until FEES completed.   4. MSA completed, attached separately for MD attestation     Goal:   General: Nutrition support treatment  Additional goals: Establish swallow function       Monitoring/Evaluation:   Per protocol, EN delivery/tolerance, Weight      Will Continue to follow per protocol      Angy Thurston RDN, LD  Time Spent: 45min

## 2019-01-16 NOTE — PLAN OF CARE
Problem: Patient Care Overview  Goal: Plan of Care Review  Outcome: Ongoing (interventions implemented as appropriate)   01/16/19 0148   Coping/Psychosocial   Plan of Care Reviewed With patient   Plan of Care Review   Progress no change   OTHER   Outcome Summary Pt. VSS. PRN pain medication administered. Resting well. Will continue to monitor.      Goal: Discharge Needs Assessment  Outcome: Ongoing (interventions implemented as appropriate)    Goal: Interprofessional Rounds/Family Conf  Outcome: Ongoing (interventions implemented as appropriate)      Problem: Fall Risk (Adult)  Goal: Identify Related Risk Factors and Signs and Symptoms  Outcome: Ongoing (interventions implemented as appropriate)    Goal: Absence of Fall  Outcome: Ongoing (interventions implemented as appropriate)      Problem: Wound (Includes Pressure Injury) (Adult)  Goal: Signs and Symptoms of Listed Potential Problems Will be Absent, Minimized or Managed (Wound)  Outcome: Ongoing (interventions implemented as appropriate)

## 2019-01-16 NOTE — PROGRESS NOTES
"Neurology       Patient Care Team:  Gio Henderson MD as PCP - General (Adolescent Medicine)    Chief complaint weakness      Subjective .     History:  Transferred to ICU earlier this morning due to acute decrease in oxygen saturation.  Now on BiPAP.  Patient feels arms and legs may be slightly stronger.  Nurse notes she has no gag although has reasonable cough.  Speech eval pending.  Continued leg pain and cramps since admission.  Status post IVIG ×1-well-tolerated.    ROS: No fever or headache.    Objective     Vital Signs   Blood pressure 127/77, pulse 90, temperature 98.5 °F (36.9 °C), temperature source Axillary, resp. rate (!) 29, height 170.2 cm (67.01\"), weight 96 kg (211 lb 10.3 oz), SpO2 92 %.    Physical Exam:              Neuro: Middle-aged white woman lying in ICU bed in no acute distress although on BiPAP.  Alert, not aphasic, appropriate.  Pupils 2.5 mm reactive, EOMI  Motor: 3-4/5-stable, upper extremities possibly slightly improved    Results Review:              ABG had 04 13, PCO2 48, PO2 69, bicarbonate 32  BMP and CBC reviewed    Assessment/Plan     Guillain-Barré syndrome-CSF, EMG/nerve conduction studies and clinical picture all consistent.  Hopefully near dee.  Has received first of 5 IVIG treatments.  Respiratory status borderline, patient now in ICU-appreciate intensivist.  Agree may need NG.  Discussed with patient.  Treat leg pain as able, with caution regarding respiratory status, discussed with patient and nurse.    I discussed the patients findings and my recommendations with patient and nursing staff    Tere Galvez MD  01/16/19  9:42 AM    "

## 2019-01-16 NOTE — SIGNIFICANT NOTE
RN called reporting patient hypoxic w/ increased respiratory effort. Pt initially on HFNC @ 45% oxygen throughout the shift. This morning patient was asleep when she had a desaturation to 82%. She was placed on BiPap and is currently requiring 100% oxygen. /77, HR 60%. Patient is alert and awake. On exam patient w/ expiratory wheezing throughout. CXR, ABG and duo-nebs ordered. Awaiting results currently. Dr. Husain aware of patient decline, at bedside to evaluate also. Will continue to monitor.

## 2019-01-16 NOTE — PROCEDURES
Critical Care  Performed by: Emani Husain MD  Authorized by: Emani Husain MD   Total critical care time: 45 minutes  Critical care time was exclusive of separately billable procedures and treating other patients.  Critical care was necessary to treat or prevent imminent or life-threatening deterioration of the following conditions: respiratory failure.  Critical care was time spent personally by me on the following activities: blood draw for specimens, development of treatment plan with patient or surrogate, discussions with consultants, evaluation of patient's response to treatment, examination of patient, ordering and performing treatments and interventions, ordering and review of radiographic studies, pulse oximetry, re-evaluation of patient's condition and review of old charts.  Comments:   Called for pt w/ increased oxygen requirement; was found to have sats in mid 80's.  Had been on high flow at 45% through most of day.  Was placed on 100% bipap to improve sats to low 90's.  Pt somewhat more tachypneic w/ wheeze on exam.  ABG w/ low PO2 on 100%.  CXR still not done.  Pt remains alert but slow to answer questions.  Current thought is that pt likely has GBS.  Given likelihood of progression at this point have discussed with intensivist and will transfer to unit.  IVIG initiated 1/15.  Neurology following.

## 2019-01-16 NOTE — DISCHARGE INSTR - DIET
MBS/VFSS/FEES   1/16/19    Reason for Referral  Patient was referred for a FEES to assess the efficiency of his/her swallow function, rule out aspiration and make recommendations regarding safe dietary consistencies, effective compensatory strategies, and safe eating environment.        Referring Physician: ISMAEL Pelayo DO          Recommendations/Treatment            Risks/Benefits Reviewed: risks/benefits explained, patient, agreed to eval  Nasal Entry: right:  Special Considerations: Scope #918    SLP Swallowing Diagnosis: mild, pharyngeal dysfunction  Functional Impact: risk of aspiration/pneumonia  Rehab Potential/Prognosis, Swallowing: good, to achieve stated therapy goals  Swallow Criteria for Skilled Therapeutic Interventions Met: demonstrates skilled criteria    Therapy Frequency (Swallow): 3 days per week  Predicted Duration Therapy Intervention (Days): until discharge  SLP Diet Recommendation: mechanical soft with no mixed consistencies, thin liquids, other (see comments)(via small single cup sips)  Recommended Diagnostics: FEES  Recommended Precautions and Strategies: upright posture during/after eating, small bites of food and sips of liquid, no straw, other (see comments)(STRICT fatigue precautions; 5 small meals/day as possible)  SLP Rec. for Method of Medication Administration: meds whole, meds crushed, with pudding or applesauce, as tolerated  Monitor for Signs of Aspiration: yes, notify SLP if any concerns  Anticipated Dischage Disposition: unknown, anticipate therapy at next level of care      Oral Preparation/ Oral Phase       Oral Phase: WFL    Pharyngeal Phase       Initiation of Pharyngeal Swallow: bolus in pyriform sinuses  Pharyngeal Phase: impaired pharyngeal phase of swallowing  Penetration Before the Swallow: thin liquids, secondary to delayed swallow initiation or mistiming  Penetration During the Swallow: thin liquids, secondary to delayed swallow initiation or mistiming, secondary to  reduced vestibular closure  Rosenbek's Scale: thin:, 3-->Level 3(able to eject from airway w/ cued cough)  Residue: all consistencies tested, diffuse within pharynx, secondary to reduced base of tongue retraction, secondary to reduced posterior pharyngeal wall stripping, secondary to reduced laryngeal elevation, secondary to reduced hyolaryngeal excursion  Response to Residue: cleared residue, with spontaneous subsequent swallow  Attempted Compensatory Maneuvers: bolus size, bolus presentation style  Response to Attempted Compensatory Maneuvers: prevented penetration  FEES Summary: SLP FEES evaluation completed. Pt presents w/ mild pharyngeal dysphagia. Oral phase grossly WFL. Delay of initiation of the pharyngeal swallow to the level of the pyriform sinuses and reduced laryngeal vestibular closure resulted in deep penetration to the level of the true vocal folds before/during the swallow w/ thin liquid via straw. Pt did not sense, but was able to clear w/ cued cough. No penetration/aspiration w/ thin liquid via small single cup sips, pudding, or solid. Mild diffuse pharyngeal residue w/ all consistencies tested 2' reduced base of tongue retraction, decreased pharyngeal stripping wave, and reduced hyolaryngeal elevation/excursion. Residue mostly cleared w/ spontaneous subsequent swallow. Recommend: dysphagia level IV diet w/ thin liquid via small single sips. No straws. Meds whole or crushed in puree/pudding. Provided education re: fatigue precautions (5 small meals/day, take time when eating/drinking, taking rests as needed, etc.) given new GBS diagnosis. Discussed small meals t/o day w/ RD who agreed to reinforce education. Exercises not indicated given GBS and risk for fatigue. SLP will f/u.        Cervical Esophageal Phase              Prognosis

## 2019-01-16 NOTE — PROGRESS NOTES
Malnutrition Severity Assessment    Patient Name:  Kristina Heck  YOB: 1960  MRN: [id number]  Admit Date:  1/12/2019    Patient meets criteria for : Severe malnutrition    Comments:  Pt meets criteria for Severe Acute Malnutrition. MD please review, attest and include in diagnosis as you deem appropriate. RD to continue to follow.       Malnutrition Type: Acute Illness/Injury Malnutrition     Malnutrition Type (last 8 hours)      Malnutrition Severity Assessment     Row Name 01/16/19 1356       Malnutrition Severity Assessment    Malnutrition Type  Acute Illness/Injury Malnutrition    Row Name 01/16/19 1356       Weight Status (Acute)    Weight Loss  Severe (>5% / 1 mo)    Row Name 01/16/19 1356       Energy Intake Status (Acute)    Energy Intake  Severe (< or equal to 50% / > or equal to 5d)    Row Name 01/16/19 1356       Criteria Met (Must meet criteria for severity in at least 2 of these categories: M Wasting, Fat Loss, Fluid, Secondary Signs, Wt. Status, Intake)    Patient meets criteria for   Severe malnutrition          Electronically signed by:  Angy Thurston RDN, LD  01/16/19 1:57 PM

## 2019-01-16 NOTE — MBS/VFSS/FEES
Acute Care - Speech Language Pathology   Swallow Initial Evaluation [facility]   Clinical Swallow Re-Evaluation  Fiberoptic Endoscopic Evaluation of Swallowing (FEES)     Patient Name: Kristina Heck  : 1960  MRN: [id number]  Today's Date: 2019  Onset of Illness/Injury or Date of Surgery: 19     Referring Physician: ISMAEL Pelayo DO      Admit Date: 2019    Visit Dx:     ICD-10-CM ICD-9-CM   1. Left ureter dilated N28.82 593.89   2. Hypoxia R09.02 799.02   3. COPD exacerbation (CMS/HCC) J44.1 491.21   4. Weakness R53.1 780.79   5. Impaired functional mobility, balance, gait, and endurance Z74.09 V49.89   6. Acute on chronic respiratory failure with hypoxia (CMS/HCC) J96.21 518.84     799.02     Patient Active Problem List   Diagnosis   • Acute on chronic respiratory failure with hypoxia (2L @ baseline)   • COPD with acute exacerbation (CMS/HCC)   • Hypothyroidism (acquired)   • Essential hypertension   • Depression   • Pinched nerve in neck   • left renal collecting system obstruction   • Generalized weakness   • Guillain-Millbrook syndrome (CMS/HCC)   • Dysphagia   • Tobacco abuse   • JURGEN on CPAP     Past Medical History:   Diagnosis Date   • COPD (chronic obstructive pulmonary disease) (CMS/HCC)    • Depression    • Disease of thyroid gland    • Hypertension      History reviewed. No pertinent surgical history.     SWALLOW EVALUATION (last 72 hours)      SLP Adult Swallow Evaluation     Row Name 19 1415 19 1115 19 0915             Rehab Evaluation    Document Type  --  evaluation  -MP  evaluation  -AC      Subjective Information  --  no complaints  -MP  complains of;weakness  -AC      Patient Observations  --  alert;cooperative  -MP  alert;cooperative  -AC      Patient/Family Observations  --  --  No family present.  -AC      Patient Effort  --  good  -MP  good  -AC         General Information    Patient Profile Reviewed  --  yes  -MP  yes  -AC      Pertinent History Of Current  Problem  --  See previous eval. New Guillan-Medanales Syndrome diagnosis.   -MP  57yo adm w/ a/c resp failure. Recent hosp for pna 2 wks ago. Hx chronic bronchitis, COPD, pinched nerve neck. Pt reporting difficulty getting food/liq down, occasional nasal regurg, choking. CXR negative.   -AC      Current Method of Nutrition  --  mechanical soft, no mixed consistencies;nectar/syrup-thick liquids  -MP  regular textures;thin liquids  -AC      Precautions/Limitations, Vision  --  WFL;for purposes of eval  -MP  WFL;for purposes of eval  -AC      Precautions/Limitations, Hearing  --  WFL;for purposes of eval  -MP  WFL;for purposes of eval  -AC      Prior Level of Function-Communication  --  WFL  -MP  WFL  -AC      Prior Level of Function-Swallowing  --  no diet consistency restrictions  -MP  no diet consistency restrictions  -AC      Plans/Goals Discussed with  --  patient;agreed upon  -MP  patient;agreed upon  -AC      Barriers to Rehab  --  none identified  -MP  none identified  -AC      Patient's Goals for Discharge  --  patient did not state  -MP  eat/drink without coughing/choking  -AC         Pain Assessment    Additional Documentation  --  Pain Scale: FACES Pre/Post-Treatment (Group)  -MP  Pain Scale: Numbers Pre/Post-Treatment (Group)  -AC         Pain Scale: Numbers Pre/Post-Treatment    Pain Scale: Numbers, Pretreatment  --  --  0/10 - no pain  -AC      Pain Scale: Numbers, Post-Treatment  --  --  0/10 - no pain  -AC         Pain Scale: FACES Pre/Post-Treatment    Pain: FACES Scale, Pretreatment  --  2-->hurts little bit  -MP  --      Pain: FACES Scale, Post-Treatment  --  2-->hurts little bit  -MP  --         Oral Motor and Function    Dentition Assessment  --  natural, present and adequate  -MP  natural, present and adequate  -AC      Secretion Management  --  --  WNL/WFL  -AC      Mucosal Quality  --  --  dry  -AC      Volitional Swallow  --  --  WFL  -AC      Volitional Cough  --  --  WFL  -AC         Oral  Musculature and Cranial Nerve Assessment    Oral Motor General Assessment  --  WFL  -MP  WFL  -AC         General Eating/Swallowing Observations    Respiratory Support Currently in Use  --  other (see comments) HFNC  -MP  nasal cannula;other (see comments) HFNC  -AC      Eating/Swallowing Skills  --  fed by SLP  -MP  fed by SLP;self-fed;other (see comments) pt experiencing hand wknss/numbness  -AC      Positioning During Eating  --  upright in bed  -  upright 90 degree;upright in bed  -      Utensils Used  --  spoon;cup  -MP  spoon;cup;straw  -      Consistencies Trialed  --  thin liquids;pureed  -MP  thin liquids;nectar/syrup-thick liquids;regular textures mixed consistency  -         Respiratory    Respiratory Status  --  --  increase in respiratory rate;during swallowing/eating  -AC      Respiratory Pattern  --  --  decrease control/incoordination of breathing swallow  -AC         Clinical Swallow Eval    Oral Prep Phase  --  WFL for thin and puree  -MP  WFL  -AC      Oral Transit  --  WFL for thin and puree  -MP  WFL  -AC      Oral Residue  --  WFL for thin and puree  -MP  WFL  -AC      Pharyngeal Phase  --  suspected pharyngeal impairment  -  suspected pharyngeal impairment  -      Clinical Swallow Evaluation Summary  --  Clinical swallow re-evaluation completed for FEES appropriateness given transfer to ICU and increase in oxygenation requirments. Trials of ice chip x1, thin via tsp and cup, and puree given. Pt w/ no overt s/sxs aspiration w/ any consistency tested. Multiple swallows noted on 1/3 trials of puree. SLP will proceed w/ FEES this PM to determine safest PO diet. OK for meds in pudding/puree until FEES.  -MP  --         Pharyngeal Phase Concerns    Pharyngeal Phase Concerns  --  multiple swallows  -  cough  -AC      Multiple Swallows  --  pudding  -MP  --      Cough  --  --  thin  -AC      Pharyngeal Phase Concerns, Comment  --  --  Pt wearing HFNC during eval. Pt demonstrated wet  sounding cough @ baseline. Coughing appeared to increase w/ thin liquid trials. No immediate coughing/throat clearing noted w/ nectar-thick liquid, puree, mixed consistency, or solid. Given pt c/o swallowing difficulty in presence of resp failure, rec'd modified diet/thickened liquids until instrumental swallow study completed. Pt agreed.  -         Fiberoptic Endoscopic Evaluation of Swallowing (FEES)    Risks/Benefits Reviewed  risks/benefits explained;patient;agreed to eval  -MP  --  --      Nasal Entry  right:  -MP  --  --      Special Considerations  Scope #918  -MP  --  --         Anatomy and Physiology    Anatomic Considerations  no anatomic structural deviation  -MP  --  --      Velopharyngeal  WFL  -MP  --  --      Base of Tongue  symmetrical  -MP  --  --      Epiglottis  WFL  -MP  --  --      Laryngeal Function Breathing  symmetrical  -MP  --  --      Laryngeal Function Phonation  symmetrical  -MP  --  --      Laryngeal Function to Breath Hold  TVT/FVF/Arytenoid;sustained closure  -MP  --  --      Secretion Rating Scale (Jay et al. 1996)  1- secretions present around the laryngeal vestibule  -MP  --  --      Secretion Description  thin;clear  -MP  --  --      Ice Chips  DNA  -MP  --  --      Spontaneous Swallow  frequency adequate  -MP  --  --      Sensory  sensed scope  -MP  --  --      Utensils Used  Spoon;Cup;Straw  -MP  --  --      Consistencies Trialed  thin liquids;pudding/puree;Regular textures  -MP  --  --         FEES Interpretation    Oral Phase  WFL  -MP  --  --         Initiation of Pharyngeal Swallow    Initiation of Pharyngeal Swallow  bolus in pyriform sinuses  -MP  --  --      Pharyngeal Phase  impaired pharyngeal phase of swallowing  -MP  --  --      Penetration Before the Swallow  thin liquids;secondary to delayed swallow initiation or mistiming  -MP  --  --      Penetration During the Swallow  thin liquids;secondary to delayed swallow initiation or mistiming;secondary to reduced  vestibular closure  -MP  --  --      Rosenbek's Scale  thin:;3-->Level 3 able to eject from airway w/ cued cough  -MP  --  --      Residue  all consistencies tested;diffuse within pharynx;secondary to reduced base of tongue retraction;secondary to reduced posterior pharyngeal wall stripping;secondary to reduced laryngeal elevation;secondary to reduced hyolaryngeal excursion  -MP  --  --      Response to Residue  cleared residue;with spontaneous subsequent swallow  -MP  --  --      Attempted Compensatory Maneuvers  bolus size;bolus presentation style  -MP  --  --      Response to Attempted Compensatory Maneuvers  prevented penetration  -MP  --  --      FEES Summary  SLP FEES evaluation completed. Pt presents w/ mild pharyngeal dysphagia. Oral phase grossly WFL. Delay of initiation of the pharyngeal swallow to the level of the pyriform sinuses and reduced laryngeal vestibular closure resulted in deep penetration to the level of the true vocal folds before/during the swallow w/ thin liquid via straw. Pt did not sense, but was able to clear w/ cued cough. No penetration/aspiration w/ thin liquid via small single cup sips, pudding, or solid. Mild diffuse pharyngeal residue w/ all consistencies tested 2' reduced base of tongue retraction, decreased pharyngeal stripping wave, and reduced hyolaryngeal elevation/excursion. Residue mostly cleared w/ spontaneous subsequent swallow. Recommend: dysphagia level IV diet w/ thin liquid via small single sips. No straws. Meds whole or crushed in puree/pudding. Provided education re: fatigue precautions (5 small meals/day, take time when eating/drinking, taking rests as needed, etc.) given new GBS diagnosis. Discussed small meals t/o day w/ RD who agreed to reinforce education. Exercises not indicated given GBS and risk for fatigue. SLP will f/u.  -MP  --  --         Clinical Impression    SLP Swallowing Diagnosis  mild;pharyngeal dysfunction  -MP  suspected pharyngeal dysfunction   -MP  suspected pharyngeal dysfunction;other (see comments) r/o pharyngeal dysphagia  -AC      Functional Impact  risk of aspiration/pneumonia  -MP  risk of aspiration/pneumonia  -MP  risk of aspiration/pneumonia  -AC      Rehab Potential/Prognosis, Swallowing  good, to achieve stated therapy goals  -MP  good, to achieve stated therapy goals  -MP  --      Swallow Criteria for Skilled Therapeutic Interventions Met  demonstrates skilled criteria  -MP  demonstrates skilled criteria  -MP  demonstrates skilled criteria  -AC         Recommendations    Therapy Frequency (Swallow)  3 days per week  -MP  PRN;evaluation only  -MP  --      Predicted Duration Therapy Intervention (Days)  until discharge  -MP  until discharge  -MP  until discharge  -AC      SLP Diet Recommendation  mechanical soft with no mixed consistencies;thin liquids;other (see comments) via small single cup sips  -MP  --  mechanical soft with no mixed consistencies;nectar thick liquids  -      Recommended Diagnostics  --  FEES  -MP  FEES  -      Recommended Precautions and Strategies  upright posture during/after eating;small bites of food and sips of liquid;no straw;other (see comments) STRICT fatigue precautions; 5 small meals/day as possible  -MP  --  upright posture during/after eating;small bites of food and sips of liquid;other (see comments) general aspiration precautions, oral care BID/PRN  -AC      SLP Rec. for Method of Medication Administration  meds whole;meds crushed;with pudding or applesauce;as tolerated  -MP  meds whole;meds crushed;with pudding or applesauce  -MP  meds whole;with pudding or applesauce;as tolerated  -AC      Monitor for Signs of Aspiration  yes;notify SLP if any concerns  -MP  yes;notify SLP if any concerns  -MP  yes;notify SLP if any concerns  -AC      Anticipated Dischage Disposition  unknown;anticipate therapy at next level of care  -MP  unknown;anticipate therapy at next level of care  -MP  unknown  -AC        User  Key  (r) = Recorded By, (t) = Taken By, (c) = Cosigned By    Initials Name Effective Dates    AC Gail Gonzales, MS CCC-SLP 07/27/17 -     Brandon Garcia MS, CFY-SLP 08/15/18 -           EDUCATION  The patient has been educated in the following areas:   Dysphagia (Swallowing Impairment) Modified Diet Instruction.    SLP Recommendation and Plan  SLP Swallowing Diagnosis: mild, pharyngeal dysfunction  SLP Diet Recommendation: mechanical soft with no mixed consistencies, thin liquids, other (see comments)(via small single cup sips)  Recommended Precautions and Strategies: upright posture during/after eating, small bites of food and sips of liquid, no straw, other (see comments)(STRICT fatigue precautions; 5 small meals/day as possible)     Monitor for Signs of Aspiration: yes, notify SLP if any concerns  Recommended Diagnostics: FEES  Swallow Criteria for Skilled Therapeutic Interventions Met: demonstrates skilled criteria  Anticipated Dischage Disposition: unknown, anticipate therapy at next level of care  Rehab Potential/Prognosis, Swallowing: good, to achieve stated therapy goals  Therapy Frequency (Swallow): 3 days per week  Predicted Duration Therapy Intervention (Days): until discharge       Plan of Care Reviewed With: patient  Plan of Care Review  Plan of Care Reviewed With: patient    SLP GOALS     Row Name 01/16/19 1415             Oral Nutrition/Hydration Goal 1 (SLP)    Oral Nutrition/Hydration Goal 1, SLP  LTG: Pt will return to regular diet, thin liquids w/ no overt s/sxs aspiration or discomfort w/ 100% acc and no cues  -MP      Time Frame (Oral Nutrition/Hydration Goal 1, SLP)  by discharge  -MP         Oral Nutrition/Hydration Goal 2 (SLP)    Oral Nutrition/Hydration Goal 2, SLP  Pt will tolerate trials of thin liquid and soft solids w/ no overt s/sxs aspiration or discomfort w/ 100% acc and no cues  -MP      Time Frame (Oral Nutrition/Hydration Goal 2, SLP)  short term goal (STG);by discharge   -MP         Swallow Management Recall Goal 1 (SLP)    Activity (Swallow Management Recall Goal 1, SLP)  recall of;safe diet level/texture;compensatory swallow strategies/techniques;rationale for use of strategies/techniques  -MP      [place]/Accuracy (Swallow Management Recall Goal 1, SLP)  independently (over 90% accuracy)  -MP      Time Frame (Swallow Management Recall Goal 1, SLP)  short term goal (STG);by discharge  -MP         Swallow Compensatory Strategies Goal 1 (SLP)    Activity (Swallow Compensatory Strategies/Techniques Goal 1, SLP)  compensatory strategies;small cup sips;other (see comments);during p.o. trials;during meal intake fatigue precautions  -MP      [place]/Accuracy (Swallow Compensatory Strategies/Techniques Goal 1, SLP)  independently (over 90% accuracy)  -MP      Time Frame (Swallow Compensatory Strategies/Techniques Goal 1, SLP)  short term goal (STG);by discharge  -MP        User Key  (r) = Recorded By, (t) = Taken By, (c) = Cosigned By    Initials Name Provider Type    Brandon Garcia MS, CFY-SLP Speech and Language Pathologist             Time Calculation:   Time Calculation- SLP     Row Name 01/16/19 1628             Time Calculation- SLP    SLP Start Time  1115  -MP      SLP Received On  01/16/19  -MP        User Key  (r) = Recorded By, (t) = Taken By, (c) = Cosigned By    Initials Name Provider Type    Brandon Garcia MS, CFY-SLP Speech and Language Pathologist          Therapy Charges for Today     Code Description Service Date Service Provider Modifiers Qty    24578840303 HC ST EVAL ORAL PHARYNG SWALLOW 3 1/16/2019 Brandon Merchant MS, CFY-SLP GN 1    [id number] HC ST FIBEROPTIC ENDO EVAL SWALL 7 1/16/2019 Brandon Merchant MS, CFY-SLP GN 1               Brandon Merchant MS, CFY-SLP  1/16/2019

## 2019-01-17 PROBLEM — R53.1 GENERALIZED WEAKNESS: Status: RESOLVED | Noted: 2019-01-12 | Resolved: 2019-01-17

## 2019-01-17 PROCEDURE — 97164 PT RE-EVAL EST PLAN CARE: CPT

## 2019-01-17 PROCEDURE — 97530 THERAPEUTIC ACTIVITIES: CPT

## 2019-01-17 PROCEDURE — 25010000002 IMMUNE GLOBULIN (HUMAN) 20 GM/200ML SOLUTION: Performed by: PSYCHIATRY & NEUROLOGY

## 2019-01-17 PROCEDURE — 25010000002 ENOXAPARIN PER 10 MG: Performed by: PSYCHIATRY & NEUROLOGY

## 2019-01-17 PROCEDURE — 94799 UNLISTED PULMONARY SVC/PX: CPT

## 2019-01-17 PROCEDURE — 99291 CRITICAL CARE FIRST HOUR: CPT | Performed by: INTERNAL MEDICINE

## 2019-01-17 PROCEDURE — 97165 OT EVAL LOW COMPLEX 30 MIN: CPT

## 2019-01-17 PROCEDURE — 25010000002 METHYLPREDNISOLONE PER 40 MG: Performed by: INTERNAL MEDICINE

## 2019-01-17 PROCEDURE — 97535 SELF CARE MNGMENT TRAINING: CPT

## 2019-01-17 PROCEDURE — 25010000002 LORAZEPAM PER 2 MG: Performed by: INTERNAL MEDICINE

## 2019-01-17 PROCEDURE — 94640 AIRWAY INHALATION TREATMENT: CPT

## 2019-01-17 PROCEDURE — 99232 SBSQ HOSP IP/OBS MODERATE 35: CPT | Performed by: PSYCHIATRY & NEUROLOGY

## 2019-01-17 PROCEDURE — 25010000002 PIPERACILLIN SOD-TAZOBACTAM PER 1 G: Performed by: INTERNAL MEDICINE

## 2019-01-17 PROCEDURE — 94660 CPAP INITIATION&MGMT: CPT

## 2019-01-17 RX ORDER — GABAPENTIN 300 MG/1
300 CAPSULE ORAL 3 TIMES DAILY
Status: DISCONTINUED | OUTPATIENT
Start: 2019-01-17 | End: 2019-01-24 | Stop reason: HOSPADM

## 2019-01-17 RX ORDER — DOXYCYCLINE 100 MG/1
100 CAPSULE ORAL EVERY 12 HOURS SCHEDULED
Status: DISCONTINUED | OUTPATIENT
Start: 2019-01-17 | End: 2019-01-19

## 2019-01-17 RX ORDER — HYDROXYZINE HYDROCHLORIDE 25 MG/1
25 TABLET, FILM COATED ORAL EVERY 6 HOURS PRN
Status: DISCONTINUED | OUTPATIENT
Start: 2019-01-17 | End: 2019-01-24 | Stop reason: HOSPADM

## 2019-01-17 RX ORDER — LORAZEPAM 2 MG/ML
1 INJECTION INTRAMUSCULAR EVERY 4 HOURS PRN
Status: DISCONTINUED | OUTPATIENT
Start: 2019-01-17 | End: 2019-01-19

## 2019-01-17 RX ORDER — IBUPROFEN 400 MG/1
200 TABLET ORAL EVERY 6 HOURS PRN
Status: DISCONTINUED | OUTPATIENT
Start: 2019-01-17 | End: 2019-01-24 | Stop reason: HOSPADM

## 2019-01-17 RX ORDER — HYDROCODONE BITARTRATE AND ACETAMINOPHEN 7.5; 325 MG/1; MG/1
1 TABLET ORAL EVERY 6 HOURS PRN
Status: DISCONTINUED | OUTPATIENT
Start: 2019-01-17 | End: 2019-01-24 | Stop reason: HOSPADM

## 2019-01-17 RX ADMIN — TAZOBACTAM SODIUM AND PIPERACILLIN SODIUM 4.5 G: 500; 4 INJECTION, SOLUTION INTRAVENOUS at 13:38

## 2019-01-17 RX ADMIN — IPRATROPIUM BROMIDE AND ALBUTEROL SULFATE 3 ML: 2.5; .5 SOLUTION RESPIRATORY (INHALATION) at 07:46

## 2019-01-17 RX ADMIN — ESCITALOPRAM OXALATE 10 MG: 20 TABLET, FILM COATED ORAL at 08:00

## 2019-01-17 RX ADMIN — METHYLPREDNISOLONE SODIUM SUCCINATE 30 MG: 40 INJECTION, POWDER, FOR SOLUTION INTRAMUSCULAR; INTRAVENOUS at 08:00

## 2019-01-17 RX ADMIN — HYDROXYZINE HYDROCHLORIDE 25 MG: 25 TABLET, FILM COATED ORAL at 13:31

## 2019-01-17 RX ADMIN — TAZOBACTAM SODIUM AND PIPERACILLIN SODIUM 4.5 G: 500; 4 INJECTION, SOLUTION INTRAVENOUS at 06:07

## 2019-01-17 RX ADMIN — METOPROLOL SUCCINATE 25 MG: 25 TABLET, EXTENDED RELEASE ORAL at 08:00

## 2019-01-17 RX ADMIN — OXYCODONE HYDROCHLORIDE AND ACETAMINOPHEN 1 TABLET: 5; 325 TABLET ORAL at 03:02

## 2019-01-17 RX ADMIN — DOXYCYCLINE 100 MG: 100 CAPSULE ORAL at 20:25

## 2019-01-17 RX ADMIN — ASPIRIN 81 MG CHEWABLE TABLET 81 MG: 81 TABLET CHEWABLE at 08:00

## 2019-01-17 RX ADMIN — GABAPENTIN 300 MG: 300 CAPSULE ORAL at 15:38

## 2019-01-17 RX ADMIN — HYDROCODONE BITARTRATE AND ACETAMINOPHEN 1 TABLET: 7.5; 325 TABLET ORAL at 13:30

## 2019-01-17 RX ADMIN — LEVOTHYROXINE SODIUM 100 MCG: 100 TABLET ORAL at 06:07

## 2019-01-17 RX ADMIN — IPRATROPIUM BROMIDE AND ALBUTEROL SULFATE 3 ML: 2.5; .5 SOLUTION RESPIRATORY (INHALATION) at 19:51

## 2019-01-17 RX ADMIN — DOXYCYCLINE 100 MG: 100 INJECTION, POWDER, LYOPHILIZED, FOR SOLUTION INTRAVENOUS at 08:00

## 2019-01-17 RX ADMIN — METHYLPREDNISOLONE SODIUM SUCCINATE 30 MG: 40 INJECTION, POWDER, FOR SOLUTION INTRAMUSCULAR; INTRAVENOUS at 20:25

## 2019-01-17 RX ADMIN — OXYCODONE HYDROCHLORIDE AND ACETAMINOPHEN 1 TABLET: 5; 325 TABLET ORAL at 08:17

## 2019-01-17 RX ADMIN — HYDROXYZINE HYDROCHLORIDE 25 MG: 25 TABLET, FILM COATED ORAL at 08:16

## 2019-01-17 RX ADMIN — IPRATROPIUM BROMIDE AND ALBUTEROL SULFATE 3 ML: 2.5; .5 SOLUTION RESPIRATORY (INHALATION) at 13:17

## 2019-01-17 RX ADMIN — IBUPROFEN 200 MG: 400 TABLET ORAL at 09:57

## 2019-01-17 RX ADMIN — GABAPENTIN 300 MG: 300 CAPSULE ORAL at 20:25

## 2019-01-17 RX ADMIN — TAZOBACTAM SODIUM AND PIPERACILLIN SODIUM 4.5 G: 500; 4 INJECTION, SOLUTION INTRAVENOUS at 22:23

## 2019-01-17 RX ADMIN — LORAZEPAM 1 MG: 2 INJECTION INTRAMUSCULAR; INTRAVENOUS at 03:03

## 2019-01-17 RX ADMIN — IPRATROPIUM BROMIDE AND ALBUTEROL SULFATE 3 ML: 2.5; .5 SOLUTION RESPIRATORY (INHALATION) at 16:23

## 2019-01-17 RX ADMIN — HYDROCODONE BITARTRATE AND ACETAMINOPHEN 1 TABLET: 7.5; 325 TABLET ORAL at 20:25

## 2019-01-17 RX ADMIN — NICOTINE 1 PATCH: 21 PATCH, EXTENDED RELEASE TRANSDERMAL at 09:57

## 2019-01-17 RX ADMIN — IMMUNE GLOBULIN INFUSION (HUMAN) 20 G: 100 INJECTION, SOLUTION INTRAVENOUS; SUBCUTANEOUS at 17:01

## 2019-01-17 RX ADMIN — GABAPENTIN 100 MG: 100 CAPSULE ORAL at 07:50

## 2019-01-17 RX ADMIN — LISINOPRIL 10 MG: 10 TABLET ORAL at 08:00

## 2019-01-17 RX ADMIN — HYDROCHLOROTHIAZIDE 25 MG: 25 TABLET ORAL at 08:00

## 2019-01-17 RX ADMIN — IMMUNE GLOBULIN INFUSION (HUMAN) 20 G: 100 INJECTION, SOLUTION INTRAVENOUS; SUBCUTANEOUS at 15:36

## 2019-01-17 RX ADMIN — ENOXAPARIN SODIUM 40 MG: 40 INJECTION SUBCUTANEOUS at 20:25

## 2019-01-17 NOTE — PLAN OF CARE
Problem: Patient Care Overview  Goal: Plan of Care Review  Outcome: Ongoing (interventions implemented as appropriate)   01/17/19 1115   Coping/Psychosocial   Plan of Care Reviewed With patient;family   OTHER   Outcome Summary PT re-evaluation completed. Pt continues to demonstrate BLE weakness (L >R), sensation/ coordination deficits, and decreased indep re: functional mobility, warranting further skilled PT services to promote PLOF. Goals modified to reflect current functional status. Able to perform STS x2 with mod x2 A (with BUE support and blocking of bilat feet/knees). Limited by fatigue but motivated to participate. Recommend IP rehab at d/c.

## 2019-01-17 NOTE — THERAPY RE-EVALUATION
Acute Care - Physical Therapy Re-Evaluation  [facility]     Patient Name: Kristina Heck  : 1960  MRN: [id number]  Today's Date: 2019   Onset of Illness/Injury or Date of Surgery: 19  Date of Referral to PT: 19  Referring Physician: MD Roxana      Admit Date: 2019    Visit Dx:     ICD-10-CM ICD-9-CM   1. Left ureter dilated N28.82 593.89   2. Hypoxia R09.02 799.02   3. COPD exacerbation (CMS/HCC) J44.1 491.21   4. Weakness R53.1 780.79   5. Impaired functional mobility, balance, gait, and endurance Z74.09 V49.89   6. Acute on chronic respiratory failure with hypoxia (CMS/HCC) J96.21 518.84     799.02   7. Pharyngeal dysphagia R13.13 787.23   8. Impaired mobility and ADLs Z74.09 799.89     Patient Active Problem List   Diagnosis   • Acute on chronic respiratory failure with hypoxia (2L @ baseline)   • COPD with acute exacerbation (CMS/HCC)   • Hypothyroidism (acquired)   • Essential hypertension   • Depression   • Pinched nerve in neck   • left renal collecting system obstruction   • Generalized weakness   • Guillain-Deering syndrome (CMS/HCC)   • Dysphagia   • Tobacco abuse   • JURGEN on CPAP     Past Medical History:   Diagnosis Date   • COPD (chronic obstructive pulmonary disease) (CMS/HCC)    • Depression    • Disease of thyroid gland    • Hypertension      History reviewed. No pertinent surgical history.     PT ASSESSMENT (last 12 hours)      Physical Therapy Evaluation     Row Name 19 1115          PT Evaluation Time/Intention    Subjective Information  no complaints  -LS     Document Type  re-evaluation  -LS     Mode of Treatment  physical therapy  -LS     Patient Effort  good  -LS     Symptoms Noted During/After Treatment  fatigue  -LS     Row Name 19 1115          General Information    Patient Profile Reviewed?  yes  -LS     Onset of Illness/Injury or Date of Surgery  19  -LS     Referring Physician  MD Roxana  -LS     Patient Observations   alert;cooperative;agree to therapy  -LS     Patient/Family Observations  Family present.   -LS     Prior Level of Function  -- see IE   -LS     Equipment Currently Used at Home  none  -LS     Pertinent History of Current Functional Problem  See IE for original admit; transferred to ICU on 1/16 with decreased resp status. RESUME PT orders received.   -LS     Existing Precautions/Restrictions  fall;oxygen therapy device and L/min GBS  -LS     Risks Reviewed  patient and family:;LOB;dizziness;increased discomfort;change in vital signs  -LS     Benefits Reviewed  patient and family:;improve function;increase independence;increase strength;increase knowledge  -LS     Barriers to Rehab  medically complex;impaired sensation  -     Row Name 01/17/19 1115          Relationship/Environment    Lives With  -- see IE  -     Row Name 01/17/19 1115          Cognitive Assessment/Interventions    Additional Documentation  Cognitive Assessment/Intervention (Group)  -     Row Name 01/17/19 1115          Cognitive Assessment/Intervention- PT/OT    Orientation Status (Cognition)  oriented x 4  -LS     Follows Commands (Cognition)  follows one step commands;over 90% accuracy;verbal cues/prompting required;physical/tactile prompts required  -     Cognitive Function (Cognitive)  safety deficit  -LS     Safety Deficit (Cognitive)  mild deficit;insight into deficits/self awareness;safety precautions awareness  -LS     Personal Safety Interventions  fall prevention program maintained;gait belt;nonskid shoes/slippers when out of bed  -     Row Name 01/17/19 1115          Safety Issues, Functional Mobility    Impairments Affecting Function (Mobility)  balance;coordination;endurance/activity tolerance;motor planning;sensation/sensory awareness;shortness of breath;strength  -     Row Name 01/17/19 1115          Bed Mobility Assessment/Treatment    Comment (Bed Mobility)  [facility] upon arrival.  -     Row Name 01/17/19 1115           Transfer Assessment/Treatment    Comment (Transfers)  STSx2 from recliner with PT/ tech on either side of pt for BUE support and blocking bilat feet/knees.   -     Sit-Stand [place] (Transfers)  moderate assist (50% patient effort);2 person assist;verbal cues  -     Stand-Sit [place] (Transfers)  moderate assist (50% patient effort);2 person assist;verbal cues  -[facility] Name 01/17/19 1115          Sit-Stand Transfer    Assistive Device (Sit-Stand Transfers)  -- BUE support on PT/tech (with assist at gait belt)  -[facility] Name 01/17/19 1115          Gait/Stairs Assessment/Training    [place] Level (Gait)  unable to assess  -[facility] Name 01/17/19 1115          General ROM    GENERAL ROM COMMENTS  BLE WFL  -[facility] Name 01/17/19 1115          MMT (Manual Muscle Testing)    General MMT Comments  RLE grossly 4-/5; L ankle DF 3-/5, L hip flex/knee ext 3+/5  -[facility] Name 01/17/19 1115          Motor Assessment/Intervention    Additional Documentation  Balance (Group);Therapeutic Exercise (Group)  -[facility] Name 01/17/19 1115          Therapeutic Exercise    85341 - PT Therapeutic Activity Minutes  14  -[facility] Name 01/17/19 1115          Static Sitting Balance    Level of [place] (Unsupported Sitting, Static Balance)  contact guard assist  -     Sitting Position (Unsupported Sitting, Static Balance)  sitting in chair  -[facility] Name 01/17/19 1115          Static Standing Balance    Level of [place] (Supported Standing, Static Balance)  moderate assist, 50 to 74% patient effort x2  -LS     Time Able to Maintain Position (Supported Standing, Static Balance)  15 to 30 seconds  -     Comment (Supported Standing, Static Balance)  20s x2 with seated rest break  -[facility] Name 01/17/19 1115          Sensory Assessment/Intervention    Sensory General Assessment  light touch sensation deficits identified;proprioception deficits identified  -[facility] Name 01/17/19 1115           Light Touch Sensation Assessment    Left Lower Extremity: Light Touch Sensation Assessment  moderate impairment, 50 to 74% correct responses  -LS     Right Lower Extremity: Light Touch Sensation Assessment  moderate impairment, 50 to 74% correct responses  -LS     Row Name 01/17/19 1115          Pain Scale: Numbers Pre/Post-Treatment    Pain Scale: Numbers, Pretreatment  0/10 - no pain  -LS     Pain Scale: Numbers, Post-Treatment  0/10 - no pain  -LS     Row Name             Wound 01/12/19 1845 upper coccyx pressure injury    Wound - Properties Group Date first assessed: 01/12/19  -MS Time first assessed: 1845  -MS Present On Admission : yes  -MS Orientation: upper  -MS Location: coccyx  -MS Type: pressure injury  -MS Stage, Pressure Injury: Stage 1  -MS    Row Name 01/17/19 1115          Plan of Care Review    Plan of Care Reviewed With  patient;family  -LS     Row Name 01/17/19 1115          Physical Therapy Clinical Impression    Date of Referral to PT  01/17/19  -     PT Diagnosis (PT Clinical Impression)  impaired functional mobility, balance, gait  -LS     Patient/Family Goals Statement (PT Clinical Impression)  return to PLOF; increase strength and sensation of LEs  -     Criteria for Skilled Interventions Met (PT Clinical Impression)  yes;treatment indicated  -LS     Rehab Potential (PT Clinical Summary)  good, to achieve stated therapy goals  -     Care Plan Review (PT)  evaluation/treatment results reviewed  -LS     Care Plan Review, Other Participant (PT Clinical Impression)  daughter  -LS     Row Name 01/17/19 1115          Vital Signs    Pre Systolic BP Rehab  105  -LS     Pre Treatment Diastolic BP  63  -LS     Post Systolic BP Rehab  126  -LS     Post Treatment Diastolic BP  79  -LS     Pretreatment Heart Rate (beats/min)  61  -LS     Posttreatment Heart Rate (beats/min)  66  -LS     Pre SpO2 (%)  99  -LS     O2 Delivery Pre Treatment  hi-flow  -LS     Post SpO2 (%)  98  -LS     O2 Delivery  Post Treatment  supplemental O2  -LS     Pre Patient Position  Sitting  -LS     Intra Patient Position  Standing  -LS     Post Patient Position  Sitting  -LS     Row Name 01/17/19 1115          Bed Mobility Goal 1 (PT)    Activity/Assistive Device (Bed Mobility Goal 1, PT)  sit to supine/supine to sit  -LS     [place] Level/Cues Needed (Bed Mobility Goal 1, PT)  minimum assist (75% or more patient effort)  -LS     Time Frame (Bed Mobility Goal 1, PT)  2 weeks  -LS     Progress/Outcomes (Bed Mobility Goal 1, PT)  goal ongoing  -LS     Row Name 01/17/19 1115          Transfer Goal 1 (PT)    Activity/Assistive Device (Transfer Goal 1, PT)  sit-to-stand/stand-to-sit;walker, rolling  -LS     [place] Level/Cues Needed (Transfer Goal 1, PT)  minimum assist (75% or more patient effort)  -LS     Time Frame (Transfer Goal 1, PT)  2 weeks  -LS     Progress/Outcome (Transfer Goal 1, PT)  goal ongoing  -     Row Name 01/17/19 1115          Gait Training Goal 1 (PT)    Activity/Assistive Device (Gait Training Goal 1, PT)  gait (walking locomotion);assistive device use  -LS     [place] Level (Gait Training Goal 1, PT)  moderate assist (50-74% patient effort)  -LS     Distance (Gait Goal 1, PT)  25  -LS     Time Frame (Gait Training Goal 1, PT)  2 weeks  -LS     Progress/Outcome (Gait Training Goal 1, PT)  goal ongoing  -     Row Name 01/17/19 1115          Patient Education Goal (PT)    Activity (Patient Education Goal, PT)  HEP for strengthening, coordination, balance  -LS     [place]/Cues/Accuracy (Memory Goal 2, PT)  demonstrates adequately;verbalizes understanding  -LS     Time Frame (Patient Education Goal, PT)  2 weeks  -LS     Progress/Outcome (Patient Education Goal, PT)  goal ongoing  -LS     Row Name 01/17/19 1115          Positioning and Restraints    Pre-Treatment Position  sitting in chair/recliner  -LS     Post Treatment Position  chair  -LS     In Chair  notified nsg;reclined;call light  within reach;encouraged to call for assist;exit alarm on;with family/caregiver;RUE elevated;LUE elevated;waffle cushion;on mechanical lift sling;legs elevated;with nsg;heels elevated  -       User Key  (r) = Recorded By, (t) = Taken By, (c) = Cosigned By    Initials Name Provider Type    Gege Westfall, PT Physical Therapist    Fabienne Jim RN Registered Nurse        Physical Therapy Education     Title: PT OT SLP Therapies (Done)     Topic: Physical Therapy (Done)     Point: Mobility training (Done)     Learning Progress Summary           Patient Acceptance, E,D,H, VU,NR by  at 1/17/2019 11:15 AM    Acceptance, E, VU by REYNALDO at 1/14/2019 10:07 AM    Acceptance, E,D, VU,NR by MB at 1/13/2019  9:55 AM    Comment:  log roll technique, transfer mechanics/safety, POC progression, benefits of mobility, home safety   Family Acceptance, E,D,H, VU,NR by RIO at 1/17/2019 11:15 AM                   Point: Home exercise program (Done)     Learning Progress Summary           Patient Acceptance, E,D,H, VU,NR by RIO at 1/17/2019 11:15 AM    Acceptance, E, VU by REYNALDO at 1/14/2019 10:07 AM    Acceptance, E,D, VU,NR by MB at 1/13/2019  9:55 AM    Comment:  log roll technique, transfer mechanics/safety, POC progression, benefits of mobility, home safety   Family Acceptance, E,D,H, VU,NR by RIO at 1/17/2019 11:15 AM                   Point: Body mechanics (Done)     Learning Progress Summary           Patient Acceptance, E,D,H, VU,NR by RIO at 1/17/2019 11:15 AM    Acceptance, E, VU by REYNALDO at 1/14/2019 10:07 AM    Acceptance, E,D, VU,NR by MB at 1/13/2019  9:55 AM    Comment:  log roll technique, transfer mechanics/safety, POC progression, benefits of mobility, home safety   Family Acceptance, E,D,H, VU,NR by RIO at 1/17/2019 11:15 AM                   Point: Precautions (Done)     Learning Progress Summary           Patient Acceptance, E,D,H, VU,NR by RIO at 1/17/2019 11:15 AM    Acceptance, E, VU by REYNALDO at 1/14/2019 10:07 AM     Acceptance, E,SERA, RUBI,NR by MB at 1/13/2019  9:55 AM    Comment:  log roll technique, transfer mechanics/safety, POC progression, benefits of mobility, home safety   Family Acceptance, E,D,H, RUBI,NR by RIO at 1/17/2019 11:15 AM                               User Key     Initials Effective Dates Name Provider Type Discipline     06/19/15 -  Magaly Ireland, PT Physical Therapist PT    RIO 06/19/15 -  Gege Malik, PT Physical Therapist PT    MB 03/14/16 -  Bronwyn Calvillo, PT Physical Therapist PT              PT Recommendation and Plan  Anticipated Discharge Disposition (PT): inpatient rehabilitation facility  Planned Therapy Interventions (PT Eval): balance training, bed mobility training, gait training, home exercise program, strengthening, transfer training, patient/family education  Therapy Frequency (PT Clinical Impression): daily  Outcome Summary/Treatment Plan (PT)  Anticipated Discharge Disposition (PT): inpatient rehabilitation facility  Plan of Care Reviewed With: patient, family  Outcome Summary: PT re-evaluation completed. Pt continues to demonstrate BLE weakness (L >R), sensation/ coordination deficits, and decreased indep re: functional mobility, warranting further skilled PT services to promote PLOF. Goals modified to reflect current functional status. Able to perform STS x2 with mod x2 A (with BUE support and blocking of bilat feet/knees). Limited by fatigue but motivated to participate. Recommend IP rehab at d/c.   Outcome Measures     Row Name 01/17/19 1115             How much help from another person do you currently need...    Turning from your back to your side while in flat bed without using bedrails?  2  -LS      Moving from lying on back to sitting on the side of a flat bed without bedrails?  2  -LS      Moving to and from a bed to a chair (including a wheelchair)?  1  -LS      Standing up from a chair using your arms (e.g., wheelchair, bedside chair)?  2  -LS      Climbing 3-5  steps with a railing?  1  -LS      To walk in hospital room?  1  -LS      AM-PAC 6 Clicks Score  9  -LS         Functional Assessment    Outcome Measure Options  AM-PAC 6 Clicks Basic Mobility (PT)  -LS        User Key  (r) = Recorded By, (t) = Taken By, (c) = Cosigned By    Initials Name Provider Type    LS Gege Malik, PT Physical Therapist         Time Calculation:   PT Charges     Row Name 01/17/19 1115             Time Calculation    Start Time  1115  -LS      PT Received On  01/17/19  -LS      PT Goal Re-Cert Due Date  01/27/19  -LS         Time Calculation- PT    Total Timed Code Minutes- PT  14 minute(s)  -LS         Timed Charges    44305 - PT Therapeutic Activity Minutes  14  -LS        User Key  (r) = Recorded By, (t) = Taken By, (c) = Cosigned By    Initials Name Provider Type    Gege Westfall, PT Physical Therapist        Therapy Suggested Charges     Code   Minutes Charges    18058 (CPT®) Hc Pt Neuromusc Re Education Ea 15 Min      04556 (CPT®) Hc Pt Ther Proc Ea 15 Min      56878 (CPT®) Hc Gait Training Ea 15 Min      01004 (CPT®) Hc Pt Therapeutic Act Ea 15 Min 14 1    18678 (CPT®) Hc Pt Manual Therapy Ea 15 Min      45708 (CPT®) Hc Pt Iontophoresis Ea 15 Min      33726 (CPT®) Hc Pt Elec Stim Ea-Per 15 Min      38379 (CPT®) Hc Pt Ultrasound Ea 15 Min      45855 (CPT®) Hc Pt Self Care/Mgmt/Train Ea 15 Min      44024 (CPT®) Hc Pt Prosthetic (S) Train Initial Encounter, Each 15 Min      27363 (CPT®) Hc Pt Orthotic(S)/Prosthetic(S) Encounter, Each 15 Min      41886 (CPT®) Hc Orthotic(S) Mgmt/Train Initial Encounter, Each 15min      Total  14 1        Therapy Charges for Today     Code Description Service Date Service Provider Modifiers Qty    63372741373 HC PT RE-EVAL ESTABLISHED PLAN 2 1/17/2019 Gege Malik, PT GP 1    55990802906 HC PT THERAPEUTIC ACT EA 15 MIN 1/17/2019 Gege Malik, PT GP 1    85868232559 HC PT THER SUPP EA 15 MIN 1/17/2019 Gege Malik, PT GP 2          PT  G-Codes  Outcome Measure Options: AM-PAC 6 Clicks Basic Mobility (PT)  AM-[facility] 6 Clicks Score: 9      Gege Malik, PT  1/17/2019

## 2019-01-17 NOTE — THERAPY EVALUATION
Acute Care - Occupational Therapy Initial Evaluation  [facility]     Patient Name: Kristina Heck  : 1960  MRN: [id number]  Today's Date: 2019  Onset of Illness/Injury or Date of Surgery: 19  Date of Referral to OT: 19  Referring Physician: MD Roxana    Admit Date: 2019       ICD-10-CM ICD-9-CM   1. Left ureter dilated N28.82 593.89   2. Hypoxia R09.02 799.02   3. COPD exacerbation (CMS/HCC) J44.1 491.21   4. Weakness R53.1 780.79   5. Impaired functional mobility, balance, gait, and endurance Z74.09 V49.89   6. Acute on chronic respiratory failure with hypoxia (CMS/HCC) J96.21 518.84     799.02   7. Pharyngeal dysphagia R13.13 787.23   8. Impaired mobility and ADLs Z74.09 799.89     Patient Active Problem List   Diagnosis   • Acute on chronic respiratory failure with hypoxia (2L @ baseline)   • COPD with acute exacerbation (CMS/HCC)   • Hypothyroidism (acquired)   • Essential hypertension   • Depression   • Pinched nerve in neck   • left renal collecting system obstruction   • Guillain-[place] syndrome (CMS/HCC)   • Dysphagia   • Tobacco abuse   • JURGEN on CPAP     Past Medical History:   Diagnosis Date   • COPD (chronic obstructive pulmonary disease) (CMS/HCC)    • Depression    • Disease of thyroid gland    • Hypertension      History reviewed. No pertinent surgical history.       OT ASSESSMENT FLOWSHEET (last 72 hours)      Occupational Therapy Evaluation     Row Name 19 0920                   OT Evaluation Time/Intention    Subjective Information  no complaints  -CL        Document Type  evaluation  -CL        Mode of Treatment  occupational therapy  -CL        Patient Effort  good  -CL        Symptoms Noted During/After Treatment  fatigue;shortness of breath  -CL           General Information    Patient Profile Reviewed?  yes  -CL        Onset of Illness/Injury or Date of Surgery  19  -CL        Referring Physician  MD Roxana  -CL        Prior Level of  Function  independent:;all household mobility;transfer;ADL's;dressing;bathing Prior to recent decline  -CL        Equipment Currently Used at Home  none  -CL        Pertinent History of Current Functional Problem  Pt presented to the ED 22/ to dyspnea. Recently presented to OSH 2/2 to dyspnea and c/o UE/LE numbness/tingling. Pt found to have a/c resp failure, COPD exacerbation, and GBS. Pt t/f to ICU on 01/16 2/2 to wrosening resp status.   -CL        Existing Precautions/Restrictions  fall;oxygen therapy device and L/min;other (see comments) GBS  -CL        Limitations/Impairments  insensate body part  -CL        Risks Reviewed  patient:;LOB;nausea/vomiting;dizziness;increased discomfort;change in vital signs;lines disloged  -CL        Benefits Reviewed  patient:;improve function;increase independence;increase strength;increase balance;decrease pain;increase knowledge  -CL        Barriers to Rehab  medically complex  -CL           Relationship/Environment    Lives With  child(selam), dependent;other (see comments) grandchildren, daughters  -CL           Resource/Environmental Concerns    Current Living Arrangements  home/apartment/condo  -CL           Home Main Entrance    Number of Stairs, Main Entrance  two  -CL           Cognitive Assessment/Interventions    Additional Documentation  Cognitive Assessment/Intervention (Group)  -CL           Cognitive Assessment/Intervention- PT/OT    Affect/Mental Status (Cognitive)  WFL  -CL        Orientation Status (Cognition)  oriented x 4  -CL        Follows Commands (Cognition)  follows one step commands;over 90% accuracy;verbal cues/prompting required;repetition of directions required  -CL        Cognitive Function (Cognitive)  safety deficit  -CL        Safety Deficit (Cognitive)  mild deficit;awareness of need for assistance;safety precautions awareness  -CL        Personal Safety Interventions  fall prevention program maintained;gait belt;nonskid shoes/slippers when out  of bed  -CL           Bed Mobility Assessment/Treatment    Bed Mobility Assessment/Treatment  rolling left;rolling right  -CL        Rolling Left [place] (Bed Mobility)  moderate assist (50% patient effort);verbal cues  -CL        Rolling Right [place] (Bed Mobility)  moderate assist (50% patient effort);verbal cues  -CL        Assistive Device (Bed Mobility)  bed rails;draw sheet  -CL           Transfer Assessment/Treatment    Transfer Assessment/Treatment  bed-chair transfer  -CL        Comment (Transfers)  Deferred STS to PT.   -CL           Bed-Chair Transfer    Bed-Chair [place] (Transfers)  dependent (less than 25% patient effort);2 person assist;verbal cues  -CL           ADL Assessment/Intervention    55467 - OT Self Care/Mgmt Minutes  10  -CL        BADL Assessment/Intervention  feeding  -CL           Self-Feeding Assessment/Training    [place] Level (Feeding)  liquids to mouth;scoop food and bring to mouth;supervision;verbal cues  -CL        Assistive Devices (Feeding)  built-up handle utensils;adapted cup  -CL        Position (Self-Feeding)  supported sitting  -CL        Comment (Feeding)  Pt educated on AE use and simulated use of utensils/cup.   -CL           General ROM    GENERAL ROM COMMENTS  BUE grossly WFL.   -CL           MMT (Manual Muscle Testing)    General MMT Comments  BUE shldr FE 3-/5, bicep/tricep 3+/5,  3+/5  -CL           Motor Assessment/Interventions    Additional Documentation  Balance (Group);Therapeutic Exercise (Group)  -CL           Therapeutic Exercise    Therapeutic Exercise  seated, upper extremities  -CL        Additional Documentation  Therapeutic Exercise (Row)  -CL        50754 - OT Therapeutic Exercise Minutes  4  -CL        50988 - OT Therapeutic Activity Minutes  10  -CL           Upper Extremity Seated Therapeutic Exercise    Performed, Seated Upper Extremity (Therapeutic Exercise)  shoulder flexion/extension;elbow flexion/extension;digit  flexion/extension red sponge for   -CL        Exercise Type, Seated Upper Extremity (Therapeutic Exercise)  AAROM (active assistive range of motion);AROM (active range of motion)  -CL        Sets/Reps Detail, Seated Upper Extremity (Therapeutic Exercise)  1/10  -CL        Comment, Seated Upper Extremity (Therapeutic Exercise)  BUE  -CL           Sensory Assessment/Intervention    Sensory General Assessment  light touch sensation deficits identified  -CL           Light Touch Sensation Assessment    Left Upper Extremity: Light Touch Sensation Assessment  severe impairment, less than 50% correct responses  -CL        Right Upper Extremity: Light Touch Sensation Assessment  severe impairment, less than 50% correct responses  -CL           Positioning and Restraints    Pre-Treatment Position  in bed  -CL        Post Treatment Position  chair  -CL        In Chair  notified nsg;reclined;call light within reach;encouraged to call for assist;exit alarm on;with family/caregiver;RUE elevated;LUE elevated;waffle cushion;on mechanical lift sling;with nsg;legs elevated;heels elevated  -CL           Pain Scale: Numbers Pre/Post-Treatment    Pain Scale: Numbers, Pretreatment  0/10 - no pain  -CL        Pain Scale: Numbers, Post-Treatment  0/10 - no pain  -CL           [REMOVED] Wound 01/12/19 1845 upper coccyx pressure injury    Wound - Properties Group Date first assessed: 01/12/19  -MS Time first assessed: 1845  -MS Present On Admission : yes  -MS Orientation: upper  -MS Location: coccyx  -MS Type: pressure injury  -MS Stage, Pressure Injury: Stage 1  -MS Resolution Date: 01/17/19  -CP Resolution Time: 0800  -CP Wound Outcome: Healed  -CP       Clinical Impression (OT)    Date of Referral to OT  01/16/19  -CL        OT Diagnosis  Decreased independence in ADLs.   -CL        Patient/Family Goals Statement (OT Eval)  Return to PLOF.   -CL        Criteria for Skilled Therapeutic Interventions Met (OT Eval)  yes;treatment  indicated  -CL        Rehab Potential (OT Eval)  good, to achieve stated therapy goals  -CL        Therapy Frequency (OT Eval)  daily  -CL        Anticipated Equipment Needs at Discharge (OT)  -- TBA further  -CL        Anticipated Discharge Disposition (OT)  inpatient rehabilitation facility  -CL           Vital Signs    Pre Systolic BP Rehab  129  -CL        Pre Treatment Diastolic BP  72  -CL        Post Systolic BP Rehab  114  -CL        Post Treatment Diastolic BP  71  -CL        Pretreatment Heart Rate (beats/min)  68  -CL        Posttreatment Heart Rate (beats/min)  69  -CL        Pre SpO2 (%)  96  -CL        O2 Delivery Pre Treatment  hi-flow  -CL        Post SpO2 (%)  94  -CL        O2 Delivery Post Treatment  supplemental O2  -CL        Pre Patient Position  Supine  -CL        Intra Patient Position  Sitting  -CL        Post Patient Position  Sitting  -CL           OT Goals    Transfer Goal Selection (OT)  transfer, OT goal 1  -CL        Grooming Goal Selection (OT)  grooming, OT goal 1  -CL        Self-Feeding Goal Selection (OT)  self feeding, OT goal 1  -CL        Strength Goal Selection (OT)  strength, OT goal 1  -CL        Additional Documentation  Strength Goal Selection (OT) (Row);Self-Feeding Goal Selection (OT) (Row);Grooming Goal Selection (OT) (Row)  -CL           Transfer Goal 1 (OT)    Activity/Assistive Device (Transfer Goal 1, OT)  sit-to-stand/stand-to-sit;toilet  -CL        [place] Level/Cues Needed (Transfer Goal 1, OT)  minimum assist (75% or more patient effort);verbal cues required  -CL        Time Frame (Transfer Goal 1, OT)  by discharge;long term goal (LTG)  -CL        Progress/Outcome (Transfer Goal 1, OT)  goal ongoing  -CL           Grooming Goal 1 (OT)    Activity/Device (Grooming Goal 1, OT)  hair care;wash face, hands  -CL        [place] (Grooming Goal 1, OT)  contact guard assist;verbal cues required  -CL        Time Frame (Grooming Goal 1, OT)  by discharge;long  term goal (LTG)  -CL        Progress/Outcome (Grooming Goal 1, OT)  goal ongoing  -CL           Self-Feeding Goal 1 (OT)    Activity/Assistive Device (Self-Feeding Goal 1, OT)  adapted cup;built-up handle utensils  -CL        [place] Level/Cues Needed (Self-Feeding Goal 1, OT)  supervision required;verbal cues required  -CL        Time Frame (Self-Feeding Goal 1, OT)  by discharge;long term goal (LTG)  -CL        Progress/Outcomes (Self-Feeding Goal 1, OT)  goal ongoing  -CL           Strength Goal 1 (OT)    Strength Goal 1 (OT)  Pt will tolerate BUE AROM HEP x10 reps to promote ADL performance.   -CL        Time Frame (Strength Goal 1, OT)  by discharge;long term goal (LTG)  -CL        Progress/Outcome (Strength Goal 1, OT)  goal ongoing  -CL           Living Environment    Home Accessibility  stairs to enter home  -CL          User Key  (r) = Recorded By, (t) = Taken By, (c) = Cosigned By    Initials Name Effective Dates    CP Suzanne Kim RN 06/16/16 -     CL Jazmine Acuña OT 04/03/18 -     Fabienne Jim RN 11/01/16 -          Occupational Therapy Education     Title: PT OT SLP Therapies (In Progress)     Topic: Occupational Therapy (In Progress)     Point: ADL training (In Progress)     Description: Instruct learner(s) on proper safety adaptation and remediation techniques during self care or transfers.   Instruct in proper use of assistive devices.    Learning Progress Summary           Patient Acceptance, E, NR by CL at 1/17/2019  3:59 PM    Comment:  Pt educated on appropriate safety precautions, t/f techniques, role of OT, positioning, AE use for self feeding, HEP, and benefits of therapy.                   Point: Home exercise program (In Progress)     Description: Instruct learner(s) on appropriate technique for monitoring, assisting and/or progressing therapeutic exercises/activities.    Learning Progress Summary           Patient Acceptance, E, NR by CL at 1/17/2019  3:59 PM    Comment:   Pt educated on appropriate safety precautions, t/f techniques, role of OT, positioning, AE use for self feeding, HEP, and benefits of therapy.                   Point: Precautions (In Progress)     Description: Instruct learner(s) on prescribed precautions during self-care and functional transfers.    Learning Progress Summary           Patient Acceptance, E, NR by CL at 1/17/2019  3:59 PM    Comment:  Pt educated on appropriate safety precautions, t/f techniques, role of OT, positioning, AE use for self feeding, HEP, and benefits of therapy.                   Point: Body mechanics (In Progress)     Description: Instruct learner(s) on proper positioning and spine alignment during self-care, functional mobility activities and/or exercises.    Learning Progress Summary           Patient Acceptance, E, NR by CL at 1/17/2019  3:59 PM    Comment:  Pt educated on appropriate safety precautions, t/f techniques, role of OT, positioning, AE use for self feeding, HEP, and benefits of therapy.                               User Key     Initials Effective Dates Name Provider Type Discipline     04/03/18 -  Jazmine Acuña, OT Occupational Therapist OT                  OT Recommendation and Plan  Outcome Summary/Treatment Plan (OT)  Anticipated Equipment Needs at Discharge (OT): (TBA further)  Anticipated Discharge Disposition (OT): inpatient rehabilitation facility  Therapy Frequency (OT Eval): daily  Plan of Care Review  Plan of Care Reviewed With: patient  Plan of Care Reviewed With: patient  Outcome Summary: OT completed a brief chart review relating to presenting physical deficits. Pt performed t/f to chair w/ mechanical lift and was educated on AE use to improve self feeding. Recommend cont skilled IPOT POC. Recommend pt DC to IP rehab.     Outcome Measures     Row Name 01/17/19 1115 01/17/19 0920          How much help from another person do you currently need...    Turning from your back to your side while in flat bed  without using bedrails?  2  -LS  --     Moving from lying on back to sitting on the side of a flat bed without bedrails?  2  -LS  --     Moving to and from a bed to a chair (including a wheelchair)?  1  -LS  --     Standing up from a chair using your arms (e.g., wheelchair, bedside chair)?  2  -LS  --     Climbing 3-5 steps with a railing?  1  -LS  --     To walk in hospital room?  1  -LS  --     AM-PAC 6 Clicks Score  9  -LS  --        How much help from another is currently needed...    Putting on and taking off regular lower body clothing?  --  1  -CL     Bathing (including washing, rinsing, and drying)  --  1  -CL     Toileting (which includes using toilet bed pan or urinal)  --  1  -CL     Putting on and taking off regular upper body clothing  --  2  -CL     Taking care of personal grooming (such as brushing teeth)  --  2  -CL     Eating meals  --  3  -CL     Score  --  10  -CL        Functional Assessment    Outcome Measure Options  AM-PAC 6 Clicks Basic Mobility (PT)  -LS  AM-PAC 6 Clicks Daily Activity (OT)  -CL       User Key  (r) = Recorded By, (t) = Taken By, (c) = Cosigned By    Initials Name Provider Type    Gege Westfall, PT Physical Therapist    Jazmine Patrick, DONAL Occupational Therapist          Time Calculation:   Time Calculation- OT     Row Name 01/17/19 0920             Time Calculation- OT    OT Start Time  0920  -CL      OT Received On  01/17/19  -CL      OT Goal Re-Cert Due Date  01/27/19  -CL         Timed Charges    81175 - OT Therapeutic Exercise Minutes  4  -CL      95505 - OT Therapeutic Activity Minutes  10  -CL      27218 - OT Self Care/Mgmt Minutes  10  -CL        User Key  (r) = Recorded By, (t) = Taken By, (c) = Cosigned By    Initials Name Provider Type    Jazmine Patrick OT Occupational Therapist        Therapy Suggested Charges     Code   Minutes Charges    17040 (CPT®) Hc Ot Neuromusc Re Education Ea 15 Min      24969 (CPT®) Hc Ot Ther Proc Ea 15 Min 4     70148 (CPT®) Hc Ot  Therapeutic Act Ea 15 Min 10 1    36934 (CPT®) Hc Ot Manual Therapy Ea 15 Min      46411 (CPT®) Hc Ot Iontophoresis Ea 15 Min      62690 (CPT®) Hc Ot Elec Stim Ea-Per 15 Min      42133 (CPT®) Hc Ot Ultrasound Ea 15 Min      62416 (CPT®) Hc Ot Self Care/Mgmt/Train Ea 15 Min 10 1    Total  24 2        Therapy Charges for Today     Code Description Service Date Service Provider Modifiers Qty    48006373882 HC OT THERAPEUTIC ACT EA 15 MIN 1/17/2019 Jazmine Acuña, OT GO 1    49529214527 HC OT SELF CARE/MGMT/TRAIN EA 15 MIN 1/17/2019 Jazmine Acuña, OT GO 1    24516704472 HC OT EVAL LOW COMPLEXITY 3 1/17/2019 Jazmine Acuña, OT GO 1    60310466736 HC OT THER SUPP EA 15 MIN 1/17/2019 Jazmine Acuña OT GO 3               Jazmine Acuña OT  1/17/2019

## 2019-01-17 NOTE — PLAN OF CARE
Problem: Patient Care Overview  Goal: Plan of Care Review  Outcome: Ongoing (interventions implemented as appropriate)   01/17/19 3050   Coping/Psychosocial   Plan of Care Reviewed With patient;family   Plan of Care Review   Progress improving   OTHER   Outcome Summary Patient up to chair today with lift for 4 hours, PROM and AROM with PT/OT, utensils and cup given for assistance. Pain and anxiety better controlled with lortab/atarax/neurotin. Urinary retention continues, urology consulted re stone/obstruction, no new orders. Will attempt to remove FC in am, patient refused to removed catheter today. IGG continues with no complication and movement of extremities continues to improve. Wean oxygen today to 2l/nc.        Problem: Fall Risk (Adult)  Goal: Identify Related Risk Factors and Signs and Symptoms  Outcome: Ongoing (interventions implemented as appropriate)      Problem: Chronic Obstructive Pulmonary Disease (Adult)  Goal: Signs and Symptoms of Listed Potential Problems Will be Absent, Minimized or Managed (Chronic Obstructive Pulmonary Disease)  Outcome: Ongoing (interventions implemented as appropriate)      Problem: Wound (Includes Pressure Injury) (Adult)  Goal: Signs and Symptoms of Listed Potential Problems Will be Absent, Minimized or Managed (Wound)  Outcome: Ongoing (interventions implemented as appropriate)      Problem: Mobility, Physical Impaired (Adult)  Goal: Identify Related Risk Factors and Signs and Symptoms  Outcome: Ongoing (interventions implemented as appropriate)    Goal: Enhanced Mobility Skills  Outcome: Ongoing (interventions implemented as appropriate)    Goal: Enhanced Functional Ability  Outcome: Ongoing (interventions implemented as appropriate)      Problem: Pain, Chronic (Adult)  Goal: Identify Related Risk Factors and Signs and Symptoms  Outcome: Ongoing (interventions implemented as appropriate)    Goal: Acceptable Pain/Comfort Level and Functional Ability  Outcome: Ongoing  (interventions implemented as appropriate)      Problem: Skin Injury Risk (Adult)  Goal: Identify Related Risk Factors and Signs and Symptoms  Outcome: Ongoing (interventions implemented as appropriate)    Goal: Skin Health and Integrity  Outcome: Ongoing (interventions implemented as appropriate)      Problem: Guillain-[place] Syndrome (Adult)  Goal: Signs and Symptoms of Listed Potential Problems Will be Absent, Minimized or Managed (Guillain-Barr[place] Syndrome)  Outcome: Ongoing (interventions implemented as appropriate)

## 2019-01-17 NOTE — PAYOR COMM NOTE
"Stephanie Cerna RN Utilization Review [phone]  Fax # [phone]  Ref # [id number]          Kristina Heck (58 y.o. Female)     Date of Birth Social Security Number Address Home Phone MRN    1960  [address]  [place] [address] [phone] [id number]    Buddhism Marital Status          None        Admission Date Admission Type Admitting Provider Attending Provider Department, Room/Bed    1/12/19 Emergency Miah Odonnell MD Gerhardstein, Donna C, MD [facility] 2A ICU, N204/1    Discharge Date Discharge Disposition Discharge Destination                       Attending Provider:  Coretta Schmidt MD    Allergies:  No Known Allergies    Isolation:  None   Infection:  None   Code Status:  CPR    Ht:  170.2 cm (67.01\")   Wt:  96 kg (211 lb 10.3 oz)    Admission Cmt:  None   Principal Problem:  Guillain-Chaska syndrome (CMS/HCC) [G61.0]                 Active Insurance as of 1/12/2019     Primary Coverage     Payor Plan Insurance Group Employer/Plan Group    PASSPORT PASSPORT MEDICAID     Payor Plan Address Payor Plan Phone Number Payor Plan Fax Number Effective Dates    [address] [phone]  11/1/1997 - None Entered    [facility] [id number]       Subscriber Name Subscriber Birth Date Member ID       KRISTINA HECK [id number]                 Emergency Contacts      (Rel.) Home Phone Work Phone Mobile Phone    JOSUE LORA (Daughter) [phone] -- [phone]    Pattie Heck (Daughter) -- -- [phone]            ICU Vital Signs     Row Name 01/17/19 0746 01/17/19 0732 01/17/19 0600 01/17/19 0500 01/17/19 0400       Vitals    Temp  --  --  --  --  97.9 °F (36.6 °C)    Temp src  --  --  --  --  Axillary    Pulse  76  78  58  63  63    Heart Rate Source  Monitor  --  Monitor  Monitor  Monitor    Resp  24  --  18  24  20    Resp Rate Source  Visual  --  Monitor  Monitor  Monitor    BP  --  --  131/71  116/68  87/58  (Abnormal)     " Noninvasive MAP (mmHg)  --  --  88  84  69    BP Location  --  --  Left arm  Left arm  Left arm    BP Method  --  --  Automatic  Automatic  Automatic    Patient Position  --  --  Lying  Lying  Lying       Oxygen Therapy    SpO2  94 %  93 %  98 %  97 %  98 %    Pulse Oximetry Type  Continuous  Continuous  Continuous  Continuous  Continuous    Device (Oxygen Therapy)  high-flow nasal cannula  high-flow nasal cannula  (Significant)   NPPV/NIV  NPPV/NIV  NPPV/NIV    Flow (L/min)  45  45  --  --  --    Oxygen Concentration (%)  50  50  --  --  --    Row Name 01/17/19 0300 01/17/19 0200 01/17/19 0100 01/17/19 0000 01/16/19 2300       Vitals    Temp  --  --  --  98.2 °F (36.8 °C)  --    Temp src  --  --  --  Axillary  --    Pulse  73  68  65  65  60    Heart Rate Source  Monitor  Monitor  Monitor  Monitor  Monitor    Resp  22 24  22  22  20    Resp Rate Source  Monitor  Monitor  Monitor  Monitor  Monitor bipap    BP  121/75  133/77  105/63  97/61  100/62    Noninvasive MAP (mmHg)  85  99  80  74  75    BP Location  Left arm  Left arm  Left arm  Left arm  Left arm    BP Method  Automatic  Automatic  Automatic  Automatic  Automatic    Patient Position  Lying  Lying  Lying  Lying  Lying       Oxygen Therapy    SpO2  95 %  95 %  94 %  95 %  95 %    Pulse Oximetry Type  Continuous  Continuous  Continuous  Continuous  Continuous    Device (Oxygen Therapy)  NPPV/NIV  (Significant)   heated;high-flow nasal cannula;humidified  (Significant)   NPPV/NIV  NPPV/NIV  NPPV/NIV    Row Name 01/16/19 2222 01/16/19 2200 01/16/19 2100 01/16/19 2000 01/16/19 1900       Vitals    Temp  --  --  --  98.5 °F (36.9 °C)  --    Temp src  --  --  --  Axillary  --    Pulse  71  71  77  80  79    Heart Rate Source  --  Monitor  Monitor  Monitor  Monitor    Resp  24  24  20  20  20    Resp Rate Source  Monitor  Visual  Visual  Visual  Visual    BP  --  103/64  120/83  128/74  116/67    Noninvasive MAP (mmHg)  --  78  109  100  90    BP Location  --   Left arm  Left arm  Left arm  Left arm    BP Method  --  Automatic  Automatic  Automatic  Automatic    Patient Position  --  Lying  Lying  Lying  --       Oxygen Therapy    SpO2  94 %  94 %  94 %  95 %  95 %    Pulse Oximetry Type  Continuous  Continuous  Continuous  Continuous  Continuous    Device (Oxygen Therapy)  NPPV/NIV  (Significant)   humidified;high-flow nasal cannula;heated  heated;high-flow nasal cannula;humidified  heated;high-flow nasal cannula  high-flow nasal cannula    Flow (L/min)  --  --  45  45  45    Oxygen Concentration (%)  --  --  55  55  55    Row Name 01/16/19 1830 01/16/19 1800 01/16/19 1730 01/16/19 1700 01/16/19 1630       Vitals    Pulse  78  80  84  86  81    Heart Rate Source  --  Monitor  --  Monitor  --    Resp  --  20  --  20  --    Resp Rate Source  --  Visual  --  Visual  --    BP  117/67  113/65  112/60  110/65  119/66    Noninvasive MAP (mmHg)  88  86  78  83  92    BP Location  --  Left arm  --  Left arm  --    BP Method  --  Automatic  --  Automatic  --       Oxygen Therapy    SpO2  94 %  94 %  94 %  93 %  94 %    Pulse Oximetry Type  --  Continuous  --  Continuous  --    Device (Oxygen Therapy)  --  high-flow nasal cannula  --  high-flow nasal cannula  --    Flow (L/min)  --  45  --  45  --    Oxygen Concentration (%)  --  55  --  55  --    Row Name 01/16/19 1602 01/16/19 1600 01/16/19 1535 01/16/19 1500 01/16/19 1400       Vitals    Temp  99.1 °F (37.3 °C)  99.1 °F (37.3 °C)  --  --  --    Temp src  --  Oral  --  --  --    Pulse  84  78  76  77  72    Heart Rate Source  --  Monitor  --  Monitor  Monitor    Resp  --  22  22  22  22    Resp Rate Source  --  Visual  --  Visual  Visual    BP  107/66  107/66  --  118/65  116/61    Noninvasive MAP (mmHg)  --  85  --  84  83    BP Location  --  Left arm  --  Left arm  Left arm    BP Method  --  Automatic  --  Automatic  Automatic       Oxygen Therapy    SpO2  --  96 %  95 %  93 %  94 %    Pulse Oximetry Type  --  Continuous  --   Continuous  Continuous    Device (Oxygen Therapy)  --  high-flow nasal cannula  high-flow nasal cannula  high-flow nasal cannula  high-flow nasal cannula    Flow (L/min)  --  45  45  45  45    Oxygen Concentration (%)  --  55  55  60  70    Row Name 01/16/19 1300 01/16/19 1220 01/16/19 1200 01/16/19 1137 01/16/19 1100       Vitals    Temp  --  --  98.1 °F (36.7 °C)  --  --    Temp src  --  --  Oral  --  --    Pulse  85  90  93  92  86    Heart Rate Source  Monitor  --  Monitor  --  Monitor    Resp  24  --  24  --  24    Resp Rate Source  Visual  --  Visual  --  Visual    BP  120/62  --  132/75  131/74  140/78    Noninvasive MAP (mmHg)  85  --  98  --  96    BP Location  Left arm  --  Left arm  --  Left arm    BP Method  Automatic  --  Automatic  --  Automatic       Oxygen Therapy    SpO2  94 %  94 %  93 %  --  91 %    Pulse Oximetry Type  Continuous  --  Continuous  --  Continuous    Device (Oxygen Therapy)  high-flow nasal cannula  high-flow nasal cannula  high-flow nasal cannula  --  high-flow nasal cannula    Flow (L/min)  45  45  45  --  45    Oxygen Concentration (%)  70  70  70  --  60    Row Name 01/16/19 1019 01/16/19 1000 01/16/19 0900             Vitals    Pulse  --  91  90      Heart Rate Source  --  Monitor  Monitor      Resp  --  28  29  (Abnormal)       Resp Rate Source  --  Monitor  Monitor      BP  --  139/78  127/77      Noninvasive MAP (mmHg)  --  95  92      BP Location  --  Left arm  Left arm      BP Method  --  Automatic  Automatic         Oxygen Therapy    SpO2  --  95 %  92 %      Pulse Oximetry Type  --  Continuous  Continuous      Device (Oxygen Therapy)  high-flow nasal cannula  NPPV/NIV  NPPV/NIV      Flow (L/min)  45  --  --      Oxygen Concentration (%)  55  60  60          Hospital Medications (active)       Dose Frequency Start End    acetaminophen (TYLENOL) tablet 650 mg 650 mg Every 4 Hours PRN 1/12/2019     Sig - Route: Take 2 tablets by mouth Every 4 (Four) Hours As Needed for Mild  "Pain . - Oral    aspirin chewable tablet 81 mg 81 mg Daily 1/13/2019     Sig - Route: Chew 1 tablet Daily. - Oral    doxycycline (VIBRAMYCIN) 100 mg/100 mL 0.9% NS  mg Every 12 Hours 1/16/2019 1/30/2019    Sig - Route: Infuse 100 mL into a venous catheter Every 12 (Twelve) Hours. - Intravenous    enoxaparin (LOVENOX) syringe 40 mg 40 mg Every 24 Hours 1/15/2019     Sig - Route: Inject 0.4 mL under the skin into the appropriate area as directed Daily. - Subcutaneous    Notes to Pharmacy: DO NOT restart until after LP today    escitalopram (LEXAPRO) tablet 10 mg 10 mg Daily 1/13/2019     Sig - Route: Take 1 tablet by mouth Daily. - Oral    gabapentin (NEURONTIN) capsule 100 mg 100 mg 3 Times Daily 1/12/2019     Sig - Route: Take 1 capsule by mouth 3 (Three) Times a Day. - Oral    hydrochlorothiazide (HYDRODIURIL) tablet 25 mg 25 mg Daily 1/13/2019     Sig - Route: Take 1 tablet by mouth Daily. - Oral    hydrocortisone sodium succinate (Solu-CORTEF) injection 100 mg 100 mg Once As Needed 1/15/2019     Sig - Route: Infuse 100 mg into a venous catheter 1 (One) Time As Needed (Infusion Reaction). - Intravenous    immune globulin (human) (GAMMAGARD) infusion 20 g 20 g Every 24 Hours Scheduled 1/15/2019 1/20/2019    Sig - Route: Infuse 20 g into a venous catheter Daily. - Intravenous    Linked Group 1:  \"And\" Linked Group Details        immune globulin (human) (GAMMAGARD) infusion 20 g 20 g Every 24 Hours Scheduled 1/15/2019 1/20/2019    Sig - Route: Infuse 20 g into a venous catheter Daily. - Intravenous    Linked Group 1:  \"And\" Linked Group Details        ipratropium-albuterol (DUO-NEB) nebulizer solution 3 mL 3 mL 4 Times Daily - RT 1/12/2019     Sig - Route: Take 3 mL by nebulization 4 (Four) Times a Day. - Nebulization    levothyroxine (SYNTHROID, LEVOTHROID) tablet 100 mcg 100 mcg Daily 1/13/2019     Sig - Route: Take 1 tablet by mouth Daily. - Oral    lisinopril (PRINIVIL,ZESTRIL) tablet 10 mg 10 mg Every 24 " "Hours Scheduled 1/14/2019     Sig - Route: Take 1 tablet by mouth Daily. - Oral    LORazepam (ATIVAN) injection 1 mg 1 mg Every 6 Hours PRN 1/15/2019 1/25/2019    Sig - Route: Infuse 0.5 mL into a venous catheter Every 6 (Six) Hours As Needed for Anxiety. - Intravenous    methylPREDNISolone sodium succinate (SOLU-Medrol) injection 30 mg 30 mg Every 12 Hours 1/16/2019     Sig - Route: Infuse 0.75 mL into a venous catheter Every 12 (Twelve) Hours. - Intravenous    metoprolol succinate XL (TOPROL-XL) 24 hr tablet 25 mg 25 mg Daily 1/13/2019     Sig - Route: Take 1 tablet by mouth Daily. - Oral    nicotine (NICODERM CQ) 21 MG/24HR patch 1 patch 1 patch Daily PRN 1/13/2019     Sig - Route: Place 1 patch on the skin as directed by provider Daily As Needed (smoking cessation). - Transdermal    oxyCODONE-acetaminophen (PERCOCET) 5-325 MG per tablet 1 tablet 1 tablet Every 6 Hours PRN 1/16/2019 1/26/2019    Sig - Route: Take 1 tablet by mouth Every 6 (Six) Hours As Needed for Moderate Pain  or Severe Pain . - Oral    Pharmacy Consult - MT  Daily 1/13/2019     Sig - Route: Daily. - Does not apply    piperacillin-tazobactam (ZOSYN) 4.5 g in iso-osmotic dextrose 100 mL IVPB (premix) 4.5 g Once 1/16/2019 1/16/2019    Sig - Route: Infuse 100 mL into a venous catheter 1 (One) Time. - Intravenous    piperacillin-tazobactam (ZOSYN) 4.5 g in iso-osmotic dextrose 100 mL IVPB (premix) 4.5 g Every 8 Hours 1/16/2019 1/30/2019    Sig - Route: Infuse 100 mL into a venous catheter Every 8 (Eight) Hours. - Intravenous    potassium chloride (KLOR-CON) packet 40 mEq 40 mEq As Needed 1/12/2019     Sig - Route: Take 40 mEq by mouth As Needed (potassium replacement, see admin instructions). - Oral    Linked Group 2:  \"Or\" Linked Group Details        potassium chloride 10 mEq in 100 mL IVPB 10 mEq Every 1 Hour PRN 1/12/2019     Sig - Route: Infuse 100 mL into a venous catheter Every 1 (One) Hour As Needed (potassium protocol PERIPHERAL - see " "admin instructions). - Intravenous    Linked Group 2:  \"Or\" Linked Group Details        sodium chloride 0.9 % flush 10 mL 10 mL As Needed 1/12/2019     Sig - Route: Infuse 10 mL into a venous catheter As Needed for Line Care. - Intravenous    Linked Group 3:  \"And\" Linked Group Details        Pharmacy to enter IVIG order 40,000 mg (Discontinued) 400 mg/kg × 94 kg Daily 1/15/2019 1/16/2019    Sig - Route: Infuse 40 g into a venous catheter Daily. - Intravenous          Ventilator/Non-Invasive Ventilation Settings (From admission, onward)    Start     Ordered    01/12/19 2151  NIPPV (CPAP or BIPAP)  At Bedtime & As Needed-RT     Question Answer Comment   Indication: Sleep Apnea    Type: BIPAP    NIPPV Mask Interface Per Patient Preference    Titrate for SPO2 90%        01/12/19 2150             Operative/Procedure Notes (last 72 hours) (Notes from 1/14/2019  8:01 AM through 1/17/2019  8:01 AM)      Emani Husain MD at 1/16/2019  5:31 AM      Procedure Orders    1. Critical Care [[id number]] ordered by Emani Husain MD at 01/16/19 0532            Post-procedure Diagnoses    1. Hypoxia [R09.02]    2. Acute on chronic respiratory failure with hypoxia (CMS/[facility]) [J96.21]             Critical Care  Performed by: Emani Husain MD  Authorized by: Emani Husain MD   Total critical care time: 45 minutes  Critical care time was exclusive of separately billable procedures and treating other patients.  Critical care was necessary to treat or prevent imminent or life-threatening deterioration of the following conditions: respiratory failure.  Critical care was time spent personally by me on the following activities: blood draw for specimens, development of treatment plan with patient or surrogate, discussions with consultants, evaluation of patient's response to treatment, examination of patient, ordering and performing treatments and interventions, ordering and review of radiographic studies, pulse oximetry, re-evaluation of " "patient's condition and review of old charts.  Comments:   Called for pt w/ increased oxygen requirement; was found to have sats in mid 80's.  Had been on high flow at 45% through most of day.  Was placed on 100% bipap to improve sats to low 90's.  Pt somewhat more tachypneic w/ wheeze on exam.  ABG w/ low PO2 on 100%.  CXR still not done.  Pt remains alert but slow to answer questions.  Current thought is that pt likely has GBS.  Given likelihood of progression at this point have discussed with intensivist and will transfer to unit.  IVIG initiated 1/15.  Neurology following.                  Electronically signed by Emani Husain MD at 1/16/2019  5:37 AM          Physician Progress Notes (last 72 hours) (Notes from 1/14/2019  8:01 AM through 1/17/2019  8:01 AM)      Tere Galvez MD at 1/16/2019  9:42 AM        Neurology       Patient Care Team:  Gio Henderson MD as PCP - General (Adolescent Medicine)    Chief complaint weakness      Subjective .     History:  Transferred to ICU earlier this morning due to acute decrease in oxygen saturation.  Now on BiPAP.  Patient feels arms and legs may be slightly stronger.  Nurse notes she has no gag although has reasonable cough.  Speech eval pending.  Continued leg pain and cramps since admission.  Status post IVIG ×1-well-tolerated.    ROS: No fever or headache.    Objective     Vital Signs   Blood pressure 127/77, pulse 90, temperature 98.5 °F (36.9 °C), temperature source Axillary, resp. rate (!) 29, height 170.2 cm (67.01\"), weight 96 kg (211 lb 10.3 oz), SpO2 92 %.    Physical Exam:              Neuro: Middle-aged white woman lying in ICU bed in no acute distress although on BiPAP.  Alert, not aphasic, appropriate.  Pupils 2.5 mm reactive, EOMI  Motor: 3-4/5-stable, upper extremities possibly slightly improved    Results Review:              ABG had 04 13, PCO2 48, PO2 69, bicarbonate 32  BMP and CBC reviewed    Assessment/Plan     Guillain-Barré " "syndrome-CSF, EMG/nerve conduction studies and clinical picture all consistent.  Hopefully near dee.  Has received first of 5 IVIG treatments.  Respiratory status borderline, patient now in ICU-appreciate intensivist.  Agree may need NG.  Discussed with patient.  Treat leg pain as able, with caution regarding respiratory status, discussed with patient and nurse.    I discussed the patients findings and my recommendations with patient and nursing staff    Tere Galvez MD  01/16/19  9:42 AM      Electronically signed by Tere Galvez MD at 1/16/2019  9:47 AM     Miah Odonnell MD at 1/16/2019  6:07 AM          INTENSIVIST   PROGRESS NOTE     Hospital:  LOS: 4 days   Subjective   S     Ms. Kristina Heck, 58 y.o. female is followed for:      Guillain-Simpson syndrome    Acute on chronic respiratory failure       As an Intensivist, we provide an integrated approach to the ICU patient and family, medical management of comorbid conditions, including but not limited to electrolytes, glycemic control, organ dysfunction, lead interdisciplinary rounds and coordinate the care with all other services, including those from other specialists.     Interval History:    Kristina Heck is a 58 y.o. female was transferred from the floor for worsening hypoxemia and tachypnea.    12/31- Developed weakness/numbness in her hands with development of numbness in her feet on 1/1.  She has had worsening dyspnea, requiring her to increase her home O2 to 4L NC.  She had progressively rising weakness to the point where she was not able to walk since the 10th.      1/10 - Evaluated at [facility] ED.  MRI performed and it was felt patient had a \"pinched nerve\", discharged and told to follow up w/ outpatient Neurology.  At that time NIF & VC were reportedly normal.       1/12 - Presented to our ED for worsening SOA & cough.  Admitted by the Hospitalist.  CTA ruled out PE.       1/13 - continued to be dyspneic w/ productive cough.  " Extremity weakness persisted.  Doxycycline started.    1/14 -urinary retention of 1100 mls.  Evaluated by Dr. Galvez w/ Neurology.  Placed on thickened liquid diet.  Respiratory status improved but still requiring HFNC.  Reported diplopia.         1/15-  LP performed (glucose 95, protein 66).  EMG results c/w GBS.  IVIG initiated.  FEES planned for next day.  NIF initially 15, 60 on repeat.  FVC 1.2.      1/16 - Patient was on HFNC @ 45% FiO2 when she had sudden hypoxia (SpO2 82%) requiring BIPAP w/ 100%.      Dr. Husain called to assume her care and admit her to the ICU.    Family is in her way.    She is tolerating BiPAP, with improvement in her oxygenation, but remains tachypneic. Sputum is brown in color.     The patient's relevant past medical, surgical and social history were reviewed and updated in Epic as appropriate.     ROS:   Constitutional: Negative for fever.   Respiratory: Dyspnea.   Cardiovascular: Negative for chest pain.   Gastrointestinal: Negative for  nausea, vomiting and diarrhea.     Objective   O     Vitals:  Temp: 99.7 °F (37.6 °C) (01/16/19 0615) Temp  Min: 99.3 °F (37.4 °C)  Max: 99.7 °F (37.6 °C)   BP: 146/84 (01/16/19 0630) BP  Min: 145/88  Max: 155/81   Pulse: 86 (01/16/19 0630) Pulse  Min: 60  Max: 92   Resp: (!) 30 (01/16/19 0615) Resp  Min: 18  Max: 30   SpO2: 95 % (01/16/19 0630) SpO2  Min: 92 %  Max: 98 %   Device: NPPV/NIV (01/16/19 0623)    Flow Rate: 30 (01/16/19 0200) Flow (L/min)  Min: 30  Max: 40     Intake/Ouptut 24 hrs (7:00AM - 6:59 AM)  Intake/Output       01/15/19 0700 - 01/16/19 0659    Intake (ml) --    Output (ml) 2200    Net (ml) -2200    Last Weight  96 kg (211 lb 10.3 oz)           BiPAP  Settings: Observed:   Mode: BiPAP (01/16/19 0349)         Resp Rate (Set): 10 (01/16/19 0349) Resp Rate (Observed) Vent: 25 (01/16/19 0349)   FiO2 (%): (S) 100 % (01/16/19 0349)    PEEP/CPAP (cm H2O): 8 cm H20 (01/12/19 5572)      Minute Ventilation (L/min) (Obs): 15 L/min  (01/15/19 0825)          Physical Examination  Telemetry:  Rhythm: (P) normal sinus rhythm (01/16/19 0606)  Atrial Rhythm: atrial fibrillation (01/14/19 0800)      Cardiovascular: Normal rate, regular and rhythm. Normal heart sounds.  No murmurs, gallop or rub.   Respiratory: No respiratory distress. Normal respiratory effort.  Rhonchi.    Abdominal:  Soft. No masses. Non-tender. No distension. No HSM.   Extremities: No digital cyanosis. No clubbing.  No edema.   Neurological:   Alert, but tired and weak.  Best Eye Response: 3-->(E3) to speech (01/16/19 0606)  Best Motor Response: 6-->(M6) obeys commands (01/16/19 0606)  Best Verbal Response: 5-->(V5) oriented (01/16/19 0606)  Diane Coma Scale Score: 14 (01/16/19 0606)   Lines/Drains/Airways: Peripheral IV(s)  Franco     Hematology:  Results from last 7 days   Lab Units 01/16/19  0351 01/15/19  1529 01/13/19  0718   WBC 10*3/mm3 8.43 10.18 7.49   HEMOGLOBIN g/dL 16.7* 16.8* 17.1*   MCV fL 93.2 92.9 93.0   PLATELETS 10*3/mm3 194 212 203       Chemistry:  Estimated Creatinine Clearance: 110.3 mL/min (by C-G formula based on SCr of 0.67 mg/dL).    Results from last 7 days   Lab Units 01/16/19  0351 01/15/19  0601 01/14/19  0501   SODIUM mmol/L 136 138 139   POTASSIUM mmol/L 3.7 4.2 3.4*   CHLORIDE mmol/L 101 105 104   CO2 mmol/L 29.0 25.0 30.0   ANION GAP mmol/L 6.0 8.0 5.0   GLUCOSE mg/dL 117* 143* 129*   CALCIUM mg/dL 8.8 9.0 8.9     Results from last 7 days   Lab Units 01/16/19  0351 01/15/19  0601 01/14/19  0501   BUN mg/dL 28* 28* 24*   CREATININE mg/dL 0.67 0.76 0.68     Results from last 7 days   Lab Units 01/13/19  0718   MAGNESIUM mg/dL 2.1       Hepatic:  Results from last 7 days   Lab Units 01/12/19  1346   ALK PHOS U/L 104*   BILIRUBIN mg/dL 1.3*   ALT (SGPT) U/L 32   AST (SGOT) U/L 23   ALBUMIN g/dL 4.10       Arterial Blood Gases:  Results from last 7 days   Lab Units 01/16/19  0413 01/12/19  1409   PH, ARTERIAL pH units 7.437 7.451*   PCO2, ARTERIAL mm  Hg 48.2* 48.9*   PO2 ART mm Hg 69.4* 59.4*   FIO2 % 100 36     CSF    Chemistry:  Results from last 7 days   Lab Units 01/15/19  1207   PROTEIN, TOTAL (CSF) mg/dL 66.0*   GLUCOSE CSF mg/dL 95*         Images:  Ct Abdomen Pelvis With & Without Contrast    Result Date: 1/15/2019  1. There is a 1 cm stone in the left ureteropelvic junction producing moderately severe obstruction of the left kidney. There is also a 12 mm low density filling defect in the renal pelvis. On the unenhanced examination this is not a calcified stone this may represent clot related to the ureteral stone and obstruction and less likely a noncalcified stone. 2. Lastly there is bibasilar airspace disease, left greater than right.  D:  01/15/2019 E:  01/15/2019  This report was finalized on 1/15/2019 1:10 PM by Dr. Cyril Lanza MD.      Mri Cervical Spine With & Without Contrast    Result Date: 1/15/2019  1. No evidence of acute osseous or ligamentous injury. 2. Moderate cervical spine degenerative change. No significant thoracic spine degenerative change. 3. No abnormal enhancement regarding the cervical or thoracic spine.  D:  01/15/2019 E:  01/15/2019  This report was finalized on 1/15/2019 10:39 AM by Javier Connors.      Mri Thoracic Spine With & Without Contrast    Result Date: 1/15/2019  1. No evidence of acute osseous or ligamentous injury. 2. Moderate cervical spine degenerative change. No significant thoracic spine degenerative change. 3. No abnormal enhancement regarding the cervical or thoracic spine.  D:  01/15/2019 E:  01/15/2019  This report was finalized on 1/15/2019 10:39 AM by Javier Connors.      Ir Lumbar Puncture Diag/thera    Result Date: 1/15/2019  Successful fluoroscopic guided lumbar puncture as above with no immediate complications.    This report was finalized on 1/15/2019 3:22 PM by Javier Connors.      Results: Reviewed.  I reviewed the patient's new laboratory and imaging results.  I independently reviewed the patient's new  images.    Medications: Reviewed.    Assessment/Plan   A / P         58 y.o.female, admitted on 2019 with Acute on chronic respiratory failure with hypoxia (CMS/[facility]) [J96.21]:     1. Guillain-Barré Syndrome  1. LP (1/15/19) with an elevated protein and normal WBC  2. On IVIG per Neurology  3. VC is reduced but NIF is adequate.    Respiratory Mechanics Threshold   Negative Inspiratory Force (cm H2O): 50 (19 0646) NIF < -25 - -30 cm H2O   Vital Capacity (mL): 1.1 (19) VC < 15-20 mL/kg       2. Respiratory failure, type 2, with worsening P/F ratio  1. May require intubation and mechancal ventilation  3. COPD and JURGEN per history  1. Tobacco use  4. Dysphagia  5. Hypothyroidism on Rx  6. HTN on  b-blockers and ACEI started 1/15/19  7. Depression  8. Antibiotics: DOXYcycline   9. Glucose: At risk for DM (pre-diabetes) based on her HbA1c. [HbA1C 5.7%-6.4%, eA-139 mg/dL].            Lab Results   Lab Value Date/Time    HGBA1C 6.40 (H) 2019 0718       Diet: Diet Dysphagia; IV - Mechanical Soft No Mixed Consistencies; Nectar / Syrup Thick; Cardiac   Advance Directives: Code Status and Medical Interventions:   Ordered at: 19 [id number]     Level Of Support Discussed With:    Patient     Code Status:    CPR     Medical Interventions (Level of Support Prior to Arrest):    Full          Assessment / Plan:    1. Awaiting for family. May need intubation and mechanical ventilation  2. Follow mechanics  3. On high dose of steroids: SM 60 mg q8h, apparently for COPD. We will decrease the dose to 30 mg q 12 h.  4. Start Piperacillin-Tazobactam      Plan of care and goals reviewed during interdisciplinary rounds.  Level of Risk is High due to:  illness with threat to life or bodily function.   I discussed the patient's findings and my recommendations with patient and nursing staff    I have personally seen, interviewed and examined the patient and verified all the key components of the history, physical  examination, assessment and plan with СВЕТЛАНА Yin and reviewed the note, which reflects my changes and contributions.    Time: was greater than 40 minutes.    (This excludes time spent performing separately reportable procedures and services).  Patient was at high risk of imminent or life-threatening deterioration in her condition due to Respiratory failure.     Miah Odonnell MD, FACP, FCCP, [facility]  Intensive Care Medicine, Nutrition Support and Pulmonary Medicine       Electronically signed by Miah Odonnell MD at 2019  7:29 AM     Masha Caba MD at 1/15/2019 10:57 AM              [facility] Medicine Services  PROGRESS NOTE    Patient Name: Kristina Heck  : 1960  MRN: [id number]    Date of Admission: 2019  Length of Stay: 3  Primary Care Physician: Gio Henderson MD    Subjective   Subjective     CC:  F/u dyspnea    HPI:  Patient seen while on BiPAP. She reports she feels about the same. Understands she will get multiple tests today including EMG, LP. D/w neurology, plan to start IVIG today .     Review of Systems      Otherwise ROS is negative except as mentioned in the HPI.    Objective   Objective     Vital Signs:   Temp:  [98.1 °F (36.7 °C)-99.2 °F (37.3 °C)] 99.2 °F (37.3 °C)  Heart Rate:  [77-86] 81  Resp:  [18-24] 20  BP: (124-155)/(69-82) 155/80  FiO2 (%):  [50 %] 50 %     Physical Exam:  GEN- moderate resp distress, sitting in bed, BIPAP in place   HEENT- atraumatic, normocephlic, eomi  NECK- supple, trachea midline, no masses  RESP: increased effort, wob, on BIPAP, difficult to auscultate full exam due to BIPAP  CV: no murmurs, s1/s2, rrr  MSK: no edema noted, spontaneous movement of all extremities  NEURO: alert, oriented, 4/5 strength noted in all 4 extremities  SKIN: no rashes  PSYCH: appropriate mood and affect       Results Reviewed:  I have personally reviewed current lab, radiology, and data and agree.    Results from last 7 days   Lab  Units  01/13/19   0718  01/12/19   1346   WBC 10*3/mm3  7.49  10.21   HEMOGLOBIN g/dL  17.1*  17.4*   HEMATOCRIT %  53.0*  52.9*   PLATELETS 10*3/mm3  203  223     Results from last 7 days   Lab Units  01/15/19   0601  01/14/19   0501  01/13/19   0718  01/12/19   1419  01/12/19   1346   SODIUM mmol/L  138  139  138   --   136   POTASSIUM mmol/L  4.2  3.4*  4.1   --   3.3*   CHLORIDE mmol/L  105  104  102   --   97*   CO2 mmol/L  25.0  30.0  29.0   --   31.0   BUN mg/dL  28*  24*  24*   --   20   CREATININE mg/dL  0.76  0.68  0.68   --   0.73   GLUCOSE mg/dL  143*  129*  141*   --   101*   CALCIUM mg/dL  9.0  8.9  9.0   --   9.3   ALT (SGPT) U/L   --    --    --    --   32   AST (SGOT) U/L   --    --    --    --   23   TROPONIN I ng/mL   --    --    --   0.00   --      Estimated Creatinine Clearance: 96.2 mL/min (by C-G formula based on SCr of 0.76 mg/dL).    BNP   Date Value Ref Range Status   01/12/2019 9.0 0.0 - 100.0 pg/mL Final     Comment:     Results may be falsely decreased if patient taking Biotin.       Microbiology Results Abnormal     None          Imaging Results (last 24 hours)     Procedure Component Value Units Date/Time    MRI Thoracic Spine With & Without Contrast [[id number]] Collected:  01/15/19 1032     Updated:  01/15/19 1041    Narrative:       EXAMINATION: MRI THORACIC SPINE W WO CONTRAST-, MRI CERVICAL SPINE W WO  CONTRAST-      INDICATION: T/L-spine trauma, significant injury suspected, neurologic  deficits.     TECHNIQUE:  Multiplanar multisequence MRI of the cervical and thoracic  spine was performed without and with contrast.     COMPARISONS:  CT chest 01/12/2019, CT abdomen/pelvis 01/14/2019.     FINDINGS: CERVICAL SPINE: Sagittal images cover from clivus through T3  caudally.     The cervicomedullary junction is unremarkable.  The cervical cord is  normal in caliber and signal.  There is no abnormal enhancement in the  spinal canal or in the paraspinal soft tissues.  No paraspinal edema  to  suggest ligamentous injury.     With regard to the marrow space, vertebral body heights are maintained.   There is maintenance of the usual lordosis without significant  spondylolisthesis.  Background marrow signal is normal.      DEGENERATIVE CHANGES ARE AS FOLLOWS:     C2-C3: No significant foraminal or spinal canal stenosis.     C3-C4: Shallow posterior disc osteophyte complex without significant  foraminal or spinal canal stenosis.     C4-C5: Shallow posterior disc osteophyte complex and right-sided  uncovertebral arthropathy. Mild right foraminal stenosis. Mild spinal  canal stenosis.     C5-C6: Prominent disc osteophyte complex and right greater than left  uncovertebral arthropathy. Moderate right foraminal stenosis. Moderate  spinal canal stenosis.     C6-C7: Prominent asymmetric disc osteophyte complex towards the right  and right greater than left uncovertebral arthropathy. Mild right  foraminal stenosis. Mild spinal canal stenosis.     C7-T1: No significant foraminal or spinal canal stenosis.     Incidental imaging of the head and cervical soft tissues is  unremarkable. Bilateral minor mastoid effusions.     THORACIC SPINE: Sagittal imaging covers from C7 through the superior  half of the L2 body caudally.     The imaged spinal cord is normal in caliber without intrinsic cord  signal abnormality identified.  There is no abnormal enhancement in the  spinal canal or in the paraspinal soft tissues. No paraspinal edema to  suggest ligamentous injury.     With regard to the marrow space, vertebral body heights are maintained.   There is maintenance of the usual kyphosis without significant  spondylolisthesis. Background marrow signal is normal.     As is commonly the case in the thoracic spine, degenerative changes are  negligible.  There is no significant foraminal or spinal canal stenosis  at any imaged thoracic level.     Incidental imaging of the thoracic soft tissues is unrevealing.  Obstructed left  renal collecting system again noted.       Impression:       1. No evidence of acute osseous or ligamentous injury.  2. Moderate cervical spine degenerative change. No significant thoracic  spine degenerative change.  3. No abnormal enhancement regarding the cervical or thoracic spine.     D:  01/15/2019  E:  01/15/2019     This report was finalized on 1/15/2019 10:39 AM by Javier Connors.       MRI Cervical Spine With & Without Contrast [[id number]] Collected:  01/15/19 1032     Updated:  01/15/19 1041    Narrative:       EXAMINATION: MRI THORACIC SPINE W WO CONTRAST-, MRI CERVICAL SPINE W WO  CONTRAST-      INDICATION: T/L-spine trauma, significant injury suspected, neurologic  deficits.     TECHNIQUE:  Multiplanar multisequence MRI of the cervical and thoracic  spine was performed without and with contrast.     COMPARISONS:  CT chest 01/12/2019, CT abdomen/pelvis 01/14/2019.     FINDINGS: CERVICAL SPINE: Sagittal images cover from clivus through T3  caudally.     The cervicomedullary junction is unremarkable.  The cervical cord is  normal in caliber and signal.  There is no abnormal enhancement in the  spinal canal or in the paraspinal soft tissues.  No paraspinal edema to  suggest ligamentous injury.     With regard to the marrow space, vertebral body heights are maintained.   There is maintenance of the usual lordosis without significant  spondylolisthesis.  Background marrow signal is normal.      DEGENERATIVE CHANGES ARE AS FOLLOWS:     C2-C3: No significant foraminal or spinal canal stenosis.     C3-C4: Shallow posterior disc osteophyte complex without significant  foraminal or spinal canal stenosis.     C4-C5: Shallow posterior disc osteophyte complex and right-sided  uncovertebral arthropathy. Mild right foraminal stenosis. Mild spinal  canal stenosis.     C5-C6: Prominent disc osteophyte complex and right greater than left  uncovertebral arthropathy. Moderate right foraminal stenosis. Moderate  spinal canal  stenosis.     C6-C7: Prominent asymmetric disc osteophyte complex towards the right  and right greater than left uncovertebral arthropathy. Mild right  foraminal stenosis. Mild spinal canal stenosis.     C7-T1: No significant foraminal or spinal canal stenosis.     Incidental imaging of the head and cervical soft tissues is  unremarkable. Bilateral minor mastoid effusions.     THORACIC SPINE: Sagittal imaging covers from C7 through the superior  half of the L2 body caudally.     The imaged spinal cord is normal in caliber without intrinsic cord  signal abnormality identified.  There is no abnormal enhancement in the  spinal canal or in the paraspinal soft tissues. No paraspinal edema to  suggest ligamentous injury.     With regard to the marrow space, vertebral body heights are maintained.   There is maintenance of the usual kyphosis without significant  spondylolisthesis. Background marrow signal is normal.     As is commonly the case in the thoracic spine, degenerative changes are  negligible.  There is no significant foraminal or spinal canal stenosis  at any imaged thoracic level.     Incidental imaging of the thoracic soft tissues is unrevealing.  Obstructed left renal collecting system again noted.       Impression:       1. No evidence of acute osseous or ligamentous injury.  2. Moderate cervical spine degenerative change. No significant thoracic  spine degenerative change.  3. No abnormal enhancement regarding the cervical or thoracic spine.     D:  01/15/2019  E:  01/15/2019     This report was finalized on 1/15/2019 10:39 AM by Javier Connors.       Fiberoptic Endo (fees) [[id number]] Resulted:  01/15/19 1002     Updated:  01/15/19 1002    CT Abdomen Pelvis With & Without Contrast [[id number]] Collected:  01/15/19 0946     Updated:  01/15/19 0946    Narrative:       EXAMINATION: CT ABDOMEN AND PELVIS W WO CONTRAST-      INDICATION: Left renal collecting system obstruction;  N28.82-Megaloureter;  R09.02-Hypoxemia; J44.1-Chronic obstructive  pulmonary disease with (acute) exacerbation; R53.1-Weakness;  Z74.09-Other reduced mobility; J96.21-Acute and chronic respiratory  failure with hypoxia.     TECHNIQUE: CT scan of abdomen and pelvis performed with and without  intravenous contrast.     The radiation dose reduction device was turned on for each scan per the  ALARA (As Low as Reasonably Achievable) protocol.     COMPARISON: None.     FINDINGS: The most superior images demonstrate bibasilar airspace  disease, left greater than right.      The liver and spleen are normal.  There is no adrenal mass. The pancreas  is normal.  There are small bilateral renal parenchymal stones. More  importantly, there is a 1 cm stone in the left ureteropelvic junction  producing moderately severe obstruction. Also there is a 1.2 cm area of  low density within the left renal pelvis. This is not calcified on  images obtained prior to contrast and may represent clot. There are also  simple cysts in both kidneys. There is no ascites, aneurysm or  retroperitoneal lymphadenopathy. There is no pelvic mass or fluid.   There is no bladder stone.       Impression:       1. There is a 1 cm stone in the left ureteropelvic junction producing  moderately severe obstruction of the left kidney. There is also a 12 mm  low density filling defect in the renal pelvis. On the unenhanced  examination this is not a calcified stone this may represent clot  related to the ureteral stone and obstruction and less likely a  noncalcified stone.   2. Lastly there is bibasilar airspace disease, left greater than right.     D:  01/15/2019  E:  01/15/2019                  I have reviewed the medications:    Current Facility-Administered Medications:   •  acetaminophen (TYLENOL) tablet 650 mg, 650 mg, Oral, Q4H PRN, Harjinder, Gena, APRN, 650 mg at 01/13/19 0641  •  aspirin chewable tablet 81 mg, 81 mg, Oral, Daily, Harjinder, Gena, APRN, 81 mg at 01/15/19  0905  •  budesonide (PULMICORT) nebulizer solution 0.5 mg, 0.5 mg, Nebulization, Daily - RT, Harjinder, Gena, APRN, 0.5 mg at 01/15/19 0938  •  doxycycline (MONODOX) capsule 100 mg, 100 mg, Oral, Q12H, Vel Leiva MD, 100 mg at 01/15/19 0905  •  enoxaparin (LOVENOX) syringe 40 mg, 40 mg, Subcutaneous, Q24H, Tere Galvez MD  •  escitalopram (LEXAPRO) tablet 10 mg, 10 mg, Oral, Daily, Harjinder, Gena, APRN, 10 mg at 01/15/19 0906  •  gabapentin (NEURONTIN) capsule 100 mg, 100 mg, Oral, TID, Mary Pelayo DO, 100 mg at 01/15/19 0905  •  guaiFENesin (MUCINEX) 12 hr tablet 600 mg, 600 mg, Oral, Q12H, Mary Pelayo DO, 600 mg at 01/15/19 0906  •  Hold medication, 1 each, Does not apply, Continuous PRN, Tere Galvez MD  •  hydrochlorothiazide (HYDRODIURIL) tablet 25 mg, 25 mg, Oral, Daily, Harjinder, Gena, APRN, 25 mg at 01/15/19 0905  •  HYDROcodone-acetaminophen (NORCO) 7.5-325 MG per tablet 1 tablet, 1 tablet, Oral, Q6H PRN, Subha Rider MD, 1 tablet at 01/15/19 0530  •  ipratropium-albuterol (DUO-NEB) nebulizer solution 3 mL, 3 mL, Nebulization, 4x Daily - RT, Harjinder, Gena, APRN, 3 mL at 01/15/19 0938  •  levothyroxine (SYNTHROID, LEVOTHROID) tablet 100 mcg, 100 mcg, Oral, Daily, Harjinder, Gena, APRN, 100 mcg at 01/15/19 0530  •  lisinopril (PRINIVIL,ZESTRIL) tablet 10 mg, 10 mg, Oral, Q24H, Vel Leiva MD, 10 mg at 01/15/19 0906  •  LORazepam (ATIVAN) injection 1 mg, 1 mg, Intravenous, Q6H PRN, Masha Caba MD, 1 mg at 01/15/19 1046  •  methylPREDNISolone sodium succinate (SOLU-Medrol) injection 60 mg, 60 mg, Intravenous, Q8H, Harjinder, Gena, APRN, 60 mg at 01/15/19 0905  •  metoprolol succinate XL (TOPROL-XL) 24 hr tablet 25 mg, 25 mg, Oral, Daily, Harjinder, Gena, APRN, 25 mg at 01/15/19 0906  •  nicotine (NICODERM CQ) 21 MG/24HR patch 1 patch, 1 patch, Transdermal, Daily PRN, Vel Leiva MD, 1 patch at 01/13/19 1209  •  Pharmacy Consult - [facility], ,  Does not apply, Daily, Soha Jerome, [facility], Stopped at 01/13/19 1045  •  potassium chloride (MICRO-K) CR capsule 40 mEq, 40 mEq, Oral, PRN, 40 mEq at 01/14/19 1710 **OR** potassium chloride (KLOR-CON) packet 40 mEq, 40 mEq, Oral, PRN, 40 mEq at 01/14/19 2340 **OR** potassium chloride 10 mEq in 100 mL IVPB, 10 mEq, Intravenous, Q1H PRN, Gena Grande, APRN  •  [COMPLETED] Insert peripheral IV, , , Once **AND** sodium chloride 0.9 % flush 10 mL, 10 mL, Intravenous, PRN, Dequan Mims MD      Assessment/Plan   Assessment / Plan     Active Hospital Problems    Diagnosis Date Noted   • Acute on chronic respiratory failure with hypoxia (CMS/HCC) [J96.21] 01/12/2019   • COPD with acute exacerbation (CMS/HCC) [J44.1] 01/12/2019   • Hypothyroidism (acquired) [E03.9] 01/12/2019   • Essential hypertension [I10] 01/12/2019   • Depression [F32.9] 01/12/2019   • Pinched nerve in neck [G58.8] 01/12/2019   • left renal collecting system obstruction [N13.5] 01/12/2019   • Generalized weakness [R53.1] 01/12/2019       Brief Hospital Course to date:  Mrs. Heck is a 58 year-old F with a past medical hx of COPD, depression, hypertension, active smoker, recent dx of pinched nerve in neck, and hypothyroidism who presented with exertional dyspnea and non-productive cough in the setting of recent admission at [facility] on 12/25 for bilateral pneumonia.     Acute on Chronic Hypoxemic Hypercapneic Respiratory Failure (baseline 2LNC), persistent  COPD with Acute Exacerbation  - Continue IV solumedrol, nebs, doxy to complete 7 day course   - No infiltrate or PE on CTA Chest   - BiPAP qhs and prn, Wean O2 as able, pt still on much higher O2 demand that baseline. Is there a neuromuscular process contributing to dyspnea?      Bilateral Upper and Lower extremity Numbness, onset 1/2, remains high risk due to persistence and significant impairment of ability to do ADLs  - Pt now reports dysphagia and occasional diplopia, ?  Neuromuscular disease  - Obtained complete records from , MRI demonstrated spinal stenosis of C5/6, neurology evaluated and did not feel presentation was c/w GBS, did recommend NSGY f/u in clinic-- please see note from 1/14 for full record summary  - neurology following here, plan for repeat MRI imaging (still pending), EMG, LP and plan for IVIG to start today  - Gabapentin for neuropathic pain  - ativan for anxiety regarding diagnostic tests today     Dysphagia  - SLP has seen, plan for FEES today     Moderate Left-sided hydronephrosis and hyroureter  - CT urogram pending  - Anticipate need for Urology consult depending on results. Renal function preserved. No flank pain.      Hypokalemia  - replace as per protocol,     Hypertension  - BP reviewed, above goal, lisinopril added, monitor     Secondary Erythrocytosis   - due to COPD, chronic tobacco abuse    Active Tobacco Abuse   - pt declines nicotine patch at present, available prn    Hx of Nephrolithiasis   Hypothyroidism, controlled, continue home synthroid, TSH normal    DVT Prophylaxis:  SQ lovenox  Disposition: I expect the patient to be discharged home ? Pending final plan with neurology and clinical course, will likely need rehab.    Pt remains high risk due to ongoing bilateral weakness and acute respiratory failure.     CODE STATUS:   Code Status and Medical Interventions:   Ordered at: 01/12/19 0426     Level Of Support Discussed With:    Patient     Code Status:    CPR     Medical Interventions (Level of Support Prior to Arrest):    Full         Electronically signed by Masha Caba MD, 01/15/19, 10:57 AM.        Electronically signed by Masha Caba MD at 1/15/2019 11:08 AM     Tere Galvez MD at 1/15/2019  9:47 AM        Neurology       Patient Care Team:  Gio Henderson MD as PCP - General (Adolescent Medicine)    Chief complaint weakness      Subjective .     History:  Feels arms and legs about the same, swallowing a little  "better.   No change in breathing    ROS: No fever, chest pain    Objective     Vital Signs   Blood pressure 155/80, pulse 81, temperature 99.2 °F (37.3 °C), temperature source Oral, resp. rate 20, height 170.2 cm (67.01\"), weight 94 kg (207 lb 3.2 oz), SpO2 93 %.    Physical Exam:              Neuro: Middle-aged white woman in no acute distress on oxygen, alert, appropriate, fluent with no dysarthria  EOMI face symmetric TML  Motor unchanged diffuse weakness of upper and lower extremities 3-4/5    Results Review:              NIF -- initially 15 with poor seal, repeated and 60; FVC 1.2  MRI thoracic and cervical spine images reviewed, no significant cord abnormality appreciated, report pending  EMG and nerve conduction consistent with severe mixed axonal demyelinating neuropathy with absent F waves, absent responses in upper extremities, decreased recruitment, markedly prolonged sural sensory latency with reduced amplitude    Assessment/Plan     Probable GBS-will obtain spinal fluid exam today, plan to initiate IVIG, monitor respiratory status.  Discussed results and further testing with patient and family at bedside .  May be at dee, but discussed may further decline.  Answered questions. Will restart dvt prophylaxis after LP.    I discussed the patients findings and my recommendations with patient, family, nursing staff and primary care team    Tere Galvez MD  01/15/19  9:47 AM      Electronically signed by Tere Galvez MD at 1/15/2019 10:07 AM     Vel Leiva MD at 1/14/2019  4:02 PM        FOLLOW UP NOTE    [facility] records obtained    Pt was seen in ER on 1/10 with MRI C-spine showing moderate spinal stenosis of C5-6, severe bilateral forminal stenosis with no cord compression or spinal lesions.     RPR, TSH, B12, NIF, VC all normal     Neurology saw and felt that pt's presentation was not consistent with GBS, recommended outpt f/u in Neurosurgery clinic.      I called and discussed " case with Dr. Galvez who will consult.     Electronically signed by Vel Leiva MD, 19, 4:04 PM.      Electronically signed by Vel Leiva MD at 2019  4:05 PM     Vel Leiva MD at 2019  9:54 AM              [facility] Medicine Services  PROGRESS NOTE    Patient Name: Kristina Heck  : 1960  MRN: [id number]    Date of Admission: 2019  Length of Stay: 2  Primary Care Physician: Gio Henderson MD    Subjective   Subjective     CC:  F/u dyspnea    HPI:  Pt reports improved dyspnea with decreased cough and wheezing. Pt is on high flow O2 with sats in the low 90s on FiO2 45%. Bilateral arm and leg numbness, burning, and tingling are persistent. Dysphagia was reported last night and plan is for a swallowing evaluation today. She denies dysphonia but reports occasional diplopia.     Review of Systems  Gen- No fevers, chills  CV- No chest pain, palpitations  Resp- As above   GI- No N/V/D, abd pain    Otherwise ROS is negative except as mentioned in the HPI.    Objective   Objective     Vital Signs:   Temp:  [97.8 °F (36.6 °C)-98.2 °F (36.8 °C)] 98 °F (36.7 °C)  Heart Rate:  [63-84] 80  Resp:  [18-22] 18  BP: (118-164)/() 164/83     Physical Exam:  Constitutional: No acute distress, awake, alert  HENT: NCAT, mucous membranes moist  Respiratory: No wheezing, diminished breath sounds bilaterally, poor air movement, respiratory effort nonlabored (slighty improved)  Cardiovascular: RRR, no murmurs, rubs, or gallops, palpable pedal pulses bilaterally  Gastrointestinal: Positive bowel sounds, soft, nontender, nondistended, obese  Musculoskeletal: No bilateral ankle edema  Psychiatric: Appropriate affect, cooperative  Neurologic: Oriented x 3, strength 4/5 in all extremities, Cranial Nerves grossly intact to confrontation, speech clear.  Skin: No rashes    Results Reviewed:  I have personally reviewed current lab, radiology, and data and  agree.    Results from last 7 days   Lab Units  01/13/19   0718  01/12/19   1346   WBC 10*3/mm3  7.49  10.21   HEMOGLOBIN g/dL  17.1*  17.4*   HEMATOCRIT %  53.0*  52.9*   PLATELETS 10*3/mm3  203  223     Results from last 7 days   Lab Units  01/14/19   0501  01/13/19   0718  01/12/19   1419  01/12/19   1346   SODIUM mmol/L  139  138   --   136   POTASSIUM mmol/L  3.4*  4.1   --   3.3*   CHLORIDE mmol/L  104  102   --   97*   CO2 mmol/L  30.0  29.0   --   31.0   BUN mg/dL  24*  24*   --   20   CREATININE mg/dL  0.68  0.68   --   0.73   GLUCOSE mg/dL  129*  141*   --   101*   CALCIUM mg/dL  8.9  9.0   --   9.3   ALT (SGPT) U/L   --    --    --   32   AST (SGOT) U/L   --    --    --   23   TROPONIN I ng/mL   --    --   0.00   --      Estimated Creatinine Clearance: 107.5 mL/min (by C-G formula based on SCr of 0.68 mg/dL).    BNP   Date Value Ref Range Status   01/12/2019 9.0 0.0 - 100.0 pg/mL Final     Comment:     Results may be falsely decreased if patient taking Biotin.       Microbiology Results Abnormal     None          Imaging Results (last 24 hours)     ** No results found for the last 24 hours. **               I have reviewed the medications:    Current Facility-Administered Medications:   •  acetaminophen (TYLENOL) tablet 650 mg, 650 mg, Oral, Q4H PRN, Harjinder, Gena, APRN, 650 mg at 01/13/19 0641  •  aspirin chewable tablet 81 mg, 81 mg, Oral, Daily, Harjinder, Gena, APRN, 81 mg at 01/14/19 0858  •  budesonide (PULMICORT) nebulizer solution 0.5 mg, 0.5 mg, Nebulization, Daily - RT, Harjinder, Gena, APRN, 0.5 mg at 01/14/19 0731  •  doxycycline (MONODOX) capsule 100 mg, 100 mg, Oral, Q12H, Vel Leiva MD, 100 mg at 01/14/19 0858  •  enoxaparin (LOVENOX) syringe 40 mg, 40 mg, Subcutaneous, Q24H, Vel Leiva MD, 40 mg at 01/13/19 1208  •  escitalopram (LEXAPRO) tablet 10 mg, 10 mg, Oral, Daily, Gena Grande APRN, 10 mg at 01/14/19 0858  •  gabapentin (NEURONTIN) capsule 100 mg, 100 mg,  Oral, TID, Mary Pelayo DO, 100 mg at 01/14/19 0858  •  guaiFENesin (MUCINEX) 12 hr tablet 600 mg, 600 mg, Oral, Q12H, Mary Pelayo DO, 600 mg at 01/14/19 0858  •  hydrochlorothiazide (HYDRODIURIL) tablet 25 mg, 25 mg, Oral, Daily, Harjinder, Gena, APRN, 25 mg at 01/14/19 0858  •  HYDROcodone-acetaminophen (NORCO) 7.5-325 MG per tablet 1 tablet, 1 tablet, Oral, Q6H PRN, Subha Rider MD, 1 tablet at 01/14/19 0858  •  ipratropium-albuterol (DUO-NEB) nebulizer solution 3 mL, 3 mL, Nebulization, 4x Daily - RT, Harjinder, Gena, APRN, 3 mL at 01/14/19 0730  •  levothyroxine (SYNTHROID, LEVOTHROID) tablet 100 mcg, 100 mcg, Oral, Daily, Harjinder, Gena, APRN, 100 mcg at 01/14/19 0858  •  methylPREDNISolone sodium succinate (SOLU-Medrol) injection 60 mg, 60 mg, Intravenous, Q8H, Harjinder, Gena, APRN, 60 mg at 01/14/19 0858  •  metoprolol succinate XL (TOPROL-XL) 24 hr tablet 25 mg, 25 mg, Oral, Daily, Harjinder, Gena, APRN, 25 mg at 01/14/19 0858  •  nicotine (NICODERM CQ) 21 MG/24HR patch 1 patch, 1 patch, Transdermal, Daily PRN, Vel Leiva MD, 1 patch at 01/13/19 1209  •  Pharmacy Consult - [facility], , Does not apply, Daily, Shoa Jerome, [facility], Stopped at 01/13/19 1045  •  potassium chloride (MICRO-K) CR capsule 40 mEq, 40 mEq, Oral, PRN, 40 mEq at 01/12/19 3442 **OR** potassium chloride (KLOR-CON) packet 40 mEq, 40 mEq, Oral, PRN **OR** potassium chloride 10 mEq in 100 mL IVPB, 10 mEq, Intravenous, Q1H PRN, Harjinder, Gena, APRN  •  [COMPLETED] Insert peripheral IV, , , Once **AND** sodium chloride 0.9 % flush 10 mL, 10 mL, Intravenous, PRN, Dequan Mims MD  •  Sodium chloride 0.9 % infusion, 100 mL/hr, Intravenous, Continuous, Gena Grande, APRN, Last Rate: 100 mL/hr at 01/14/19 0300, 100 mL/hr at 01/14/19 0300      Assessment/Plan   Assessment / Plan     Active Hospital Problems    Diagnosis Date Noted   • Acute on chronic respiratory failure with hypoxia (CMS/HCC) [J96.21]  01/12/2019   • COPD with acute exacerbation (CMS/HCC) [J44.1] 01/12/2019   • Hypothyroidism (acquired) [E03.9] 01/12/2019   • Essential hypertension [I10] 01/12/2019   • Depression [F32.9] 01/12/2019   • Pinched nerve in neck [G58.8] 01/12/2019   • left renal collecting system obstruction [N13.5] 01/12/2019   • Generalized weakness [R53.1] 01/12/2019       Brief Hospital Course to date:  Mrs. Heck is a 58 year-old F with a past medical hx of COPD, depression, hypertension, active smoker, recent dx of pinched nerve in neck, and hypothyroidism who presented with exertional dyspnea and non-productive cough in the setting of recent admission at [facility] on 12/25 for bilateral pneumonia.     Acute on Chronic Hypoxemic Hypercapneic Respiratory Failure (baseline 2LNC), persistent  COPD with Acute Exacerbation  - Continue IV solumedrol, nebs, doxy to complete 7 day course   - No infiltrate or PE on CTA Chest   - BiPAP qhs and prn, Wean O2 as able, pt still on much higher O2 demand that baseline. Is there a neuromuscular process contributing to dyspnea?      Bilateral Upper and Lower extremity Numbness, onset 1/2, remains high risk due to persistence and significant impairment of ability to do ADLs  - Pt now reports dysphagia and occasional diplopia, ? Neuromuscular disease  - MRI reports from [facility] are needed to help guide next step in evaluation. Is this a neuromuscular process or a myelopathic process? D/w RN who has called  for records again this AM. If nothing by midday, will repeat MRI C-spine and consult Neurology or Neurosurgery   - Gabapentin for neuropathic pain    Dysphagia  - SLP has seen, plan for FEES today     Moderate Left-sided hydronephrosis and hyroureter  - CT urogram pending  - Anticipate need for Urology consult depending on results. Renal function preserved. No flank pain.      Hypokalemia  - replace as per protocol, Mg sufficient    Hypertension  - BP reviewed, above goal, add lisinopril  today     Secondary Erythrocytosis   - due to COPD, chronic tobacco abuse    Active Tobacco Abuse   - pt declines nicotine patch at present, available prn    Hx of Nephrolithiasis   Hypothyroidism, controlled, continue home synthroid, TSH normal    DVT Prophylaxis:  SQ lovenox  Disposition: I expect the patient to be discharged home with Rehab later this week depending on assessment and plan of bilateral weakness.    Pt remains high risk due to ongoing bilateral weakness and acute respiratory failure.     CODE STATUS:   Code Status and Medical Interventions:   Ordered at: 01/12/19 2141     Level Of Support Discussed With:    Patient     Code Status:    CPR     Medical Interventions (Level of Support Prior to Arrest):    Full         Electronically signed by Vel Erazo MD, 01/14/19, 9:54 AM.        Electronically signed by Vel Erazo MD at 1/14/2019 10:10 AM          Consult Notes (last 72 hours) (Notes from 1/14/2019  8:01 AM through 1/17/2019  8:01 AM)      Tere Galvez MD at 1/14/2019  6:27 PM      Consult Orders    1. Inpatient Neurology Consult [[id number]] ordered by Vel Erazo MD at 01/14/19 1602                    Referring Provider: Dr. erazo     Reason for Consultation: Generalized weakness    Patient Care Team:  Gio Henderson MD as PCP - General (Adolescent Medicine)    Chief complaint increasing limb weakness    Subjective .     History of present illness:  58-year-old woman with COPD and recent pneumonia treated at [facility], reports that she first noticed numbness in both her hands on 12/31/9018 and the next day noted some numbness in her feet.  Since that time she has noted progressive weakness of arms and legs to the point where she has been unable to walk for the past 5 days. She chronically has some problems with emptying her bladder but today cannot empty at all, and had catheter with 1100 out.  She has been short of breath since the  pneumonia but with increasing oxygen requirements.  Swallow eval today lead to thickened liquids diet, denies speech difficulty or any vision changes such as double or blurred vision.  Evaluation at  was apparently unrevealing including MRI C-spine and outpatient follow-up was planned.  No trauma, no back pain, no recent GI illness although she is mildly nauseated now with postnasal drip.    Review of Systems  Pertinent items are noted in HPI, all other systems reviewed and negative     History  Past Medical History:   Diagnosis Date   • COPD (chronic obstructive pulmonary disease) (CMS/HCC)    • Depression    • Disease of thyroid gland    • Hypertension    , History reviewed. No pertinent surgical history.,   Family History   Problem Relation Age of Onset   • Heart disease Mother    • Cancer Father    ,   Social History     Tobacco Use   • Smoking status: Current Every Day Smoker     Packs/day: 0.25     Types: Cigarettes   • Smokeless tobacco: Never Used   Substance Use Topics   • Alcohol use: No     Frequency: Never   • Drug use: No   ,   Medications Prior to Admission   Medication Sig Dispense Refill Last Dose   • albuterol sulfate  (90 Base) MCG/ACT inhaler Inhale 2 puffs Every 4 (Four) Hours As Needed for Wheezing.      • aspirin 81 MG chewable tablet Chew 81 mg Daily.      • escitalopram (LEXAPRO) 10 MG tablet Take 10 mg by mouth Daily.      • Fluticasone Furoate-Vilanterol (BREO ELLIPTA) 200-25 MCG/INH aerosol powder  inhaler Inhale 1 puff Daily.      • hydrochlorothiazide (HYDRODIURIL) 25 MG tablet Take 25 mg by mouth Daily.      • HYDROcodone-ibuprofen (VICOPROFEN) 7.5-200 MG per tablet Take 1 tablet by mouth Every 6 (Six) Hours As Needed for Moderate Pain .      • hydrOXYzine (ATARAX) 25 MG tablet Take 25 mg by mouth Every 6 (Six) Hours As Needed for Anxiety.      • levothyroxine (SYNTHROID, LEVOTHROID) 100 MCG tablet Take 100 mcg by mouth Daily.      • metoprolol tartrate (LOPRESSOR) 25 MG tablet  "Take 25 mg by mouth 2 (Two) Times a Day.      • potassium chloride (K-DUR,KLOR-CON) 20 MEQ CR tablet Take 20 mEq by mouth 2 (Two) Times a Day.      • Umeclidinium Bromide (INCRUSE ELLIPTA) 62.5 MCG/INH aerosol powder  Inhale 1 puff Daily.      , Scheduled Meds:    aspirin 81 mg Oral Daily   budesonide 0.5 mg Nebulization Daily - RT   doxycycline 100 mg Oral Q12H   enoxaparin 40 mg Subcutaneous Q24H   escitalopram 10 mg Oral Daily   gabapentin 100 mg Oral TID   guaiFENesin 600 mg Oral Q12H   hydrochlorothiazide 25 mg Oral Daily   ipratropium-albuterol 3 mL Nebulization 4x Daily - RT   levothyroxine 100 mcg Oral Daily   lisinopril 10 mg Oral Q24H   methylPREDNISolone sodium succinate 60 mg Intravenous Q8H   metoprolol succinate XL 25 mg Oral Daily   pharmacy consult - MTM  Does not apply Daily   , Continuous Infusions:   , PRN Meds:  •  acetaminophen  •  HYDROcodone-acetaminophen  •  nicotine  •  potassium chloride **OR** potassium chloride **OR** potassium chloride  •  [COMPLETED] Insert peripheral IV **AND** sodium chloride and Allergies:  Patient has no known allergies.    Objective     Vital Signs   Blood pressure 124/82, pulse 77, temperature 98.1 °F (36.7 °C), temperature source Oral, resp. rate 18, height 170.2 cm (67\"), weight 94.2 kg (207 lb 11.2 oz), SpO2 (!) 87 %.    Physical Exam:   Middle-aged white woman lying in hospital bed in no acute distress, although somewhat anxious discussing progressive symptoms and lack of diagnosis.  She is alert and fully oriented, with normal language, attention, memory and fund of knowledge.  No dysarthria.  Pupils 3 mm and reactive, no papilledema appreciated.  Visual fields full to confrontation, extraocular movements intact, normal facial sensation and movement and able to puff cheeks and bury lashes normally.  No weakness of shoulder shrug appreciated, palate elevates symmetrically, tongue protrudes midline and no weakness of lateral tongue  movement appreciated.  " Hearing intact to finger rub  Motor: She has generalized diffuse weakness of 3+ to 4 minus throughout, no upper motor neuron pattern.  Muscle tone is not increased, coordination is commensurate with strength  DTRs could not be obtained, no response to plantar stim  She has stocking decrease in pinprick symmetrically to below the knees, otherwise intact.  No spine tenderness, neck supple  Unable to ambulate  Heart RRR no murmur appreciated  Abdomen soft, NT, ND with positive bowel sounds    Results Review:   I reviewed the patient's new clinical results.  I reviewed the patient's new imaging results and agree with the interpretation.  I reviewed the patient's other test results and agree with the interpretation  CTA of chest shows no evidence for PE, chest x-ray shows no acute findings  Labs reviewed, H&H 17 and 53, CBC otherwise unremarkable, glucose 129, potassium 3.4, TSH 0.507  Hemoglobin A1c 6.4, B12 690    Assessment/Plan       Acute on chronic respiratory failure with hypoxia (CMS/HCC)    COPD with acute exacerbation (CMS/HCC)    Hypothyroidism (acquired)    Essential hypertension    Depression    Pinched nerve in neck    left renal collecting system obstruction    Generalized weakness      Generalized weakness with urinary retention, dysphagia, areflexia, and  minimal sensory findings concerning for peripheral nerve process, e.g. GBS.  However, need to rule out central process first we'll plan on MRI of spine tonight, then proceed to nerve conduction and EMG in the morning, and likely spinal fluid exam after that pending results as they come in.    I discussed the patients findings and my recommendations with patient, nursing staff and primary care team    Tere Galvez MD  01/14/19  6:27 PM            Electronically signed by Tere Galvez MD at 1/14/2019  6:46 PM

## 2019-01-17 NOTE — PLAN OF CARE
Problem: Patient Care Overview  Goal: Plan of Care Review  Outcome: Ongoing (interventions implemented as appropriate)   01/16/19 1917   Coping/Psychosocial   Plan of Care Reviewed With patient;family   Plan of Care Review   Progress improving   OTHER   Outcome Summary Pt. was on a bipap this morning when I received her. She is now on a high flow nasal cannula. Pulmonary toileting. Pt. states that she has more feeling now. VSS. Will continue to monitor.

## 2019-01-17 NOTE — PROGRESS NOTES
Clinical Nutrition     Dysphagia    Time: 20min  Patient Name: Kristina Heck  Date of Encounter: 01/17/19 2:15 PM  MRN: [id number]  Admission date: 1/12/2019     Menu adjusted, and supplements provided to allow for frequent small meals    SLP Recommendation and Plan/FEES 1-16-19  SLP Swallowing Diagnosis: mild, pharyngeal dysfunction  SLP Diet Recommendation: mechanical soft with no mixed consistencies, thin liquids, other (see comments)(via small single cup sips)  Recommended Precautions and Strategies: upright posture during/after eating, small bites of food and sips of liquid, no straw, other (see comments)(STRICT fatigue precautions; 5 small meals/day as possible)      Current diet: Diet Dysphagia; IV - Mechanical Soft No Mixed Consistencies; Thin; No Straws  Boost GC 3x/day  2pm snack---HS snack    Intervention:  Follow treatment plan  Care plan reviewed  Menu adjusted  Supplement provided    Discussed diet with SLP + RN    Follow up:   Per protocol      Sophie Melgar RD  2:15 PM

## 2019-01-17 NOTE — PLAN OF CARE
Problem: Patient Care Overview  Goal: Plan of Care Review  Outcome: Ongoing (interventions implemented as appropriate)   01/17/19 1600   Coping/Psychosocial   Plan of Care Reviewed With patient   Plan of Care Review   Progress improving   OTHER   Outcome Summary OT completed a brief chart review relating to presenting physical deficits. Pt performed t/f to chair w/ mechanical lift and was educated on AE use to improve self feeding. Recommend cont skilled IPOT POC. Recommend pt DC to IP rehab.

## 2019-01-17 NOTE — CONSULTS
Consult    Referring Provider:No ref. provider found  Reason for Consultation: Left UPJ stone.    Patient Care Team:  Gio Henderson MD as PCP - General (Adolescent Medicine)    Chief complaint   Chief Complaint   Patient presents with   • Numbness       Subjective .     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 The patient was noted on CT scan incidentally to have a left renal stone and UPJ stone. I was asked by Dr. Schmidt to evaluate. Patient says she is comfortable, no distress. Her main issues are her Guillain-Norwood symptoms. She denies prior to admission any hematuria, UTIs or incontinence. She felt she was voiding okay but noted a few changes recently when she had her neurological issues develop. She has had renal stones treated at  in the past. She has not had followup recently with them. The patient denies any renal colic. She was unaware she had any stones ongoing.         Past Medical History:   Diagnosis Date   • COPD (chronic obstructive pulmonary disease) (CMS/HCC)    • Depression    • Disease of thyroid gland    • Hypertension      History reviewed. No pertinent surgical history.      Current Facility-Administered Medications:   •  acetaminophen (TYLENOL) tablet 650 mg, 650 mg, Oral, Q4H PRN, Harjinder, Gena, APRN, 650 mg at 01/16/19 1141  •  aspirin chewable tablet 81 mg, 81 mg, Oral, Daily, Harjinder, Gena, APRN, 81 mg at 01/17/19 0800  •  doxycycline (MONODOX) capsule 100 mg, 100 mg, Oral, Q12H, Coretta Schmidt MD  •  enoxaparin (LOVENOX) syringe 40 mg, 40 mg, Subcutaneous, Q24H, Tere Galvez MD, 40 mg at 01/16/19 2101  •  escitalopram (LEXAPRO) tablet 10 mg, 10 mg, Oral, Daily, Harjinder, Gena, APRN, 10 mg at 01/17/19 0800  •  gabapentin (NEURONTIN) capsule 300 mg, 300 mg,  Oral, TID, Coretta Schmidt MD, 300 mg at 01/17/19 1538  •  hydrochlorothiazide (HYDRODIURIL) tablet 25 mg, 25 mg, Oral, Daily, Harjinder, Gena, APRN, 25 mg at 01/17/19 0800  •  HYDROcodone-acetaminophen (NORCO) 7.5-325 MG per tablet 1 tablet, 1 tablet, Oral, Q6H PRN, Coretta Schmidt MD, 1 tablet at 01/17/19 1330  •  hydrocortisone sodium succinate (Solu-CORTEF) injection 100 mg, 100 mg, Intravenous, Once PRN, Tere Galvez MD  •  hydrOXYzine (ATARAX) tablet 25 mg, 25 mg, Oral, Q6H PRN, Kristie Zambrano APRN, 25 mg at 01/17/19 1331  •  ibuprofen (ADVIL,MOTRIN) tablet 200 mg, 200 mg, Oral, Q6H PRN, Kristie Zambrano APRN, 200 mg at 01/17/19 0957  •  immune globulin (human) (GAMMAGARD) infusion 20 g, 20 g, Intravenous, Q24H, 20 g at 01/16/19 1610 **AND** immune globulin (human) (GAMMAGARD) infusion 20 g, 20 g, Intravenous, Q24H, Tere Galvez MD, 20 g at 01/17/19 1536  •  ipratropium-albuterol (DUO-NEB) nebulizer solution 3 mL, 3 mL, Nebulization, 4x Daily - RT, Harjinder, Gena, APRN, 3 mL at 01/17/19 1317  •  levothyroxine (SYNTHROID, LEVOTHROID) tablet 100 mcg, 100 mcg, Oral, Daily, Harjinder, Gena, APRN, 100 mcg at 01/17/19 0607  •  lisinopril (PRINIVIL,ZESTRIL) tablet 10 mg, 10 mg, Oral, Q24H, Vel Leiva MD, 10 mg at 01/17/19 0800  •  LORazepam (ATIVAN) injection 1 mg, 1 mg, Intravenous, Q4H PRN, Tere Galvez MD  •  methylPREDNISolone sodium succinate (SOLU-Medrol) injection 30 mg, 30 mg, Intravenous, Q12H, Miah Odonnell MD, 30 mg at 01/17/19 0800  •  metoprolol succinate XL (TOPROL-XL) 24 hr tablet 25 mg, 25 mg, Oral, Daily, Gena Grande, APRN, 25 mg at 01/17/19 0800  •  nicotine (NICODERM CQ) 21 MG/24HR patch 1 patch, 1 patch, Transdermal, Daily PRN, Vel Leiva MD, 1 patch at 01/17/19 0957  •  nystatin (MYCOSTATIN) 168549 UNIT/ML suspension 500,000 Units, 5 mL, Oral, 4x Daily, Coretta Schmidt MD  •  Pharmacy Consult - [facility], ,  "Does not apply, Daily, Soha Jerome, [facility], Stopped at 01/13/19 1045  •  piperacillin-tazobactam (ZOSYN) 4.5 g in iso-osmotic dextrose 100 mL IVPB (premix), 4.5 g, Intravenous, Q8H, Miah Odonnell MD, 4.5 g at 01/17/19 1338  •  [DISCONTINUED] potassium chloride (MICRO-K) CR capsule 40 mEq, 40 mEq, Oral, PRN, 40 mEq at 01/14/19 1710 **OR** potassium chloride (KLOR-CON) packet 40 mEq, 40 mEq, Oral, PRN, 40 mEq at 01/14/19 2340 **OR** potassium chloride 10 mEq in 100 mL IVPB, 10 mEq, Intravenous, Q1H PRN, Gena Grande APRN  •  [COMPLETED] Insert peripheral IV, , , Once **AND** sodium chloride 0.9 % flush 10 mL, 10 mL, Intravenous, PRN, Dequan Mims MD, 10 mL at 01/15/19 2004  Patient has no known allergies.    Review of Systems  Pertinent items are noted in HPI, all other systems reviewed and negative    Objective     Vital Signs   /78   Pulse 67   Temp 98.3 °F (36.8 °C)   Resp 16   Ht 170.2 cm (67.01\")   Wt 96 kg (211 lb 10.3 oz)   SpO2 97%   BMI 33.14 kg/m²     Physical Exam:  General Appearance  Back: No kyphosis present, no scoliosis present,no tenderness to percussion or Palpation  Lungs: Respirations regular, even and  unlabored    Chest Wall: No abnormalities observed  Abdomen: No masses, no organomegaly, soft   non-tender, non-distended, no guarding, no rebound    Extremities: Moves all extremities well, no edemam no cyanosis, no redness    Skin: No bleeding, bruising or rash        Results Review:  Lab Results (last 24 hours)     ** No results found for the last 24 hours. **        Imaging Results (last 72 hours)     Procedure Component Value Units Date/Time    Fiberoptic Endo (fees) [[id number]] Resulted:  01/16/19 1457     Updated:  01/16/19 1457    Narrative:       This procedure was auto-finalized with no dictation required.    XR Chest 1 View [[id number]] Collected:  01/16/19 0906     Updated:  01/16/19 0917    Narrative:       EXAMINATION: XR CHEST 1 VW-01/16/2019:    "   INDICATION: Dyspnea; N28.82-Megaloureter; R09.02-Hypoxemia;  J44.1-Chronic obstructive pulmonary disease with (acute) exacerbation;  R53.1-Weakness; Z74.09-Other reduced mobility; J96.21-Acute and chronic  respiratory failure with hypoxia.     TECHNIQUE:  Single view frontal chest.     COMPARISONS: 01/12/2019.     FINDINGS:  Lungs are without focal abnormality aside from subsegmental  atelectasis of the left lung base. No sizable pleural effusion or  pneumothorax. Cardiomediastinal silhouette is within normal limits.       Impression:       No significant interval change.     D:  01/16/2019  E:  01/16/2019     This report was finalized on 1/16/2019 9:15 AM by Javier Connors.       IR Lumbar Puncture Diag/Thera [[id number]] Collected:  01/15/19 1512     Updated:  01/15/19 1524    Narrative:       EXAMINATION: IR LUMBAR PUNCTURE DIAG/THERA-     INDICATION: generalized weakness, r/o GBS; N28.82-Megaloureter;  R09.02-Hypoxemia; J44.1-Chronic obstructive pulmonary disease with  (acute) exacerbation; R53.1-Weakness; Z74.09-Other reduced mobility;  J96.21-Acute and chronic respiratory failure with hypoxia     TECHNIQUE: 28 seconds of fluoroscopic time was used for this procedure.  2 associated images were saved. Informed consent was obtained from the  patient's daughter. The patient was placed in the prone position on the  table. The back was prepped and draped in a sterile fashion. 1%  lidocaine was used as a local anesthetic to the skin and subcutaneous  tissues. Under fluoroscopic guidance a 20-gauge spinal needle was  advanced at the L2-L3 level to the CSF space. Approximately 8 cc of  clear and colorless CSF was returned and collected in 4 numbered vials.  Sample vials were labeled and sent to pathology for further analysis.  The needle was removed.     COMPARISON: NONE     FINDINGS: The patient tolerated the procedure well, and no immediate  complications occurred.          Impression:       Successful fluoroscopic  guided lumbar puncture as above with  no immediate complications.         This report was finalized on 1/15/2019 3:22 PM by Javier Connors.       Fiberoptic Endo (fees) [[id number]] Resulted:  01/15/19 1354     Updated:  01/15/19 1354    Narrative:       This procedure was auto-finalized with no dictation required.    CT Abdomen Pelvis With & Without Contrast [[id number]] Collected:  01/15/19 0946     Updated:  01/15/19 1312    Narrative:       EXAMINATION: CT ABDOMEN AND PELVIS W WO CONTRAST-      INDICATION: Left renal collecting system obstruction;  N28.82-Megaloureter; R09.02-Hypoxemia; J44.1-Chronic obstructive  pulmonary disease with (acute) exacerbation; R53.1-Weakness;  Z74.09-Other reduced mobility; J96.21-Acute and chronic respiratory  failure with hypoxia.     TECHNIQUE: CT scan of abdomen and pelvis performed with and without  intravenous contrast.     The radiation dose reduction device was turned on for each scan per the  ALARA (As Low as Reasonably Achievable) protocol.     COMPARISON: None.     FINDINGS: The most superior images demonstrate bibasilar airspace  disease, left greater than right.      The liver and spleen are normal.  There is no adrenal mass. The pancreas  is normal.  There are small bilateral renal parenchymal stones. More  importantly, there is a 1 cm stone in the left ureteropelvic junction  producing moderately severe obstruction. Also there is a 1.2 cm area of  low density within the left renal pelvis. This is not calcified on  images obtained prior to contrast and may represent clot. There are also  simple cysts in both kidneys. There is no ascites, aneurysm or  retroperitoneal lymphadenopathy. There is no pelvic mass or fluid.   There is no bladder stone.       Impression:       1. There is a 1 cm stone in the left ureteropelvic junction producing  moderately severe obstruction of the left kidney. There is also a 12 mm  low density filling defect in the renal pelvis. On the  unenhanced  examination this is not a calcified stone this may represent clot  related to the ureteral stone and obstruction and less likely a  noncalcified stone.   2. Lastly there is bibasilar airspace disease, left greater than right.     D:  01/15/2019  E:  01/15/2019     This report was finalized on 1/15/2019 1:10 PM by Dr. Cyril Lanza MD.       MRI Thoracic Spine With & Without Contrast [[id number]] Collected:  01/15/19 1032     Updated:  01/15/19 1041    Narrative:       EXAMINATION: MRI THORACIC SPINE W WO CONTRAST-, MRI CERVICAL SPINE W WO  CONTRAST-      INDICATION: T/L-spine trauma, significant injury suspected, neurologic  deficits.     TECHNIQUE:  Multiplanar multisequence MRI of the cervical and thoracic  spine was performed without and with contrast.     COMPARISONS:  CT chest 01/12/2019, CT abdomen/pelvis 01/14/2019.     FINDINGS: CERVICAL SPINE: Sagittal images cover from clivus through T3  caudally.     The cervicomedullary junction is unremarkable.  The cervical cord is  normal in caliber and signal.  There is no abnormal enhancement in the  spinal canal or in the paraspinal soft tissues.  No paraspinal edema to  suggest ligamentous injury.     With regard to the marrow space, vertebral body heights are maintained.   There is maintenance of the usual lordosis without significant  spondylolisthesis.  Background marrow signal is normal.      DEGENERATIVE CHANGES ARE AS FOLLOWS:     C2-C3: No significant foraminal or spinal canal stenosis.     C3-C4: Shallow posterior disc osteophyte complex without significant  foraminal or spinal canal stenosis.     C4-C5: Shallow posterior disc osteophyte complex and right-sided  uncovertebral arthropathy. Mild right foraminal stenosis. Mild spinal  canal stenosis.     C5-C6: Prominent disc osteophyte complex and right greater than left  uncovertebral arthropathy. Moderate right foraminal stenosis. Moderate  spinal canal stenosis.     C6-C7: Prominent asymmetric  disc osteophyte complex towards the right  and right greater than left uncovertebral arthropathy. Mild right  foraminal stenosis. Mild spinal canal stenosis.     C7-T1: No significant foraminal or spinal canal stenosis.     Incidental imaging of the head and cervical soft tissues is  unremarkable. Bilateral minor mastoid effusions.     THORACIC SPINE: Sagittal imaging covers from C7 through the superior  half of the L2 body caudally.     The imaged spinal cord is normal in caliber without intrinsic cord  signal abnormality identified.  There is no abnormal enhancement in the  spinal canal or in the paraspinal soft tissues. No paraspinal edema to  suggest ligamentous injury.     With regard to the marrow space, vertebral body heights are maintained.   There is maintenance of the usual kyphosis without significant  spondylolisthesis. Background marrow signal is normal.     As is commonly the case in the thoracic spine, degenerative changes are  negligible.  There is no significant foraminal or spinal canal stenosis  at any imaged thoracic level.     Incidental imaging of the thoracic soft tissues is unrevealing.  Obstructed left renal collecting system again noted.       Impression:       1. No evidence of acute osseous or ligamentous injury.  2. Moderate cervical spine degenerative change. No significant thoracic  spine degenerative change.  3. No abnormal enhancement regarding the cervical or thoracic spine.     D:  01/15/2019  E:  01/15/2019     This report was finalized on 1/15/2019 10:39 AM by Javier Connors.       MRI Cervical Spine With & Without Contrast [[id number]] Collected:  01/15/19 1032     Updated:  01/15/19 1041    Narrative:       EXAMINATION: MRI THORACIC SPINE W WO CONTRAST-, MRI CERVICAL SPINE W WO  CONTRAST-      INDICATION: T/L-spine trauma, significant injury suspected, neurologic  deficits.     TECHNIQUE:  Multiplanar multisequence MRI of the cervical and thoracic  spine was performed without and  with contrast.     COMPARISONS:  CT chest 01/12/2019, CT abdomen/pelvis 01/14/2019.     FINDINGS: CERVICAL SPINE: Sagittal images cover from clivus through T3  caudally.     The cervicomedullary junction is unremarkable.  The cervical cord is  normal in caliber and signal.  There is no abnormal enhancement in the  spinal canal or in the paraspinal soft tissues.  No paraspinal edema to  suggest ligamentous injury.     With regard to the marrow space, vertebral body heights are maintained.   There is maintenance of the usual lordosis without significant  spondylolisthesis.  Background marrow signal is normal.      DEGENERATIVE CHANGES ARE AS FOLLOWS:     C2-C3: No significant foraminal or spinal canal stenosis.     C3-C4: Shallow posterior disc osteophyte complex without significant  foraminal or spinal canal stenosis.     C4-C5: Shallow posterior disc osteophyte complex and right-sided  uncovertebral arthropathy. Mild right foraminal stenosis. Mild spinal  canal stenosis.     C5-C6: Prominent disc osteophyte complex and right greater than left  uncovertebral arthropathy. Moderate right foraminal stenosis. Moderate  spinal canal stenosis.     C6-C7: Prominent asymmetric disc osteophyte complex towards the right  and right greater than left uncovertebral arthropathy. Mild right  foraminal stenosis. Mild spinal canal stenosis.     C7-T1: No significant foraminal or spinal canal stenosis.     Incidental imaging of the head and cervical soft tissues is  unremarkable. Bilateral minor mastoid effusions.     THORACIC SPINE: Sagittal imaging covers from C7 through the superior  half of the L2 body caudally.     The imaged spinal cord is normal in caliber without intrinsic cord  signal abnormality identified.  There is no abnormal enhancement in the  spinal canal or in the paraspinal soft tissues. No paraspinal edema to  suggest ligamentous injury.     With regard to the marrow space, vertebral body heights are maintained.    There is maintenance of the usual kyphosis without significant  spondylolisthesis. Background marrow signal is normal.     As is commonly the case in the thoracic spine, degenerative changes are  negligible.  There is no significant foraminal or spinal canal stenosis  at any imaged thoracic level.     Incidental imaging of the thoracic soft tissues is unrevealing.  Obstructed left renal collecting system again noted.       Impression:       1. No evidence of acute osseous or ligamentous injury.  2. Moderate cervical spine degenerative change. No significant thoracic  spine degenerative change.  3. No abnormal enhancement regarding the cervical or thoracic spine.     D:  01/15/2019  E:  01/15/2019     This report was finalized on 1/15/2019 10:39 AM by Javier Connors.                   Assessment/Plan       Guillain-Avon syndrome (CMS/HCC)    Acute on chronic respiratory failure with hypoxia (2L @ baseline)    COPD with acute exacerbation (CMS/HCC)    Hypothyroidism (acquired)    Essential hypertension    Depression    Pinched nerve in neck    left renal collecting system obstruction    Dysphagia    Tobacco abuse    JURGEN on CPAP      I discussed the patients findings and my recommendations with patient. Specifically, I indicated that she has no urinary tract infection, normal creatinine, she is having no urologic symptomology with regard to her stone. I did notice on the CT scan a dilated bladder which is probably most consistent with her Guillain-Avon. As she has not had prior problems, from my standpoint you can remove the Franco catheter at your discretion and institute intermittent catheterization protocol if desired. I suspect this will be transient. With regard to her stone, she is asymptomatic, normal renal function and she has other issues to address and she agrees that at discharge if she has no problems she will follow up with  Urology and take a copy of her CT scan disk for their perusal and their  recommendations. Patient agreeable.         Jermain Gordon MD  01/17/19  3:45 PM    Time:

## 2019-01-17 NOTE — PLAN OF CARE
Problem: Patient Care Overview  Goal: Plan of Care Review  Outcome: Ongoing (interventions implemented as appropriate)   01/17/19 0640   Coping/Psychosocial   Plan of Care Reviewed With patient;son   Plan of Care Review   Progress improving   OTHER   Outcome Summary Pt tolerated bipap during the night. VSS. Pt still c/o numbness and tingling. Pt encouraged to do pulmonary toileting. Will continue to monitor.       Problem: Fall Risk (Adult)  Goal: Identify Related Risk Factors and Signs and Symptoms  Outcome: Ongoing (interventions implemented as appropriate)    Goal: Absence of Fall  Outcome: Outcome(s) achieved Date Met: 01/17/19      Problem: Chronic Obstructive Pulmonary Disease (Adult)  Goal: Signs and Symptoms of Listed Potential Problems Will be Absent, Minimized or Managed (Chronic Obstructive Pulmonary Disease)  Outcome: Ongoing (interventions implemented as appropriate)      Problem: Wound (Includes Pressure Injury) (Adult)  Goal: Signs and Symptoms of Listed Potential Problems Will be Absent, Minimized or Managed (Wound)  Outcome: Ongoing (interventions implemented as appropriate)

## 2019-01-17 NOTE — SIGNIFICANT NOTE
Patient states she is have severe pain located behind bilateral knees.  She describes this pain as dull aching pain which she says will eventually cause cramping in her upper thighs.  She is holding breathing and panting, and refuses to eat breakfast.  She states she needs pain meds so she can eat because she is starving.  ALBIN Zambrano notified of increased need for pain control medications, description of pain assessment.  Orders received and carried out.

## 2019-01-18 PROBLEM — N20.0 LEFT NEPHROLITHIASIS: Status: ACTIVE | Noted: 2019-01-18

## 2019-01-18 PROBLEM — G58.9 PINCHED NERVE IN NECK: Status: RESOLVED | Noted: 2019-01-12 | Resolved: 2019-01-18

## 2019-01-18 LAB
ALBUMIN SERPL-MCNC: 3.06 G/DL (ref 3.2–4.8)
ALBUMIN/GLOB SERPL: 0.6 G/DL (ref 1.5–2.5)
ALP SERPL-CCNC: 64 U/L (ref 25–100)
ALT SERPL W P-5'-P-CCNC: 122 U/L (ref 7–40)
ANION GAP SERPL CALCULATED.3IONS-SCNC: 4 MMOL/L (ref 3–11)
AST SERPL-CCNC: 49 U/L (ref 0–33)
BILIRUB SERPL-MCNC: 1.6 MG/DL (ref 0.3–1.2)
BUN BLD-MCNC: 39 MG/DL (ref 9–23)
BUN/CREAT SERPL: 49.4 (ref 7–25)
CALCIUM SPEC-SCNC: 8.4 MG/DL (ref 8.7–10.4)
CHLORIDE SERPL-SCNC: 97 MMOL/L (ref 99–109)
CO2 SERPL-SCNC: 29 MMOL/L (ref 20–31)
CREAT BLD-MCNC: 0.79 MG/DL (ref 0.6–1.3)
DEPRECATED RDW RBC AUTO: 48.4 FL (ref 37–54)
ERYTHROCYTE [DISTWIDTH] IN BLOOD BY AUTOMATED COUNT: 14.3 % (ref 11.3–14.5)
GFR SERPL CREATININE-BSD FRML MDRD: 75 ML/MIN/1.73
GLOBULIN UR ELPH-MCNC: 4.9 GM/DL
GLUCOSE BLD-MCNC: 101 MG/DL (ref 70–100)
HCT VFR BLD AUTO: 49 % (ref 34.5–44)
HGB BLD-MCNC: 16.1 G/DL (ref 11.5–15.5)
MAGNESIUM SERPL-MCNC: 2 MG/DL (ref 1.3–2.7)
MCH RBC QN AUTO: 30.2 PG (ref 27–31)
MCHC RBC AUTO-ENTMCNC: 32.9 G/DL (ref 32–36)
MCV RBC AUTO: 91.9 FL (ref 80–99)
PLATELET # BLD AUTO: 152 10*3/MM3 (ref 150–450)
PMV BLD AUTO: 11.3 FL (ref 6–12)
POTASSIUM BLD-SCNC: 4.3 MMOL/L (ref 3.5–5.5)
PROT SERPL-MCNC: 8 G/DL (ref 5.7–8.2)
RBC # BLD AUTO: 5.33 10*6/MM3 (ref 3.89–5.14)
SODIUM BLD-SCNC: 130 MMOL/L (ref 132–146)
WBC NRBC COR # BLD: 6.32 10*3/MM3 (ref 3.5–10.8)

## 2019-01-18 PROCEDURE — 97110 THERAPEUTIC EXERCISES: CPT

## 2019-01-18 PROCEDURE — 83735 ASSAY OF MAGNESIUM: CPT | Performed by: INTERNAL MEDICINE

## 2019-01-18 PROCEDURE — 97530 THERAPEUTIC ACTIVITIES: CPT

## 2019-01-18 PROCEDURE — 94799 UNLISTED PULMONARY SVC/PX: CPT

## 2019-01-18 PROCEDURE — 99291 CRITICAL CARE FIRST HOUR: CPT | Performed by: INTERNAL MEDICINE

## 2019-01-18 PROCEDURE — 25010000002 IMMUNE GLOBULIN (HUMAN) 20 GM/200ML SOLUTION: Performed by: PSYCHIATRY & NEUROLOGY

## 2019-01-18 PROCEDURE — 94660 CPAP INITIATION&MGMT: CPT

## 2019-01-18 PROCEDURE — 99232 SBSQ HOSP IP/OBS MODERATE 35: CPT | Performed by: PSYCHIATRY & NEUROLOGY

## 2019-01-18 PROCEDURE — 87205 SMEAR GRAM STAIN: CPT | Performed by: INTERNAL MEDICINE

## 2019-01-18 PROCEDURE — 25010000002 LORAZEPAM PER 2 MG: Performed by: PSYCHIATRY & NEUROLOGY

## 2019-01-18 PROCEDURE — 25010000002 PIPERACILLIN SOD-TAZOBACTAM PER 1 G: Performed by: INTERNAL MEDICINE

## 2019-01-18 PROCEDURE — 85027 COMPLETE CBC AUTOMATED: CPT | Performed by: INTERNAL MEDICINE

## 2019-01-18 PROCEDURE — 80053 COMPREHEN METABOLIC PANEL: CPT | Performed by: INTERNAL MEDICINE

## 2019-01-18 PROCEDURE — 25010000002 METHYLPREDNISOLONE PER 40 MG: Performed by: INTERNAL MEDICINE

## 2019-01-18 PROCEDURE — 94640 AIRWAY INHALATION TREATMENT: CPT

## 2019-01-18 PROCEDURE — 87070 CULTURE OTHR SPECIMN AEROBIC: CPT | Performed by: INTERNAL MEDICINE

## 2019-01-18 PROCEDURE — 94760 N-INVAS EAR/PLS OXIMETRY 1: CPT

## 2019-01-18 PROCEDURE — 25010000002 ENOXAPARIN PER 10 MG: Performed by: PSYCHIATRY & NEUROLOGY

## 2019-01-18 RX ADMIN — NYSTATIN 500000 UNITS: 500000 SUSPENSION ORAL at 17:07

## 2019-01-18 RX ADMIN — IMMUNE GLOBULIN INFUSION (HUMAN) 20 G: 100 INJECTION, SOLUTION INTRAVENOUS; SUBCUTANEOUS at 16:40

## 2019-01-18 RX ADMIN — IPRATROPIUM BROMIDE AND ALBUTEROL SULFATE 3 ML: 2.5; .5 SOLUTION RESPIRATORY (INHALATION) at 08:03

## 2019-01-18 RX ADMIN — METOPROLOL SUCCINATE 25 MG: 25 TABLET, EXTENDED RELEASE ORAL at 08:20

## 2019-01-18 RX ADMIN — ESCITALOPRAM OXALATE 10 MG: 20 TABLET, FILM COATED ORAL at 08:20

## 2019-01-18 RX ADMIN — HYDROCODONE BITARTRATE AND ACETAMINOPHEN 1 TABLET: 7.5; 325 TABLET ORAL at 03:16

## 2019-01-18 RX ADMIN — HYDROXYZINE HYDROCHLORIDE 25 MG: 25 TABLET, FILM COATED ORAL at 12:12

## 2019-01-18 RX ADMIN — GUAIFENESIN 200 MG: 200 SOLUTION ORAL at 20:21

## 2019-01-18 RX ADMIN — HYDROCHLOROTHIAZIDE 25 MG: 25 TABLET ORAL at 08:20

## 2019-01-18 RX ADMIN — DOXYCYCLINE 100 MG: 100 CAPSULE ORAL at 08:20

## 2019-01-18 RX ADMIN — NYSTATIN 500000 UNITS: 500000 SUSPENSION ORAL at 12:12

## 2019-01-18 RX ADMIN — TAZOBACTAM SODIUM AND PIPERACILLIN SODIUM 4.5 G: 500; 4 INJECTION, SOLUTION INTRAVENOUS at 21:16

## 2019-01-18 RX ADMIN — HYDROXYZINE HYDROCHLORIDE 25 MG: 25 TABLET, FILM COATED ORAL at 03:16

## 2019-01-18 RX ADMIN — HYDROCODONE BITARTRATE AND ACETAMINOPHEN 1 TABLET: 7.5; 325 TABLET ORAL at 22:57

## 2019-01-18 RX ADMIN — METHYLPREDNISOLONE SODIUM SUCCINATE 30 MG: 40 INJECTION, POWDER, FOR SOLUTION INTRAMUSCULAR; INTRAVENOUS at 08:20

## 2019-01-18 RX ADMIN — NYSTATIN 500000 UNITS: 500000 SUSPENSION ORAL at 20:21

## 2019-01-18 RX ADMIN — NICOTINE 1 PATCH: 21 PATCH, EXTENDED RELEASE TRANSDERMAL at 08:20

## 2019-01-18 RX ADMIN — TAZOBACTAM SODIUM AND PIPERACILLIN SODIUM 4.5 G: 500; 4 INJECTION, SOLUTION INTRAVENOUS at 13:36

## 2019-01-18 RX ADMIN — LORAZEPAM 1 MG: 2 INJECTION INTRAMUSCULAR; INTRAVENOUS at 20:21

## 2019-01-18 RX ADMIN — GUAIFENESIN 200 MG: 200 SOLUTION ORAL at 12:12

## 2019-01-18 RX ADMIN — IMMUNE GLOBULIN INFUSION (HUMAN) 20 G: 100 INJECTION, SOLUTION INTRAVENOUS; SUBCUTANEOUS at 18:49

## 2019-01-18 RX ADMIN — IBUPROFEN 200 MG: 400 TABLET ORAL at 12:12

## 2019-01-18 RX ADMIN — ENOXAPARIN SODIUM 40 MG: 40 INJECTION SUBCUTANEOUS at 20:22

## 2019-01-18 RX ADMIN — GABAPENTIN 300 MG: 300 CAPSULE ORAL at 16:40

## 2019-01-18 RX ADMIN — LISINOPRIL 10 MG: 10 TABLET ORAL at 08:20

## 2019-01-18 RX ADMIN — IPRATROPIUM BROMIDE AND ALBUTEROL SULFATE 3 ML: 2.5; .5 SOLUTION RESPIRATORY (INHALATION) at 12:42

## 2019-01-18 RX ADMIN — LEVOTHYROXINE SODIUM 100 MCG: 100 TABLET ORAL at 05:58

## 2019-01-18 RX ADMIN — HYDROCODONE BITARTRATE AND ACETAMINOPHEN 1 TABLET: 7.5; 325 TABLET ORAL at 08:20

## 2019-01-18 RX ADMIN — ASPIRIN 81 MG CHEWABLE TABLET 81 MG: 81 TABLET CHEWABLE at 08:20

## 2019-01-18 RX ADMIN — TAZOBACTAM SODIUM AND PIPERACILLIN SODIUM 4.5 G: 500; 4 INJECTION, SOLUTION INTRAVENOUS at 05:58

## 2019-01-18 RX ADMIN — IPRATROPIUM BROMIDE AND ALBUTEROL SULFATE 3 ML: 2.5; .5 SOLUTION RESPIRATORY (INHALATION) at 19:40

## 2019-01-18 RX ADMIN — GABAPENTIN 300 MG: 300 CAPSULE ORAL at 08:20

## 2019-01-18 RX ADMIN — LORAZEPAM 1 MG: 2 INJECTION INTRAMUSCULAR; INTRAVENOUS at 13:36

## 2019-01-18 RX ADMIN — METHYLPREDNISOLONE SODIUM SUCCINATE 30 MG: 40 INJECTION, POWDER, FOR SOLUTION INTRAMUSCULAR; INTRAVENOUS at 20:21

## 2019-01-18 RX ADMIN — GABAPENTIN 300 MG: 300 CAPSULE ORAL at 20:21

## 2019-01-18 RX ADMIN — NYSTATIN 500000 UNITS: 500000 SUSPENSION ORAL at 08:20

## 2019-01-18 RX ADMIN — IPRATROPIUM BROMIDE AND ALBUTEROL SULFATE 3 ML: 2.5; .5 SOLUTION RESPIRATORY (INHALATION) at 16:05

## 2019-01-18 RX ADMIN — GUAIFENESIN 200 MG: 200 SOLUTION ORAL at 17:07

## 2019-01-18 RX ADMIN — DOXYCYCLINE 100 MG: 100 CAPSULE ORAL at 20:21

## 2019-01-18 RX ADMIN — HYDROCODONE BITARTRATE AND ACETAMINOPHEN 1 TABLET: 7.5; 325 TABLET ORAL at 16:40

## 2019-01-18 NOTE — PROGRESS NOTES
"Neurology       Patient Care Team:  Gio Henderson MD as PCP - General (Adolescent Medicine)    Chief complaint GBS      Subjective .     History:  A little stronger today, can quite easily lift legs off bed and was able to stand 4 times with PT.  Voice stronger as well.  Arms still very numb and hard to use hands.  Reports she had brief trouble when switched from BiPAP to oxygen by nasal cannula this morning.  Wondering about prognosis, has questions about [place]    ROS: No fever, headache vision changes    Objective     Vital Signs   Blood pressure 124/70, pulse 79, temperature 98.3 °F (36.8 °C), temperature source Oral, resp. rate 16, height 170.2 cm (67.01\"), weight 96 kg (211 lb 10.3 oz), SpO2 92 %.    Physical Exam:              Neuro: Middle-aged white woman in hospital bed, O2 by nasal cannula, appears comfortable, voice strong, speaking in sentences.  No dysarthria.  EOMI face symmetric TML  Motor: upper and lower extremities 3-4, but increase in strength of hip flexion, was 3 now 4    Results Review:              CBC and CMP reviewed, creatinine normal 0.79, sodium 1:30    Assessment/Plan     Myasthenia gravis-status post 4 of 5 IVIG treatments and responding extremely well, although baseline respiratory impairment raises risk of decompensation.  Discussed prognosis, motor and sensory symptoms, value of inpatient rehabilitation at length.    I discussed the patients findings and my recommendations with patient    Tere Galvez MD  01/18/19  5:57 PM    "

## 2019-01-18 NOTE — PLAN OF CARE
Problem: Patient Care Overview  Goal: Plan of Care Review  Outcome: Ongoing (interventions implemented as appropriate)   01/18/19 0515   Coping/Psychosocial   Plan of Care Reviewed With patient   Plan of Care Review   Progress improving   OTHER   Outcome Summary Pt slept well on BiPAP throughout most of the shift, pain/anxiety better controlled with lortab and atarax. She still complains of numbness in hands/feet, but says it is improving. VSS     Goal: Individualization and Mutuality  Outcome: Ongoing (interventions implemented as appropriate)    Goal: Discharge Needs Assessment  Outcome: Ongoing (interventions implemented as appropriate)      Problem: Fall Risk (Adult)  Goal: Identify Related Risk Factors and Signs and Symptoms  Outcome: Ongoing (interventions implemented as appropriate)    Goal: Absence of Fall  Outcome: Ongoing (interventions implemented as appropriate)    Goal: Identify Related Risk Factors and Signs and Symptoms  Outcome: Ongoing (interventions implemented as appropriate)      Problem: Chronic Obstructive Pulmonary Disease (Adult)  Goal: Signs and Symptoms of Listed Potential Problems Will be Absent, Minimized or Managed (Chronic Obstructive Pulmonary Disease)  Outcome: Ongoing (interventions implemented as appropriate)      Problem: Wound (Includes Pressure Injury) (Adult)  Goal: Signs and Symptoms of Listed Potential Problems Will be Absent, Minimized or Managed (Wound)  Outcome: Ongoing (interventions implemented as appropriate)      Problem: Mobility, Physical Impaired (Adult)  Goal: Identify Related Risk Factors and Signs and Symptoms  Outcome: Ongoing (interventions implemented as appropriate)    Goal: Enhanced Mobility Skills  Outcome: Ongoing (interventions implemented as appropriate)    Goal: Enhanced Functional Ability  Outcome: Ongoing (interventions implemented as appropriate)      Problem: Pain, Chronic (Adult)  Goal: Identify Related Risk Factors and Signs and Symptoms  Outcome:  Ongoing (interventions implemented as appropriate)    Goal: Acceptable Pain/Comfort Level and Functional Ability  Outcome: Ongoing (interventions implemented as appropriate)      Problem: Skin Injury Risk (Adult)  Goal: Skin Health and Integrity  Outcome: Ongoing (interventions implemented as appropriate)      Problem: Guillain-Barré Syndrome (Adult)  Goal: Signs and Symptoms of Listed Potential Problems Will be Absent, Minimized or Managed (Guillain-Barré Syndrome)  Outcome: Ongoing (interventions implemented as appropriate)

## 2019-01-18 NOTE — PLAN OF CARE
Problem: Patient Care Overview  Goal: Plan of Care Review  Outcome: Ongoing (interventions implemented as appropriate)   01/18/19 1035   Coping/Psychosocial   Plan of Care Reviewed With patient;daughter   Plan of Care Review   Progress improving   OTHER   Outcome Summary Pt demonstrated ability to progress to STS x3 with mod x2 A; progressed standing duration and quality of standing posture. Gave good effort with progression of LE HEP. Motivated to participate, but needs encouragement (tearful disposition by end of session). Cont to be limited by weakness and fatigue; will cont PT POC.

## 2019-01-18 NOTE — PROGRESS NOTES
Critical Care Note     LOS: 6 days   Patient Care Team:  Gio Henderson MD as PCP - General (Adolescent Medicine)    Chief Complaint/Reason for visit:    Chief Complaint   Patient presents with   • Numbness     Patient Active Problem List   Diagnosis   • Acute on chronic respiratory failure with hypoxia (2L @ baseline)   • COPD with acute exacerbation (CMS/HCC)   • Hypothyroidism (acquired)   • Essential hypertension   • Depression   • left renal collecting system obstruction   • Guillain-[place] syndrome (CMS/HCC)   • Dysphagia   • Tobacco abuse   • JURGEN on CPAP   • Left nephrolithiasis       Subjective      58-year-old woman who presented December 31 with weakness and numbness to the [facility]. These symptoms progressed and on January 12 she developed worsening shortness of breath and cough. She has a known history of COPD and is an active smoker. She is on supplemental oxygen at home. CTA of the chest was negative for pulmonary embolism. She then developed urinary retention. Lumbar puncture on January 15 revealed a protein of 66, glucose of 95. EMG was results were consistent with Guillian-[place]'syndrome. IVIG was initiated January 15.  She moved to the intensive care unit January 16 with hypoxia requiring BiPAP. She takes Lortab 7.5 mg 4 tablets daily and has done so for 14 years for chronic back pain.    Interval History:   Afebrile, sinus rhythm  BiPAP 16/760% FiO2 last night. Currently on 6 L nasal cannula with a saturation of 92%  Cough productive of purulent mucus  NIF>69, VC 1.4L  Admits to early fatigue  Urology evaluated for nephrolithiasis at the UP junction with hydronephrosis and have recommended follow-up as an outpatient at  where she has previously gotten care because she is currently asymptomatic with normal renal function    Review of Systems:    All systems were reviewed and negative except as noted in subjective.    Medical history, surgical history, social history, family  "history reviewed    Objective     Intake/Output:    Intake/Output Summary (Last 24 hours) at 1/18/2019 1115  Last data filed at 1/18/2019 0900  Gross per 24 hour   Intake 1403.4 ml   Output 1635 ml   Net -231.6 ml       Nutrition:  Diet Dysphagia; IV - Mechanical Soft No Mixed Consistencies; Thin; No Straws    Infusions:       Mechanical Ventilator Settings:            Resp Rate (Set): 4  Pressure Support (cm H2O): 8 cm H20  FiO2 (%): 60 %  PEEP/CPAP (cm H2O): 8 cm H20    Minute Ventilation (L/min) (Obs): 10 L/min  Resp Rate (Observed) Vent: 20          PIP Observed (cm H2O): 12 cm H2O       Telemetry: Sinus rhythm             Vital Signs  Blood pressure 117/76, pulse 72, temperature 97.9 °F (36.6 °C), temperature source Axillary, resp. rate 18, height 170.2 cm (67.01\"), weight 96 kg (211 lb 10.3 oz), SpO2 94 %.    Physical Exam:  General Appearance:  Middle-aged white woman in no respiratory distress    Head:  Normocephalic, atraumatic    Eyes:          No jaundice. Pupils equal and reactive to light, extraocular movements intact    Ears:     Throat: Oral mucosa moist    Neck: Trachea midline, no palpable thyroid    Back:      Lungs:   Symmetric chest expansion. Breath sounds are bilateral with mid and end expiratory rhonchi     Heart:  Regular rhythm, S1, S2 auscultated    Abdomen:   Bowel sounds present, soft, nontender    Rectal:   Deferred   Extremities: No pitting edema or cyanosis    Pulses: Pedal pulses present    Skin: Warm and dry    Lymph nodes:    Neurologic: Alert and oriented. Speech fluent, tongue midline, mild generalized weakness       Results Review:     I reviewed the patient's new clinical results.   Results from last 7 days   Lab Units 01/18/19  0535 01/16/19  0351 01/15/19  0601  01/12/19  1346   SODIUM mmol/L 130* 136 138   < > 136   POTASSIUM mmol/L 4.3 3.7 4.2   < > 3.3*   CHLORIDE mmol/L 97* 101 105   < > 97*   CO2 mmol/L 29.0 29.0 25.0   < > 31.0   BUN mg/dL 39* 28* 28*   < > 20 "   CREATININE mg/dL 0.79 0.67 0.76   < > 0.73   CALCIUM mg/dL 8.4* 8.8 9.0   < > 9.3   BILIRUBIN mg/dL 1.6*  --   --   --  1.3*   ALK PHOS U/L 64  --   --   --  104*   ALT (SGPT) U/L 122*  --   --   --  32   AST (SGOT) U/L 49*  --   --   --  23   GLUCOSE mg/dL 101* 117* 143*   < > 101*    < > = values in this interval not displayed.     Results from last 7 days   Lab Units 01/18/19  0535 01/16/19  0351 01/15/19  1529   WBC 10*3/mm3 6.32 8.43 10.18   HEMOGLOBIN g/dL 16.1* 16.7* 16.8*   HEMATOCRIT % 49.0* 52.4* 52.1*   PLATELETS 10*3/mm3 152 194 212     Results from last 7 days   Lab Units 01/16/19  1108   PH, ARTERIAL pH units 7.484*   PO2 ART mm Hg 57.0*   PCO2, ARTERIAL mm Hg 42.6   HCO3 ART mmol/L 32.0*     No results found for: BLOODCX  No results found for: URINECX    I reviewed the patient's new imaging including images and reports.  COMPARISONS: 01/12/2019.     FINDINGS:  Lungs are without focal abnormality aside from subsegmental  atelectasis of the left lung base. No sizable pleural effusion or  pneumothorax. Cardiomediastinal silhouette is within normal limits.     IMPRESSION:  No significant interval change.     D:  01/16/2019  FINDINGS: The most superior images demonstrate bibasilar airspace  disease, left greater than right.      The liver and spleen are normal.  There is no adrenal mass. The pancreas  is normal.  There are small bilateral renal parenchymal stones. More  importantly, there is a 1 cm stone in the left ureteropelvic junction  producing moderately severe obstruction. Also there is a 1.2 cm area of  low density within the left renal pelvis. This is not calcified on  images obtained prior to contrast and may represent clot. There are also  simple cysts in both kidneys. There is no ascites, aneurysm or  retroperitoneal lymphadenopathy. There is no pelvic mass or fluid.   There is no bladder stone.     IMPRESSION:  1. There is a 1 cm stone in the left ureteropelvic junction producing  moderately  severe obstruction of the left kidney. There is also a 12 mm  low density filling defect in the renal pelvis. On the unenhanced  examination this is not a calcified stone this may represent clot  related to the ureteral stone and obstruction and less likely a  noncalcified stone.   2. Lastly there is bibasilar airspace disease, left greater than right.     D:  01/15/2019  E:  01/15/2019       All medications reviewed.     aspirin 81 mg Oral Daily   doxycycline 100 mg Oral Q12H   enoxaparin 40 mg Subcutaneous Q24H   escitalopram 10 mg Oral Daily   gabapentin 300 mg Oral TID   guaiFENesin 200 mg Oral 4x Daily   hydrochlorothiazide 25 mg Oral Daily   immune globulin (human) 20 g Intravenous Q24H   And      immune globulin (human) 20 g Intravenous Q24H   ipratropium-albuterol 3 mL Nebulization 4x Daily - RT   levothyroxine 100 mcg Oral Daily   lisinopril 10 mg Oral Q24H   methylPREDNISolone sodium succinate 30 mg Intravenous Q12H   metoprolol succinate XL 25 mg Oral Daily   nystatin 5 mL Oral 4x Daily   pharmacy consult - MTM  Does not apply Daily   piperacillin-tazobactam 4.5 g Intravenous Q8H         Assessment/Plan       Guillain-[place] syndrome (CMS/HCC)    left renal collecting system obstruction    Acute on chronic respiratory failure with hypoxia (2L @ baseline)    COPD with acute exacerbation (CMS/HCC)    Dysphagia    JURGEN on CPAP    Hypothyroidism (acquired)    Essential hypertension    Depression    Tobacco abuse    Left nephrolithiasis    #1 Guillian [place]' Syndrome currently receiving IVIG.  vital capacity was 1.4. She has some improvement in strength of her extremities    #2 acute on chronic respiratory failure, at baseline she wears 4 L nasal cannula at home. She is tolerating BiPAP at night. She actively smoke cigarettes. She is maintaining oxygen saturations on high flow nasal cannula. 1/17 morning blood gas revealed a pH of 7.48, CO2 of 42, O2 of 57 on 52% high flow nasal cannula. She has underlying  obstructive airways disease and a productive cough. X-ray shows chronic changes but no pneumonia. CTA of the chest was negative for pulmonary embolus. She is more hypoxic than I would expect from somebody with no infiltrate. She does have copious mucoid secretions and perhaps that his V/Q inequality and plugging.    #3 COPD with exacerbation she is currently on Zosyn, doxycycline, nebulized bronchodilators. She also is on IV steroids that will help both her lungs and her Guillain-Barré syndrome. Will give her a 48 hour trial of Medineb and add mucolytic therapy    #4 essential hypertension, blood pressure adequately controlled with lisinopril and beta blocker    #5 start of sleep apnea, tolerating BiPAP nightly, has a home CPAP unit    #6 left hydronephrosis with nephrolithiasis. Evaluation by urology appreciated, they recommend simple observation with her normal creatinine and no evidence of urinary tract infection. She has apparently received [facility] at the [facility] in the past for nephrolithiasis. Serum creatinine is normal. She would be high risk for anesthesia with her Guillian Barré syndrome and hypoxia    PLAN:  Complete 7-10 days of Zosyn, doxycycline  Nebulized bronchodilators/ trial Medineb  Oral mucolytic therapy  BiPAP nightly  Scheduled Lortab 7.5  4 times daily, home dose  Physical therapy, occupational therapy   gabapentin and ibuprofen  Ativan for muscle cramping, per neurology  Nicotine patch  Encourage ongoing smoking cessation  Thyroid replacement  IVIG per neurology 20 g daily  Lisinopril, metoprolol for high blood pressure  Monitor vital capacity, NIF    VTE Prophylaxis: Lovenox    Stress Ulcer Prophylaxis:none    Coretta Schmidt MD  01/18/19  11:15 AM      Time: 30 min  I personally provided care to this critically ill patient as documented above.  Critical care time does not include time spent on separately billed procedures.  Non of my critical care time was concurrent with  other critical care providers.

## 2019-01-18 NOTE — PLAN OF CARE
Problem: Patient Care Overview  Goal: Plan of Care Review  Outcome: Ongoing (interventions implemented as appropriate)   01/18/19 4993   Coping/Psychosocial   Plan of Care Reviewed With patient   Plan of Care Review   Progress improving   OTHER   Outcome Summary Patient up to chair via sling/lift, worked w/PT. VSS. IGIV administered, tolerated well, no reaction. Increased strength noted however continues to be weak. UOP adequate.        Problem: Fall Risk (Adult)  Goal: Identify Related Risk Factors and Signs and Symptoms  Outcome: Outcome(s) achieved Date Met: 01/18/19    Goal: Absence of Fall  Outcome: Ongoing (interventions implemented as appropriate)    Goal: Identify Related Risk Factors and Signs and Symptoms  Outcome: Ongoing (interventions implemented as appropriate)      Problem: Chronic Obstructive Pulmonary Disease (Adult)  Goal: Signs and Symptoms of Listed Potential Problems Will be Absent, Minimized or Managed (Chronic Obstructive Pulmonary Disease)  Outcome: Ongoing (interventions implemented as appropriate)      Problem: Wound (Includes Pressure Injury) (Adult)  Goal: Signs and Symptoms of Listed Potential Problems Will be Absent, Minimized or Managed (Wound)  Outcome: Ongoing (interventions implemented as appropriate)      Problem: Mobility, Physical Impaired (Adult)  Goal: Identify Related Risk Factors and Signs and Symptoms  Outcome: Outcome(s) achieved Date Met: 01/18/19    Goal: Enhanced Mobility Skills  Outcome: Ongoing (interventions implemented as appropriate)    Goal: Enhanced Functional Ability  Outcome: Ongoing (interventions implemented as appropriate)      Problem: Pain, Chronic (Adult)  Goal: Identify Related Risk Factors and Signs and Symptoms  Outcome: Outcome(s) achieved Date Met: 01/18/19    Goal: Acceptable Pain/Comfort Level and Functional Ability  Outcome: Ongoing (interventions implemented as appropriate)      Problem: Skin Injury Risk (Adult)  Goal: Identify Related Risk  Factors and Signs and Symptoms  Outcome: Outcome(s) achieved Date Met: 01/18/19    Goal: Skin Health and Integrity  Outcome: Ongoing (interventions implemented as appropriate)      Problem: Guillain-Barré Syndrome (Adult)  Goal: Signs and Symptoms of Listed Potential Problems Will be Absent, Minimized or Managed (Guillain-Barré Syndrome)  Outcome: Ongoing (interventions implemented as appropriate)

## 2019-01-18 NOTE — PROGRESS NOTES
Clinical Nutrition     Multidisciplinary Rounds    Time: 20min  Patient Name: Kristina Heck  Date of Encounter: 01/18/19 9:23 AM  MRN: [id number]  Admission date: 1/12/2019      Reason for visit: MDR. RD to continue to follow per protocol.     Additional information obtained during MDR: Pt doing well with PO intake.     Current diet: Diet Dysphagia; IV - Mechanical Soft No Mixed Consistencies; Thin; No Straws      Dietary Nutrition Supplements Boost Glucose Control; chocolate      Dietary Nutrition Supplements Other (See Comment); 2pm snack---HS snack        EMR reviewed     Intervention:  Follow treatment plan  Care plan reviewed    Follow up:   Per protocol      Angy Thurston RDN, LD  9:23 AM

## 2019-01-18 NOTE — THERAPY TREATMENT NOTE
Acute Care - Physical Therapy Treatment Note  [facility]     Patient Name: Kristina Heck  : 1960  MRN: [id number]  Today's Date: 2019  Onset of Illness/Injury or Date of Surgery: 19  Date of Referral to PT: 19  Referring Physician: MD Roxana    Admit Date: 2019    Visit Dx:    ICD-10-CM ICD-9-CM   1. Left ureter dilated N28.82 593.89   2. Hypoxia R09.02 799.02   3. COPD exacerbation (CMS/HCC) J44.1 491.21   4. Weakness R53.1 780.79   5. Impaired functional mobility, balance, gait, and endurance Z74.09 V49.89   6. Acute on chronic respiratory failure with hypoxia (CMS/HCC) J96.21 518.84     799.02   7. Pharyngeal dysphagia R13.13 787.23   8. Impaired mobility and ADLs Z74.09 799.89     Patient Active Problem List   Diagnosis   • Acute on chronic respiratory failure with hypoxia (2L @ baseline)   • COPD with acute exacerbation (CMS/HCC)   • Hypothyroidism (acquired)   • Essential hypertension   • Depression   • left renal collecting system obstruction   • Guillain-Richmond syndrome (CMS/HCC)   • Dysphagia   • Tobacco abuse   • JURGEN on CPAP   • Left nephrolithiasis       Therapy Treatment    Rehabilitation Treatment Summary     Row Name 19 1035             Treatment Time/Intention    Discipline  physical therapist  -LS      Document Type  therapy note (daily note)  -LS      Subjective Information  no complaints  -LS      Mode of Treatment  physical therapy  -LS      Patient/Family Observations  DTR present initially.   -LS      Patient Effort  good  -LS      Existing Precautions/Restrictions  fall;oxygen therapy device and L/min GBS  -LS      Treatment Considerations/Comments  Bilat knee instability in standing.   -LS      Recorded by [LS] Gege Malik, PT 19 1324      Row Name 19 1035             Vital Signs    Pre Systolic BP Rehab  117  -LS      Pre Treatment Diastolic BP  76  -LS      Pretreatment Heart Rate (beats/min)  76  -LS      Posttreatment Heart Rate  (beats/min)  72  -LS      Pre SpO2 (%)  92  -LS      O2 Delivery Pre Treatment  nasal cannula  -LS      Post SpO2 (%)  93  -LS      O2 Delivery Post Treatment  supplemental O2  -LS      Pre Patient Position  Sitting  -LS      Intra Patient Position  Standing  -LS      Post Patient Position  Supine  -LS      Recorded by [LS] Gege Malik, PT 01/18/19 1324      Row Name 01/18/19 1035             Cognitive Assessment/Intervention- PT/OT    Affect/Mental Status (Cognitive)  WFL  -LS      Orientation Status (Cognition)  oriented x 4  -LS      Follows Commands (Cognition)  follows one step commands;over 90% accuracy;verbal cues/prompting required  -LS      Cognitive Function (Cognitive)  safety deficit  -LS      Safety Deficit (Cognitive)  mild deficit;insight into deficits/self awareness;safety precautions awareness  -LS      Personal Safety Interventions  fall prevention program maintained;gait belt;nonskid shoes/slippers when out of bed  -LS      Recorded by [LS] Gege Malik, PT 01/18/19 1324      Row Name 01/18/19 1035             Bed Mobility Assessment/Treatment    Rolling Right Ida (Bed Mobility)  minimum assist (75% patient effort)  -LS      Supine-Sit Ida (Bed Mobility)  minimum assist (75% patient effort)  -LS      Assistive Device (Bed Mobility)  bed rails;draw sheet  -LS      Recorded by [LS] Gege Malik, PT 01/18/19 1324      Row Name 01/18/19 1035             Transfer Assessment/Treatment    Transfer Assessment/Treatment  chair-bed transfer  -LS      Comment (Transfers)  STS x3 from recliner. PT/tech on either side of pt for BUE support and blocking feet/knees. Cues for achieving full upright posture with trunk extension.    -LS      Recorded by [LS] Gege Malik, PT 01/18/19 1324      Row Name 01/18/19 1035             Bed-Chair Transfer    Bed-Chair Ida (Transfers)  dependent (less than 25% patient effort);2 person assist;verbal cues  -LS      Recorded by [LS] Helena  Gege PHILLIPS, PT 01/18/19 1324      Row Name 01/18/19 1035             Sit-Stand Transfer    Sit-Stand Warrior (Transfers)  moderate assist (50% patient effort);2 person assist;verbal cues  -LS      Recorded by [LS] Gege Malik, PT 01/18/19 1324      Row Name 01/18/19 1035             Stand-Sit Transfer    Stand-Sit Warrior (Transfers)  moderate assist (50% patient effort);2 person assist;verbal cues  -LS      Recorded by [LS] Gege Malik, PT 01/18/19 1324      Row Name 01/18/19 1035             Gait/Stairs Assessment/Training    Warrior Level (Gait)  unable to assess  -LS      Recorded by [LS] Gege Malik, PT 01/18/19 1324      Row Name 01/18/19 1035             Therapeutic Exercise    Therapeutic Exercise  seated, lower extremities  -LS      68319 - PT Therapeutic Exercise Minutes  8  -LS      82385 - PT Therapeutic Activity Minutes  20  -LS      Recorded by [LS] Gege Malik, PT 01/18/19 1324      Row Name 01/18/19 1035             Lower Extremity Seated Therapeutic Exercise    Performed, Seated Lower Extremity (Therapeutic Exercise)  hip flexion/extension;LAQ (long arc quad), knee extension;ankle dorsiflexion/plantarflexion  -LS      Exercise Type, Seated Lower Extremity (Therapeutic Exercise)  AROM (active range of motion)  -LS      Sets/Reps Detail, Seated Lower Extremity (Therapeutic Exercise)  1/10; with rest breaks between each exercise  -LS      Recorded by [LS] Gege Malik, PT 01/18/19 1324      Row Name 01/18/19 1035             Static Sitting Balance    Level of Warrior (Unsupported Sitting, Static Balance)  contact guard assist  -LS      Sitting Position (Unsupported Sitting, Static Balance)  sitting in chair  -LS      Recorded by [LS] Gege Malik, PT 01/18/19 1324      Row Name 01/18/19 1035             Static Standing Balance    Level of Warrior (Supported Standing, Static Balance)  moderate assist, 50 to 74% patient effort x2  -LS      Time Able to Maintain  Position (Supported Standing, Static Balance)  30 to 45 seconds  -LS      Comment (Supported Standing, Static Balance)  12s x3 with seated rest break between attempts.   -LS      Recorded by [LS] Gege Malik, PT 01/18/19 1324      Row Name 01/18/19 1035             Positioning and Restraints    Pre-Treatment Position  sitting in chair/recliner  -LS      Post Treatment Position  bed  -LS      In Bed  notified nsg;fowlers;call light within reach;encouraged to call for assist;exit alarm on;RUE elevated;LUE elevated;legs elevated  -LS      Recorded by [LS] Gege Malik, PT 01/18/19 1324      Row Name 01/18/19 1035             Pain Scale: Numbers Pre/Post-Treatment    Pain Location  back  -LS      Pre/Post Treatment Pain Comment  tolerated  -LS      Pain Intervention(s)  Repositioned;Ambulation/increased activity  -LS      Recorded by [LS] Gege Malik, PT 01/18/19 1324      Row Name 01/18/19 1035             Pain Scale: FACES Pre/Post-Treatment    Pain: FACES Scale, Pretreatment  0-->no hurt  -LS      Pain: FACES Scale, Post-Treatment  2-->hurts little bit  -LS      Recorded by [LS] Gege Malik, PT 01/18/19 1324      Row Name 01/18/19 1035             Plan of Care Review    Plan of Care Reviewed With  patient;daughter  -LS      Recorded by [LS] Gege Malik, PT 01/18/19 1324      Row Name 01/18/19 1035             Outcome Summary/Treatment Plan (PT)    Daily Summary of Progress (PT)  progress toward functional goals is good  -LS      Recorded by [LS] Gege Malik, PT 01/18/19 1324        User Key  (r) = Recorded By, (t) = Taken By, (c) = Cosigned By    Initials Name Effective Dates Discipline    LS Gege Malik, PT 06/19/15 -  PT                   Physical Therapy Education     Title: PT OT SLP Therapies (In Progress)     Topic: Physical Therapy (Done)     Point: Mobility training (Done)     Learning Progress Summary           Patient Acceptance, E,D, VU,NR by RIO at 1/18/2019 10:35 AM    Acceptance,  E,D,H, VU,NR by LS at 1/17/2019 11:15 AM    Acceptance, E, VU by KM at 1/14/2019 10:07 AM    Acceptance, E,D, VU,NR by MB at 1/13/2019  9:55 AM    Comment:  log roll technique, transfer mechanics/safety, POC progression, benefits of mobility, home safety   Family Acceptance, E,D, VU,NR by LS at 1/18/2019 10:35 AM    Acceptance, E,D,H, VU,NR by LS at 1/17/2019 11:15 AM                   Point: Home exercise program (Done)     Learning Progress Summary           Patient Acceptance, E,D, VU,NR by LS at 1/18/2019 10:35 AM    Acceptance, E,D,H, VU,NR by LS at 1/17/2019 11:15 AM    Acceptance, E, VU by REYNALDO at 1/14/2019 10:07 AM    Acceptance, E,D, VU,NR by MB at 1/13/2019  9:55 AM    Comment:  log roll technique, transfer mechanics/safety, POC progression, benefits of mobility, home safety   Family Acceptance, E,D, VU,NR by LS at 1/18/2019 10:35 AM    Acceptance, E,D,H, VU,NR by LS at 1/17/2019 11:15 AM                   Point: Body mechanics (Done)     Learning Progress Summary           Patient Acceptance, E,D, VU,NR by LS at 1/18/2019 10:35 AM    Acceptance, E,D,H, VU,NR by LS at 1/17/2019 11:15 AM    Acceptance, E, VU by REYNALDO at 1/14/2019 10:07 AM    Acceptance, E,D, VU,NR by MB at 1/13/2019  9:55 AM    Comment:  log roll technique, transfer mechanics/safety, POC progression, benefits of mobility, home safety   Family Acceptance, E,D, VU,NR by LS at 1/18/2019 10:35 AM    Acceptance, E,D,H, VU,NR by LS at 1/17/2019 11:15 AM                   Point: Precautions (Done)     Learning Progress Summary           Patient Acceptance, E,D, VU,NR by LS at 1/18/2019 10:35 AM    Acceptance, E,D,H, VU,NR by LS at 1/17/2019 11:15 AM    Acceptance, E, VU by KM at 1/14/2019 10:07 AM    Acceptance, SERA STUBBS, URBI,NR by MB at 1/13/2019  9:55 AM    Comment:  log roll technique, transfer mechanics/safety, POC progression, benefits of mobility, home safety   Family Acceptance, SERA STUBBS, RUBI,NR by RIO at 1/18/2019 10:35 AM    Acceptance, SERA STUBBS,TANESHA, RUBI,NR by RIO  at 1/17/2019 11:15 AM                               User Key     Initials Effective Dates Name Provider Type Discipline    KM 06/19/15 -  Magaly Ireland, PT Physical Therapist PT    LS 06/19/15 -  Gege Malik, PT Physical Therapist PT    MB 03/14/16 -  Bronwyn Calvillo, PT Physical Therapist PT                PT Recommendation and Plan  Anticipated Discharge Disposition (PT): inpatient rehabilitation facility  Planned Therapy Interventions (PT Eval): balance training, bed mobility training, gait training, home exercise program, strengthening, transfer training, patient/family education  Therapy Frequency (PT Clinical Impression): daily  Outcome Summary/Treatment Plan (PT)  Daily Summary of Progress (PT): progress toward functional goals is good  Anticipated Discharge Disposition (PT): inpatient rehabilitation facility  Plan of Care Reviewed With: patient, daughter  Progress: improving  Outcome Summary: Pt demonstrated ability to progress to STS x3 with mod x2 A; progressed standing duration and quality of standing posture. Gave good effort with progression of LE HEP. Motivated to participate, but needs encouragement (tearful disposition by end of session). Cont to be limited by weakness and fatigue; will cont PT POC.   Outcome Measures     Row Name 01/18/19 1035 01/17/19 1115 01/17/19 0920       How much help from another person do you currently need...    Turning from your back to your side while in flat bed without using bedrails?  2  -LS  2  -LS  --    Moving from lying on back to sitting on the side of a flat bed without bedrails?  2  -LS  2  -LS  --    Moving to and from a bed to a chair (including a wheelchair)?  1  -LS  1  -LS  --    Standing up from a chair using your arms (e.g., wheelchair, bedside chair)?  2  -LS  2  -LS  --    Climbing 3-5 steps with a railing?  1  -LS  1  -LS  --    To walk in hospital room?  1  -LS  1  -LS  --    AM-PAC 6 Clicks Score  9  -LS  9  -LS  --       How much  help from another is currently needed...    Putting on and taking off regular lower body clothing?  --  --  1  -CL    Bathing (including washing, rinsing, and drying)  --  --  1  -CL    Toileting (which includes using toilet bed pan or urinal)  --  --  1  -CL    Putting on and taking off regular upper body clothing  --  --  2  -CL    Taking care of personal grooming (such as brushing teeth)  --  --  2  -CL    Eating meals  --  --  3  -CL    Score  --  --  10  -CL       Functional Assessment    Outcome Measure Options  AM-PAC 6 Clicks Basic Mobility (PT)  -LS  AM-PAC 6 Clicks Basic Mobility (PT)  -LS  AM-PAC 6 Clicks Daily Activity (OT)  -CL      User Key  (r) = Recorded By, (t) = Taken By, (c) = Cosigned By    Initials Name Provider Type    Ggee Westfall, PT Physical Therapist    CL Jazmine Acuña, OT Occupational Therapist         Time Calculation:   PT Charges     Row Name 01/18/19 1035             Time Calculation    Start Time  1035  -      PT Received On  01/18/19  -         Time Calculation- PT    Total Timed Code Minutes- PT  28 minute(s)  -LS         Timed Charges    51534 - PT Therapeutic Exercise Minutes  8  -LS      67555 - PT Therapeutic Activity Minutes  20  -LS        User Key  (r) = Recorded By, (t) = Taken By, (c) = Cosigned By    Initials Name Provider Type    Gege Westfall, PT Physical Therapist        Therapy Suggested Charges     Code   Minutes Charges    19491 (CPT®)  Pt Neuromusc Re Education Ea 15 Min      29064 (CPT®) Hc Pt Ther Proc Ea 15 Min 8 1    75990 (CPT®) Hc Gait Training Ea 15 Min      91124 (CPT®) Hc Pt Therapeutic Act Ea 15 Min 20 1    06618 (CPT®) Hc Pt Manual Therapy Ea 15 Min      53598 (CPT®) Hc Pt Iontophoresis Ea 15 Min      97542 (CPT®) Hc Pt Elec Stim Ea-Per 15 Min      93824 (CPT®) Hc Pt Ultrasound Ea 15 Min      79530 (CPT®) Hc Pt Self Care/Mgmt/Train Ea 15 Min      71162 (CPT®) Hc Pt Prosthetic (S) Train Initial Encounter, Each 15 Min      88872 (CPT®) Hc Pt  Orthotic(S)/Prosthetic(S) Encounter, Each 15 Min      87492 (CPT®) Hc Orthotic(S) Mgmt/Train Initial Encounter, Each 15min      Total  28 2        Therapy Charges for Today     Code Description Service Date Service Provider Modifiers Qty    12409433276 HC PT RE-EVAL ESTABLISHED PLAN 2 1/17/2019 Gege Malik, PT GP 1    16689277915 HC PT THERAPEUTIC ACT EA 15 MIN 1/17/2019 Gege Malik, PT GP 1    43621429860 HC PT THER SUPP EA 15 MIN 1/17/2019 Gege Malik, PT GP 2    51110802894 HC PT THERAPEUTIC ACT EA 15 MIN 1/18/2019 Gege Malik, PT GP 1    90992846511 HC PT THER PROC EA 15 MIN 1/18/2019 Gege Malik, PT GP 1    31977163914 HC PT THER SUPP EA 15 MIN 1/18/2019 Gege Malik, PT GP 2          PT G-Codes  Outcome Measure Options: AM-PAC 6 Clicks Basic Mobility (PT)  AM-PAC 6 Clicks Score: 9  Score: 10    Gege Malik, PT  1/18/2019

## 2019-01-18 NOTE — PROGRESS NOTES
Continued Stay Note  [facility]     Patient Name: Kristina Heck  MRN: [id number]  Today's Date: 1/18/2019    Admit Date: 1/12/2019    Discharge Plan     Row Name 01/18/19 1146       Plan    Plan  rehab    Patient/Family in Agreement with Plan  yes    Plan Comments  Spoke with patient at bedside about rehab.  Patient isn't sure and states she wants to discuss with her daughter.  CM will continue to follow.        Discharge Codes    No documentation.       Expected Discharge Date and Time     Expected Discharge Date Expected Discharge Time    Jan 23, 2019             Xuan Echols RN

## 2019-01-19 PROCEDURE — 94799 UNLISTED PULMONARY SVC/PX: CPT

## 2019-01-19 PROCEDURE — 25010000002 ENOXAPARIN PER 10 MG: Performed by: PSYCHIATRY & NEUROLOGY

## 2019-01-19 PROCEDURE — 25010000002 IMMUNE GLOBULIN (HUMAN) 20 GM/200ML SOLUTION: Performed by: PSYCHIATRY & NEUROLOGY

## 2019-01-19 PROCEDURE — 25010000002 METHYLPREDNISOLONE PER 40 MG: Performed by: INTERNAL MEDICINE

## 2019-01-19 PROCEDURE — 94760 N-INVAS EAR/PLS OXIMETRY 1: CPT

## 2019-01-19 PROCEDURE — 99233 SBSQ HOSP IP/OBS HIGH 50: CPT | Performed by: INTERNAL MEDICINE

## 2019-01-19 PROCEDURE — 25010000002 PIPERACILLIN SOD-TAZOBACTAM PER 1 G: Performed by: INTERNAL MEDICINE

## 2019-01-19 PROCEDURE — 94660 CPAP INITIATION&MGMT: CPT

## 2019-01-19 PROCEDURE — 97535 SELF CARE MNGMENT TRAINING: CPT

## 2019-01-19 PROCEDURE — 99232 SBSQ HOSP IP/OBS MODERATE 35: CPT | Performed by: PSYCHIATRY & NEUROLOGY

## 2019-01-19 PROCEDURE — 97110 THERAPEUTIC EXERCISES: CPT

## 2019-01-19 PROCEDURE — 97530 THERAPEUTIC ACTIVITIES: CPT

## 2019-01-19 RX ORDER — METHYLPREDNISOLONE SODIUM SUCCINATE 40 MG/ML
20 INJECTION, POWDER, LYOPHILIZED, FOR SOLUTION INTRAMUSCULAR; INTRAVENOUS EVERY 12 HOURS
Status: COMPLETED | OUTPATIENT
Start: 2019-01-19 | End: 2019-01-22

## 2019-01-19 RX ORDER — DOCUSATE SODIUM 100 MG/1
100 CAPSULE, LIQUID FILLED ORAL 2 TIMES DAILY PRN
Status: DISCONTINUED | OUTPATIENT
Start: 2019-01-19 | End: 2019-01-24 | Stop reason: HOSPADM

## 2019-01-19 RX ORDER — CEFDINIR 300 MG/1
300 CAPSULE ORAL EVERY 12 HOURS SCHEDULED
Status: COMPLETED | OUTPATIENT
Start: 2019-01-19 | End: 2019-01-22

## 2019-01-19 RX ADMIN — IBUPROFEN 200 MG: 400 TABLET ORAL at 14:03

## 2019-01-19 RX ADMIN — HYDROCODONE BITARTRATE AND ACETAMINOPHEN 1 TABLET: 7.5; 325 TABLET ORAL at 04:52

## 2019-01-19 RX ADMIN — METHYLPREDNISOLONE SODIUM SUCCINATE 30 MG: 40 INJECTION, POWDER, FOR SOLUTION INTRAMUSCULAR; INTRAVENOUS at 08:11

## 2019-01-19 RX ADMIN — HYDROCODONE BITARTRATE AND ACETAMINOPHEN 1 TABLET: 7.5; 325 TABLET ORAL at 10:35

## 2019-01-19 RX ADMIN — DOCUSATE SODIUM 100 MG: 100 CAPSULE, LIQUID FILLED ORAL at 09:10

## 2019-01-19 RX ADMIN — ENOXAPARIN SODIUM 40 MG: 40 INJECTION SUBCUTANEOUS at 20:14

## 2019-01-19 RX ADMIN — SODIUM CHLORIDE, PRESERVATIVE FREE 10 ML: 5 INJECTION INTRAVENOUS at 08:12

## 2019-01-19 RX ADMIN — CEFDINIR 300 MG: 300 CAPSULE ORAL at 11:57

## 2019-01-19 RX ADMIN — NYSTATIN 500000 UNITS: 500000 SUSPENSION ORAL at 11:58

## 2019-01-19 RX ADMIN — NYSTATIN 500000 UNITS: 500000 SUSPENSION ORAL at 08:11

## 2019-01-19 RX ADMIN — NICOTINE 1 PATCH: 21 PATCH, EXTENDED RELEASE TRANSDERMAL at 08:13

## 2019-01-19 RX ADMIN — IPRATROPIUM BROMIDE AND ALBUTEROL SULFATE 3 ML: 2.5; .5 SOLUTION RESPIRATORY (INHALATION) at 19:36

## 2019-01-19 RX ADMIN — GABAPENTIN 300 MG: 300 CAPSULE ORAL at 15:31

## 2019-01-19 RX ADMIN — ASPIRIN 81 MG CHEWABLE TABLET 81 MG: 81 TABLET CHEWABLE at 08:12

## 2019-01-19 RX ADMIN — GUAIFENESIN 200 MG: 200 SOLUTION ORAL at 17:53

## 2019-01-19 RX ADMIN — DOXYCYCLINE 100 MG: 100 CAPSULE ORAL at 08:12

## 2019-01-19 RX ADMIN — HYDROCHLOROTHIAZIDE 25 MG: 25 TABLET ORAL at 08:12

## 2019-01-19 RX ADMIN — DOCUSATE SODIUM 100 MG: 100 CAPSULE, LIQUID FILLED ORAL at 17:53

## 2019-01-19 RX ADMIN — HYDROXYZINE HYDROCHLORIDE 25 MG: 25 TABLET, FILM COATED ORAL at 20:13

## 2019-01-19 RX ADMIN — POLYETHYLENE GLYCOL 3350 17 G: 17 POWDER, FOR SOLUTION ORAL at 09:10

## 2019-01-19 RX ADMIN — ESCITALOPRAM OXALATE 10 MG: 20 TABLET, FILM COATED ORAL at 08:12

## 2019-01-19 RX ADMIN — IPRATROPIUM BROMIDE AND ALBUTEROL SULFATE 3 ML: 2.5; .5 SOLUTION RESPIRATORY (INHALATION) at 09:29

## 2019-01-19 RX ADMIN — HYDROXYZINE HYDROCHLORIDE 25 MG: 25 TABLET, FILM COATED ORAL at 14:03

## 2019-01-19 RX ADMIN — CEFDINIR 300 MG: 300 CAPSULE ORAL at 20:15

## 2019-01-19 RX ADMIN — IPRATROPIUM BROMIDE AND ALBUTEROL SULFATE 3 ML: 2.5; .5 SOLUTION RESPIRATORY (INHALATION) at 12:53

## 2019-01-19 RX ADMIN — IMMUNE GLOBULIN INFUSION (HUMAN) 20 G: 100 INJECTION, SOLUTION INTRAVENOUS; SUBCUTANEOUS at 17:52

## 2019-01-19 RX ADMIN — LISINOPRIL 10 MG: 10 TABLET ORAL at 08:12

## 2019-01-19 RX ADMIN — GUAIFENESIN 200 MG: 200 SOLUTION ORAL at 08:11

## 2019-01-19 RX ADMIN — IMMUNE GLOBULIN INFUSION (HUMAN) 20 G: 100 INJECTION, SOLUTION INTRAVENOUS; SUBCUTANEOUS at 15:31

## 2019-01-19 RX ADMIN — METHYLPREDNISOLONE SODIUM SUCCINATE 20 MG: 40 INJECTION, POWDER, FOR SOLUTION INTRAMUSCULAR; INTRAVENOUS at 20:15

## 2019-01-19 RX ADMIN — IPRATROPIUM BROMIDE AND ALBUTEROL SULFATE 3 ML: 2.5; .5 SOLUTION RESPIRATORY (INHALATION) at 16:25

## 2019-01-19 RX ADMIN — GUAIFENESIN 200 MG: 200 SOLUTION ORAL at 11:58

## 2019-01-19 RX ADMIN — HYDROCODONE BITARTRATE AND ACETAMINOPHEN 1 TABLET: 7.5; 325 TABLET ORAL at 21:08

## 2019-01-19 RX ADMIN — GABAPENTIN 300 MG: 300 CAPSULE ORAL at 08:12

## 2019-01-19 RX ADMIN — TAZOBACTAM SODIUM AND PIPERACILLIN SODIUM 4.5 G: 500; 4 INJECTION, SOLUTION INTRAVENOUS at 05:00

## 2019-01-19 RX ADMIN — METOPROLOL SUCCINATE 25 MG: 25 TABLET, EXTENDED RELEASE ORAL at 08:12

## 2019-01-19 RX ADMIN — GUAIFENESIN 200 MG: 200 SOLUTION ORAL at 20:14

## 2019-01-19 RX ADMIN — HYDROCODONE BITARTRATE AND ACETAMINOPHEN 1 TABLET: 7.5; 325 TABLET ORAL at 15:32

## 2019-01-19 RX ADMIN — LEVOTHYROXINE SODIUM 100 MCG: 100 TABLET ORAL at 05:00

## 2019-01-19 RX ADMIN — NYSTATIN 500000 UNITS: 500000 SUSPENSION ORAL at 20:14

## 2019-01-19 RX ADMIN — IBUPROFEN 200 MG: 400 TABLET ORAL at 08:12

## 2019-01-19 RX ADMIN — HYDROXYZINE HYDROCHLORIDE 25 MG: 25 TABLET, FILM COATED ORAL at 08:12

## 2019-01-19 RX ADMIN — NYSTATIN 500000 UNITS: 500000 SUSPENSION ORAL at 17:53

## 2019-01-19 RX ADMIN — GABAPENTIN 300 MG: 300 CAPSULE ORAL at 20:14

## 2019-01-19 NOTE — PLAN OF CARE
Problem: Patient Care Overview  Goal: Plan of Care Review  Outcome: Ongoing (interventions implemented as appropriate)   01/19/19 1280   Coping/Psychosocial   Plan of Care Reviewed With patient   Plan of Care Review   Progress improving   OTHER   Outcome Summary Patient stood w/ physical therapy. Increased strength assessed in the upper and lower extremities. VSS. Last dose of IVIG being administered at this time.       Problem: Fall Risk (Adult)  Goal: Absence of Fall  Outcome: Ongoing (interventions implemented as appropriate)    Goal: Identify Related Risk Factors and Signs and Symptoms  Outcome: Outcome(s) achieved Date Met: 01/19/19      Problem: Guillain-Barré Syndrome (Adult)  Goal: Signs and Symptoms of Listed Potential Problems Will be Absent, Minimized or Managed (Guillain-Barré Syndrome)  Outcome: Ongoing (interventions implemented as appropriate)

## 2019-01-19 NOTE — PLAN OF CARE
Problem: Patient Care Overview  Goal: Plan of Care Review  Outcome: Ongoing (interventions implemented as appropriate)   01/19/19 1116   Coping/Psychosocial   Plan of Care Reviewed With patient;daughter   Plan of Care Review   Progress improving   OTHER   Outcome Summary Pt practiced sit <> stand x4 reps with Lexx x2, cues for weightshifting and hand/foot placement. Pt gave good effort and is an excellent rehab candidate.

## 2019-01-19 NOTE — PROGRESS NOTES
Daily Progress Note  Neurology     LOS: 7 days     Subjective     Chief Complaint: Numbness and weakness. GBS    Interval History: No Acute issues overnight.  Overall feels stronger today.  Was able to stand the with the help of a walker.  She complains of numbness in both the hands.    ROS: No fever, headache or vision changes.    Objective     Vital signs in last 24 hours:  Temp:  [97.2 °F (36.2 °C)-98.3 °F (36.8 °C)] 97.6 °F (36.4 °C)  Heart Rate:  [56-84] 70  Resp:  [16-20] 16  BP: ()/(58-88) 127/77      Physical Exam:   General: In chair with eyes open. In NAD.  O2 by nasal cannula.   Respiratory: Respirations unlabored   CV: RRR       Neurologic Exam:   Mental status: Awake, alert, Follows commands. Speech fluent   CN: II-XII intact to detailed exam    Motor: Strength full (4/5) throughout in BUE and BLE.  Overall good improvement since admission.   Reflexes: Absent in both upper and lower extremities.          Results Review:    Results from last 7 days   Lab Units 01/18/19  0535   WBC 10*3/mm3 6.32   HEMOGLOBIN g/dL 16.1*   HEMATOCRIT % 49.0*   PLATELETS 10*3/mm3 152     Results from last 7 days   Lab Units 01/18/19  0535   SODIUM mmol/L 130*   POTASSIUM mmol/L 4.3   CHLORIDE mmol/L 97*   CO2 mmol/L 29.0   BUN mg/dL 39*   CREATININE mg/dL 0.79   CALCIUM mg/dL 8.4*       No radiology results for the last day      Assessment/Plan       1.  Guillain-Barré syndrome: She is currently receiving  5/5  IVIG treatment and has responded very well.  Overall strength in both upper and lower extremities improved since admission.  She was able to stand with the help of a walker this morning with PT.  She'll need extensive physical therapy and will be going to [facility].  Continue with gabapentin 300 mg TID for neuropathy symptoms. This certainly can be increased in future if she tolerates higher dose without any  issues.  Talked to patient in detail about the prognosis, future course of GBS.  Neurology will sign off.  Follow-up with Dr. Serra in outpatient neurology clinic.    Jacob Serra MD  01/19/19  12:42 PM

## 2019-01-19 NOTE — PLAN OF CARE
Problem: Patient Care Overview  Goal: Plan of Care Review  Outcome: Ongoing (interventions implemented as appropriate)   01/19/19 5534   Coping/Psychosocial   Plan of Care Reviewed With patient   Plan of Care Review   Progress improving   OTHER   Outcome Summary pt maintaining sats on 2 L NC, tolerated bipap for 6 hours, VSS, afebrile, adequate UOP, pain controlled with prn lortab.       Problem: Fall Risk (Adult)  Goal: Absence of Fall  Outcome: Ongoing (interventions implemented as appropriate)   01/19/19 0534   Fall Risk (Adult)   Absence of Fall making progress toward outcome       Problem: Chronic Obstructive Pulmonary Disease (Adult)  Goal: Signs and Symptoms of Listed Potential Problems Will be Absent, Minimized or Managed (Chronic Obstructive Pulmonary Disease)  Outcome: Ongoing (interventions implemented as appropriate)   01/19/19 0334   Goal/Outcome Evaluation   Problems Assessed (Chronic Obstructive Pulmonary Disease (COPD)) all   Problems Present (COPD, Bronch/Emphy) respiratory compromise;situational response       Problem: Pain, Chronic (Adult)  Goal: Identify Related Risk Factors and Signs and Symptoms  Outcome: Ongoing (interventions implemented as appropriate)   01/19/19 0534   Pain, Chronic (Adult)   Signs and Symptoms (Chronic Pain) verbalization of pain descriptors       Problem: Skin Injury Risk (Adult)  Goal: Identify Related Risk Factors and Signs and Symptoms  Outcome: Outcome(s) achieved Date Met: 01/19/19      Problem: Guillain-Barré Syndrome (Adult)  Goal: Signs and Symptoms of Listed Potential Problems Will be Absent, Minimized or Managed (Guillain-Barré Syndrome)  Outcome: Ongoing (interventions implemented as appropriate)   01/19/19 3929   Goal/Outcome Evaluation   Problems Assessed (Guillain-Barré Syndrome) autonomic nervous system dysfunction;eating/swallowing impairment;hypoxia/respiratory failure;motor weakness;sensory changes/pain;situational response   Problems Present  (Guillain-Barré Syn) eating/swallowing impairment;motor weakness;sensory changes/pain;situational response

## 2019-01-19 NOTE — THERAPY TREATMENT NOTE
Acute Care - Physical Therapy Treatment Note  [facility]     Patient Name: Krisitna Heck  : 1960  MRN: [id number]  Today's Date: 2019  Onset of Illness/Injury or Date of Surgery: 19  Date of Referral to PT: 19  Referring Physician: MD Roxana    Admit Date: 2019    Visit Dx:    ICD-10-CM ICD-9-CM   1. Left ureter dilated N28.82 593.89   2. Hypoxia R09.02 799.02   3. COPD exacerbation (CMS/HCC) J44.1 491.21   4. Weakness R53.1 780.79   5. Impaired functional mobility, balance, gait, and endurance Z74.09 V49.89   6. Acute on chronic respiratory failure with hypoxia (CMS/HCC) J96.21 518.84     799.02   7. Pharyngeal dysphagia R13.13 787.23   8. Impaired mobility and ADLs Z74.09 799.89     Patient Active Problem List   Diagnosis   • Acute on chronic respiratory failure with hypoxia (2L @ baseline)   • COPD with acute exacerbation (CMS/HCC)   • Hypothyroidism (acquired)   • Essential hypertension   • Depression   • left renal collecting system obstruction   • Guillain-Burnside syndrome (CMS/HCC)   • Dysphagia   • Tobacco abuse   • JURGEN on CPAP   • Left nephrolithiasis       Therapy Treatment    Rehabilitation Treatment Summary     Row Name 19 1108             Treatment Time/Intention    Discipline  physical therapist  -SJ      Document Type  therapy note (daily note)  -SJ      Subjective Information  complains of;pain;weakness  -SJ      Mode of Treatment  physical therapy  -SJ      Patient/Family Observations  Pt in ICU, reclined in chair. Daughter present.  -SJ      Care Plan Review  care plan/treatment goals reviewed;risks/benefits reviewed;current/potential barriers reviewed;patient/other agree to care plan  -SJ      Therapy Frequency (PT Clinical Impression)  daily  -SJ      Patient Effort  excellent  -SJ      Existing Precautions/Restrictions  fall  -SJ      Treatment Considerations/Comments  bilat knee instability in standing  -SJ      Patient Response to Treatment  gave  great effort, katie well  -SJ      Recorded by [SJ] Tere Cardoso, PT 01/19/19 1116      Row Name 01/19/19 1108             Vital Signs    Pre Systolic BP Rehab  115  -SJ      Pre Treatment Diastolic BP  80  -SJ      Post Systolic BP Rehab  119  -SJ      Post Treatment Diastolic BP  79  -SJ      Pretreatment Heart Rate (beats/min)  69  -SJ      Posttreatment Heart Rate (beats/min)  75  -SJ      Pre SpO2 (%)  93  -SJ      O2 Delivery Pre Treatment  room air  -SJ      Post SpO2 (%)  94  -SJ      O2 Delivery Post Treatment  room air  -SJ      Pre Patient Position  Sitting  -SJ      Intra Patient Position  Standing  -SJ      Post Patient Position  Sitting  -SJ      Recorded by [SJ] Tere Cardoso, PT 01/19/19 1116      Row Name 01/19/19 1108             Cognitive Assessment/Intervention- PT/OT    Affect/Mental Status (Cognitive)  WFL  -SJ      Orientation Status (Cognition)  oriented x 4  -SJ      Follows Commands (Cognition)  follows one step commands;over 90% accuracy;verbal cues/prompting required  -SJ      Safety Deficit (Cognitive)  mild deficit;insight into deficits/self awareness  -SJ      Personal Safety Interventions  fall prevention program maintained;gait belt;muscle strengthening facilitated;nonskid shoes/slippers when out of bed  -SJ      Recorded by [SJ] Tere Cardoso, PT 01/19/19 1116      Row Name 01/19/19 1108             Safety Issues, Functional Mobility    Safety Issues Affecting Function (Mobility)  insight into deficits/self awareness;sequencing abilities  -SJ      Impairments Affecting Function (Mobility)  balance;coordination;endurance/activity tolerance;motor planning;sensation/sensory awareness;shortness of breath;strength  -SJ      Recorded by [SJ] Tere Cardoso, PT 01/19/19 1116      Row Name 01/19/19 1108             Bed Mobility Assessment/Treatment    Comment (Bed Mobility)  UIC  -SJ      Recorded by [SJ] Tere Cardoso, PT 01/19/19 1116      Row Name 01/19/19 1108              Transfer Assessment/Treatment    Comment (Transfers)  STS x4 reps from recliner  -SJ      Recorded by [SJ] Tere Cardoso, PT 01/19/19 1116      Row Name 01/19/19 1108             Sit-Stand Transfer    Sit-Stand [place] (Transfers)  minimum assist (75% patient effort);2 person assist;verbal cues  -SJ      Assistive Device (Sit-Stand Transfers)  walker, front-wheeled  -SJ      Recorded by [SJ] Tere Cardoso, PT 01/19/19 1116      Row Name 01/19/19 1108             Stand-Sit Transfer    Stand-Sit [place] (Transfers)  minimum assist (75% patient effort);2 person assist;verbal cues  -SJ      Assistive Device (Stand-Sit Transfers)  walker, front-wheeled  -SJ      Recorded by [SJ] Tere Cardoso, PT 01/19/19 1116      Row Name 01/19/19 1108             Gait/Stairs Assessment/Training    [place] Level (Gait)  unable to assess  -SJ      Recorded by [SJ] Tere Cardoso, PT 01/19/19 1116      Row Name 01/19/19 1108             Therapeutic Exercise    74925 - PT Therapeutic Exercise Minutes  8  -SJ      23007 - PT Therapeutic Activity Minutes  8  -SJ      Recorded by [SJ] Tere Cardoso, PT 01/19/19 1116      Row Name 01/19/19 1108             Lower Extremity Seated Therapeutic Exercise    Performed, Seated Lower Extremity (Therapeutic Exercise)  hip flexion/extension;LAQ (long arc quad), knee extension;ankle dorsiflexion/plantarflexion  -SJ      Exercise Type, Seated Lower Extremity (Therapeutic Exercise)  AROM (active range of motion)  -SJ      Sets/Reps Detail, Seated Lower Extremity (Therapeutic Exercise)  1/10  -SJ      Comment, Seated Lower Extremity (Therapeutic Exercise)  also glute sets  -SJ      Recorded by [SJ] Tere Cardoso, PT 01/19/19 1116      Row Name 01/19/19 1108             Static Sitting Balance    Level of [place] (Unsupported Sitting, Static Balance)  standby assist  -SJ      Sitting Position (Unsupported Sitting, Static Balance)  sitting in chair  -SJ      Time Able to  Maintain Position (Unsupported Sitting, Static Balance)  more than 5 minutes  -SJ      Recorded by [KARLOS] Tere Cardoso, PT 01/19/19 1116      Row Name 01/19/19 1108             Static Standing Balance    Level of [place] (Supported Standing, Static Balance)  minimal assist, 75% patient effort  -SJ      Time Able to Maintain Position (Supported Standing, Static Balance)  15 to 30 seconds  -SJ      Assistive Device Utilized (Supported Standing, Static Balance)  rolling walker  -SJ      Recorded by [SJ] Tere Cardoso, PT 01/19/19 1116      Row Name 01/19/19 1108             Positioning and Restraints    Pre-Treatment Position  sitting in chair/recliner  -SJ      Post Treatment Position  chair  -SJ      In Chair  notified nsg;call light within reach;encouraged to call for assist;with family/caregiver;waffle cushion;on mechanical lift sling;heels elevated  -SJ      Recorded by [KARLOS] Tere Cardoso, PT 01/19/19 1116      Row Name 01/19/19 1108             Pain Scale: Numbers Pre/Post-Treatment    Pain Scale: Numbers, Pretreatment  6/10  -SJ      Pain Scale: Numbers, Post-Treatment  6/10  -SJ      Pain Location  foot  -SJ      Pre/Post Treatment Pain Comment  bilat feet tingling  -SJ      Pain Intervention(s)  Repositioned  -SJ      Recorded by [KARLOS] Tere Cardoso, PT 01/19/19 1116      Row Name 01/19/19 1108             Coping    Observed Emotional State  cooperative;accepting;calm  -SJ      Verbalized Emotional State  acceptance  -SJ      Recorded by [KARLOS] Tere Cardoso, PT 01/19/19 1116      Row Name 01/19/19 1108             Plan of Care Review    Plan of Care Reviewed With  patient;daughter  -SJ      Recorded by [SJ] Tere Cardoso, PT 01/19/19 1116      Row Name 01/19/19 1108             Outcome Summary/Treatment Plan (PT)    Daily Summary of Progress (PT)  progress toward functional goals is good  -SJ      Recorded by [KARLOS] Tere Cardoso, PT 01/19/19 1116        User Key  (r) = Recorded By, (t) =  Taken By, (c) = Cosigned By    Initials Name Effective Dates Discipline    KARLOS Tere Cardoso, PT 06/19/15 -  PT                   Physical Therapy Education     Title: PT OT SLP Therapies (In Progress)     Topic: Physical Therapy (Done)     Point: Mobility training (Done)     Learning Progress Summary           Patient Acceptance, E,D, VU,DU by  at 1/19/2019 11:18 AM    Acceptance, E,D, VU,NR by RIO at 1/18/2019 10:35 AM    Acceptance, E,D,H, VU,NR by RIO at 1/17/2019 11:15 AM    Acceptance, E, VU by REYNALDO at 1/14/2019 10:07 AM    Acceptance, E,D, VU,NR by MB at 1/13/2019  9:55 AM    Comment:  log roll technique, transfer mechanics/safety, POC progression, benefits of mobility, home safety   Family Acceptance, E,D, VU,DU by  at 1/19/2019 11:18 AM    Acceptance, E,D, VU,NR by RIO at 1/18/2019 10:35 AM    Acceptance, E,D,H, VU,NR by RIO at 1/17/2019 11:15 AM                   Point: Home exercise program (Done)     Learning Progress Summary           Patient Acceptance, E,D, VU,DU by  at 1/19/2019 11:18 AM    Acceptance, E,D, VU,NR by RIO at 1/18/2019 10:35 AM    Acceptance, E,D,H, VU,NR by RIO at 1/17/2019 11:15 AM    Acceptance, E, VU by REYNALDO at 1/14/2019 10:07 AM    Acceptance, E,D, VU,NR by MB at 1/13/2019  9:55 AM    Comment:  log roll technique, transfer mechanics/safety, POC progression, benefits of mobility, home safety   Family Acceptance, E,D, VU,DU by  at 1/19/2019 11:18 AM    Acceptance, E,D, VU,NR by RIO at 1/18/2019 10:35 AM    Acceptance, E,D,H, VU,NR by RIO at 1/17/2019 11:15 AM                   Point: Body mechanics (Done)     Learning Progress Summary           Patient Acceptance, E,D, VU,DU by  at 1/19/2019 11:18 AM    Acceptance, E,D, VU,NR by RIO at 1/18/2019 10:35 AM    Acceptance, E,D,H, RUBI,NR by RIO at 1/17/2019 11:15 AM    Acceptance, RUBI STUBBS by KM at 1/14/2019 10:07 AM    Acceptance, SERA STUBBS VU,NR by MB at 1/13/2019  9:55 AM    Comment:  log roll technique, transfer mechanics/safety, POC  progression, benefits of mobility, home safety   Family Acceptance, E,D, VU,DU by  at 1/19/2019 11:18 AM    Acceptance, E,D, VU,NR by LS at 1/18/2019 10:35 AM    Acceptance, E,D,H, VU,NR by LS at 1/17/2019 11:15 AM                   Point: Precautions (Done)     Learning Progress Summary           Patient Acceptance, E,D, VU,DU by  at 1/19/2019 11:18 AM    Acceptance, E,D, VU,NR by LS at 1/18/2019 10:35 AM    Acceptance, E,D,H, VU,NR by LS at 1/17/2019 11:15 AM    Acceptance, E, VU by KM at 1/14/2019 10:07 AM    Acceptance, E,D, VU,NR by MB at 1/13/2019  9:55 AM    Comment:  log roll technique, transfer mechanics/safety, POC progression, benefits of mobility, home safety   Family Acceptance, E,D, VU,DU by  at 1/19/2019 11:18 AM    Acceptance, E,D, VU,NR by RIO at 1/18/2019 10:35 AM    Acceptance, E,D,H, VU,NR by RIO at 1/17/2019 11:15 AM                               User Key     Initials Effective Dates Name Provider Type Discipline     06/19/15 -  Tere Cardoso, PT Physical Therapist PT     06/19/15 -  Magaly Ireland, PT Physical Therapist PT     06/19/15 -  Gege Malik, PT Physical Therapist PT    MB 03/14/16 -  Bronwyn Calvillo, PT Physical Therapist PT                PT Recommendation and Plan  Therapy Frequency (PT Clinical Impression): daily  Outcome Summary/Treatment Plan (PT)  Daily Summary of Progress (PT): progress toward functional goals is good  Plan of Care Reviewed With: patient, daughter  Progress: improving  Outcome Summary: Pt practiced sit <> stand x4 reps with Lexx x2, cues for weightshifting and hand/foot placement. Pt gave good effort and is an excellent rehab candidate.  Outcome Measures     Row Name 01/19/19 1028 01/18/19 1035 01/17/19 1115       How much help from another person do you currently need...    Turning from your back to your side while in flat bed without using bedrails?  2  -  2  -  2  -LS    Moving from lying on back to sitting on the side of a  flat bed without bedrails?  2  -SJ  2  -LS  2  -LS    Moving to and from a bed to a chair (including a wheelchair)?  2  -SJ  1  -LS  1  -LS    Standing up from a chair using your arms (e.g., wheelchair, bedside chair)?  2  -SJ  2  -LS  2  -LS    Climbing 3-5 steps with a railing?  1  -SJ  1  -LS  1  -LS    To walk in hospital room?  1  -SJ  1  -LS  1  -LS    AM-PAC 6 Clicks Score  10  -SJ  9  -LS  9  -LS       Functional Assessment    Outcome Measure Options  AM-PAC 6 Clicks Basic Mobility (PT)  -SJ  AM-PAC 6 Clicks Basic Mobility (PT)  -LS  AM-PAC 6 Clicks Basic Mobility (PT)  -LS    Row Name 01/17/19 0920             How much help from another is currently needed...    Putting on and taking off regular lower body clothing?  1  -CL      Bathing (including washing, rinsing, and drying)  1  -CL      Toileting (which includes using toilet bed pan or urinal)  1  -CL      Putting on and taking off regular upper body clothing  2  -CL      Taking care of personal grooming (such as brushing teeth)  2  -CL      Eating meals  3  -CL      Score  10  -CL         Functional Assessment    Outcome Measure Options  AM-PAC 6 Clicks Daily Activity (OT)  -CL        User Key  (r) = Recorded By, (t) = Taken By, (c) = Cosigned By    Initials Name Provider Type    Tere Gonzalez, PT Physical Therapist    LS Gege Malik, PT Physical Therapist    CL Jazmine Acuña, OT Occupational Therapist         Time Calculation:   PT Charges     Row Name 01/19/19 1119 01/19/19 1108          Time Calculation    Start Time  1028  -SJ  --     PT Received On  01/19/19  -  --     PT Goal Re-Cert Due Date  01/27/19  -  --        Time Calculation- PT    Total Timed Code Minutes- PT  16 minute(s)  -  --        Timed Charges    83288 - PT Therapeutic Exercise Minutes  --  8  -SJ     55539 - PT Therapeutic Activity Minutes  --  8  -SJ       User Key  (r) = Recorded By, (t) = Taken By, (c) = Cosigned By    Initials Name Provider Type    KARLOS Cardoso  Tere, PT Physical Therapist        Therapy Suggested Charges     Code   Minutes Charges    14168 (CPT®) Hc Pt Neuromusc Re Education Ea 15 Min      31438 (CPT®) Hc Pt Ther Proc Ea 15 Min 8 1    22205 (CPT®) Hc Gait Training Ea 15 Min      81501 (CPT®) Hc Pt Therapeutic Act Ea 15 Min 8     14243 (CPT®) Hc Pt Manual Therapy Ea 15 Min      98534 (CPT®) Hc Pt Iontophoresis Ea 15 Min      91122 (CPT®) Hc Pt Elec Stim Ea-Per 15 Min      05109 (CPT®) Hc Pt Ultrasound Ea 15 Min      15004 (CPT®) Hc Pt Self Care/Mgmt/Train Ea 15 Min      94692 (CPT®) Hc Pt Prosthetic (S) Train Initial Encounter, Each 15 Min      71638 (CPT®) Hc Pt Orthotic(S)/Prosthetic(S) Encounter, Each 15 Min      27475 (CPT®) Hc Orthotic(S) Mgmt/Train Initial Encounter, Each 15min      Total  16 1        Therapy Charges for Today     Code Description Service Date Service Provider Modifiers Qty    99274948376 HC PT THER PROC EA 15 MIN 1/19/2019 Tere Cardoso, PT GP 1          PT G-Codes  Outcome Measure Options: AM-PAC 6 Clicks Basic Mobility (PT)  AM-PAC 6 Clicks Score: 10  Score: 10    Tere Cardoso PT  1/19/2019

## 2019-01-19 NOTE — SIGNIFICANT NOTE
01/19/19 1220   SLP Deferred Reason   SLP Deferred Reason Routine  (RN reports no ST related concerns. States pt tolerating PO diet well and is gaining strength. SLP to sign off at this time. RN to reconsult if needed)

## 2019-01-19 NOTE — PLAN OF CARE
Problem: Patient Care Overview  Goal: Plan of Care Review  Outcome: Ongoing (interventions implemented as appropriate)   01/19/19 1028   Coping/Psychosocial   Plan of Care Reviewed With patient;family   OTHER   Outcome Summary Pt very motivated to participate with therapy this date. Improvement shown from previous treatment sessions. Pt would benefit from inpatient rehab at d/c.

## 2019-01-19 NOTE — PROGRESS NOTES
Critical Care Note     LOS: 7 days   Patient Care Team:  Gio Henderson MD as PCP - General (Adolescent Medicine)    Chief Complaint/Reason for visit:    Chief Complaint   Patient presents with   • Numbness     Patient Active Problem List   Diagnosis   • Acute on chronic respiratory failure with hypoxia (2L @ baseline)   • COPD with acute exacerbation (CMS/HCC)   • Hypothyroidism (acquired)   • Essential hypertension   • Depression   • left renal collecting system obstruction   • Guillain-[place] syndrome (CMS/HCC)   • Dysphagia   • Tobacco abuse   • JURGEN on CPAP   • Left nephrolithiasis       Subjective      58-year-old woman who presented December 31 with weakness and numbness to the [facility]. These symptoms progressed and on January 12 she developed worsening shortness of breath and cough. She has a known history of COPD and is an active smoker. She is on supplemental oxygen at home. CTA of the chest was negative for pulmonary embolism. She then developed urinary retention. Lumbar puncture on January 15 revealed a protein of 66, glucose of 95. EMG was results were consistent with Guillian-[place]'syndrome. IVIG was initiated January 15.  She moved to the intensive care unit January 16 with hypoxia requiring BiPAP. She takes Lortab 7.5 mg 4 tablets daily and has done so for 14 years for chronic back pain.  Urology evaluated for nephrolithiasis at the UP junction with hydronephrosis and have recommended follow-up as an outpatient at  where she has previously gotten care because she is currently asymptomatic with normal renal function    Interval History:   Afebrile, sinus rhythm  BiPAP 16/760% FiO2 last night. Currently on 2 L nasal cannula with a saturation of 96%  Cough productive of purulent mucus, improving  NIF>69, VC 1.4L yesterday, not recorded today  Admits to early fatigue      Review of Systems:    All systems were reviewed and negative except as noted in subjective.    Medical history,  "surgical history, social history, family history reviewed    Objective     Intake/Output:    Intake/Output Summary (Last 24 hours) at 1/19/2019 1018  Last data filed at 1/19/2019 0900  Gross per 24 hour   Intake 1205 ml   Output 1650 ml   Net -445 ml       Nutrition:  Diet Dysphagia; IV - Mechanical Soft No Mixed Consistencies; Thin; No Straws    Infusions:       Mechanical Ventilator Settings:            Resp Rate (Set): 4  Pressure Support (cm H2O): 8 cm H20  FiO2 (%): 60 %  PEEP/CPAP (cm H2O): 8 cm H20    Minute Ventilation (L/min) (Obs): 10 L/min  Resp Rate (Observed) Vent: 20          PIP Observed (cm H2O): 12 cm H2O       Telemetry: Sinus rhythm             Vital Signs  Blood pressure 127/77, pulse 70, temperature 97.6 °F (36.4 °C), temperature source Axillary, resp. rate 16, height 170.2 cm (67.01\"), weight 96 kg (211 lb 10.3 oz), SpO2 96 %.    Physical Exam:  General Appearance:  Middle-aged white woman in no respiratory distress    Head:  Normocephalic, atraumatic    Eyes:          No jaundice. Pupils equal and reactive to light, extraocular movements intact    Ears:     Throat: Oral mucosa moist    Neck: Trachea midline, no palpable thyroid    Back:      Lungs:   Symmetric chest expansion. Breath sounds are improved with decreased rhonchi    Heart:  Regular rhythm, S1, S2 auscultated    Abdomen:   Bowel sounds present, soft, nontender    Rectal:   Deferred   Extremities: No pitting edema or cyanosis    Pulses: Pedal pulses present    Skin: Warm and dry    Lymph nodes:    Neurologic: Alert and oriented. Speech fluent, tongue midline, mild generalized weakness       Results Review:     I reviewed the patient's new clinical results.   Results from last 7 days   Lab Units 01/18/19  0535 01/16/19  0351 01/15/19  0601  01/12/19  1346   SODIUM mmol/L 130* 136 138   < > 136   POTASSIUM mmol/L 4.3 3.7 4.2   < > 3.3*   CHLORIDE mmol/L 97* 101 105   < > 97*   CO2 mmol/L 29.0 29.0 25.0   < > 31.0   BUN mg/dL 39* 28* " 28*   < > 20   CREATININE mg/dL 0.79 0.67 0.76   < > 0.73   CALCIUM mg/dL 8.4* 8.8 9.0   < > 9.3   BILIRUBIN mg/dL 1.6*  --   --   --  1.3*   ALK PHOS U/L 64  --   --   --  104*   ALT (SGPT) U/L 122*  --   --   --  32   AST (SGOT) U/L 49*  --   --   --  23   GLUCOSE mg/dL 101* 117* 143*   < > 101*    < > = values in this interval not displayed.     Results from last 7 days   Lab Units 01/18/19  0535 01/16/19  0351 01/15/19  1529   WBC 10*3/mm3 6.32 8.43 10.18   HEMOGLOBIN g/dL 16.1* 16.7* 16.8*   HEMATOCRIT % 49.0* 52.4* 52.1*   PLATELETS 10*3/mm3 152 194 212     Results from last 7 days   Lab Units 01/16/19  1108   PH, ARTERIAL pH units 7.484*   PO2 ART mm Hg 57.0*   PCO2, ARTERIAL mm Hg 42.6   HCO3 ART mmol/L 32.0*     No results found for: BLOODCX  No results found for: URINECX    I reviewed the patient's new imaging including images and reports.  COMPARISONS: 01/12/2019.     FINDINGS:  Lungs are without focal abnormality aside from subsegmental  atelectasis of the left lung base. No sizable pleural effusion or  pneumothorax. Cardiomediastinal silhouette is within normal limits.     IMPRESSION:  No significant interval change.     D:  01/16/2019  FINDINGS: The most superior images demonstrate bibasilar airspace  disease, left greater than right.      The liver and spleen are normal.  There is no adrenal mass. The pancreas  is normal.  There are small bilateral renal parenchymal stones. More  importantly, there is a 1 cm stone in the left ureteropelvic junction  producing moderately severe obstruction. Also there is a 1.2 cm area of  low density within the left renal pelvis. This is not calcified on  images obtained prior to contrast and may represent clot. There are also  simple cysts in both kidneys. There is no ascites, aneurysm or  retroperitoneal lymphadenopathy. There is no pelvic mass or fluid.   There is no bladder stone.     IMPRESSION:  1. There is a 1 cm stone in the left ureteropelvic junction  producing  moderately severe obstruction of the left kidney. There is also a 12 mm  low density filling defect in the renal pelvis. On the unenhanced  examination this is not a calcified stone this may represent clot  related to the ureteral stone and obstruction and less likely a  noncalcified stone.   2. Lastly there is bibasilar airspace disease, left greater than right.     D:  01/15/2019  E:  01/15/2019       All medications reviewed.     aspirin 81 mg Oral Daily   doxycycline 100 mg Oral Q12H   enoxaparin 40 mg Subcutaneous Q24H   escitalopram 10 mg Oral Daily   gabapentin 300 mg Oral TID   guaiFENesin 200 mg Oral 4x Daily   hydrochlorothiazide 25 mg Oral Daily   immune globulin (human) 20 g Intravenous Q24H   And      immune globulin (human) 20 g Intravenous Q24H   ipratropium-albuterol 3 mL Nebulization 4x Daily - RT   levothyroxine 100 mcg Oral Daily   lisinopril 10 mg Oral Q24H   methylPREDNISolone sodium succinate 30 mg Intravenous Q12H   metoprolol succinate XL 25 mg Oral Daily   nystatin 5 mL Oral 4x Daily   pharmacy consult - MTM  Does not apply Daily   piperacillin-tazobactam 4.5 g Intravenous Q8H   polyethylene glycol 17 g Oral Daily         Assessment/Plan       Guillain-[place] syndrome (CMS/HCC)    left renal collecting system obstruction    Acute on chronic respiratory failure with hypoxia (2L @ baseline)    COPD with acute exacerbation (CMS/HCC)    Dysphagia    JURGEN on CPAP    Hypothyroidism (acquired)    Essential hypertension    Depression    Tobacco abuse    Left nephrolithiasis    #1 Guillian [place]' Syndrome currently receiving IVIG.  vital capacity was 1.4. She has some improvement in strength of her extremities    #2 acute on chronic respiratory failure, at baseline she wears 4 L nasal cannula at home. She is tolerating BiPAP at night. She actively smoke cigarettes. She is maintaining oxygen saturations on high flow nasal cannula. 1/17 morning blood gas revealed a pH of 7.48, CO2 of 42, O2 of  57 on 52% high flow nasal cannula. She has underlying obstructive airways disease and a productive cough. X-ray shows chronic changes but no pneumonia. CTA of the chest was negative for pulmonary embolus. She is more hypoxic than I would expect from somebody with no infiltrate. She does have copious mucoid secretions and perhaps that his V/Q inequality and plugging. Oxygenation improved with better secrestions mobilization.     #3 COPD with exacerbation she is currently on Zosyn, doxycycline, nebulized bronchodilators. She also is on IV steroids that will help both her lungs and her Guillain-Barré syndrome.  Medineb and  mucolytic therapy has helped significantly. Sputum gram stain looks like strept and H.flu. Will transition to Po cephalosporin    #4 essential hypertension, blood pressure adequately controlled with lisinopril and beta blocker    #5 start of sleep apnea, tolerating BiPAP nightly, has a home CPAP unit    #6 left hydronephrosis with nephrolithiasis. Evaluation by urology appreciated, they recommend simple observation with her normal creatinine and no evidence of urinary tract infection. She has apparently received care at the [facility] in the past for nephrolithiasis. Serum creatinine is normal. She would be high risk for anesthesia with her Guillian Barré syndrome and hypoxia    PLAN:  Transition to PO Cefdinir  Nebulized bronchodilators/ trial Medineb  Oral mucolytic therapy  BiPAP nightly  Scheduled Lortab 7.5  4 times daily, home dose  Physical therapy, occupational therapy   gabapentin and ibuprofen  Ativan for muscle cramping, per neurology  Nicotine patch  Encourage ongoing smoking cessation  Thyroid replacement  IVIG per neurology 20 g daily  Lisinopril, metoprolol for high blood pressure  Monitor vital capacity, NIF  telemetry    VTE Prophylaxis: Lovenox    Stress Ulcer Prophylaxis:none    Coretta Schmidt MD  01/19/19  10:18 AM      Time: 30 min  I personally provided care to  this critically ill patient as documented above.  Critical care time does not include time spent on separately billed procedures.  Non of my critical care time was concurrent with other critical care providers.

## 2019-01-19 NOTE — THERAPY TREATMENT NOTE
Acute Care - Occupational Therapy Treatment Note  [facility]     Patient Name: Kristina Heck  : 1960  MRN: [id number]  Today's Date: 2019  Onset of Illness/Injury or Date of Surgery: 19  Date of Referral to OT: 19  Referring Physician: MD Roxana    Admit Date: 2019       ICD-10-CM ICD-9-CM   1. Left ureter dilated N28.82 593.89   2. Hypoxia R09.02 799.02   3. COPD exacerbation (CMS/HCC) J44.1 491.21   4. Weakness R53.1 780.79   5. Impaired functional mobility, balance, gait, and endurance Z74.09 V49.89   6. Acute on chronic respiratory failure with hypoxia (CMS/HCC) J96.21 518.84     799.02   7. Pharyngeal dysphagia R13.13 787.23   8. Impaired mobility and ADLs Z74.09 799.89     Patient Active Problem List   Diagnosis   • Acute on chronic respiratory failure with hypoxia (2L @ baseline)   • COPD with acute exacerbation (CMS/HCC)   • Hypothyroidism (acquired)   • Essential hypertension   • Depression   • left renal collecting system obstruction   • Guillain-[place] syndrome (CMS/HCC)   • Dysphagia   • Tobacco abuse   • JURGEN on CPAP   • Left nephrolithiasis     Past Medical History:   Diagnosis Date   • COPD (chronic obstructive pulmonary disease) (CMS/HCC)    • Depression    • Disease of thyroid gland    • Hypertension      History reviewed. No pertinent surgical history.    Therapy Treatment    Rehabilitation Treatment Summary     Row Name 19 1108 19 1100 19 1028       Treatment Time/Intention    Discipline  physical therapist  -SJ  --  -JR  occupational therapist  -JR    Document Type  therapy note (daily note)  -SJ  --  -JR  therapy note (daily note)  -JR    Subjective Information  complains of;pain;weakness  -SJ  --  -JR  complains of;pain  -JR    Mode of Treatment  physical therapy  -SJ  --  -JR  occupational therapy  -JR    Patient/Family Observations  Pt in ICU, reclined in chair. Daughter present.  -SJ  --  --    Care Plan Review  care plan/treatment  goals reviewed;risks/benefits reviewed;current/potential barriers reviewed;patient/other agree to care plan  -SJ  --  -JR  risks/benefits reviewed;patient/other agree to care plan  -JR    Therapy Frequency (PT Clinical Impression)  daily  -SJ  --  --    Patient Effort  excellent  -SJ  --  -JR  excellent  -JR    Existing Precautions/Restrictions  fall  -SJ  --  -JR  fall;oxygen therapy device and L/min GBS  -JR    Treatment Considerations/Comments  bilat knee instability in standing  -SJ  --  --    Patient Response to Treatment  gave great effort, katie well  -SJ  --  --    Recorded by [SJ] Tere Cardoso, PT 01/19/19 1116 [JR] Subha Braga, OT 01/19/19 1215 [JR] Subha Braga, OT 01/19/19 1215    Row Name 01/19/19 1108 01/19/19 1100 01/19/19 1028       Vital Signs    Pre Systolic BP Rehab  115  -SJ  --  -JR  115  -JR    Pre Treatment Diastolic BP  80  -SJ  --  -JR  80  -JR    Post Systolic BP Rehab  119  -SJ  --  -JR  119  -JR    Post Treatment Diastolic BP  79  -SJ  --  -JR  79  -JR    Pretreatment Heart Rate (beats/min)  69  -SJ  --  -JR  69  -JR    Posttreatment Heart Rate (beats/min)  75  -SJ  --  -JR  75  -JR    Pre SpO2 (%)  93  -SJ  --  93  -JR    O2 Delivery Pre Treatment  room air  -SJ  --  supplemental O2 2L  -JR    Post SpO2 (%)  94  -SJ  --  94  -JR    O2 Delivery Post Treatment  room air  -  --  supplemental O2  -JR    Pre Patient Position  Sitting  -SJ  --  Sitting  -JR    Intra Patient Position  Standing  -SJ  --  Standing  -JR    Post Patient Position  Sitting  -SJ  --  Sitting  -JR    Recorded by [SJ] Tere Cardoso, PT 01/19/19 1116 [JR] Subha Braga, OT 01/19/19 1215 [JR] Subha Braga, OT 01/19/19 1215    Row Name 01/19/19 1108 01/19/19 1028          Cognitive Assessment/Intervention- PT/OT    Affect/Mental Status (Cognitive)  WFL  -SJ  WNL  -JR     Orientation Status (Cognition)  oriented x 4  -SJ  oriented x 4  -JR     Follows Commands (Cognition)  follows one step  commands;over 90% accuracy;verbal cues/prompting required  -SJ  WNL  -JR     Safety Deficit (Cognitive)  mild deficit;insight into deficits/self awareness  -  --     Personal Safety Interventions  fall prevention program maintained;gait belt;muscle strengthening facilitated;nonskid shoes/slippers when out of bed  -SJ  --     Recorded by [SJ] Tere Cardoso, PT 01/19/19 1116 [JR] Subha Braga, OT 01/19/19 1215     Row Name 01/19/19 1108             Safety Issues, Functional Mobility    Safety Issues Affecting Function (Mobility)  insight into deficits/self awareness;sequencing abilities  -SJ      Impairments Affecting Function (Mobility)  balance;coordination;endurance/activity tolerance;motor planning;sensation/sensory awareness;shortness of breath;strength  -SJ      Recorded by [SJ] Tere Cardoso, PT 01/19/19 1116      Row Name 01/19/19 1108 01/19/19 1028          Bed Mobility Assessment/Treatment    Comment (Bed Mobility)  [facility]  -  Defer-up in chair  -JR     Recorded by [SJ] Tere Cardoso, PT 01/19/19 1116 [JR] Subha Braga, OT 01/19/19 1215     Row Name 01/19/19 1108 01/19/19 1028          Transfer Assessment/Treatment    Transfer Assessment/Treatment  --  sit-stand transfer;stand-sit transfer  -JR     Comment (Transfers)  STS x4 reps from recliner  -SJ  Pt required verbal cues for hand placement with transfers and verbal cues to increase control with stand to sit. B  knees buckling in standing.  -JR     Recorded by [SJ] Tere Cardoso, PT 01/19/19 1116 [JR] Subha Braga, OT 01/19/19 1215     Row Name 01/19/19 1108 01/19/19 1028          Sit-Stand Transfer    Sit-Stand [place] (Transfers)  minimum assist (75% patient effort);2 person assist;verbal cues  -SJ  minimum assist (75% patient effort);2 person assist;verbal cues  -JR     Assistive Device (Sit-Stand Transfers)  walker, front-wheeled  -SJ  walker, front-wheeled  -JR     Recorded by [SJ] Tere Cardoso, PT 01/19/19 1116  [JR] Subha Braga OT 01/19/19 1215     Row Name 01/19/19 1108 01/19/19 1028          Stand-Sit Transfer    Stand-Sit [place] (Transfers)  minimum assist (75% patient effort);2 person assist;verbal cues  -SJ  minimum assist (75% patient effort);2 person assist;verbal cues  -JR     Assistive Device (Stand-Sit Transfers)  walker, front-wheeled  -SJ  walker, front-wheeled  -JR     Recorded by [SJ] Tere Cardoso, PT 01/19/19 1116 [JR] Subha Braga, OT 01/19/19 1215     Row Name 01/19/19 1108             Gait/Stairs Assessment/Training    [place] Level (Gait)  unable to assess  -SJ      Recorded by [SJ] Tere Cardoso, PT 01/19/19 1116      Row Name 01/19/19 1028             ADL Assessment/Intervention    40201 - OT Self Care/Mgmt Minutes  15  -JR      BADL Assessment/Intervention  upper body dressing  -JR      Recorded by [JR] Subha Braga OT 01/19/19 1215      Row Name 01/19/19 1028             Upper Body Dressing Assessment/Training    Upper Body Dressing [place] Level  don;pajama/robe;moderate assist (50% patient effort);verbal cues  -JR      Upper Body Dressing Position  supported sitting  -JR      Recorded by [JR] Subha Braga OT 01/19/19 1215      Row Name 01/19/19 1108 01/19/19 1028          Therapeutic Exercise    08427 - PT Therapeutic Exercise Minutes  8  -SJ  --     39062 - PT Therapeutic Activity Minutes  8  -SJ  --     54517 - OT Therapeutic Activity Minutes  --  8  -JR     Recorded by [SJ] Tere Cardoso, PT 01/19/19 1116 [JR] Subha Braga OT 01/19/19 1215     Row Name 01/19/19 1108             Lower Extremity Seated Therapeutic Exercise    Performed, Seated Lower Extremity (Therapeutic Exercise)  hip flexion/extension;LAQ (long arc quad), knee extension;ankle dorsiflexion/plantarflexion  -SJ      Exercise Type, Seated Lower Extremity (Therapeutic Exercise)  AROM (active range of motion)  -SJ      Sets/Reps Detail, Seated Lower Extremity (Therapeutic  Exercise)  1/10  -SJ      Comment, Seated Lower Extremity (Therapeutic Exercise)  also glute sets  -SJ      Recorded by [SJ] Tere Cardoso, PT 01/19/19 1116      Row Name 01/19/19 1028             Therapeutic Exercise    Upper Extremity Range of Motion (Therapeutic Exercise)  shoulder flexion/extension, bilateral;shoulder abduction/adduction, bilateral;elbow flexion/extension, bilateral;forearm supination/pronation, bilateral;wrist flexion/extension, bilateral  -JR      Hand (Therapeutic Exercise)  hand , bilateral  -JR      Exercise Type (Therapeutic Exercise)  AROM (active range of motion)  -JR      Sets/Reps (Therapeutic Exercise)  10 reps  -JR      Recorded by [JR] Subha Braga OT 01/19/19 1215      Row Name 01/19/19 1108             Static Sitting Balance    Level of [place] (Unsupported Sitting, Static Balance)  standby assist  -SJ      Sitting Position (Unsupported Sitting, Static Balance)  sitting in chair  -SJ      Time Able to Maintain Position (Unsupported Sitting, Static Balance)  more than 5 minutes  -SJ      Recorded by [SJ] Tere Cardoso, PT 01/19/19 1116      Row Name 01/19/19 1108             Static Standing Balance    Level of [place] (Supported Standing, Static Balance)  minimal assist, 75% patient effort  -SJ      Time Able to Maintain Position (Supported Standing, Static Balance)  15 to 30 seconds  -SJ      Assistive Device Utilized (Supported Standing, Static Balance)  rolling walker  -SJ      Recorded by [SJ] Tere Cardoso, PT 01/19/19 1116      Row Name 01/19/19 1108 01/19/19 1028          Positioning and Restraints    Pre-Treatment Position  sitting in chair/recliner  -SJ  sitting in chair/recliner  -JR     Post Treatment Position  chair  -SJ  chair  -JR     In Chair  notified nsg;call light within reach;encouraged to call for assist;with family/caregiver;waffle cushion;on mechanical lift sling;heels elevated  -SJ  notified nsg;reclined;call light within  reach;encouraged to call for assist;exit alarm on;with family/caregiver;on mechanical lift sling;waffle cushion;heels elevated  -JR     Recorded by [SJ] Tere Cardoso, PT 01/19/19 1116 [JR] Subha Braga, OT 01/19/19 1215     Row Name 01/19/19 1108 01/19/19 1028          Pain Scale: Numbers Pre/Post-Treatment    Pain Scale: Numbers, Pretreatment  6/10  -SJ  6/10  -JR     Pain Scale: Numbers, Post-Treatment  6/10  -SJ  5/10  -JR     Pain Location  foot  -SJ  foot  -JR     Pre/Post Treatment Pain Comment  bilat feet tingling  -SJ  --     Pain Intervention(s)  Repositioned  -SJ  Repositioned  -JR     Recorded by [SJ] Tere Cardoso, PT 01/19/19 1116 [JR] Subha Braga, OT 01/19/19 1215     Row Name 01/19/19 1028             Sensory Assessment/Intervention    Sensory General Assessment  -- Pt reports N/T in B hands  -JR      Recorded by [JR] Subha Braga, OT 01/19/19 1215      Row Name 01/19/19 1108             Coping    Observed Emotional State  cooperative;accepting;calm  -SJ      Verbalized Emotional State  acceptance  -SJ      Recorded by [SJ] Tere Cardoso, PT 01/19/19 1116      Row Name 01/19/19 1108 01/19/19 1028          Plan of Care Review    Plan of Care Reviewed With  patient;daughter  -SJ  patient;family  -JR     Recorded by [SJ] Tere Cardoso, PT 01/19/19 1116 [JR] Subha Braga, OT 01/19/19 1215     Row Name 01/19/19 1028             Outcome Summary/Treatment Plan (OT)    Daily Summary of Progress (OT)  progress toward functional goals is good  -JR      Barriers to Overall Progress (OT)  medically complex  -JR      Plan for Continued Treatment (OT)  Continue OT per POC  -JR      Anticipated Discharge Disposition (OT)  inpatient rehabilitation facility  -JR      Recorded by [JR] Subha Braga, OT 01/19/19 1215      Row Name 01/19/19 1108             Outcome Summary/Treatment Plan (PT)    Daily Summary of Progress (PT)  progress toward functional goals is good  -SJ       Recorded by [KARLOS] Tere Cardoso, PT 01/19/19 1116        User Key  (r) = Recorded By, (t) = Taken By, (c) = Cosigned By    Initials Name Effective Dates Discipline     Michael Cardosoie, PT 06/19/15 -  PT    Subha Morris OT 06/22/15 -  OT             Occupational Therapy Education     Title: PT OT SLP Therapies (In Progress)     Topic: Occupational Therapy (In Progress)     Point: ADL training (In Progress)     Description: Instruct learner(s) on proper safety adaptation and remediation techniques during self care or transfers.   Instruct in proper use of assistive devices.    Learning Progress Summary           Patient Eager, E, NR by  at 1/19/2019 10:28 AM    Comment:  Educated pt and family regarding HEP    Acceptance, E, NR by CL at 1/17/2019  3:59 PM    Comment:  Pt educated on appropriate safety precautions, t/f techniques, role of OT, positioning, AE use for self feeding, HEP, and benefits of therapy.   Family Eager, E, NR by  at 1/19/2019 10:28 AM    Comment:  Educated pt and family regarding HEP                   Point: Home exercise program (In Progress)     Description: Instruct learner(s) on appropriate technique for monitoring, assisting and/or progressing therapeutic exercises/activities.    Learning Progress Summary           Patient Eager, E, NR by  at 1/19/2019 10:28 AM    Comment:  Educated pt and family regarding HEP    Acceptance, E, NR by CL at 1/17/2019  3:59 PM    Comment:  Pt educated on appropriate safety precautions, t/f techniques, role of OT, positioning, AE use for self feeding, HEP, and benefits of therapy.   Family Eager, E, NR by  at 1/19/2019 10:28 AM    Comment:  Educated pt and family regarding HEP                   Point: Precautions (In Progress)     Description: Instruct learner(s) on prescribed precautions during self-care and functional transfers.    Learning Progress Summary           Patient Acceptance, E, NR by CL at 1/17/2019  3:59 PM    Comment:  Pt  educated on appropriate safety precautions, t/f techniques, role of OT, positioning, AE use for self feeding, HEP, and benefits of therapy.                   Point: Body mechanics (In Progress)     Description: Instruct learner(s) on proper positioning and spine alignment during self-care, functional mobility activities and/or exercises.    Learning Progress Summary           Patient Acceptance, E, NR by CL at 1/17/2019  3:59 PM    Comment:  Pt educated on appropriate safety precautions, t/f techniques, role of OT, positioning, AE use for self feeding, HEP, and benefits of therapy.                               User Key     Initials Effective Dates Name Provider Type Discipline    JR 06/22/15 -  Subha Braga, OT Occupational Therapist OT    CL 04/03/18 -  Jazmine Acuña OT Occupational Therapist OT                OT Recommendation and Plan  Outcome Summary/Treatment Plan (OT)  Daily Summary of Progress (OT): progress toward functional goals is good  Barriers to Overall Progress (OT): medically complex  Plan for Continued Treatment (OT): Continue OT per POC  Anticipated Discharge Disposition (OT): inpatient rehabilitation facility  Daily Summary of Progress (OT): progress toward functional goals is good  Plan of Care Review  Plan of Care Reviewed With: patient, family  Plan of Care Reviewed With: patient, family  Outcome Summary: Pt very motivated to participate with therapy this date. Improvement shown from previous treatment sessions. Pt would benefit from inpatient rehab at d/c.  Outcome Measures     Row Name 01/19/19 1029 01/19/19 1028 01/18/19 1035       How much help from another person do you currently need...    Turning from your back to your side while in flat bed without using bedrails?  --  2  -SJ  2  -LS    Moving from lying on back to sitting on the side of a flat bed without bedrails?  --  2  -SJ  2  -LS    Moving to and from a bed to a chair (including a wheelchair)?  --  2  -SJ  1  -LS    Standing  up from a chair using your arms (e.g., wheelchair, bedside chair)?  --  2  -SJ  2  -LS    Climbing 3-5 steps with a railing?  --  1  -SJ  1  -LS    To walk in hospital room?  --  1  -SJ  1  -LS    AM-PAC 6 Clicks Score  --  10  -SJ  9  -LS       How much help from another is currently needed...    Putting on and taking off regular lower body clothing?  1  -JR  --  --    Bathing (including washing, rinsing, and drying)  2  -JR  --  --    Toileting (which includes using toilet bed pan or urinal)  2  -JR  --  --    Putting on and taking off regular upper body clothing  2  -JR  --  --    Taking care of personal grooming (such as brushing teeth)  2  -JR  --  --    Eating meals  3  -JR  --  --    Score  12  -JR  --  --       Functional Assessment    Outcome Measure Options  AM-PAC 6 Clicks Daily Activity (OT)  -JR  -[facility] 6 Clicks Basic Mobility (PT)  -SJ  -[facility] 6 Clicks Basic Mobility (PT)  -    Row Name 01/17/19 1115 01/17/19 0920          How much help from another person do you currently need...    Turning from your back to your side while in flat bed without using bedrails?  2  -LS  --     Moving from lying on back to sitting on the side of a flat bed without bedrails?  2  -LS  --     Moving to and from a bed to a chair (including a wheelchair)?  1  -LS  --     Standing up from a chair using your arms (e.g., wheelchair, bedside chair)?  2  -LS  --     Climbing 3-5 steps with a railing?  1  -LS  --     To walk in hospital room?  1  -LS  --     AM-[facility] 6 Clicks Score  9  -LS  --        How much help from another is currently needed...    Putting on and taking off regular lower body clothing?  --  1  -CL     Bathing (including washing, rinsing, and drying)  --  1  -CL     Toileting (which includes using toilet bed pan or urinal)  --  1  -CL     Putting on and taking off regular upper body clothing  --  2  -CL     Taking care of personal grooming (such as brushing teeth)  --  2  -CL     Eating meals  --  3  -CL      Score  --  10  -CL        Functional Assessment    Outcome Measure Options  AM-PAC 6 Clicks Basic Mobility (PT)  -LS  AM-PAC 6 Clicks Daily Activity (OT)  -CL       User Key  (r) = Recorded By, (t) = Taken By, (c) = Cosigned By    Initials Name Provider Type    SJ Tere Cardoso, PT Physical Therapist    JR Subha Braga, OT Occupational Therapist    LS Gege Malik, PT Physical Therapist    CL Jazmine Acuña, OT Occupational Therapist           Time Calculation:   Time Calculation- OT     Row Name 01/19/19 1028             Time Calculation- OT    OT Start Time  1028  -JR      Total Timed Code Minutes- OT  23 minute(s)  -JR      OT Received On  01/19/19  -         Timed Charges    45811 - OT Therapeutic Activity Minutes  8  -JR      84046 - OT Self Care/Mgmt Minutes  15  -JR        User Key  (r) = Recorded By, (t) = Taken By, (c) = Cosigned By    Initials Name Provider Type    JR Subha Braga, OT Occupational Therapist           Therapy Suggested Charges     Code   Minutes Charges    10032 (CPT®) Hc Ot Neuromusc Re Education Ea 15 Min      28147 (CPT®) Hc Ot Ther Proc Ea 15 Min      77447 (CPT®) Hc Ot Therapeutic Act Ea 15 Min 8 1    68929 (CPT®) Hc Ot Manual Therapy Ea 15 Min      10570 (CPT®) Hc Ot Iontophoresis Ea 15 Min      50661 (CPT®) Hc Ot Elec Stim Ea-Per 15 Min      94524 (CPT®) Hc Ot Ultrasound Ea 15 Min      45962 (CPT®) Hc Ot Self Care/Mgmt/Train Ea 15 Min 15 1    Total  23 2        Therapy Charges for Today     Code Description Service Date Service Provider Modifiers Qty    70559818076 HC OT THERAPEUTIC ACT EA 15 MIN 1/19/2019 Subha Braga OT GO 1    96694297842 HC OT SELF CARE/MGMT/TRAIN EA 15 MIN 1/19/2019 Subha Braga OT GO 1               Subha Braga, OT  1/19/2019

## 2019-01-20 LAB
ALBUMIN SERPL-MCNC: 2.88 G/DL (ref 3.2–4.8)
ALBUMIN/GLOB SERPL: 0.5 G/DL (ref 1.5–2.5)
ALP SERPL-CCNC: 63 U/L (ref 25–100)
ALT SERPL W P-5'-P-CCNC: 131 U/L (ref 7–40)
ANION GAP SERPL CALCULATED.3IONS-SCNC: 3 MMOL/L (ref 3–11)
AST SERPL-CCNC: 49 U/L (ref 0–33)
BACTERIA SPEC RESP CULT: ABNORMAL
BACTERIA SPEC RESP CULT: ABNORMAL
BASOPHILS # BLD AUTO: 0.02 10*3/MM3 (ref 0–0.2)
BASOPHILS NFR BLD AUTO: 0.2 % (ref 0–1)
BILIRUB SERPL-MCNC: 1.4 MG/DL (ref 0.3–1.2)
BUN BLD-MCNC: 43 MG/DL (ref 9–23)
BUN/CREAT SERPL: 59.7 (ref 7–25)
CALCIUM SPEC-SCNC: 8.5 MG/DL (ref 8.7–10.4)
CHLORIDE SERPL-SCNC: 98 MMOL/L (ref 99–109)
CO2 SERPL-SCNC: 29 MMOL/L (ref 20–31)
CREAT BLD-MCNC: 0.72 MG/DL (ref 0.6–1.3)
DEPRECATED RDW RBC AUTO: 49.3 FL (ref 37–54)
EOSINOPHIL # BLD AUTO: 0.03 10*3/MM3 (ref 0–0.3)
EOSINOPHIL NFR BLD AUTO: 0.3 % (ref 0–3)
ERYTHROCYTE [DISTWIDTH] IN BLOOD BY AUTOMATED COUNT: 14.8 % (ref 11.3–14.5)
GFR SERPL CREATININE-BSD FRML MDRD: 83 ML/MIN/1.73
GLOBULIN UR ELPH-MCNC: 6.2 GM/DL
GLUCOSE BLD-MCNC: 124 MG/DL (ref 70–100)
GRAM STN SPEC: ABNORMAL
HCT VFR BLD AUTO: 47.4 % (ref 34.5–44)
HGB BLD-MCNC: 15.7 G/DL (ref 11.5–15.5)
IMM GRANULOCYTES # BLD AUTO: 0.14 10*3/MM3 (ref 0–0.03)
IMM GRANULOCYTES NFR BLD AUTO: 1.5 % (ref 0–0.6)
LYMPHOCYTES # BLD AUTO: 1.48 10*3/MM3 (ref 0.6–4.8)
LYMPHOCYTES NFR BLD AUTO: 15.5 % (ref 24–44)
MAGNESIUM SERPL-MCNC: 2 MG/DL (ref 1.3–2.7)
MCH RBC QN AUTO: 30.4 PG (ref 27–31)
MCHC RBC AUTO-ENTMCNC: 33.1 G/DL (ref 32–36)
MCV RBC AUTO: 91.7 FL (ref 80–99)
MONOCYTES # BLD AUTO: 1.15 10*3/MM3 (ref 0–1)
MONOCYTES NFR BLD AUTO: 12.1 % (ref 0–12)
NEUTROPHILS # BLD AUTO: 6.86 10*3/MM3 (ref 1.5–8.3)
NEUTROPHILS NFR BLD AUTO: 71.9 % (ref 41–71)
PLATELET # BLD AUTO: 177 10*3/MM3 (ref 150–450)
PMV BLD AUTO: 10.9 FL (ref 6–12)
POTASSIUM BLD-SCNC: 4.4 MMOL/L (ref 3.5–5.5)
PROT SERPL-MCNC: 9.1 G/DL (ref 5.7–8.2)
RBC # BLD AUTO: 5.17 10*6/MM3 (ref 3.89–5.14)
SODIUM BLD-SCNC: 130 MMOL/L (ref 132–146)
WBC NRBC COR # BLD: 9.54 10*3/MM3 (ref 3.5–10.8)

## 2019-01-20 PROCEDURE — 25010000002 ENOXAPARIN PER 10 MG: Performed by: PSYCHIATRY & NEUROLOGY

## 2019-01-20 PROCEDURE — 94799 UNLISTED PULMONARY SVC/PX: CPT

## 2019-01-20 PROCEDURE — 94660 CPAP INITIATION&MGMT: CPT

## 2019-01-20 PROCEDURE — 85025 COMPLETE CBC W/AUTO DIFF WBC: CPT | Performed by: INTERNAL MEDICINE

## 2019-01-20 PROCEDURE — 80053 COMPREHEN METABOLIC PANEL: CPT | Performed by: INTERNAL MEDICINE

## 2019-01-20 PROCEDURE — 25010000002 METHYLPREDNISOLONE PER 40 MG: Performed by: INTERNAL MEDICINE

## 2019-01-20 PROCEDURE — 94640 AIRWAY INHALATION TREATMENT: CPT

## 2019-01-20 PROCEDURE — 83735 ASSAY OF MAGNESIUM: CPT | Performed by: INTERNAL MEDICINE

## 2019-01-20 PROCEDURE — 94760 N-INVAS EAR/PLS OXIMETRY 1: CPT

## 2019-01-20 PROCEDURE — 99233 SBSQ HOSP IP/OBS HIGH 50: CPT | Performed by: INTERNAL MEDICINE

## 2019-01-20 RX ADMIN — METOPROLOL SUCCINATE 25 MG: 25 TABLET, EXTENDED RELEASE ORAL at 08:39

## 2019-01-20 RX ADMIN — IPRATROPIUM BROMIDE AND ALBUTEROL SULFATE 3 ML: 2.5; .5 SOLUTION RESPIRATORY (INHALATION) at 16:04

## 2019-01-20 RX ADMIN — Medication 5 MG: at 20:05

## 2019-01-20 RX ADMIN — GUAIFENESIN 200 MG: 200 SOLUTION ORAL at 17:52

## 2019-01-20 RX ADMIN — GABAPENTIN 300 MG: 300 CAPSULE ORAL at 20:03

## 2019-01-20 RX ADMIN — ENOXAPARIN SODIUM 40 MG: 40 INJECTION SUBCUTANEOUS at 20:03

## 2019-01-20 RX ADMIN — METHYLPREDNISOLONE SODIUM SUCCINATE 20 MG: 40 INJECTION, POWDER, FOR SOLUTION INTRAMUSCULAR; INTRAVENOUS at 08:38

## 2019-01-20 RX ADMIN — GUAIFENESIN 200 MG: 200 SOLUTION ORAL at 20:04

## 2019-01-20 RX ADMIN — HYDROCODONE BITARTRATE AND ACETAMINOPHEN 1 TABLET: 7.5; 325 TABLET ORAL at 11:10

## 2019-01-20 RX ADMIN — DOCUSATE SODIUM 100 MG: 100 CAPSULE, LIQUID FILLED ORAL at 08:40

## 2019-01-20 RX ADMIN — CEFDINIR 300 MG: 300 CAPSULE ORAL at 20:05

## 2019-01-20 RX ADMIN — CEFDINIR 300 MG: 300 CAPSULE ORAL at 08:47

## 2019-01-20 RX ADMIN — IPRATROPIUM BROMIDE AND ALBUTEROL SULFATE 3 ML: 2.5; .5 SOLUTION RESPIRATORY (INHALATION) at 12:32

## 2019-01-20 RX ADMIN — GABAPENTIN 300 MG: 300 CAPSULE ORAL at 16:43

## 2019-01-20 RX ADMIN — IPRATROPIUM BROMIDE AND ALBUTEROL SULFATE 3 ML: 2.5; .5 SOLUTION RESPIRATORY (INHALATION) at 21:18

## 2019-01-20 RX ADMIN — HYDROXYZINE HYDROCHLORIDE 25 MG: 25 TABLET, FILM COATED ORAL at 20:05

## 2019-01-20 RX ADMIN — NICOTINE 1 PATCH: 21 PATCH, EXTENDED RELEASE TRANSDERMAL at 08:42

## 2019-01-20 RX ADMIN — ASPIRIN 81 MG CHEWABLE TABLET 81 MG: 81 TABLET CHEWABLE at 08:39

## 2019-01-20 RX ADMIN — HYDROCHLOROTHIAZIDE 25 MG: 25 TABLET ORAL at 08:38

## 2019-01-20 RX ADMIN — NYSTATIN 500000 UNITS: 500000 SUSPENSION ORAL at 20:04

## 2019-01-20 RX ADMIN — POLYETHYLENE GLYCOL 3350 17 G: 17 POWDER, FOR SOLUTION ORAL at 08:39

## 2019-01-20 RX ADMIN — GUAIFENESIN 200 MG: 200 SOLUTION ORAL at 08:39

## 2019-01-20 RX ADMIN — HYDROCODONE BITARTRATE AND ACETAMINOPHEN 1 TABLET: 7.5; 325 TABLET ORAL at 16:43

## 2019-01-20 RX ADMIN — NYSTATIN 500000 UNITS: 500000 SUSPENSION ORAL at 17:52

## 2019-01-20 RX ADMIN — LISINOPRIL 10 MG: 10 TABLET ORAL at 08:40

## 2019-01-20 RX ADMIN — IPRATROPIUM BROMIDE AND ALBUTEROL SULFATE 3 ML: 2.5; .5 SOLUTION RESPIRATORY (INHALATION) at 08:10

## 2019-01-20 RX ADMIN — HYDROCODONE BITARTRATE AND ACETAMINOPHEN 1 TABLET: 7.5; 325 TABLET ORAL at 04:39

## 2019-01-20 RX ADMIN — METHYLPREDNISOLONE SODIUM SUCCINATE 20 MG: 40 INJECTION, POWDER, FOR SOLUTION INTRAMUSCULAR; INTRAVENOUS at 20:05

## 2019-01-20 RX ADMIN — GABAPENTIN 300 MG: 300 CAPSULE ORAL at 08:39

## 2019-01-20 RX ADMIN — ESCITALOPRAM OXALATE 10 MG: 20 TABLET, FILM COATED ORAL at 08:39

## 2019-01-20 RX ADMIN — LEVOTHYROXINE SODIUM 100 MCG: 100 TABLET ORAL at 05:28

## 2019-01-20 RX ADMIN — NYSTATIN 500000 UNITS: 500000 SUSPENSION ORAL at 12:51

## 2019-01-20 RX ADMIN — NYSTATIN 500000 UNITS: 500000 SUSPENSION ORAL at 08:39

## 2019-01-20 RX ADMIN — GUAIFENESIN 200 MG: 200 SOLUTION ORAL at 12:51

## 2019-01-20 NOTE — PLAN OF CARE
Problem: Patient Care Overview  Goal: Plan of Care Review  Outcome: Ongoing (interventions implemented as appropriate)   01/20/19 4232   Coping/Psychosocial   Plan of Care Reviewed With patient   Plan of Care Review   Progress improving   OTHER   Outcome Summary Pt transferred from 2A ICU, doing well. VSS. Remains on 2L NC w/o desaturation. In comparison to last two days of care, patient has regained substantial strength in her upper and lower extremities. Pt is tearful in regard to diagnosis and is looking forward to returning home when approrpiate. Anticipate d/c to [facility] early this week.       Problem: Fall Risk (Adult)  Goal: Absence of Fall  Outcome: Ongoing (interventions implemented as appropriate)      Problem: Chronic Obstructive Pulmonary Disease (Adult)  Goal: Signs and Symptoms of Listed Potential Problems Will be Absent, Minimized or Managed (Chronic Obstructive Pulmonary Disease)  Outcome: Ongoing (interventions implemented as appropriate)      Problem: Wound (Includes Pressure Injury) (Adult)  Goal: Signs and Symptoms of Listed Potential Problems Will be Absent, Minimized or Managed (Wound)  Outcome: Ongoing (interventions implemented as appropriate)      Problem: Mobility, Physical Impaired (Adult)  Goal: Enhanced Mobility Skills  Outcome: Ongoing (interventions implemented as appropriate)    Goal: Enhanced Functional Ability  Outcome: Ongoing (interventions implemented as appropriate)      Problem: Pain, Chronic (Adult)  Goal: Identify Related Risk Factors and Signs and Symptoms  Outcome: Outcome(s) achieved Date Met: 01/20/19    Goal: Acceptable Pain/Comfort Level and Functional Ability  Outcome: Ongoing (interventions implemented as appropriate)      Problem: Skin Injury Risk (Adult)  Goal: Skin Health and Integrity  Outcome: Ongoing (interventions implemented as appropriate)      Problem: Guillain-[place] Syndrome (Adult)  Goal: Signs and Symptoms of Listed Potential Problems Will be Absent,  Minimized or Managed (Guillain-[place] Syndrome)  Outcome: Ongoing (interventions implemented as appropriate)

## 2019-01-20 NOTE — PLAN OF CARE
Problem: Patient Care Overview  Goal: Plan of Care Review  Outcome: Ongoing (interventions implemented as appropriate)   01/20/19 0648   Coping/Psychosocial   Plan of Care Reviewed With patient   Plan of Care Review   Progress improving   OTHER   Outcome Summary pt improving with muscle strength and coordination, VSS, maintaining sats on 2LNC, on and off bipap, c/o leg pain, prn lortab given, voiding adequately, disimpacted and BM x 1 after disimpaction.        Problem: Chronic Obstructive Pulmonary Disease (Adult)  Goal: Signs and Symptoms of Listed Potential Problems Will be Absent, Minimized or Managed (Chronic Obstructive Pulmonary Disease)  Outcome: Ongoing (interventions implemented as appropriate)   01/20/19 0648   Goal/Outcome Evaluation   Problems Assessed (Chronic Obstructive Pulmonary Disease (COPD)) all   Problems Present (COPD, Bronch/Emphy) situational response       Problem: Wound (Includes Pressure Injury) (Adult)  Goal: Signs and Symptoms of Listed Potential Problems Will be Absent, Minimized or Managed (Wound)  Outcome: Ongoing (interventions implemented as appropriate)   01/20/19 0648   Goal/Outcome Evaluation   Problems Assessed (Wound) all   Problems Present (Wound) situational response;pain       Problem: Pain, Chronic (Adult)  Goal: Identify Related Risk Factors and Signs and Symptoms  Outcome: Ongoing (interventions implemented as appropriate)   01/20/19 0648   Pain, Chronic (Adult)   Related Risk Factors (Chronic Pain) disease process;knowledge deficit;nerve injury   Signs and Symptoms (Chronic Pain) verbalization of pain descriptors       Problem: Guillain-Barré Syndrome (Adult)  Goal: Signs and Symptoms of Listed Potential Problems Will be Absent, Minimized or Managed (Guillain-Barré Syndrome)  Outcome: Ongoing (interventions implemented as appropriate)   01/20/19 0648   Goal/Outcome Evaluation   Problems Assessed (Guillain-Barré Syndrome) all   Problems Present (Guillain-Barré Syn) motor  weakness;sensory changes/pain;situational response

## 2019-01-20 NOTE — PROGRESS NOTES
Critical Care Note     LOS: 8 days   Patient Care Team:  Gio Henderson MD as PCP - General (Adolescent Medicine)    Chief Complaint/Reason for visit:    Chief Complaint   Patient presents with   • Numbness     Patient Active Problem List   Diagnosis   • Acute on chronic respiratory failure with hypoxia (2L @ baseline)   • COPD with acute exacerbation (CMS/HCC)   • Hypothyroidism (acquired)   • Essential hypertension   • Depression   • left renal collecting system obstruction   • Guillain-[place] syndrome (CMS/HCC)   • Dysphagia   • Tobacco abuse   • JURGEN on CPAP   • Left nephrolithiasis       Subjective      58-year-old woman who presented December 31 with weakness and numbness to the [facility]. These symptoms progressed and on January 12 she developed worsening shortness of breath and cough. She has a known history of COPD and is an active smoker. She is on supplemental oxygen at home. CTA of the chest was negative for pulmonary embolism. She then developed urinary retention. Lumbar puncture on January 15 revealed a protein of 66, glucose of 95. EMG was results were consistent with Guillian-[place]ass[place]'syndrome. IVIG was initiated January 15.  She moved to the intensive care unit January 16 with hypoxia requiring BiPAP. She takes Lortab 7.5 mg 4 tablets daily and has done so for 14 years for chronic back pain.  Urology evaluated for nephrolithiasis at the UP junction with hydronephrosis and have recommended follow-up as an outpatient at  where she has previously gotten care because she is currently asymptomatic with normal renal function    Interval History:   Afebrile, sinus rhythm  BiPAP 16/760% FiO2 last night. Currently on 2 L nasal cannula with a saturation of 96%  Cough productive of purulent mucus, improving   VC 1.5L   Admits to early fatigue      Review of Systems:    All systems were reviewed and negative except as noted in subjective.    Medical history, surgical history, social history,  "family history reviewed    Objective     Intake/Output:    Intake/Output Summary (Last 24 hours) at 1/20/2019 1828  Last data filed at 1/20/2019 1200  Gross per 24 hour   Intake 480 ml   Output 1075 ml   Net -595 ml       Nutrition:  Diet Dysphagia; IV - Mechanical Soft No Mixed Consistencies; Thin; No Straws    Infusions:       Mechanical Ventilator Settings:            Resp Rate (Set): 4  Pressure Support (cm H2O): 8 cm H20  FiO2 (%): 40 %  PEEP/CPAP (cm H2O): 8 cm H20    Minute Ventilation (L/min) (Obs): 10 L/min  Resp Rate (Observed) Vent: 20          PIP Observed (cm H2O): 12 cm H2O       Telemetry: Sinus rhythm             Vital Signs  Blood pressure 110/67, pulse 80, temperature 98.1 °F (36.7 °C), temperature source Oral, resp. rate 18, height 170.2 cm (67.01\"), weight 96 kg (211 lb 10.3 oz), SpO2 96 %.    Physical Exam:  General Appearance:  Middle-aged white woman in no respiratory distress    Head:  Normocephalic, atraumatic    Eyes:          No jaundice. Pupils equal and reactive to light, extraocular movements intact    Ears:     Throat: Oral mucosa moist    Neck: Trachea midline, no palpable thyroid    Back:      Lungs:   Symmetric chest expansion. Breath sounds are improved with decreased rhonchi    Heart:  Regular rhythm, S1, S2 auscultated    Abdomen:   Bowel sounds present, soft, nontender    Rectal:   Deferred   Extremities: No pitting edema or cyanosis    Pulses: Pedal pulses present    Skin: Warm and dry    Lymph nodes:    Neurologic: Alert and oriented. Speech fluent, tongue midline, mild generalized weakness       Results Review:     I reviewed the patient's new clinical results.   Results from last 7 days   Lab Units 01/20/19  0427 01/18/19  0535 01/16/19  0351   SODIUM mmol/L 130* 130* 136   POTASSIUM mmol/L 4.4 4.3 3.7   CHLORIDE mmol/L 98* 97* 101   CO2 mmol/L 29.0 29.0 29.0   BUN mg/dL 43* 39* 28*   CREATININE mg/dL 0.72 0.79 0.67   CALCIUM mg/dL 8.5* 8.4* 8.8   BILIRUBIN mg/dL 1.4* 1.6*  " --    ALK PHOS U/L 63 64  --    ALT (SGPT) U/L 131* 122*  --    AST (SGOT) U/L 49* 49*  --    GLUCOSE mg/dL 124* 101* 117*     Results from last 7 days   Lab Units 01/20/19  0427 01/18/19  0535 01/16/19  0351   WBC 10*3/mm3 9.54 6.32 8.43   HEMOGLOBIN g/dL 15.7* 16.1* 16.7*   HEMATOCRIT % 47.4* 49.0* 52.4*   PLATELETS 10*3/mm3 177 152 194     Results from last 7 days   Lab Units 01/16/19  1108   PH, ARTERIAL pH units 7.484*   PO2 ART mm Hg 57.0*   PCO2, ARTERIAL mm Hg 42.6   HCO3 ART mmol/L 32.0*     No results found for: BLOODCX  No results found for: URINECX    I reviewed the patient's new imaging including images and reports.  COMPARISONS: 01/12/2019.     FINDINGS:  Lungs are without focal abnormality aside from subsegmental  atelectasis of the left lung base. No sizable pleural effusion or  pneumothorax. Cardiomediastinal silhouette is within normal limits.     IMPRESSION:  No significant interval change.     D:  01/16/2019  FINDINGS: The most superior images demonstrate bibasilar airspace  disease, left greater than right.      The liver and spleen are normal.  There is no adrenal mass. The pancreas  is normal.  There are small bilateral renal parenchymal stones. More  importantly, there is a 1 cm stone in the left ureteropelvic junction  producing moderately severe obstruction. Also there is a 1.2 cm area of  low density within the left renal pelvis. This is not calcified on  images obtained prior to contrast and may represent clot. There are also  simple cysts in both kidneys. There is no ascites, aneurysm or  retroperitoneal lymphadenopathy. There is no pelvic mass or fluid.   There is no bladder stone.     IMPRESSION:  1. There is a 1 cm stone in the left ureteropelvic junction producing  moderately severe obstruction of the left kidney. There is also a 12 mm  low density filling defect in the renal pelvis. On the unenhanced  examination this is not a calcified stone this may represent clot  related to the  ureteral stone and obstruction and less likely a  noncalcified stone.   2. Lastly there is bibasilar airspace disease, left greater than right.     D:  01/15/2019  E:  01/15/2019       All medications reviewed.     aspirin 81 mg Oral Daily   cefdinir 300 mg Oral Q12H   enoxaparin 40 mg Subcutaneous Q24H   escitalopram 10 mg Oral Daily   gabapentin 300 mg Oral TID   guaiFENesin 200 mg Oral 4x Daily   hydrochlorothiazide 25 mg Oral Daily   ipratropium-albuterol 3 mL Nebulization 4x Daily - RT   levothyroxine 100 mcg Oral Daily   lisinopril 10 mg Oral Q24H   methylPREDNISolone sodium succinate 20 mg Intravenous Q12H   metoprolol succinate XL 25 mg Oral Daily   nystatin 5 mL Oral 4x Daily   pharmacy consult - MTM  Does not apply Daily   polyethylene glycol 17 g Oral Daily         Assessment/Plan       Guillain-[place] syndrome (CMS/HCC)    left renal collecting system obstruction    Acute on chronic respiratory failure with hypoxia (2L @ baseline)    COPD with acute exacerbation (CMS/HCC)    Dysphagia    JURGEN on CPAP    Hypothyroidism (acquired)    Essential hypertension    Depression    Tobacco abuse    Left nephrolithiasis    #1 Guillian [place]' Syndrome currently receiving IVIG.  vital capacity was 1.5. She has some improvement in strength of her extremities    #2 acute on chronic respiratory failure, at baseline she wears 2 L nasal cannula at home. She is tolerating BiPAP at night. She actively smoke cigarettes. She is maintaining oxygen saturations on low flow nasal cannula, 4 liters. 1/17 morning blood gas revealed a pH of 7.48, CO2 of 42, O2 of 57 on 52% high flow nasal cannula. She has underlying obstructive airways disease and a productive cough. X-ray shows chronic changes but no pneumonia. CTA of the chest was negative for pulmonary embolus.  Oxygenation improved with better secrestions mobilization.     #3 COPD with exacerbation she is currently nebulized bronchodilatorsPo cephalosporin, IV steroids, mucolytics.   Medineb and  mucolytic therapy has helped significantly. Sputum gram stain looks like strept and H.flu.     #4 essential hypertension, blood pressure adequately controlled with lisinopril and beta blocker    #5 start of sleep apnea, tolerating BiPAP nightly, has a home CPAP unit    #6 left hydronephrosis with nephrolithiasis. Evaluation by urology appreciated, they recommend simple observation with her normal creatinine and no evidence of urinary tract infection. She has apparently received [facility] at the [facility] in the past for nephrolithiasis. Serum creatinine is normal. She would be high risk for anesthesia with her Guillian Barré syndrome and hypoxia    PLAN:   PO Cefdinir  Nebulized bronchodilators/ resume Medineb if oxygen declines  Oral mucolytic therapy  BiPAP nightly  Scheduled Lortab 7.5  4 times daily, home dose  Physical therapy, occupational therapy   gabapentin and ibuprofen  Ativan for muscle cramping, per neurology  Nicotine patch  Encourage ongoing smoking cessation  Thyroid replacement  IVIG per neurology 20 g daily; completed  Lisinopril, metoprolol for high blood pressure  Monitor vital capacity, NIF  telemetry    VTE Prophylaxis: Lovenox    Stress Ulcer Prophylaxis:none    Coretta Schmidt MD  01/20/19  6:28 PM      Time: 25 min  I personally provided care to this critically ill patient as documented above.  Critical care time does not include time spent on separately billed procedures.  Non of my critical care time was concurrent with other critical care providers.

## 2019-01-21 PROCEDURE — 94660 CPAP INITIATION&MGMT: CPT

## 2019-01-21 PROCEDURE — 25010000002 METHYLPREDNISOLONE PER 40 MG: Performed by: INTERNAL MEDICINE

## 2019-01-21 PROCEDURE — 94760 N-INVAS EAR/PLS OXIMETRY 1: CPT

## 2019-01-21 PROCEDURE — 25010000002 ENOXAPARIN PER 10 MG: Performed by: PSYCHIATRY & NEUROLOGY

## 2019-01-21 PROCEDURE — 94799 UNLISTED PULMONARY SVC/PX: CPT

## 2019-01-21 PROCEDURE — 94640 AIRWAY INHALATION TREATMENT: CPT

## 2019-01-21 PROCEDURE — 99232 SBSQ HOSP IP/OBS MODERATE 35: CPT | Performed by: INTERNAL MEDICINE

## 2019-01-21 RX ADMIN — HYDROXYZINE HYDROCHLORIDE 25 MG: 25 TABLET, FILM COATED ORAL at 20:38

## 2019-01-21 RX ADMIN — NICOTINE 1 PATCH: 21 PATCH, EXTENDED RELEASE TRANSDERMAL at 13:31

## 2019-01-21 RX ADMIN — GUAIFENESIN 200 MG: 200 SOLUTION ORAL at 08:27

## 2019-01-21 RX ADMIN — METHYLPREDNISOLONE SODIUM SUCCINATE 20 MG: 40 INJECTION, POWDER, FOR SOLUTION INTRAMUSCULAR; INTRAVENOUS at 08:27

## 2019-01-21 RX ADMIN — ENOXAPARIN SODIUM 40 MG: 40 INJECTION SUBCUTANEOUS at 20:38

## 2019-01-21 RX ADMIN — GUAIFENESIN 200 MG: 200 SOLUTION ORAL at 20:38

## 2019-01-21 RX ADMIN — GABAPENTIN 300 MG: 300 CAPSULE ORAL at 20:38

## 2019-01-21 RX ADMIN — HYDROCODONE BITARTRATE AND ACETAMINOPHEN 1 TABLET: 7.5; 325 TABLET ORAL at 04:02

## 2019-01-21 RX ADMIN — CEFDINIR 300 MG: 300 CAPSULE ORAL at 08:27

## 2019-01-21 RX ADMIN — LEVOTHYROXINE SODIUM 100 MCG: 100 TABLET ORAL at 06:36

## 2019-01-21 RX ADMIN — ASPIRIN 81 MG CHEWABLE TABLET 81 MG: 81 TABLET CHEWABLE at 08:27

## 2019-01-21 RX ADMIN — POLYETHYLENE GLYCOL 3350 17 G: 17 POWDER, FOR SOLUTION ORAL at 08:28

## 2019-01-21 RX ADMIN — IPRATROPIUM BROMIDE AND ALBUTEROL SULFATE 3 ML: 2.5; .5 SOLUTION RESPIRATORY (INHALATION) at 07:04

## 2019-01-21 RX ADMIN — HYDROCODONE BITARTRATE AND ACETAMINOPHEN 1 TABLET: 7.5; 325 TABLET ORAL at 12:24

## 2019-01-21 RX ADMIN — IPRATROPIUM BROMIDE AND ALBUTEROL SULFATE 3 ML: 2.5; .5 SOLUTION RESPIRATORY (INHALATION) at 11:47

## 2019-01-21 RX ADMIN — GABAPENTIN 300 MG: 300 CAPSULE ORAL at 08:27

## 2019-01-21 RX ADMIN — IPRATROPIUM BROMIDE AND ALBUTEROL SULFATE 3 ML: 2.5; .5 SOLUTION RESPIRATORY (INHALATION) at 15:48

## 2019-01-21 RX ADMIN — GUAIFENESIN 200 MG: 200 SOLUTION ORAL at 16:51

## 2019-01-21 RX ADMIN — METHYLPREDNISOLONE SODIUM SUCCINATE 20 MG: 40 INJECTION, POWDER, FOR SOLUTION INTRAMUSCULAR; INTRAVENOUS at 20:37

## 2019-01-21 RX ADMIN — NYSTATIN 500000 UNITS: 500000 SUSPENSION ORAL at 20:38

## 2019-01-21 RX ADMIN — CEFDINIR 300 MG: 300 CAPSULE ORAL at 20:38

## 2019-01-21 RX ADMIN — NYSTATIN 500000 UNITS: 500000 SUSPENSION ORAL at 08:27

## 2019-01-21 RX ADMIN — HYDROCODONE BITARTRATE AND ACETAMINOPHEN 1 TABLET: 7.5; 325 TABLET ORAL at 18:07

## 2019-01-21 RX ADMIN — SODIUM CHLORIDE, PRESERVATIVE FREE 10 ML: 5 INJECTION INTRAVENOUS at 20:38

## 2019-01-21 RX ADMIN — LISINOPRIL 10 MG: 10 TABLET ORAL at 08:28

## 2019-01-21 RX ADMIN — ESCITALOPRAM OXALATE 10 MG: 20 TABLET, FILM COATED ORAL at 08:27

## 2019-01-21 RX ADMIN — GUAIFENESIN 200 MG: 200 SOLUTION ORAL at 13:31

## 2019-01-21 RX ADMIN — NYSTATIN 500000 UNITS: 500000 SUSPENSION ORAL at 13:31

## 2019-01-21 RX ADMIN — Medication 5 MG: at 20:38

## 2019-01-21 RX ADMIN — NYSTATIN 500000 UNITS: 500000 SUSPENSION ORAL at 16:51

## 2019-01-21 RX ADMIN — HYDROCHLOROTHIAZIDE 25 MG: 25 TABLET ORAL at 08:27

## 2019-01-21 RX ADMIN — GABAPENTIN 300 MG: 300 CAPSULE ORAL at 16:50

## 2019-01-21 RX ADMIN — IPRATROPIUM BROMIDE AND ALBUTEROL SULFATE 3 ML: 2.5; .5 SOLUTION RESPIRATORY (INHALATION) at 20:12

## 2019-01-21 RX ADMIN — METOPROLOL SUCCINATE 25 MG: 25 TABLET, EXTENDED RELEASE ORAL at 08:27

## 2019-01-21 NOTE — PLAN OF CARE
Problem: Patient Care Overview  Goal: Plan of Care Review  Outcome: Ongoing (interventions implemented as appropriate)   01/21/19 4484   Coping/Psychosocial   Plan of Care Reviewed With patient   Plan of Care Review   Progress no change   OTHER   Outcome Summary Pt VSS throughout day, still requring 2 assist and is weak. Eating most of meals. Request prn pain meds around the clock. Pt to go to  pending insurance.        Problem: Fall Risk (Adult)  Goal: Absence of Fall  Outcome: Ongoing (interventions implemented as appropriate)      Problem: Chronic Obstructive Pulmonary Disease (Adult)  Goal: Signs and Symptoms of Listed Potential Problems Will be Absent, Minimized or Managed (Chronic Obstructive Pulmonary Disease)  Outcome: Ongoing (interventions implemented as appropriate)      Problem: Wound (Includes Pressure Injury) (Adult)  Goal: Signs and Symptoms of Listed Potential Problems Will be Absent, Minimized or Managed (Wound)  Outcome: Ongoing (interventions implemented as appropriate)      Problem: Mobility, Physical Impaired (Adult)  Goal: Enhanced Mobility Skills  Outcome: Ongoing (interventions implemented as appropriate)    Goal: Enhanced Functional Ability  Outcome: Ongoing (interventions implemented as appropriate)      Problem: Pain, Chronic (Adult)  Goal: Acceptable Pain/Comfort Level and Functional Ability  Outcome: Ongoing (interventions implemented as appropriate)      Problem: Skin Injury Risk (Adult)  Goal: Skin Health and Integrity  Outcome: Ongoing (interventions implemented as appropriate)      Problem: Guillain-Barré Syndrome (Adult)  Goal: Signs and Symptoms of Listed Potential Problems Will be Absent, Minimized or Managed (Guillain-Barré Syndrome)  Outcome: Ongoing (interventions implemented as appropriate)

## 2019-01-21 NOTE — PROGRESS NOTES
Continued Stay Note  [facility]     Patient Name: Kristina Heck  MRN: [id number]  Today's Date: 1/21/2019    Admit Date: 1/12/2019    Discharge Plan     Row Name 01/21/19 1247       Plan    Plan  [place]    Patient/Family in Agreement with Plan  yes    Plan Comments  Met with patient in the room to discuss discharge plan. She states she would like to go to rehab at [facility]. Referral called to Dor[facility]. She will evaluate patient for admission and intiate precertification with insurance. CM will continue to follow.         Discharge Codes    No documentation.       Expected Discharge Date and Time     Expected Discharge Date Expected Discharge Time    Jan 23, 2019             Irene Davis

## 2019-01-21 NOTE — PROGRESS NOTES
"Clinical Nutrition   Reason For Visit: Follow-up protocol    Patient Name: Kristina Heck  YOB: 1960  MRN: [id number]  Date of Encounter: 01/21/19 2:16 PM  Admission date: 1/12/2019          Nutrition Assessment     Admission Problem List:  Guillain-[place] syndrome (CMS/HCC)  Acute on chronic respiratory failure with hypoxia (2L @ baseline)    Applicable medical tests/procedures since admission:  BL upper extremity numbness   Dysphagia  (1/14) Bedside evaluation- mechanical soft with no mixed consistencies, nectar thick liquids  (1/15) S/p FEES (recs-Dys IV with NTL)  (1/16) ICU transfer for worsening hypoxia and tachycardia        PMH: She  has a past medical history of COPD (chronic obstructive pulmonary disease) (CMS/HCC), Depression, Disease of thyroid gland, and Hypertension.   PSxH: She  has no past surgical history on file.        Reported/Observed/Food/Nutrition Related History   Reports appetite improved, fair.  Staff helping with cutting up foods for ease of eating. Pt states she is weak and it is difficult for her to cut up things on her own. RN states she has been encouraging supplement intake.          Anthropometrics   Height: 67\"  Weight: 214lbs 12.8 oz -bed weight 1/21  BMI: 33.63  BMI classification: Obese Class I: 30-34.9kg/m2       Labs reviewed   Labs reviewed: Yes  Results from last 7 days   Lab Units 01/20/19  0427 01/18/19  0535 01/16/19  0351   SODIUM mmol/L 130* 130* 136   POTASSIUM mmol/L 4.4 4.3 3.7   CHLORIDE mmol/L 98* 97* 101   CO2 mmol/L 29.0 29.0 29.0   BUN mg/dL 43* 39* 28*   CREATININE mg/dL 0.72 0.79 0.67   GLUCOSE mg/dL 124* 101* 117*   CALCIUM mg/dL 8.5* 8.4* 8.8   MAGNESIUM mg/dL 2.0 2.0  --      Results from last 7 days   Lab Units 01/20/19  0427 01/18/19  0535 01/16/19  0351   WBC 10*3/mm3 9.54 6.32 8.43   ALBUMIN g/dL 2.88* 3.06*  --      Results from last 7 days   Lab Units 01/16/19  1121   GLUCOSE mg/dL 120     Lab Results   Lab Value Date/Time    HGBA1C 6.40 " (H) 01/13/2019 0718     Medications reviewed   Medications reviewed: Yes      Intake/Ouptut 24 hrs (7:00AM - 6:59 AM)     Intake & Output (last day)       01/20 0701 - 01/21 0700 01/21 0701 - 01/22 0700    P.O. 480     Total Intake(mL/kg) 480 (4.9)     Urine (mL/kg/hr) 350 (0.1)     Stool 0     Total Output 350     Net +130           Urine Unmeasured Occurrence 3 x     Stool Unmeasured Occurrence 1 x              Current Nutrition Prescription   PO: Diet Dysphagia; IV - Mechanical Soft No Mixed Consistencies; Thin; No Straws  Oral Nutrition Supplement:  Boost Glucose control 3x/day  Snacks 2pm and HS      Evaluation of Received Nutrient/Fluid Intake:  4 Days: 53% of 8 meals       Nutrition Diagnosis   1/21  Problem Malnutrition -Severe Acute Illness Malnutrition    Etiology Poor PO intake, unintentional wt loss   Signs/Symptoms 7% weight loss x 4 weeks, minimal PO intake x 4 weeks per family report   ongoing    Problem Inadequate oral intake   Etiology Guillain-[place], difficulty swallowing, weakness   Signs/Symptoms Intake 53% of 8 meals       Intervention   Intervention: Follow treatment progress, Care plan reviewed, Interview for preferences, Encourage intake      Goal:   General: Nutrition support treatment  PO: Increase intake, Continue positive trend, Advace diet as medically appropriate        Monitoring/Evaluation:       Monitoring/Evaluation: Per protocol, I&O, PO intake, Supplement intake, Pertinent labs, GI status, Symptoms, Swallow function  Will Continue to follow per protocol  Tere San RD  Time Spent: 30 min

## 2019-01-22 PROCEDURE — 25010000002 METHYLPREDNISOLONE PER 40 MG: Performed by: INTERNAL MEDICINE

## 2019-01-22 PROCEDURE — 25010000002 METHYLPREDNISOLONE PER 40 MG: Performed by: PHYSICIAN ASSISTANT

## 2019-01-22 PROCEDURE — 94640 AIRWAY INHALATION TREATMENT: CPT

## 2019-01-22 PROCEDURE — 97535 SELF CARE MNGMENT TRAINING: CPT

## 2019-01-22 PROCEDURE — 97110 THERAPEUTIC EXERCISES: CPT

## 2019-01-22 PROCEDURE — 94760 N-INVAS EAR/PLS OXIMETRY 1: CPT

## 2019-01-22 PROCEDURE — 99232 SBSQ HOSP IP/OBS MODERATE 35: CPT | Performed by: PHYSICIAN ASSISTANT

## 2019-01-22 PROCEDURE — 94799 UNLISTED PULMONARY SVC/PX: CPT

## 2019-01-22 PROCEDURE — 97530 THERAPEUTIC ACTIVITIES: CPT

## 2019-01-22 PROCEDURE — 25010000002 ENOXAPARIN PER 10 MG: Performed by: PSYCHIATRY & NEUROLOGY

## 2019-01-22 RX ADMIN — ESCITALOPRAM OXALATE 10 MG: 20 TABLET, FILM COATED ORAL at 08:06

## 2019-01-22 RX ADMIN — HYDROCODONE BITARTRATE AND ACETAMINOPHEN 1 TABLET: 7.5; 325 TABLET ORAL at 08:47

## 2019-01-22 RX ADMIN — IPRATROPIUM BROMIDE AND ALBUTEROL SULFATE 3 ML: 2.5; .5 SOLUTION RESPIRATORY (INHALATION) at 07:51

## 2019-01-22 RX ADMIN — IPRATROPIUM BROMIDE AND ALBUTEROL SULFATE 3 ML: 2.5; .5 SOLUTION RESPIRATORY (INHALATION) at 19:38

## 2019-01-22 RX ADMIN — GUAIFENESIN 200 MG: 200 SOLUTION ORAL at 11:16

## 2019-01-22 RX ADMIN — IPRATROPIUM BROMIDE AND ALBUTEROL SULFATE 3 ML: 2.5; .5 SOLUTION RESPIRATORY (INHALATION) at 16:10

## 2019-01-22 RX ADMIN — METHYLPREDNISOLONE SODIUM SUCCINATE 20 MG: 40 INJECTION, POWDER, FOR SOLUTION INTRAMUSCULAR; INTRAVENOUS at 08:06

## 2019-01-22 RX ADMIN — NYSTATIN 500000 UNITS: 500000 SUSPENSION ORAL at 18:05

## 2019-01-22 RX ADMIN — METHYLPREDNISOLONE SODIUM SUCCINATE 20 MG: 40 INJECTION, POWDER, FOR SOLUTION INTRAMUSCULAR; INTRAVENOUS at 20:45

## 2019-01-22 RX ADMIN — NYSTATIN 500000 UNITS: 500000 SUSPENSION ORAL at 20:45

## 2019-01-22 RX ADMIN — HYDROCODONE BITARTRATE AND ACETAMINOPHEN 1 TABLET: 7.5; 325 TABLET ORAL at 14:39

## 2019-01-22 RX ADMIN — GABAPENTIN 300 MG: 300 CAPSULE ORAL at 20:45

## 2019-01-22 RX ADMIN — ENOXAPARIN SODIUM 40 MG: 40 INJECTION SUBCUTANEOUS at 20:44

## 2019-01-22 RX ADMIN — HYDROXYZINE HYDROCHLORIDE 25 MG: 25 TABLET, FILM COATED ORAL at 20:45

## 2019-01-22 RX ADMIN — GUAIFENESIN 200 MG: 200 SOLUTION ORAL at 08:06

## 2019-01-22 RX ADMIN — HYDROCHLOROTHIAZIDE 25 MG: 25 TABLET ORAL at 08:06

## 2019-01-22 RX ADMIN — HYDROCODONE BITARTRATE AND ACETAMINOPHEN 1 TABLET: 7.5; 325 TABLET ORAL at 02:18

## 2019-01-22 RX ADMIN — CEFDINIR 300 MG: 300 CAPSULE ORAL at 20:45

## 2019-01-22 RX ADMIN — HYDROCODONE BITARTRATE AND ACETAMINOPHEN 1 TABLET: 7.5; 325 TABLET ORAL at 20:44

## 2019-01-22 RX ADMIN — LEVOTHYROXINE SODIUM 100 MCG: 100 TABLET ORAL at 05:23

## 2019-01-22 RX ADMIN — GABAPENTIN 300 MG: 300 CAPSULE ORAL at 08:05

## 2019-01-22 RX ADMIN — NYSTATIN 500000 UNITS: 500000 SUSPENSION ORAL at 11:16

## 2019-01-22 RX ADMIN — IPRATROPIUM BROMIDE AND ALBUTEROL SULFATE 3 ML: 2.5; .5 SOLUTION RESPIRATORY (INHALATION) at 12:08

## 2019-01-22 RX ADMIN — GABAPENTIN 300 MG: 300 CAPSULE ORAL at 15:06

## 2019-01-22 RX ADMIN — CEFDINIR 300 MG: 300 CAPSULE ORAL at 08:06

## 2019-01-22 RX ADMIN — Medication 5 MG: at 20:45

## 2019-01-22 RX ADMIN — ASPIRIN 81 MG CHEWABLE TABLET 81 MG: 81 TABLET CHEWABLE at 08:06

## 2019-01-22 RX ADMIN — METOPROLOL SUCCINATE 25 MG: 25 TABLET, EXTENDED RELEASE ORAL at 08:05

## 2019-01-22 RX ADMIN — POLYETHYLENE GLYCOL 3350 17 G: 17 POWDER, FOR SOLUTION ORAL at 08:05

## 2019-01-22 RX ADMIN — NYSTATIN 500000 UNITS: 500000 SUSPENSION ORAL at 08:05

## 2019-01-22 RX ADMIN — GUAIFENESIN 200 MG: 200 SOLUTION ORAL at 20:44

## 2019-01-22 RX ADMIN — GUAIFENESIN 200 MG: 200 SOLUTION ORAL at 18:05

## 2019-01-22 NOTE — THERAPY TREATMENT NOTE
Acute Care - Physical Therapy Treatment Note  [facility]     Patient Name: Kristina Heck  : 1960  MRN: [id number]  Today's Date: 2019  Onset of Illness/Injury or Date of Surgery: 19  Date of Referral to PT: 19  Referring Physician: MD Roxana    Admit Date: 2019    Visit Dx:    ICD-10-CM ICD-9-CM   1. Left ureter dilated N28.82 593.89   2. Hypoxia R09.02 799.02   3. COPD exacerbation (CMS/HCC) J44.1 491.21   4. Weakness R53.1 780.79   5. Impaired functional mobility, balance, gait, and endurance Z74.09 V49.89   6. Acute on chronic respiratory failure with hypoxia (CMS/HCC) J96.21 518.84     799.02   7. Pharyngeal dysphagia R13.13 787.23   8. Impaired mobility and ADLs Z74.09 799.89     Patient Active Problem List   Diagnosis   • Acute on chronic respiratory failure with hypoxia (2L @ baseline)   • COPD with acute exacerbation (CMS/HCC)   • Hypothyroidism (acquired)   • Essential hypertension   • Depression   • left renal collecting system obstruction   • Guillain-Perth syndrome (CMS/HCC)   • Dysphagia   • Tobacco abuse   • JURGEN on CPAP   • Left nephrolithiasis       Therapy Treatment    Rehabilitation Treatment Summary     Row Name 19 1310 19 1121          Treatment Time/Intention    Discipline  --  -CD  physical therapist  -CD     Document Type  --  -CD  therapy note (daily note)  -CD     Subjective Information  --  -CD  complains of;fatigue;weakness  -CD     Mode of Treatment  --  physical therapy  -CD     Patient/Family Observations  --  PT SITTING UP IN CHAIR ON RA AND WITH IV HEPLOCKED. DTR PRESENT. NSG USED LIFT FOR OOB TO CHAIR EARLIER.   -CD     Care Plan Review  --  care plan/treatment goals reviewed  -CD     Therapy Frequency (PT Clinical Impression)  --  daily  -CD     Patient Effort  --  excellent  -CD     Existing Precautions/Restrictions  --  fall  -CD     Treatment Considerations/Comments  --  GENERALIZED WEAKNESS, KNEES BUCKLE.   -CD     Patient  Response to Treatment  --  PT MOTIVATED TO WORK WITH THERAPY. AGREES TO ATTEMPT PIVOT TRANSFERS TODAY.   -CD     Recorded by [CD] Jo Cooper, PT 01/22/19 1340 [CD] Jo Cooper, PT 01/22/19 1340     Row Name 01/22/19 1121             Vital Signs    Post Systolic BP Rehab  130  -CD      Post Treatment Diastolic BP  79  -CD      Pretreatment Resp Rate (breaths/min)  79  -CD      Pre SpO2 (%)  94  -CD      O2 Delivery Pre Treatment  room air  -CD      O2 Delivery Intra Treatment  room air  -CD      Post SpO2 (%)  91  -CD      O2 Delivery Post Treatment  room air  -CD      Recorded by [CD] Jo Cooper, PT 01/22/19 1340      Row Name 01/22/19 1121             Cognitive Assessment/Intervention- PT/OT    Affect/Mental Status (Cognitive)  WFL  -CD      Orientation Status (Cognition)  oriented x 4  -CD      Follows Commands (Cognition)  follows one step commands;over 90% accuracy  -CD      Cognitive Function (Cognitive)  safety deficit  -CD      Safety Deficit (Cognitive)  insight into deficits/self awareness  -CD      Personal Safety Interventions  fall prevention program maintained;gait belt;muscle strengthening facilitated;nonskid shoes/slippers when out of bed;supervised activity  -CD      Recorded by [CD] Jo Cooper, PT 01/22/19 1340      Row Name 01/22/19 1121             Safety Issues, Functional Mobility    Safety Issues Affecting Function (Mobility)  insight into deficits/self awareness  -CD      Impairments Affecting Function (Mobility)  balance;endurance/activity tolerance;strength;sensation/sensory awareness;shortness of breath  -CD      Recorded by [CD] Jo Cooper, PT 01/22/19 1340      Row Name 01/22/19 1121             Bed Mobility Assessment/Treatment    Bed Mobility Assessment/Treatment  sit-supine;scooting/bridging  -CD      Scooting/Bridging [place] (Bed Mobility)  verbal cues;moderate assist (50% patient effort)  -CD      Sit-Supine [place] (Bed Mobility)  moderate assist (50%  patient effort)  -CD      Bed Mobility, Safety Issues  decreased use of arms for pushing/pulling;decreased use of legs for bridging/pushing  -CD      Assistive Device (Bed Mobility)  bed rails;draw sheet  -CD      Recorded by [CD] Jo Cooper, PT 01/22/19 1340      Row Name 01/22/19 1121             Transfer Assessment/Treatment    Transfer Assessment/Treatment  sit-stand transfer;stand-sit transfer;stand pivot/stand step transfer;toilet transfer  -CD      Comment (Transfers)  CUES FOR SEQUENCING, HAND PLACEMENT. STEP PIVOT TRANSFERS RECLINER TO BSC AND BSC TO BED.   -CD      Recorded by [CD] Jo Cooper, PT 01/22/19 1340      Row Name 01/22/19 1121             Sit-Stand Transfer    Sit-Stand [place]as[place] (Transfers)  minimum assist (75% patient effort);2 person assist;verbal cues  -CD      Assistive Device (Sit-Stand Transfers)  walker, front-wheeled PERFORMED X 2 REPS.   -CD      Recorded by [CD] Jo Cooper, PT 01/22/19 1340      Row Name 01/22/19 1121             Stand-Sit Transfer    Stand-Sit Flas[place] (Transfers)  verbal cues;minimum assist (75% patient effort);2 person assist  -CD      Assistive Device (Stand-Sit Transfers)  walker, front-wheeled  -CD      Recorded by [CD] Jo Cooper, PT 01/22/19 1340      Row Name 01/22/19 1121             Stand Pivot/Stand Step Transfer    Stand Pivot/Stand Step Flas[place]  moderate assist (50% patient effort);2 person assist;verbal cues  -CD      Assistive Device (Stand Pivot Stand Step Transfer)  walker, front-wheeled CUES FOR UPRIGHT POSTURE   -CD      Recorded by [CD] Jo Cooper, PT 01/22/19 1340      Row Name 01/22/19 1121             Toilet Transfer    Type (Toilet Transfer)  sit-stand;stand-sit  -CD      Flasher Level (Toilet Transfer)  minimum assist (75% patient effort);2 person assist  -CD      Assistive Device (Toilet Transfer)  walker, front-wheeled  -CD      Recorded by [CD] Jo Cooper, PT 01/22/19 1340      Row Name 01/22/19  1121             Gait/Stairs Assessment/Training    Comment (Gait/Stairs)  STEPS FROM RECLINER TO BSC TO EDGE OF BED.   -CD      Recorded by [CD] Jo Cooper, PT 01/22/19 1340      Row Name 01/22/19 1121             Motor Skills Assessment/Interventions    Additional Documentation  Balance (Group);Balance Interventions (Group);Therapeutic Exercise (Group);Therapeutic Exercise Interventions (Group)  -CD      Recorded by [CD] Jo Cooper, PT 01/22/19 1340      Row Name 01/22/19 1121             Therapeutic Exercise    46032 - PT Therapeutic Exercise Minutes  15  -CD      32355 - PT Therapeutic Activity Minutes  23  -CD      Recorded by [CD] Jo Cooper, PT 01/22/19 1340      Row Name 01/22/19 1121             Therapeutic Exercise    Lower Extremity (Therapeutic Exercise)  LAQ (long arc quad), bilateral;hamstring sets, bilateral;quad sets, bilateral BRIDGING AND AP'S   -CD      Exercise Type (Therapeutic Exercise)  AROM (active range of motion)  -CD      Sets/Reps (Therapeutic Exercise)  1 SET OF 10 EXCEPT BRIDGING 5 REPS.   -CD      Comment (Therapeutic Exercise)  FATIGUES QUICKLY- NEEDS TO REST BETWEEN EXERCISES.   -CD      Recorded by [CD] Jo Cooper, PT 01/22/19 1340      Row Name 01/22/19 1121             Gross Motor Coordination    Gross Motor Impairments  coordination;balance;strength;motor control;sensation  -CD      Recorded by [CD] oJ Cooper, PT 01/22/19 1340      Row Name 01/22/19 1121             Static Sitting Balance    Level of [place] (Unsupported Sitting, Static Balance)  standby assist  -CD      Sitting Position (Unsupported Sitting, Static Balance)  sitting in chair;sitting on edge of bed AND ON BSC.   -CD      Time Able to Maintain Position (Unsupported Sitting, Static Balance)  more than 5 minutes  -CD      Recorded by [CD] Jo Cooper, PT 01/22/19 1340      Row Name 01/22/19 1121             Static Standing Balance    Level of [place] (Supported Standing, Static  Balance)  minimal assist, 75% patient effort;2 person assist  -CD      Time Able to Maintain Position (Supported Standing, Static Balance)  30 to 45 seconds  -CD      Assistive Device Utilized (Supported Standing, Static Balance)  walker, rolling  -CD      Comment (Supported Standing, Static Balance)  STOOD AT R WALKER FOR HYGIENE AFTER TOILETING. DEPENDENT WITH HYGIENE.   -CD      Recorded by [CD] Jo Cooper, PT 01/22/19 1340      Row Name 01/22/19 1121             Positioning and Restraints    Pre-Treatment Position  sitting in chair/recliner  -CD      Post Treatment Position  bed  -CD      In Bed  supine;call light within reach;encouraged to call for assist;exit alarm on;with family/caregiver;notified nsg;legs elevated  -CD      Recorded by [CD] Jo Cooper, PT 01/22/19 1340      Row Name 01/22/19 1121             Pain Scale: Numbers Pre/Post-Treatment    Pain Scale: Numbers, Pretreatment  0/10 - no pain  -CD      Pain Scale: Numbers, Post-Treatment  0/10 - no pain  -CD      Recorded by [CD] Jo Cooper, PT 01/22/19 1340      Row Name 01/22/19 1121             Plan of Care Review    Plan of Care Reviewed With  patient  -CD      Recorded by [CD] Jo Cooper, PT 01/22/19 1340      Row Name 01/22/19 1121             Outcome Summary/Treatment Plan (PT)    Daily Summary of Progress (PT)  progress toward functional goals is good  -CD      Anticipated Discharge Disposition (PT)  inpatient rehabilitation facility  -CD      Recorded by [CD] Jo Cooper, PT 01/22/19 1340        User Key  (r) = Recorded By, (t) = Taken By, (c) = Cosigned By    Initials Name Effective Dates Discipline    CD Jo Cooper, PT 06/19/15 -  PT                   Physical Therapy Education     Title: PT OT SLP Therapies (In Progress)     Topic: Physical Therapy (Done)     Point: Mobility training (Done)     Learning Progress Summary           Patient Acceptance, RUBI STUBBS,NR by CD at 1/22/2019  1:40 PM    Comment:  TRANSFER TRAINING ,  THER EX/HEP, BENEFITS OF OOB ACTIVITY, PROGRESSION OF POC, D/C PLANNING.    Acceptance, E, VU by EM at 1/22/2019  3:28 AM    Acceptance, E,D, VU,DU by KARLOS at 1/19/2019 11:18 AM    Acceptance, E,D, VU,NR by RIO at 1/18/2019 10:35 AM    Acceptance, E,D,H, VU,NR by RIO at 1/17/2019 11:15 AM    Acceptance, E, VU by REYNALDO at 1/14/2019 10:07 AM    Acceptance, E,D, VU,NR by MB at 1/13/2019  9:55 AM    Comment:  log roll technique, transfer mechanics/safety, POC progression, benefits of mobility, home safety   Family Acceptance, E,D, VU,DU by KARLOS at 1/19/2019 11:18 AM    Acceptance, E,D, VU,NR by RIO at 1/18/2019 10:35 AM    Acceptance, E,D,H, VU,NR by RIO at 1/17/2019 11:15 AM                   Point: Home exercise program (Done)     Learning Progress Summary           Patient Acceptance, E, VU,NR by CD at 1/22/2019  1:40 PM    Comment:  TRANSFER TRAINING , THER EX/HEP, BENEFITS OF OOB ACTIVITY, PROGRESSION OF POC, D/C PLANNING.    Acceptance, E, VU by EM at 1/22/2019  3:28 AM    Acceptance, E,D, VU,DU by KARLOS at 1/19/2019 11:18 AM    Acceptance, E,D, VU,NR by RIO at 1/18/2019 10:35 AM    Acceptance, E,D,H, VU,NR by RIO at 1/17/2019 11:15 AM    Acceptance, E, VU by REYNALDO at 1/14/2019 10:07 AM    Acceptance, E,D, VU,NR by MB at 1/13/2019  9:55 AM    Comment:  log roll technique, transfer mechanics/safety, POC progression, benefits of mobility, home safety   Family Acceptance, E,D, VU,DU by KARLOS at 1/19/2019 11:18 AM    Acceptance, E,D, VU,NR by RIO at 1/18/2019 10:35 AM    Acceptance, E,D,H, VU,NR by RIO at 1/17/2019 11:15 AM                   Point: Body mechanics (Done)     Learning Progress Summary           Patient Acceptance, E, VU,NR by CD at 1/22/2019  1:40 PM    Comment:  TRANSFER TRAINING , THER EX/HEP, BENEFITS OF OOB ACTIVITY, PROGRESSION OF POC, D/C PLANNING.    Acceptance, RUBI STUBBS by EM at 1/22/2019  3:28 AM    Acceptance, SERA STUBBS VUDU by SJ at 1/19/2019 11:18 AM    Acceptance, SERA STUBBS VU,NR by LS at 1/18/2019 10:35 AM    Acceptance,  E,D,H, VU,NR by LS at 1/17/2019 11:15 AM    Acceptance, E, VU by KM at 1/14/2019 10:07 AM    Acceptance, E,D, VU,NR by MB at 1/13/2019  9:55 AM    Comment:  log roll technique, transfer mechanics/safety, POC progression, benefits of mobility, home safety   Family Acceptance, E,D, VU,DU by SJ at 1/19/2019 11:18 AM    Acceptance, E,D, VU,NR by LS at 1/18/2019 10:35 AM    Acceptance, E,D,H, VU,NR by LS at 1/17/2019 11:15 AM                   Point: Precautions (Done)     Learning Progress Summary           Patient Acceptance, E, VU,NR by CD at 1/22/2019  1:40 PM    Comment:  TRANSFER TRAINING , THER EX/HEP, BENEFITS OF OOB ACTIVITY, PROGRESSION OF POC, D/C PLANNING.    Acceptance, E, VU by EM at 1/22/2019  3:28 AM    Acceptance, E,D, VU,DU by  at 1/19/2019 11:18 AM    Acceptance, E,D, VU,NR by LS at 1/18/2019 10:35 AM    Acceptance, E,D,H, VU,NR by LS at 1/17/2019 11:15 AM    Acceptance, E, VU by REYNALDO at 1/14/2019 10:07 AM    Acceptance, E,D, VU,NR by MB at 1/13/2019  9:55 AM    Comment:  log roll technique, transfer mechanics/safety, POC progression, benefits of mobility, home safety   Family Acceptance, E,D, VU,DU by  at 1/19/2019 11:18 AM    Acceptance, E,D, VU,NR by LS at 1/18/2019 10:35 AM    Acceptance, E,D,H, VU,NR by LS at 1/17/2019 11:15 AM                               User Key     Initials Effective Dates Name Provider Type Discipline    CD 06/19/15 -  Jo Cooper, PT Physical Therapist PT     06/19/15 -  Tere Cardoso, PT Physical Therapist PT    KM 06/19/15 -  Magaly Ireland, PT Physical Therapist PT    LS 06/19/15 -  Gege Malik, PT Physical Therapist PT    MB 03/14/16 -  Bronwyn Calvillo, PT Physical Therapist PT    EM 06/12/17 -  Wendy Hayes RN Registered Nurse Nurse                PT Recommendation and Plan  Anticipated Discharge Disposition (PT): inpatient rehabilitation facility  Therapy Frequency (PT Clinical Impression): daily  Outcome Summary/Treatment Plan (PT)  Daily  Summary of Progress (PT): progress toward functional goals is good  Anticipated Discharge Disposition (PT): inpatient rehabilitation facility  Plan of Care Reviewed With: patient  Progress: improving  Outcome Summary: PT IMPROVING IN STRENGTH. ABLE TO INITIATE STAND STEP PIVOT TRANSFERS WITH MOD ASSIST X2. PT IS LIMITED BY FATIGUE. GIVES GOOD EFFORT AND IS GOOD REHAB CANDIDATE. RECOMMEND IRF AT D/C.   Outcome Measures     Row Name 01/22/19 1121             How much help from another person do you currently need...    Turning from your back to your side while in flat bed without using bedrails?  3  -CD      Moving from lying on back to sitting on the side of a flat bed without bedrails?  2  -CD      Moving to and from a bed to a chair (including a wheelchair)?  2  -CD      Standing up from a chair using your arms (e.g., wheelchair, bedside chair)?  2  -CD      Climbing 3-5 steps with a railing?  1  -CD      To walk in hospital room?  2  -CD      AM-PAC 6 Clicks Score  12  -CD         Functional Assessment    Outcome Measure Options  AM-PAC 6 Clicks Basic Mobility (PT);Modified [place]  -CD        User Key  (r) = Recorded By, (t) = Taken By, (c) = Cosigned By    Initials Name Provider Type    CD Jo Cooper, PT Physical Therapist         Time Calculation:   PT Charges     Row Name 01/22/19 1121             Time Calculation    Start Time  1121  -CD      PT Received On  01/22/19  -CD      PT Goal Re-Cert Due Date  01/27/19  -CD         Time Calculation- PT    Total Timed Code Minutes- PT  38 minute(s)  -CD         Timed Charges    33599 - PT Therapeutic Exercise Minutes  15  -CD      27830 - PT Therapeutic Activity Minutes  23  -CD        User Key  (r) = Recorded By, (t) = Taken By, (c) = Cosigned By    Initials Name Provider Type    Jo Rodrigez, PT Physical Therapist        Therapy Suggested Charges     Code   Minutes Charges    54603 (CPT®) Hc Pt Neuromusc Re Education Ea 15 Min      78205 (CPT®) Hc Pt Ther  Proc Ea 15 Min 15 1    22204 (CPT®) Hc Gait Training Ea 15 Min      20881 (CPT®) Hc Pt Therapeutic Act Ea 15 Min 23 2    69482 (CPT®) Hc Pt Manual Therapy Ea 15 Min      52596 (CPT®) Hc Pt Iontophoresis Ea 15 Min      72144 (CPT®) Hc Pt Elec Stim Ea-Per 15 Min      29288 (CPT®) Hc Pt Ultrasound Ea 15 Min      12892 (CPT®) Hc Pt Self Care/Mgmt/Train Ea 15 Min      05132 (CPT®) Hc Pt Prosthetic (S) Train Initial Encounter, Each 15 Min      90493 (CPT®) Hc Pt Orthotic(S)/Prosthetic(S) Encounter, Each 15 Min      87595 (CPT®) Hc Orthotic(S) Mgmt/Train Initial Encounter, Each 15min      Total  38 3        Therapy Charges for Today     Code Description Service Date Service Provider Modifiers Qty    42710771941 HC PT THER PROC EA 15 MIN 1/22/2019 Jo Cooper, PT GP 1    73807640901 HC PT THERAPEUTIC ACT EA 15 MIN 1/22/2019 Jo Cooper, PT GP 2    89099362515 HC PT THER SUPP EA 15 MIN 1/22/2019 Jo Cooper, PT GP 3          PT G-Codes  Outcome Measure Options: AM-PAC 6 Clicks Basic Mobility (PT), Modified [place]  AM-PAC 6 Clicks Score: 12  Score: 12    Jo Cooper, PT  1/22/2019

## 2019-01-22 NOTE — PAYOR COMM NOTE
"Updated clinicals for continued hospitalization  auth # [id number]    Thank You,  Elena Garcia, RN  Utilization Review  [phone]  Fax [phone]    Kristina Heck (58 y.o. Female)     Date of Birth Social Security Number Address Home Phone MRN    1960  [address] [facility] [facility]  [place] [address] [phone] [id number]    [employer] Marital Status          None        Admission Date Admission Type Admitting Provider Attending Provider Department, Room/Bed    1/12/19 Emergency Kirsten Lawler MD Mini, Jocelyn, MD [facility] 3E, S336/1    Discharge Date Discharge Disposition Discharge Destination                       Attending Provider:  Kirsten Lawler MD    Allergies:  No Known Allergies    Isolation:  None   Infection:  None   Code Status:  CPR    Ht:  170.2 cm (67.01\")   Wt:  97.4 kg (214 lb 12.8 oz)    Admission Cmt:  None   Principal Problem:  Guillain-Eldred syndrome (CMS/HCC) [G61.0]                 Active Insurance as of 1/12/2019     Primary Coverage     Payor Plan Insurance Group Employer/Plan Group    PASSPORT PASSPORT MEDICAID     Payor Plan Address Payor Plan Phone Number Payor Plan Fax Number Effective Dates    [address] [phone]  11/1/1997 - None Entered    [facility] [address]       Subscriber Name Subscriber Birth Date Member ID       KRISTINA HECK [id number]                 Emergency Contacts      (Rel.) Home Phone Work Phone Mobile Phone    JOSUE LORA (Daughter) [phone] -- [phone]    Pattie Heck (Daughter) -- -- [phone]            ICU Vital Signs     Row Name 01/22/19 1518 01/22/19 1228 01/22/19 1208 01/22/19 0800 01/22/19 0751       Vitals    Temp  99 °F (37.2 °C)  98.3 °F (36.8 °C)  --  --  --    Temp src  Oral  Oral  --  --  --    Pulse  --  --  78  --  73    Heart Rate Source  Monitor  Monitor  Monitor  --  Monitor    Resp  20  20  20  --  20    Resp Rate Source  Visual  Visual  Visual  --  Visual    BP  " 131/64  112/62  --  --  --    BP Location  Right arm  Right arm  --  --  --    BP Method  Automatic  Automatic  --  --  --    Patient Position  Lying  Lying  --  --  --       Oxygen Therapy    SpO2  92 %  90 %  91 %  --  94 %    Pulse Oximetry Type  --  --  Continuous  --  Continuous    Device (Oxygen Therapy)  --  --  room air  nasal cannula  humidified;nasal cannula    Flow (L/min)  --  --  --  2  2    Row Name 01/22/19 0742 01/22/19 0600 01/22/19 0400 01/22/19 0343 01/22/19 0218       Vitals    Temp  98.1 °F (36.7 °C)  --  --  98.1 °F (36.7 °C)  --    Temp src  Oral  --  --  Oral  --    Pulse  72  --  --  --  --    Heart Rate Source  Monitor  --  --  Monitor  --    Resp  18  --  --  18  --    Resp Rate Source  Visual  --  --  Visual  --    BP  112/72  --  --  124/62  --    Noninvasive MAP (mmHg)  --  --  --  86  --    BP Location  Right arm  --  --  Right arm  --    BP Method  Automatic  --  --  Automatic  --    Patient Position  Lying  --  --  Lying  --       Oxygen Therapy    SpO2  95 %  --  --  --  --    Device (Oxygen Therapy)  --  nasal cannula  nasal cannula  nasal cannula  nasal cannula    Flow (L/min)  --  2  2  2  2    Row Name 01/22/19 0036 01/22/19 0033 01/22/19 0000 01/21/19 2200 01/21/19 2058       Vitals    Pulse  --  58  --  --  68    Resp Rate (Observed) Vent  --  --  --  --  22       Oxygen Therapy    SpO2  --  93 %  --  --  94 %    Device (Oxygen Therapy)  --  nasal cannula  nasal cannula  NPPV/NIV  NPPV/NIV    Flow (L/min)  --  --  2  --  --    Oxygen Concentration (%)  --  --  --  35  35    Row Name 01/21/19 2012 01/21/19 2000 01/21/19 1910 01/21/19 1559 01/21/19 1548       Vitals    Temp  --  --  97.8 °F (36.6 °C)  98.1 °F (36.7 °C)  --    Temp src  --  --  Oral  Oral  --    Pulse  60  --  67  77  66    Heart Rate Source  Monitor  --  Monitor  Monitor  --    Resp  22  --  16  16  --    Resp Rate Source  Visual  --  Visual  Visual  --    BP  --  --  116/64  128/66  --    Noninvasive MAP (mmHg)   --  --  96  --  --    BP Location  --  --  Right arm  Right arm  --    BP Method  --  --  Automatic  Automatic  --    Patient Position  --  --  Sitting  Lying  --       Oxygen Therapy    SpO2  92 %  --  97 %  --  94 %    Pulse Oximetry Type  --  --  --  --  Continuous    Device (Oxygen Therapy)  humidified;nasal cannula  NPPV/NIV  humidified;nasal cannula  --  nasal cannula    Flow (L/min)  2  --  2  --  2    Oxygen Concentration (%)  --  35  --  --  --    Row Name 01/21/19 1215 01/21/19 1147 01/21/19 0801 01/21/19 0800 01/21/19 0704       Vitals    Temp  98.3 °F (36.8 °C)  --  98.2 °F (36.8 °C)  --  --    Temp src  Oral  --  Oral  --  --    Pulse  70  69  68  --  60    Heart Rate Source  Monitor  --  Monitor  --  --    Resp  --  --  14  --  --    Resp Rate Source  Visual  --  Visual  --  --    BP  130/71  --  115/66  --  --    BP Location  Right arm  --  Right arm  --  --    BP Method  Automatic  --  Automatic  --  --    Patient Position  Lying  --  Lying  --  --       Oxygen Therapy    SpO2  94 %  96 %  92 %  --  92 %    Pulse Oximetry Type  Continuous  Continuous  --  --  Continuous    Device (Oxygen Therapy)  nasal cannula  nasal cannula  --  nasal cannula  nasal cannula    Flow (L/min)  2  2  --  2  2    Row Name 01/21/19 0600 01/21/19 0540 01/21/19 0400 01/21/19 0330 01/21/19 0200       Height and Weight    Weight  --  97.4 kg (214 lb 12.8 oz)  --  --  --    Weight Method  --  Bed scale  --  --  --       Vitals    Temp  --  --  --  97.8 °F (36.6 °C)  --    Temp src  --  --  --  Oral  --    Pulse  --  --  --  71  --    Heart Rate Source  --  --  --  Monitor  --    Resp  --  --  --  18  --    Resp Rate Source  --  --  --  Visual  --    BP  --  --  --  128/78  --    Noninvasive MAP (mmHg)  --  --  --  114  --    BP Location  --  --  --  Right arm  --    BP Method  --  --  --  Automatic  --    Patient Position  --  --  --  Lying  --       Oxygen Therapy    SpO2  --  --  --  96 %  --    Device (Oxygen Therapy)   nasal cannula  --  NPPV/NIV  --  NPPV/NIV    Flow (L/min)  2  --  --  --  --    Row Name 01/21/19 0024 01/21/19 0000 01/20/19 2350 01/20/19 2119 01/20/19 2005       Vitals    Temp  --  --  97.6 °F (36.4 °C)  --  --    Temp src  --  --  Axillary  --  --    Pulse  --  --  57  --  --    Heart Rate Source  --  --  Monitor  Monitor  --    Resp  --  --  18  18  --    Resp Rate Source  --  --  Visual  Visual  --    Resp Rate (Observed) Vent  20  --  --  --  --    BP  --  --  131/79  --  --    Noninvasive MAP (mmHg)  --  --  102  --  --    BP Location  --  --  Right arm  --  --    BP Method  --  --  Automatic  --  --    Patient Position  --  --  Lying  Lying  --       Oxygen Therapy    SpO2  --  --  96 %  --  --    Pulse Oximetry Type  --  --  --  Continuous  --    Device (Oxygen Therapy)  --  NPPV/NIV  --  nasal cannula;humidified  nasal cannula;humidified    Flow (L/min)  --  --  --  2  2    Row Name 01/20/19 1910 01/20/19 1800 01/20/19 1600 01/20/19 1553 01/20/19 1400       Vitals    Temp  98.5 °F (36.9 °C)  --  --  98.1 °F (36.7 °C)  --    Temp src  Oral  --  --  Oral  --    Pulse  64  --  --  --  --    Heart Rate Source  Monitor  --  --  Monitor  --    Resp  20  --  --  18  --    Resp Rate Source  Visual  --  --  Visual  --    BP  121/68  --  --  110/67  --    Noninvasive MAP (mmHg)  84  --  --  --  --    BP Location  Right arm  --  --  Right arm  --    BP Method  Automatic  --  --  Automatic  --    Patient Position  Lying  --  --  Sitting  --       Oxygen Therapy    SpO2  96 %  --  --  --  --    Device (Oxygen Therapy)  --  nasal cannula;humidified  nasal cannula;humidified  humidified;nasal cannula  nasal cannula;humidified    Flow (L/min)  --  2  2  2  2    Row Name 01/20/19 1324 01/20/19 1232 01/20/19 1200 01/20/19 1100 01/20/19 1000       Vitals    Temp  98 °F (36.7 °C)  --  --  --  --    Temp src  Oral  --  --  --  --    Pulse  80  75  81  71  76    Heart Rate Source  Monitor  Monitor  Monitor  Monitor  Monitor     Resp  18  20  20  18  20    Resp Rate Source  Monitor  Visual  Visual  Visual  Visual    BP  120/68  --  133/86  127/74  127/104  (Abnormal)     Noninvasive MAP (mmHg)  --  --  100  97  117    BP Location  Right arm  --  --  --  --    BP Method  Automatic  --  --  --  --    Patient Position  Sitting  --  --  --  --       Oxygen Therapy    SpO2  96 %  95 %  93 %  95 %  94 %    Pulse Oximetry Type  --  Continuous  Continuous  Continuous  Continuous    Device (Oxygen Therapy)  humidified;nasal cannula  nasal cannula  nasal cannula;humidified  nasal cannula;humidified  nasal cannula;humidified    Flow (L/min)  2  2  2  2  2    Row Name 01/20/19 0900 01/20/19 0840 01/20/19 0838 01/20/19 0810 01/20/19 0800       Vitals    Temp  --  --  --  --  98.8 °F (37.1 °C)    Temp src  --  --  --  --  Axillary    Pulse  93  75  73  66  69    Heart Rate Source  Monitor  --  --  Monitor  Monitor    Resp  18  --  --  16  20    Resp Rate Source  Visual  --  --  Visual  Visual    BP  126/73  133/72  133/72  --  133/72    Noninvasive MAP (mmHg)  96  --  --  --  96       Oxygen Therapy    SpO2  96 %  --  --  97 %  96 %    Pulse Oximetry Type  Continuous  --  --  Continuous  Continuous    Device (Oxygen Therapy)  nasal cannula;humidified  --  --  humidified;nasal cannula  nasal cannula;humidified    Flow (L/min)  2  --  --  2  2    Row Name 01/20/19 0700 01/20/19 0600 01/20/19 0500 01/20/19 0400 01/20/19 0300       Vitals    Temp  --  --  --  97.8 °F (36.6 °C)  --    Temp src  --  --  --  Oral  --    Pulse  67  66  70  64  62    Heart Rate Source  Monitor  --  --  Monitor  --    Resp  18  --  --  14  --    Resp Rate Source  Visual  --  --  Visual  --    BP  120/72  119/68  116/66  109/72  118/71    Noninvasive MAP (mmHg)  93  90  87  93  92    BP Location  --  --  --  Left arm  --    BP Method  --  --  --  Automatic  --    Patient Position  --  --  --  Lying  --       Oxygen Therapy    SpO2  96 %  96 %  91 %  95 %  98 %    Pulse Oximetry  Type  Continuous  --  --  Continuous  --    Device (Oxygen Therapy)  humidified;nasal cannula  humidified;nasal cannula  --  humidified;nasal cannula  --    Flow (L/min)  --  2  --  2  --    Row Name 01/20/19 0200 01/20/19 0100 01/20/19 0005 01/20/19 0000 01/19/19 2300       Vitals    Temp  --  --  --  98 °F (36.7 °C)  --    Temp src  --  --  --  Oral  --    Pulse  64  67  --  71  80    Heart Rate Source  Monitor  --  --  Monitor  --    Resp  18  --  --  20  --    Resp Rate Source  Monitor  --  --  Visual  --    BP  118/67  111/66  --  115/67  112/69    Noninvasive MAP (mmHg)  89  84  --  88  85    BP Location  Left arm  --  --  Left arm  --    BP Method  Automatic  --  --  Automatic  --    Patient Position  Lying  --  --  Lying  --       Oxygen Therapy    SpO2  93 %  94 %  94 %  93 %  95 %    Pulse Oximetry Type  Continuous  --  Continuous  Continuous  --    Device (Oxygen Therapy)  NPPV/NIV  --  NPPV/NIV  nasal cannula  --    Flow (L/min)  --  --  --  2  --    Oxygen Concentration (%)  45  --  --  --  --    Row Name 01/19/19 2200 01/19/19 2130 01/19/19 2100 01/19/19 2000 01/19/19 1936       Vitals    Temp  --  --  --  98.7 °F (37.1 °C)  --    Temp src  --  --  --  Oral  --    Pulse  76  --  73  77  77    Heart Rate Source  --  --  --  Monitor  Monitor    Resp  --  --  --  18  18    Resp Rate Source  --  --  --  Visual  Visual    BP  123/69  --  106/59  95/53  --    Noninvasive MAP (mmHg)  92  --  72  69  --    BP Location  --  --  --  Left arm  --    BP Method  --  --  --  Automatic  --    Patient Position  --  --  --  Lying  --       Oxygen Therapy    SpO2  93 %  97 %  92 %  94 %  95 %    Pulse Oximetry Type  --  Continuous  --  Continuous  Continuous    Device (Oxygen Therapy)  --  NPPV/NIV  --  room air  nasal cannula    Flow (L/min)  --  --  --  --  2    Oxygen Concentration (%)  40  --  --  --  --    Row Name 01/19/19 1900 01/19/19 1800 01/19/19 1730 01/19/19 1715 01/19/19 1700       Vitals    Temp  --  --   --  --  98.1 °F (36.7 °C)    Temp src  --  --  --  --  Oral    Pulse  75  81  86  81  86    Heart Rate Source  --  Monitor  --  --  Monitor    Resp  --  16  --  --  18    Resp Rate Source  --  Visual  --  --  Visual    BP  94/55  94/55  100/62  99/60  106/67    Noninvasive MAP (mmHg)  68  65  76  75  75    BP Location  --  Left arm  --  --  Left arm    BP Method  --  Automatic  --  --  Automatic    Patient Position  --  Lying  --  --  Lying       Oxygen Therapy    SpO2  92 %  93 %  93 %  94 %  94 %    Pulse Oximetry Type  --  Continuous  --  --  Continuous    Device (Oxygen Therapy)  --  nasal cannula  --  --  nasal cannula    Flow (L/min)  --  2  --  --  2    Row Name 01/19/19 1645 01/19/19 1630 01/19/19 1625 01/19/19 1615 01/19/19 1600       Vitals    Temp  --  --  --  98 °F (36.7 °C)  98.2 °F (36.8 °C)    Temp src  --  --  --  Oral  Axillary    Pulse  84  75  77  81  84    Heart Rate Source  --  Monitor  Monitor  Monitor  Monitor    Resp  --  16  16  18  16    Resp Rate Source  --  Visual  Visual  Visual  Visual    BP  104/65  107/67  --  113/70  106/74    Noninvasive MAP (mmHg)  82  82  --  83  87    BP Location  --  Left arm  --  Left arm  Left arm    BP Method  --  Automatic  --  Automatic  Automatic    Patient Position  --  Lying  --  Lying  Lying       Oxygen Therapy    SpO2  92 %  96 %  96 %  94 %  95 %    Pulse Oximetry Type  --  Continuous  Continuous  Continuous  Continuous    Device (Oxygen Therapy)  --  nasal cannula  humidified;nasal cannula  nasal cannula  nasal cannula    Flow (L/min)  --  2  2  2  2    Row Name 01/19/19 1545                   Vitals    Pulse  78        BP  101/75        Noninvasive MAP (mmHg)  86           Oxygen Therapy    SpO2  95 %            Intake & Output (last 3 days)       01/19 0701 - 01/20 0700 01/20 0701 - 01/21 0700 01/21 0701 - 01/22 0700 01/22 0701 - 01/23 0700    P.O. 120 480  480    I.V. (mL/kg)        IV Piggyback        Total Intake(mL/kg) 120 (1.3) 480 (4.9) 898  (4.9)    Urine (mL/kg/hr) 1205 (0.5) 350 (0.1)      Stool 0 0      Total Output 1205 350      Net -1085 +130  +480            Urine Unmeasured Occurrence 1 x 3 x 2 x 3 x    Stool Unmeasured Occurrence 1 x 1 x  1 x        Lines, Drains & Airways    Active LDAs     Name:   Placement date:   Placement time:   Site:   Days:    Peripheral IV 01/20/19 0530 Anterior;Right Forearm   01/20/19    0530    Forearm   2                Hospital Medications (active)       Dose Frequency Start End    acetaminophen (TYLENOL) tablet 650 mg 650 mg Every 4 Hours PRN 1/12/2019     Sig - Route: Take 2 tablets by mouth Every 4 (Four) Hours As Needed for Mild Pain . - Oral    aspirin chewable tablet 81 mg 81 mg Daily 1/13/2019     Sig - Route: Chew 1 tablet Daily. - Oral    cefdinir (OMNICEF) capsule 300 mg 300 mg Every 12 Hours Scheduled 1/19/2019 1/23/2019    Sig - Route: Take 1 capsule by mouth Every 12 (Twelve) Hours. - Oral    docusate sodium (COLACE) capsule 100 mg 100 mg 2 Times Daily PRN 1/19/2019     Sig - Route: Take 1 capsule by mouth 2 (Two) Times a Day As Needed for Constipation. - Oral    enoxaparin (LOVENOX) syringe 40 mg 40 mg Every 24 Hours 1/15/2019     Sig - Route: Inject 0.4 mL under the skin into the appropriate area as directed Daily. - Subcutaneous    Notes to Pharmacy: DO NOT restart until after LP today    escitalopram (LEXAPRO) tablet 10 mg 10 mg Daily 1/13/2019     Sig - Route: Take 0.5 tablets by mouth Daily. - Oral    gabapentin (NEURONTIN) capsule 300 mg 300 mg 3 Times Daily 1/17/2019     Sig - Route: Take 1 capsule by mouth 3 (Three) Times a Day. - Oral    guaiFENesin (ROBITUSSIN) 100 MG/5ML oral solution 200 mg 200 mg 4 Times Daily 1/18/2019     Sig - Route: Take 10 mL by mouth 4 (Four) Times a Day. - Oral    hydrochlorothiazide (HYDRODIURIL) tablet 25 mg 25 mg Daily 1/13/2019     Sig - Route: Take 1 tablet by mouth Daily. - Oral    HYDROcodone-acetaminophen (NORCO) 7.5-325 MG per tablet 1 tablet 1 tablet  Every 6 Hours PRN 1/17/2019 1/27/2019    Sig - Route: Take 1 tablet by mouth Every 6 (Six) Hours As Needed for Moderate Pain . - Oral    hydrocortisone sodium succinate (Solu-CORTEF) injection 100 mg 100 mg Once As Needed 1/15/2019     Sig - Route: Infuse 100 mg into a venous catheter 1 (One) Time As Needed (Infusion Reaction). - Intravenous    hydrOXYzine (ATARAX) tablet 25 mg 25 mg Every 6 Hours PRN 1/17/2019     Sig - Route: Take 1 tablet by mouth Every 6 (Six) Hours As Needed for Anxiety. - Oral    ibuprofen (ADVIL,MOTRIN) tablet 200 mg 200 mg Every 6 Hours PRN 1/17/2019     Sig - Route: Take 0.5 tablets by mouth Every 6 (Six) Hours As Needed for Mild Pain . - Oral    ipratropium-albuterol (DUO-NEB) nebulizer solution 3 mL 3 mL 4 Times Daily - RT 1/12/2019     Sig - Route: Take 3 mL by nebulization 4 (Four) Times a Day. - Nebulization    levothyroxine (SYNTHROID, LEVOTHROID) tablet 100 mcg 100 mcg Daily 1/13/2019     Sig - Route: Take 1 tablet by mouth Daily. - Oral    lisinopril (PRINIVIL,ZESTRIL) tablet 10 mg 10 mg Every 24 Hours Scheduled 1/14/2019     Sig - Route: Take 1 tablet by mouth Daily. - Oral    melatonin sublingual tablet 5 mg 5 mg Nightly PRN 1/20/2019     Sig - Route: Place 1 tablet under the tongue At Night As Needed for Sleep. - Sublingual    methylPREDNISolone sodium succinate (SOLU-Medrol) injection 20 mg 20 mg Every 12 Hours 1/19/2019 1/23/2019    Sig - Route: Infuse 0.5 mL into a venous catheter Every 12 (Twelve) Hours. - Intravenous    metoprolol succinate XL (TOPROL-XL) 24 hr tablet 25 mg 25 mg Daily 1/13/2019     Sig - Route: Take 1 tablet by mouth Daily. - Oral    nicotine (NICODERM CQ) 21 MG/24HR patch 1 patch 1 patch Daily PRN 1/13/2019     Sig - Route: Place 1 patch on the skin as directed by provider Daily As Needed (smoking cessation). - Transdermal    nystatin (MYCOSTATIN) 619780 UNIT/ML suspension 500,000 Units 5 mL 4 Times Daily 1/17/2019     Sig - Route: Take 5 mL by mouth 4  "(Four) Times a Day. - Oral    Pharmacy Consult - MTM  Daily 1/13/2019     Sig - Route: Daily. - Does not apply    polyethylene glycol 3350 powder (packet) 17 g Daily 1/19/2019     Sig - Route: Take 17 g by mouth Daily. - Oral    potassium chloride (KLOR-CON) packet 40 mEq 40 mEq As Needed 1/12/2019     Sig - Route: Take 40 mEq by mouth As Needed (potassium replacement, see admin instructions). - Oral    Linked Group 1:  \"Or\" Linked Group Details        potassium chloride 10 mEq in 100 mL IVPB 10 mEq Every 1 Hour PRN 1/12/2019     Sig - Route: Infuse 100 mL into a venous catheter Every 1 (One) Hour As Needed (potassium protocol PERIPHERAL - see admin instructions). - Intravenous    Linked Group 1:  \"Or\" Linked Group Details        predniSONE (DELTASONE) tablet 30 mg 30 mg Daily With Breakfast 1/23/2019 1/26/2019    Sig - Route: Take 30 mg by mouth Daily With Breakfast. - Oral    sodium chloride 0.9 % flush 10 mL 10 mL As Needed 1/12/2019     Sig - Route: Infuse 10 mL into a venous catheter As Needed for Line Care. - Intravenous    Linked Group 2:  \"And\" Linked Group Details              Blood Administration Record (From admission, onward)    None        Lab Results (last 72 hours)     Procedure Component Value Units Date/Time    Respiratory Culture - Sputum, Cough [[id number]]  (Abnormal) Collected:  01/18/19 0819    Specimen:  Sputum from Cough Updated:  01/20/19 1353     Respiratory Culture Light growth (2+) Normal Respiratory Angelica      Scant growth (1+) Yeast isolated     Gram Stain Moderate (3+) WBCs per low power field      Moderate (3+) Epithelial cells per low power field      Few (2+) Yeast      Few (2+) Gram positive cocci in pairs and chains      Few (2+) Gram variable bacilli    CBC & Differential [[id number]] Collected:  01/20/19 0427    Specimen:  Blood Updated:  01/20/19 1753    Narrative:       The following orders were created for panel order CBC & Differential.  Procedure                               " Abnormality         Status                     ---------                               -----------         ------                     CBC Auto Differential[[id number]]        Abnormal            Final result                 Please view results for these tests on the individual orders.    CBC Auto Differential [[id number]]  (Abnormal) Collected:  01/20/19 0427    Specimen:  Blood Updated:  01/20/19 0537     WBC 9.54 10*3/mm3      RBC 5.17 10*6/mm3      Hemoglobin 15.7 g/dL      Hematocrit 47.4 %      MCV 91.7 fL      MCH 30.4 pg      MCHC 33.1 g/dL      RDW 14.8 %      RDW-SD 49.3 fl      MPV 10.9 fL      Platelets 177 10*3/mm3      Neutrophil % 71.9 %      Lymphocyte % 15.5 %      Monocyte % 12.1 %      Eosinophil % 0.3 %      Basophil % 0.2 %      Immature Grans % 1.5 %      Neutrophils, Absolute 6.86 10*3/mm3      Lymphocytes, Absolute 1.48 10*3/mm3      Monocytes, Absolute 1.15 10*3/mm3      Eosinophils, Absolute 0.03 10*3/mm3      Basophils, Absolute 0.02 10*3/mm3      Immature Grans, Absolute 0.14 10*3/mm3     Comprehensive Metabolic Panel [[id number]]  (Abnormal) Collected:  01/20/19 0427    Specimen:  Blood Updated:  01/20/19 0516     Glucose 124 mg/dL      BUN 43 mg/dL      Creatinine 0.72 mg/dL      Sodium 130 mmol/L      Potassium 4.4 mmol/L      Chloride 98 mmol/L      CO2 29.0 mmol/L      Calcium 8.5 mg/dL      Total Protein 9.1 g/dL      Albumin 2.88 g/dL      ALT (SGPT) 131 U/L      AST (SGOT) 49 U/L      Alkaline Phosphatase 63 U/L      Total Bilirubin 1.4 mg/dL      eGFR Non African Amer 83 mL/min/1.73      Globulin 6.2 gm/dL      A/G Ratio 0.5 g/dL      BUN/Creatinine Ratio 59.7     Anion Gap 3.0 mmol/L     Narrative:       National [employer] Guidelines    Stage     Description        GFR  1         Normal or High     90+  2         Mild decrease      60-89  3         Moderate decrease  30-59  4         Severe decrease    15-29  5         Kidney failure     <15    The MDRD GFR formula is only  valid for adults with stable renal function between ages 18 and 70.    Magnesium [[id number]]  (Normal) Collected:  01/20/19 0427    Specimen:  Blood Updated:  01/20/19 0516     Magnesium 2.0 mg/dL           Imaging Results (last 72 hours)     ** No results found for the last 72 hours. **        ECG/EMG Results (last 72 hours)     ** No results found for the last 72 hours. **        Orders (last 72 hrs)     Start     Ordered    01/23/19 0800  predniSONE (DELTASONE) tablet 30 mg  Daily With Breakfast      01/22/19 1432    01/22/19 0835  Obtain Informed Consent  Once      01/22/19 0834    01/21/19 1018  DIET MESSAGE Pt wants biscuit and gravy  Once     Comments:  Pt wants biscuit and gravy    01/21/19 1017    01/20/19 1441  DIET MESSAGE Patient is requesting vegetable beef soup and a grilled cheese for dinner please.  Thank you!  Once     Comments:  Patient is requesting vegetable beef soup and a grilled cheese for dinner please.  Thank you!    01/20/19 1441    01/20/19 1236  melatonin sublingual tablet 5 mg  Nightly PRN      01/20/19 1237    01/20/19 1038  DIET MESSAGE Patient would like biscuits and gravy for lunch. Thanks!  Once     Comments:  Patient would like biscuits and gravy for lunch. Thanks!    01/20/19 1038    01/20/19 1035  DIET MESSAGE Patient would like biscuits and gravy for lunch entree. Thanks!  Once,   Status:  Canceled     Comments:  Patient would like biscuits and gravy for lunch entree. Thanks!    01/20/19 1035    01/20/19 0600  CBC & Differential  Once      01/18/19 1122    01/20/19 0600  Comprehensive Metabolic Panel  Once      01/18/19 1122    01/20/19 0600  Magnesium  Once      01/18/19 1122    01/20/19 0600  CBC Auto Differential  PROCEDURE ONCE      01/20/19 0001    01/19/19 2100  methylPREDNISolone sodium succinate (SOLU-Medrol) injection 20 mg  Every 12 Hours      01/19/19 1034    01/19/19 1650  DIET MESSAGE Patient would like a grilled cheese sandwich.  Thank you.  Once     Comments:   Patient would like a grilled cheese sandwich.  Thank you.    01/19/19 1650    01/19/19 1200  cefdinir (OMNICEF) capsule 300 mg  Every 12 Hours Scheduled      01/19/19 1034    01/19/19 0930  polyethylene glycol 3350 powder (packet)  Daily      01/19/19 0838    01/19/19 0837  docusate sodium (COLACE) capsule 100 mg  2 Times Daily PRN      01/19/19 0838    01/18/19 1200  guaiFENesin (ROBITUSSIN) 100 MG/5ML oral solution 200 mg  4 Times Daily      01/18/19 0926    01/17/19 1800  Dietary Nutrition Supplements Boost Glucose Control; chocolate  Daily With Breakfast, Lunch & Dinner      01/17/19 1405    01/17/19 1800  nystatin (MYCOSTATIN) 135588 UNIT/ML suspension 500,000 Units  4 Times Daily      01/17/19 1413    01/17/19 1800  Dietary Nutrition Supplements Other (See Comment); 2pm snack---HS snack  2 Times Daily     Comments:  2pm snack: yogurt and banana-----HS snack: cheese and crax or chix salad + crax,  and puree fruit    01/17/19 1414    01/17/19 1600  gabapentin (NEURONTIN) capsule 300 mg  3 Times Daily      01/17/19 0947    01/17/19 1053  HYDROcodone-acetaminophen (NORCO) 7.5-325 MG per tablet 1 tablet  Every 6 Hours PRN      01/17/19 1054    01/17/19 0839  ibuprofen (ADVIL,MOTRIN) tablet 200 mg  Every 6 Hours PRN      01/17/19 0839    01/17/19 0811  hydrOXYzine (ATARAX) tablet 25 mg  Every 6 Hours PRN      01/17/19 0811    01/15/19 2100  enoxaparin (LOVENOX) syringe 40 mg  Every 24 Hours     Comments:  DO NOT restart until after LP today    01/15/19 1007    01/15/19 1600  immune globulin (human) (GAMMAGARD) infusion 20 g  Every 24 Hours Scheduled      01/15/19 1511    01/15/19 1600  immune globulin (human) (GAMMAGARD) infusion 20 g  Every 24 Hours Scheduled      01/15/19 1511    01/15/19 1453  hydrocortisone sodium succinate (Solu-CORTEF) injection 100 mg  Once As Needed      01/15/19 1456    01/15/19 1002  NIF (Negative Inspiratory Flow)  Daily (RT)     Comments:  pls also check FVC    01/15/19 1001    01/14/19  1100  lisinopril (PRINIVIL,ZESTRIL) tablet 10 mg  Every 24 Hours Scheduled      01/14/19 1010    01/13/19 1114  nicotine (NICODERM CQ) 21 MG/24HR patch 1 patch  Daily PRN      01/13/19 1116    01/13/19 0915  Pharmacy Consult - [facility]  Daily      01/13/19 0815    01/13/19 0900  aspirin chewable tablet 81 mg  Daily      01/12/19 2147 01/13/19 0900  escitalopram (LEXAPRO) tablet 10 mg  Daily      01/12/19 2147 01/13/19 0900  hydrochlorothiazide (HYDRODIURIL) tablet 25 mg  Daily      01/12/19 2147 01/13/19 0900  metoprolol succinate XL (TOPROL-XL) 24 hr tablet 25 mg  Daily      01/12/19 2147 01/13/19 0600  levothyroxine (SYNTHROID, LEVOTHROID) tablet 100 mcg  Daily      01/12/19 2147 01/12/19 2245  ipratropium-albuterol (DUO-NEB) nebulizer solution 3 mL  4 Times Daily - RT      01/12/19 2147 01/12/19 2149  potassium chloride (KLOR-CON) packet 40 mEq  As Needed      01/12/19 2150 01/12/19 2149  potassium chloride 10 mEq in 100 mL IVPB  Every 1 Hour PRN      01/12/19 2150 01/12/19 2143  acetaminophen (TYLENOL) tablet 650 mg  Every 4 Hours PRN      01/12/19 2147 01/12/19 1326  sodium chloride 0.9 % flush 10 mL  As Needed      01/12/19 1328    Unscheduled  Up With Assistance  As Needed      01/12/19 2147    --  hydrochlorothiazide (HYDRODIURIL) 25 MG tablet  Daily      01/12/19 1303    --  aspirin 81 MG chewable tablet  Daily      01/12/19 1303    --  escitalopram (LEXAPRO) 10 MG tablet  Daily      01/12/19 1303    --  levothyroxine (SYNTHROID, LEVOTHROID) 100 MCG tablet  Daily      01/12/19 1303    --  HYDROcodone-ibuprofen (VICOPROFEN) 7.5-200 MG per tablet  Every 6 Hours PRN      01/14/19 1046    --  metoprolol tartrate (LOPRESSOR) 25 MG tablet  2 Times Daily      01/14/19 1046    --  hydrOXYzine (ATARAX) 25 MG tablet  Every 6 Hours PRN      01/14/19 1046    --  albuterol sulfate  (90 Base) MCG/ACT inhaler  Every 4 Hours PRN      01/14/19 1046    --  Umeclidinium Bromide (INCRUSE ELLIPTA)  62.5 MCG/INH aerosol powder   Daily      19    --  Fluticasone Furoate-Vilanterol (BREO ELLIPTA) 200-25 MCG/INH aerosol powder  inhaler  Daily      19    --  potassium chloride (K-DUR,KLOR-CON) 20 MEQ CR tablet  2 Times Daily      19    --  SCANNED - TELEMETRY        19 0000    --  SCANNED - TELEMETRY        19 0000    --  SCANNED - TELEMETRY        19 0000    --  SCANNED - TELEMETRY        19 0000    --  SCANNED - TELEMETRY        19 0000    --  SCANNED - TELEMETRY        19 0000    --  SCANNED - TELEMETRY        19 0000    --  SCANNED - TELEMETRY        19 0000    --  SCANNED - TELEMETRY        19 0000    --  SCANNED - TELEMETRY        19 0000          Operative/Procedure Notes (last 72 hours) (Notes from 2019  3:45 PM through 2019  3:45 PM)     No notes of this type exist for this encounter.           Physician Progress Notes (last 72 hours) (Notes from 2019  3:45 PM through 2019  3:45 PM)      Sindhu Winters PA-C at 2019  2:12 PM              [facility] Medicine Services  PROGRESS NOTE    Patient Name: Kristina Heck  : 1960  MRN: [id number]    Date of Admission: 2019  Length of Stay: 10  Primary Care Physician: Gio Henderson MD     Subjective     CC: GBS    HPI:  Laying in bed. Per nursing, very anxious to get to rehab and upset she does not have a bed yet. Otherwise, no new complaints. Some tingling in her feet but not overly painful. Making improvement with therapy - now able to initiate  step-to-pivot while standing.     Review of Systems  Gen- No fevers, chills  CV- No chest pain, palpitations  Resp- No cough, dyspnea  GI- No N/V/D, abd pain    Otherwise ROS is negative except as mentioned in the HPI.    Objective   Objective     Vital Signs:   Temp:  [97.8 °F (36.6 °C)-98.3 °F (36.8 °C)] 98.3 °F (36.8 °C)  Heart Rate:  [58-78] 78  Resp:   [16-22] 20  BP: (112-128)/(62-72) 112/62  FiO2 (%):  [35 %] 35 %        Physical Exam:  Constitutional: No acute distress, awake, alert  HENT: NCAT, mucous membranes moist  Respiratory: Clear to auscultation bilaterally, respiratory effort normal   Cardiovascular: RRR, no murmurs, rubs, or gallops, palpable pedal pulses bilaterally  Gastrointestinal: Positive bowel sounds, soft, nontender, nondistended  Musculoskeletal: No bilateral ankle edema  Psychiatric: Appropriate affect, cooperative  Neurologic: Oriented x 3. Generally weak, able to lift arms and legs against gravity. No focal deficits. Speech is clear.   Skin: No rashes    Results Reviewed:  I have personally reviewed current lab, radiology, and data and agree.    Results from last 7 days   Lab Units 01/20/19  0427 01/18/19  0535 01/16/19  0351   WBC 10*3/mm3 9.54 6.32 8.43   HEMOGLOBIN g/dL 15.7* 16.1* 16.7*   HEMATOCRIT % 47.4* 49.0* 52.4*   PLATELETS 10*3/mm3 177 152 194     Results from last 7 days   Lab Units 01/20/19  0427 01/18/19  0535 01/16/19  0351   SODIUM mmol/L 130* 130* 136   POTASSIUM mmol/L 4.4 4.3 3.7   CHLORIDE mmol/L 98* 97* 101   CO2 mmol/L 29.0 29.0 29.0   BUN mg/dL 43* 39* 28*   CREATININE mg/dL 0.72 0.79 0.67   GLUCOSE mg/dL 124* 101* 117*   CALCIUM mg/dL 8.5* 8.4* 8.8   ALT (SGPT) U/L 131* 122*  --    AST (SGOT) U/L 49* 49*  --      Estimated Creatinine Clearance: 103.3 mL/min (by C-G formula based on SCr of 0.72 mg/dL).    No results found for: BNP    Microbiology Results Abnormal     Procedure Component Value - Date/Time    Respiratory Culture - Sputum, Cough [[id number]]  (Abnormal) Collected:  01/18/19 7297    Lab Status:  Final result Specimen:  Sputum from Cough Updated:  01/20/19 1355     Respiratory Culture Light growth (2+) Normal Respiratory Angelica      Scant growth (1+) Yeast isolated     Gram Stain Moderate (3+) WBCs per low power field      Moderate (3+) Epithelial cells per low power field      Few (2+) Yeast      Few (2+)  Gram positive cocci in pairs and chains      Few (2+) Gram variable bacilli          Imaging Results (last 24 hours)     ** No results found for the last 24 hours. **        I have reviewed the medications:    aspirin 81 mg Oral Daily   cefdinir 300 mg Oral Q12H   enoxaparin 40 mg Subcutaneous Q24H   escitalopram 10 mg Oral Daily   gabapentin 300 mg Oral TID   guaiFENesin 200 mg Oral 4x Daily   hydrochlorothiazide 25 mg Oral Daily   ipratropium-albuterol 3 mL Nebulization 4x Daily - RT   levothyroxine 100 mcg Oral Daily   lisinopril 10 mg Oral Q24H   methylPREDNISolone sodium succinate 20 mg Intravenous Q12H   metoprolol succinate XL 25 mg Oral Daily   nystatin 5 mL Oral 4x Daily   pharmacy consult - MTM  Does not apply Daily   polyethylene glycol 17 g Oral Daily     Assessment / Plan     Active Hospital Problems    Diagnosis Date Noted   • **Guillain-[place] syndrome (CMS/HCC) [G61.0] 01/16/2019   • Left nephrolithiasis [N20.0] 01/18/2019   • Dysphagia [R13.10] 01/16/2019   • Tobacco abuse [Z72.0] 01/16/2019   • JURGEN on CPAP [G47.33, Z99.89] 01/16/2019   • Acute on chronic respiratory failure with hypoxia (2L @ baseline) [J96.21] 01/12/2019   • COPD with acute exacerbation (CMS/HCC) [J44.1] 01/12/2019   • Hypothyroidism (acquired) [E03.9] 01/12/2019   • Essential hypertension [I10] 01/12/2019   • Depression [F32.9] 01/12/2019   • left renal collecting system obstruction [N13.5] 01/12/2019     Brief Hospital Course to date:  Kristina Heck is a 58 y.o. female who presented to [facility] ED on 12/31 with weakness and numbness. Admitted to [facility] on 1/12 with COPD exacerbation and and weakness. Her weakness progressed and neurology was consulted. 1/15 LP revealed protein 66, glucose 95. EMG results consistent with GBS and she was started on IVIG. She has completed 5 rounds of IVIG.     Guillian [place] Syndrome  - Has completed 5 rounds of IVIG    - Gabapentin 300mg TID for neuropathy   - PT/OT following - awaiting placement at [facility]    - Follow up with Dr. Serra of neurology as an outpatient     Acute on chronic respiratory failure  COPD exacerbation  - Apparently on baseline 4L NC at home but she is currently only requiring 1-2L per records  - Still on Solumedrol 20mg IV Q12H - start PO taper   - Continue Ceftin and scheduled nebs     HTN, BP controlled on current regimen  JURGEN, NIPPV QHS. Has a CPAP at home.     Left hydronephrosis with nephrolithiasis  - Urology has evaluated - recommend observation only due to lack of urinary symptoms and normal creatinine  - Has previously received care at [facility] for nephrolithiasis     Chronic pain  - At baseline, on Lortab 7.5mg 4 times per day     Tobacco abuse  - Continue to encourage smoking cessation  - Nicotine patch     Hypothyroidism, continue replacement therapy     DVT Prophylaxis: Lovenox  Disposition: I expect the patient to be discharged to [facility] when bed available    CODE STATUS:   Code Status and Medical Interventions:   Ordered at: 01/12/19 2149     Level Of Support Discussed With:    Patient     Code Status:    CPR     Medical Interventions (Level of Support Prior to Arrest):    Full     Electronically signed by Sindhu Winters PA-C, 01/22/19, 2:13 PM.        Electronically signed by Sindhu Winters PA-C at 1/22/2019  2:33 PM     Marah Rios MD at 1/21/2019  2:00 PM              [facility] Medicine Services  INPATIENT PROGRESS NOTE    Date of Admission: 1/12/2019  Length of Stay: 9  Primary Care Physician: Gio Henderson MD    Subjective     Chief Complaint: GBS    HPI:Patient moved out of the ICY 1/20.  Doing OK, s/p 5 doses of IVIG  Legs still weak, can stand- walking still a problem      Review Of Systems:   Patient denies headaches, fever, chills, shortness of breath, chest pain, cough, abdominal pain, nausea or vomiting, diarrhea, rash, itching or bleeding      Objective      Vitals:   98.1 °F (36.7 °C) Temp  Min: 97.6 °F (36.4 °C)  Max: 98.5  °F (36.9 °C)    128/66 BP  Min: 115/66  Max: 131/79    77 Pulse  Min: 57  Max: 77    16 Resp  Min: 14  Max: 20    94 % SpO2  Min: 92 %  Max: 96 %    nasal cannula     2 No Data Recorded     Patient is alert and talkative in no distress at rest- tearful but appropriate  Neck is without mass or JVD  Heart is Reg wo murmur  Lungs are clear wo wheeze or crackle  Abd is soft without HSM or mass, not distended or tender to palpation  MAEW- generally weak but able to life legs against gravity  Skin is without rash  Neurologic exam is nonfocal   Mood is appropriate      Results Review:    I have reviewed the labs, radiology results and diagnostic studies.    Results from last 7 days   Lab Units 01/20/19 0427 01/18/19  0535 01/16/19  0351   WBC 10*3/mm3 9.54 6.32 8.43   HEMOGLOBIN g/dL 15.7* 16.1* 16.7*   HEMATOCRIT % 47.4* 49.0* 52.4*   PLATELETS 10*3/mm3 177 152 194     Results from last 7 days   Lab Units 01/20/19 0427 01/18/19  0535 01/16/19  0351   SODIUM mmol/L 130* 130* 136   POTASSIUM mmol/L 4.4 4.3 3.7   CHLORIDE mmol/L 98* 97* 101   CO2 mmol/L 29.0 29.0 29.0   BUN mg/dL 43* 39* 28*   CREATININE mg/dL 0.72 0.79 0.67   GLUCOSE mg/dL 124* 101* 117*   CALCIUM mg/dL 8.5* 8.4* 8.8   ALT (SGPT) U/L 131* 122*  --    AST (SGOT) U/L 49* 49*  --        Microbiology Results Abnormal     Procedure Component Value - Date/Time    Respiratory Culture - Sputum, Cough [[id number]]  (Abnormal) Collected:  01/18/19 0819    Lab Status:  Final result Specimen:  Sputum from Cough Updated:  01/20/19 0151     Respiratory Culture Light growth (2+) Normal Respiratory Angelica      Scant growth (1+) Yeast isolated     Gram Stain Moderate (3+) WBCs per low power field      Moderate (3+) Epithelial cells per low power field      Few (2+) Yeast      Few (2+) Gram positive cocci in pairs and chains      Few (2+) Gram variable bacilli        No radiology results for the last day       I have reviewed the medications.    Assessment/Plan      Assessment/Problem List    Guillain-[place] syndrome (CMS/HCC)    Acute on chronic respiratory failure with hypoxia (2L @ baseline)    COPD with acute exacerbation (CMS/HCC)    Hypothyroidism (acquired)    Essential hypertension    Depression    left renal collecting system obstruction    Dysphagia    Tobacco abuse    JURGEN on CPAP    Left nephrolithiasis      58-year-old woman who presented December 31 with weakness and numbness to the [facility]. These symptoms progressed and on January 12 she developed worsening shortness of breath and cough. She has a known history of COPD and is an active smoker. She is on supplemental oxygen at home. CTA of the chest was negative for pulmonary embolism. She then developed urinary retention.  - Lumbar puncture on January 15 revealed a protein of 66, glucose of 95. -EMG was results were consistent with Guillian-[place]'syndrome.   -IVIG was initiated January 15.    -Moved to the intensive care unit January 16 with hypoxia requiring BiPAP. She takes Lortab 7.5 mg 4 tablets daily and has done so for 14 years for chronic back pain.  -Urology evaluated for nephrolithiasis at the UP junction with hydronephrosis and have recommended follow-up as an outpatient at  where she has previously gotten care because she is currently asymptomatic with normal renal function    Doing well recovering-  Plans to go to [facility] then Home  Depressed-- no medication for now- hopefully some rest out of the ICU will help    DVT prophylaxis:LOV SQ  Discharge Planning: I expect patient to be discharged to rehab 1-2      days.    Electronically signed by Marah Rios MD, 01/21/19 4:12 PM .      Electronically signed by Marah Rios MD at 1/21/2019  4:15 PM     Coretta Schmidt MD at 1/20/2019  6:28 PM          Critical Care Note     LOS: 8 days   Patient Care Team:  Gio Henderson MD as PCP - General (Adolescent Medicine)    Chief Complaint/Reason for visit:    Chief Complaint    Patient presents with   • Numbness     Patient Active Problem List   Diagnosis   • Acute on chronic respiratory failure with hypoxia (2L @ baseline)   • COPD with acute exacerbation (CMS/HCC)   • Hypothyroidism (acquired)   • Essential hypertension   • Depression   • left renal collecting system obstruction   • Guillain-[place] syndrome (CMS/HCC)   • Dysphagia   • Tobacco abuse   • JURGEN on CPAP   • Left nephrolithiasis       Subjective      58-year-old woman who presented December 31 with weakness and numbness to the [facility]. These symptoms progressed and on January 12 she developed worsening shortness of breath and cough. She has a known history of COPD and is an active smoker. She is on supplemental oxygen at home. CTA of the chest was negative for pulmonary embolism. She then developed urinary retention. Lumbar puncture on January 15 revealed a protein of 66, glucose of 95. EMG was results were consistent with Guillian-[place]'syndrome. IVIG was initiated January 15.  She moved to the intensive care unit January 16 with hypoxia requiring BiPAP. She takes Lortab 7.5 mg 4 tablets daily and has done so for 14 years for chronic back pain.  Urology evaluated for nephrolithiasis at the UP junction with hydronephrosis and have recommended follow-up as an outpatient at  where she has previously gotten care because she is currently asymptomatic with normal renal function    Interval History:   Afebrile, sinus rhythm  BiPAP 16/760% FiO2 last night. Currently on 2 L nasal cannula with a saturation of 96%  Cough productive of purulent mucus, improving   VC 1.5L   Admits to early fatigue      Review of Systems:    All systems were reviewed and negative except as noted in subjective.    Medical history, surgical history, social history, family history reviewed    Objective     Intake/Output:    Intake/Output Summary (Last 24 hours) at 1/20/2019 8456  Last data filed at 1/20/2019 1200  Gross per 24 hour   Intake  "480 ml   Output 1075 ml   Net -595 ml       Nutrition:  Diet Dysphagia; IV - Mechanical Soft No Mixed Consistencies; Thin; No Straws    Infusions:       Mechanical Ventilator Settings:            Resp Rate (Set): 4  Pressure Support (cm H2O): 8 cm H20  FiO2 (%): 40 %  PEEP/CPAP (cm H2O): 8 cm H20    Minute Ventilation (L/min) (Obs): 10 L/min  Resp Rate (Observed) Vent: 20          PIP Observed (cm H2O): 12 cm H2O       Telemetry: Sinus rhythm             Vital Signs  Blood pressure 110/67, pulse 80, temperature 98.1 °F (36.7 °C), temperature source Oral, resp. rate 18, height 170.2 cm (67.01\"), weight 96 kg (211 lb 10.3 oz), SpO2 96 %.    Physical Exam:  General Appearance:  Middle-aged white woman in no respiratory distress    Head:  Normocephalic, atraumatic    Eyes:          No jaundice. Pupils equal and reactive to light, extraocular movements intact    Ears:     Throat: Oral mucosa moist    Neck: Trachea midline, no palpable thyroid    Back:      Lungs:   Symmetric chest expansion. Breath sounds are improved with decreased rhonchi    Heart:  Regular rhythm, S1, S2 auscultated    Abdomen:   Bowel sounds present, soft, nontender    Rectal:   Deferred   Extremities: No pitting edema or cyanosis    Pulses: Pedal pulses present    Skin: Warm and dry    Lymph nodes:    Neurologic: Alert and oriented. Speech fluent, tongue midline, mild generalized weakness       Results Review:     I reviewed the patient's new clinical results.   Results from last 7 days   Lab Units 01/20/19  0427 01/18/19  0535 01/16/19  0351   SODIUM mmol/L 130* 130* 136   POTASSIUM mmol/L 4.4 4.3 3.7   CHLORIDE mmol/L 98* 97* 101   CO2 mmol/L 29.0 29.0 29.0   BUN mg/dL 43* 39* 28*   CREATININE mg/dL 0.72 0.79 0.67   CALCIUM mg/dL 8.5* 8.4* 8.8   BILIRUBIN mg/dL 1.4* 1.6*  --    ALK PHOS U/L 63 64  --    ALT (SGPT) U/L 131* 122*  --    AST (SGOT) U/L 49* 49*  --    GLUCOSE mg/dL 124* 101* 117*     Results from last 7 days   Lab Units 01/20/19  0427 " 01/18/19  0535 01/16/19  0351   WBC 10*3/mm3 9.54 6.32 8.43   HEMOGLOBIN g/dL 15.7* 16.1* 16.7*   HEMATOCRIT % 47.4* 49.0* 52.4*   PLATELETS 10*3/mm3 177 152 194     Results from last 7 days   Lab Units 01/16/19  1108   PH, ARTERIAL pH units 7.484*   PO2 ART mm Hg 57.0*   PCO2, ARTERIAL mm Hg 42.6   HCO3 ART mmol/L 32.0*     No results found for: BLOODCX  No results found for: URINECX    I reviewed the patient's new imaging including images and reports.  COMPARISONS: 01/12/2019.     FINDINGS:  Lungs are without focal abnormality aside from subsegmental  atelectasis of the left lung base. No sizable pleural effusion or  pneumothorax. Cardiomediastinal silhouette is within normal limits.     IMPRESSION:  No significant interval change.     D:  01/16/2019  FINDINGS: The most superior images demonstrate bibasilar airspace  disease, left greater than right.      The liver and spleen are normal.  There is no adrenal mass. The pancreas  is normal.  There are small bilateral renal parenchymal stones. More  importantly, there is a 1 cm stone in the left ureteropelvic junction  producing moderately severe obstruction. Also there is a 1.2 cm area of  low density within the left renal pelvis. This is not calcified on  images obtained prior to contrast and may represent clot. There are also  simple cysts in both kidneys. There is no ascites, aneurysm or  retroperitoneal lymphadenopathy. There is no pelvic mass or fluid.   There is no bladder stone.     IMPRESSION:  1. There is a 1 cm stone in the left ureteropelvic junction producing  moderately severe obstruction of the left kidney. There is also a 12 mm  low density filling defect in the renal pelvis. On the unenhanced  examination this is not a calcified stone this may represent clot  related to the ureteral stone and obstruction and less likely a  noncalcified stone.   2. Lastly there is bibasilar airspace disease, left greater than right.     D:  01/15/2019  E:   01/15/2019       All medications reviewed.     aspirin 81 mg Oral Daily   cefdinir 300 mg Oral Q12H   enoxaparin 40 mg Subcutaneous Q24H   escitalopram 10 mg Oral Daily   gabapentin 300 mg Oral TID   guaiFENesin 200 mg Oral 4x Daily   hydrochlorothiazide 25 mg Oral Daily   ipratropium-albuterol 3 mL Nebulization 4x Daily - RT   levothyroxine 100 mcg Oral Daily   lisinopril 10 mg Oral Q24H   methylPREDNISolone sodium succinate 20 mg Intravenous Q12H   metoprolol succinate XL 25 mg Oral Daily   nystatin 5 mL Oral 4x Daily   pharmacy consult - MTM  Does not apply Daily   polyethylene glycol 17 g Oral Daily         Assessment/Plan       Guillain-[place] syndrome (CMS/HCC)    left renal collecting system obstruction    Acute on chronic respiratory failure with hypoxia (2L @ baseline)    COPD with acute exacerbation (CMS/HCC)    Dysphagia    JURGEN on CPAP    Hypothyroidism (acquired)    Essential hypertension    Depression    Tobacco abuse    Left nephrolithiasis    #1 Guillian [place]' Syndrome currently receiving IVIG.  vital capacity was 1.5. She has some improvement in strength of her extremities    #2 acute on chronic respiratory failure, at baseline she wears 2 L nasal cannula at home. She is tolerating BiPAP at night. She actively smoke cigarettes. She is maintaining oxygen saturations on low flow nasal cannula, 4 liters. 1/17 morning blood gas revealed a pH of 7.48, CO2 of 42, O2 of 57 on 52% high flow nasal cannula. She has underlying obstructive airways disease and a productive cough. X-ray shows chronic changes but no pneumonia. CTA of the chest was negative for pulmonary embolus.  Oxygenation improved with better secrestions mobilization.     #3 COPD with exacerbation she is currently nebulized bronchodilatorsPo cephalosporin, IV steroids, mucolytics.  Medineb and  mucolytic therapy has helped significantly. Sputum gram stain looks like strept and H.flu.     #4 essential hypertension, blood pressure adequately  controlled with lisinopril and beta blocker    #5 start of sleep apnea, tolerating BiPAP nightly, has a home CPAP unit    #6 left hydronephrosis with nephrolithiasis. Evaluation by urology appreciated, they recommend simple observation with her normal creatinine and no evidence of urinary tract infection. She has apparently received care at the [facility] in the past for nephrolithiasis. Serum creatinine is normal. She would be high risk for anesthesia with her Guillian [place] syndrome and hypoxia    PLAN:   PO Cefdinir  Nebulized bronchodilators/ resume Medineb if oxygen declines  Oral mucolytic therapy  BiPAP nightly  Scheduled Lortab 7.5  4 times daily, home dose  Physical therapy, occupational therapy   gabapentin and ibuprofen  Ativan for muscle cramping, per neurology  Nicotine patch  Encourage ongoing smoking cessation  Thyroid replacement  IVIG per neurology 20 g daily; completed  Lisinopril, metoprolol for high blood pressure  Monitor vital capacity, NIF  telemetry    VTE Prophylaxis: Lovenox    Stress Ulcer Prophylaxis:none    Coretta FUCHS. MD [place]  01/20/19  6:28 PM      Time: 25 min  I personally provided care to this critically ill patient as documented above.  Critical care time does not include time spent on separately billed procedures.  Non of my critical care time was concurrent with other critical care providers.     Electronically signed by Coretta Schmidt MD at 1/20/2019  6:32 PM

## 2019-01-22 NOTE — PLAN OF CARE
Problem: Patient Care Overview  Goal: Plan of Care Review  Outcome: Ongoing (interventions implemented as appropriate)   01/22/19 1341   Coping/Psychosocial   Plan of Care Reviewed With patient   Plan of Care Review   Progress improving   OTHER   Outcome Summary PT IMPROVING IN STRENGTH. ABLE TO INITIATE STAND- STEP PIVOT TRANSFERS WITH MOD ASSIST X2. PT IS LIMITED BY FATIGUE. GIVES GOOD EFFORT AND IS GOOD REHAB CANDIDATE. RECOMMEND IRF AT D/C.

## 2019-01-22 NOTE — PROGRESS NOTES
[facility] Medicine Services  PROGRESS NOTE    Patient Name: Kristina Heck  : 1960  MRN: [id number]    Date of Admission: 2019  Length of Stay: 10  Primary Care Physician: Gio Henderson MD     Subjective     CC: GBS    HPI:  Laying in bed. Per nursing, very anxious to get to rehab and upset she does not have a bed yet. Otherwise, no new complaints. Some tingling in her feet but not overly painful. Making improvement with therapy - now able to initiate  step-to-pivot while standing.     Review of Systems  Gen- No fevers, chills  CV- No chest pain, palpitations  Resp- No cough, dyspnea  GI- No N/V/D, abd pain    Otherwise ROS is negative except as mentioned in the HPI.    Objective   Objective     Vital Signs:   Temp:  [97.8 °F (36.6 °C)-98.3 °F (36.8 °C)] 98.3 °F (36.8 °C)  Heart Rate:  [58-78] 78  Resp:  [16-22] 20  BP: (112-128)/(62-72) 112/62  FiO2 (%):  [35 %] 35 %        Physical Exam:  Constitutional: No acute distress, awake, alert  HENT: NCAT, mucous membranes moist  Respiratory: Clear to auscultation bilaterally, respiratory effort normal   Cardiovascular: RRR, no murmurs, rubs, or gallops, palpable pedal pulses bilaterally  Gastrointestinal: Positive bowel sounds, soft, nontender, nondistended  Musculoskeletal: No bilateral ankle edema  Psychiatric: Appropriate affect, cooperative  Neurologic: Oriented x 3. Generally weak, able to lift arms and legs against gravity. No focal deficits. Speech is clear.   Skin: No rashes    Results Reviewed:  I have personally reviewed current lab, radiology, and data and agree.    Results from last 7 days   Lab Units 19  0535 19  0351   WBC 10*3/mm3 9.54 6.32 8.43   HEMOGLOBIN g/dL 15.7* 16.1* 16.7*   HEMATOCRIT % 47.4* 49.0* 52.4*   PLATELETS 10*3/mm3 177 152 194     Results from last 7 days   Lab Units 19  0427 19  0535 19  0351   SODIUM mmol/L 130* 130* 136   POTASSIUM mmol/L 4.4  4.3 3.7   CHLORIDE mmol/L 98* 97* 101   CO2 mmol/L 29.0 29.0 29.0   BUN mg/dL 43* 39* 28*   CREATININE mg/dL 0.72 0.79 0.67   GLUCOSE mg/dL 124* 101* 117*   CALCIUM mg/dL 8.5* 8.4* 8.8   ALT (SGPT) U/L 131* 122*  --    AST (SGOT) U/L 49* 49*  --      Estimated Creatinine Clearance: 103.3 mL/min (by C-G formula based on SCr of 0.72 mg/dL).    No results found for: BNP    Microbiology Results Abnormal     Procedure Component Value - Date/Time    Respiratory Culture - Sputum, Cough [[id number]]  (Abnormal) Collected:  01/18/19 0819    Lab Status:  Final result Specimen:  Sputum from Cough Updated:  01/20/19 1358     Respiratory Culture Light growth (2+) Normal Respiratory Angelica      Scant growth (1+) Yeast isolated     Gram Stain Moderate (3+) WBCs per low power field      Moderate (3+) Epithelial cells per low power field      Few (2+) Yeast      Few (2+) Gram positive cocci in pairs and chains      Few (2+) Gram variable bacilli          Imaging Results (last 24 hours)     ** No results found for the last 24 hours. **        I have reviewed the medications:    aspirin 81 mg Oral Daily   cefdinir 300 mg Oral Q12H   enoxaparin 40 mg Subcutaneous Q24H   escitalopram 10 mg Oral Daily   gabapentin 300 mg Oral TID   guaiFENesin 200 mg Oral 4x Daily   hydrochlorothiazide 25 mg Oral Daily   ipratropium-albuterol 3 mL Nebulization 4x Daily - RT   levothyroxine 100 mcg Oral Daily   lisinopril 10 mg Oral Q24H   methylPREDNISolone sodium succinate 20 mg Intravenous Q12H   metoprolol succinate XL 25 mg Oral Daily   nystatin 5 mL Oral 4x Daily   pharmacy consult - MTM  Does not apply Daily   polyethylene glycol 17 g Oral Daily     Assessment / Plan     Active Hospital Problems    Diagnosis Date Noted   • **Guillain-[place] syndrome (CMS/HCC) [G61.0] 01/16/2019   • Left nephrolithiasis [N20.0] 01/18/2019   • Dysphagia [R13.10] 01/16/2019   • Tobacco abuse [Z72.0] 01/16/2019   • JURGEN on CPAP [G47.33, Z99.89] 01/16/2019   • Acute on  chronic respiratory failure with hypoxia (2L @ baseline) [J96.21] 01/12/2019   • COPD with acute exacerbation (CMS/HCC) [J44.1] 01/12/2019   • Hypothyroidism (acquired) [E03.9] 01/12/2019   • Essential hypertension [I10] 01/12/2019   • Depression [F32.9] 01/12/2019   • left renal collecting system obstruction [N13.5] 01/12/2019     Brief Hospital Course to date:  Kristina Heck is a 58 y.o. female who presented to [facility] ED on 12/31 with weakness and numbness. Admitted to [facility] on 1/12 with COPD exacerbation and and weakness. Her weakness progressed and neurology was consulted. 1/15 LP revealed protein 66, glucose 95. EMG results consistent with GBS and she was started on IVIG. She has completed 5 rounds of IVIG.     [place] Syndrome  - Has completed 5 rounds of IVIG    - Gabapentin 300mg TID for neuropathy   - PT/OT following - awaiting placement at [facility]   - Follow up with Dr. Serra of neurology as an outpatient     Acute on chronic respiratory failure  COPD exacerbation  - Apparently on baseline 4L NC at home but she is currently only requiring 1-2L per records  - Still on Solumedrol 20mg IV Q12H - start PO taper   - Continue Ceftin and scheduled nebs     HTN, BP controlled on current regimen  JURGEN, NIPPV QHS. Has a CPAP at home.     Left hydronephrosis with nephrolithiasis  - Urology has evaluated - recommend observation only due to lack of urinary symptoms and normal creatinine  - Has previously received care at [facility] for nephrolithiasis     Chronic pain  - At baseline, on Lortab 7.5mg 4 times per day     Tobacco abuse  - Continue to encourage smoking cessation  - Nicotine patch     Hypothyroidism, continue replacement therapy     DVT Prophylaxis: Lovenox  Disposition: I expect the patient to be discharged to [facility] when bed available    CODE STATUS:   Code Status and Medical Interventions:   Ordered at: 01/12/19 6260     Level Of Support Discussed With:    Patient     Code Status:    CPR     Medical Interventions  (Level of Support Prior to Arrest):    Full     Electronically signed by Sindhu Winters PA-C, 01/22/19, 2:13 PM.

## 2019-01-22 NOTE — THERAPY TREATMENT NOTE
Acute Care - Occupational Therapy Treatment Note  [facility]     Patient Name: Kristina Heck  : 1960  MRN: [id number]  Today's Date: 2019  Onset of Illness/Injury or Date of Surgery: 19  Date of Referral to OT: 19  Referring Physician: MD Roxana    Admit Date: 2019       ICD-10-CM ICD-9-CM   1. Left ureter dilated N28.82 593.89   2. Hypoxia R09.02 799.02   3. COPD exacerbation (CMS/HCC) J44.1 491.21   4. Weakness R53.1 780.79   5. Impaired functional mobility, balance, gait, and endurance Z74.09 V49.89   6. Acute on chronic respiratory failure with hypoxia (CMS/HCC) J96.21 518.84     799.02   7. Pharyngeal dysphagia R13.13 787.23   8. Impaired mobility and ADLs Z74.09 799.89     Patient Active Problem List   Diagnosis   • Acute on chronic respiratory failure with hypoxia (2L @ baseline)   • COPD with acute exacerbation (CMS/HCC)   • Hypothyroidism (acquired)   • Essential hypertension   • Depression   • left renal collecting system obstruction   • Guillain-[place] syndrome (CMS/HCC)   • Dysphagia   • Tobacco abuse   • JURGEN on CPAP   • Left nephrolithiasis     Past Medical History:   Diagnosis Date   • COPD (chronic obstructive pulmonary disease) (CMS/HCC)    • Depression    • Disease of thyroid gland    • Hypertension      History reviewed. No pertinent surgical history.    Therapy Treatment    Rehabilitation Treatment Summary     Row Name 19 1340 19 1310 19 1121       Treatment Time/Intention    Discipline  occupational therapist  -HK  --  -CD  physical therapist  -CD    Document Type  therapy note (daily note)  -HK  --  -CD  therapy note (daily note)  -CD    Subjective Information  complains of;weakness;fatigue  -HK  --  -CD  complains of;fatigue;weakness  -CD    Mode of Treatment  individual therapy;occupational therapy  -HK  --  physical therapy  -CD    Patient/Family Observations  Pt received in fowlers position with IV heplocked. No visitors at bedside.    -HK  --  PT SITTING UP IN CHAIR ON RA AND WITH IV HEPLOCKED. DTR PRESENT. NSG USED LIFT FOR OOB TO CHAIR EARLIER.   -CD    Care Plan Review  care plan/treatment goals reviewed;risks/benefits reviewed;patient/other agree to care plan  -HK  --  care plan/treatment goals reviewed  -CD    Therapy Frequency (PT Clinical Impression)  --  --  daily  -CD    Patient Effort  good  -HK  --  excellent  -CD    Existing Precautions/Restrictions  fall  -HK  --  fall  -CD    Treatment Considerations/Comments  --  --  GENERALIZED WEAKNESS, KNEES BUCKLE.   -CD    Patient Response to Treatment  --  --  PT MOTIVATED TO WORK WITH THERAPY. AGREES TO ATTEMPT PIVOT TRANSFERS TODAY.   -CD    Recorded by [HK] Ashli Robledo, OT 01/22/19 1531 [CD] Jo Cooper, PT 01/22/19 1340 [CD] Jo Cooper, PT 01/22/19 1340    Row Name 01/22/19 1340 01/22/19 1121          Vital Signs    Pre Systolic BP Rehab  112 RN cleared for tx  -HK  --     Pre Treatment Diastolic BP  62  -HK  --     Post Systolic BP Rehab  --  130  -CD     Post Treatment Diastolic BP  --  79  -CD     Pretreatment Resp Rate (breaths/min)  --  79  -CD     Pre SpO2 (%)  --  94  -CD     O2 Delivery Pre Treatment  --  room air  -CD     O2 Delivery Intra Treatment  --  room air  -CD     Post SpO2 (%)  --  91  -CD     O2 Delivery Post Treatment  --  room air  -CD     Pre Patient Position  Supine  -HK  --     Intra Patient Position  Standing  -HK  --     Post Patient Position  Supine  -HK  --     Recorded by [HK] Ashli Robledo, OT 01/22/19 1531 [CD] Jo Cooper, PT 01/22/19 1340     Row Name 01/22/19 1340             Cognitive Assessment/Intervention    Additional Documentation  Cognitive Assessment/Intervention (Group)  -HK      Recorded by [HK] Ashli Robledo, OT 01/22/19 1531      Row Name 01/22/19 1340 01/22/19 1121          Cognitive Assessment/Intervention- PT/OT    Affect/Mental Status (Cognitive)  WFL  -HK  WFL  -CD     Orientation Status (Cognition)  oriented x 4  -HK   oriented x 4  -CD     Follows Commands (Cognition)  follows one step commands;over 90% accuracy  -HK  follows one step commands;over 90% accuracy  -CD     Cognitive Function (Cognitive)  safety deficit  -HK  safety deficit  -CD     Safety Deficit (Cognitive)  mild deficit;safety precautions awareness;safety precautions follow-through/compliance  -HK  insight into deficits/self awareness  -CD     Personal Safety Interventions  fall prevention program maintained;gait belt;nonskid shoes/slippers when out of bed  -HK  fall prevention program maintained;gait belt;muscle strengthening facilitated;nonskid shoes/slippers when out of bed;supervised activity  -CD     Recorded by [HK] Ashli Robledo, OT 01/22/19 1531 [CD] Jo Cooper, PT 01/22/19 1340     Row Name 01/22/19 1121             Safety Issues, Functional Mobility    Safety Issues Affecting Function (Mobility)  insight into deficits/self awareness  -CD      Impairments Affecting Function (Mobility)  balance;endurance/activity tolerance;strength;sensation/sensory awareness;shortness of breath  -CD      Recorded by [CD] Jo Cooper, PT 01/22/19 1340      Row Name 01/22/19 1340 01/22/19 1121          Bed Mobility Assessment/Treatment    Bed Mobility Assessment/Treatment  scooting/bridging;supine-sit;sit-supine  -HK  sit-supine;scooting/bridging  -CD     Scooting/Bridging [place] (Bed Mobility)  supervision;verbal cues  -HK  verbal cues;moderate assist (50% patient effort)  -CD     Supine-Sit [place] (Bed Mobility)  supervision;verbal cues  -HK  --     Sit-Supine [place] (Bed Mobility)  minimum assist (75% patient effort);verbal cues  -HK  moderate assist (50% patient effort)  -CD     Bed Mobility, Safety Issues  decreased use of legs for bridging/pushing;decreased use of arms for pushing/pulling  -HK  decreased use of arms for pushing/pulling;decreased use of legs for bridging/pushing  -CD     Assistive Device (Bed Mobility)  head of bed elevated;bed  rails  -HK  bed rails;draw sheet  -CD     Comment (Bed Mobility)  Pt required Lexx to get legs back into bed when returning to supine.   -HK  --     Recorded by [HK] Ashli Robledo, OT 01/22/19 1531 [CD] Jo Cooper, PT 01/22/19 1340     Row Name 01/22/19 1340             Functional Mobility    Functional Mobility- Ind. Level  not tested  -HK      Recorded by [HK] Ashli Robledo, OT 01/22/19 1531      Row Name 01/22/19 1340 01/22/19 1121          Transfer Assessment/Treatment    Transfer Assessment/Treatment  --  sit-stand transfer;stand-sit transfer;stand pivot/stand step transfer;toilet transfer  -CD     Comment (Transfers)  Deferred; pt fatigued from PT.   -HK  CUES FOR SEQUENCING, HAND PLACEMENT. STEP PIVOT TRANSFERS RECLINER TO BSC AND BSC TO BED.   -CD     Recorded by [HK] Ashli Robledo, OT 01/22/19 1531 [CD] Jo Cooper, PT 01/22/19 1340     Row Name 01/22/19 1121             Sit-Stand Transfer    Sit-Stand [place] (Transfers)  minimum assist (75% patient effort);2 person assist;verbal cues  -CD      Assistive Device (Sit-Stand Transfers)  walker, front-wheeled PERFORMED X 2 REPS.   -CD      Recorded by [CD] Jo Cooper, PT 01/22/19 1340      Row Name 01/22/19 1121             Stand-Sit Transfer    Stand-Sit [place] (Transfers)  verbal cues;minimum assist (75% patient effort);2 person assist  -CD      Assistive Device (Stand-Sit Transfers)  walker, front-wheeled  -CD      Recorded by [CD] Jo Cooper, PT 01/22/19 1340      Row Name 01/22/19 1121             Stand Pivot/Stand Step Transfer    Stand Pivot/Stand Step [place]  moderate assist (50% patient effort);2 person assist;verbal cues  -CD      Assistive Device (Stand Pivot Stand Step Transfer)  walker, front-wheeled CUES FOR UPRIGHT POSTURE   -CD      Recorded by [CD] Jo Cooper, PT 01/22/19 1340      Row Name 01/22/19 1121             Toilet Transfer    Type (Toilet Transfer)  sit-stand;stand-sit  -CD      [place] Level  (Toilet Transfer)  minimum assist (75% patient effort);2 person assist  -CD      Assistive Device (Toilet Transfer)  walker, front-wheeled  -CD      Recorded by [CD] Jo Cooper, PT 01/22/19 1340      Row Name 01/22/19 1121             Gait/Stairs Assessment/Training    Comment (Gait/Stairs)  STEPS FROM RECLINER TO BSC TO EDGE OF BED.   -CD      Recorded by [CD] Jo Cooper, PT 01/22/19 1340      Row Name 01/22/19 1340             ADL Assessment/Intervention    68648 - OT Self Care/Mgmt Minutes  19  -HK      BADL Assessment/Intervention  grooming  -HK      Recorded by [HK] Ashli Robledo, OT 01/22/19 1531      Row Name 01/22/19 1340             Grooming Assessment/Training    [place] Level (Grooming)  hair care, combing/brushing;oral care regimen;wash face, hands;minimum assist (75% patient effort);verbal cues  -HK      Grooming Position  unsupported sitting  -HK      Comment (Grooming)  Pt required Lexx to put toothpaste on toothbrush and to comb back of hair.   -HK      Recorded by [HK] Ashli Robledo, OT 01/22/19 1531      Row Name 01/22/19 1340             BADL Safety/Performance    Impairments, BADL Safety/Performance  balance;strength;endurance/activity tolerance;motor planning;motor control  -HK      Recorded by [HK] Ashli Robledo, OT 01/22/19 1531      Row Name 01/22/19 1340 01/22/19 1121          Motor Skills Assessment/Interventions    Additional Documentation  Balance (Group);Therapeutic Exercise (Group);Therapeutic Exercise Interventions (Group)  -  Balance (Group);Balance Interventions (Group);Therapeutic Exercise (Group);Therapeutic Exercise Interventions (Group)  -CD     Recorded by [HK] Ashli Robledo, OT 01/22/19 1531 [CD] Jo Cooper, PT 01/22/19 1340     Row Name 01/22/19 1340 01/22/19 1121          Therapeutic Exercise    62697 - PT Therapeutic Exercise Minutes  --  15  -CD     48072 - PT Therapeutic Activity Minutes  --  23  -CD     91943 - OT Therapeutic Exercise Minutes  5  -HK   --     Recorded by [HK] Ashli Robledo, OT 01/22/19 1531 [CD] Jo Cooper, PT 01/22/19 1340     Row Name 01/22/19 1340 01/22/19 1121          Therapeutic Exercise    Lower Extremity (Therapeutic Exercise)  --  LAQ (long arc quad), bilateral;hamstring sets, bilateral;quad sets, bilateral BRIDGING AND AP'S   -CD     Exercise Type (Therapeutic Exercise)  resistive exercises  -HK  AROM (active range of motion)  -CD     Position (Therapeutic Exercise)  supine  -HK  --     Sets/Reps (Therapeutic Exercise)  1/10  -HK  1 SET OF 10 EXCEPT BRIDGING 5 REPS.   -CD     Comment (Therapeutic Exercise)  Pt educated on fine motor HEP with pink blocks. Pt completed x10 reps of each exercise.    -HK  FATIGUES QUICKLY- NEEDS TO REST BETWEEN EXERCISES.   -CD     Recorded by [HK] Ashli Robledo, OT 01/22/19 1533 [CD] Jo Cooper, PT 01/22/19 1340     Row Name 01/22/19 1121             Gross Motor Coordination    Gross Motor Impairments  coordination;balance;strength;motor control;sensation  -CD      Recorded by [CD] Jo Cooper, PT 01/22/19 1340      Row Name 01/22/19 1340             Balance    Balance  static sitting balance;dynamic sitting balance  -HK      Recorded by [HK] Ashli Robledo, OT 01/22/19 1531      Row Name 01/22/19 1340 01/22/19 1121          Static Sitting Balance    Level of [place] (Unsupported Sitting, Static Balance)  supervision  -HK  standby assist  -CD     Sitting Position (Unsupported Sitting, Static Balance)  sitting on edge of bed  -HK  sitting in chair;sitting on edge of bed AND ON BSC.   -CD     Time Able to Maintain Position (Unsupported Sitting, Static Balance)  1 to 2 minutes  -HK  more than 5 minutes  -CD     Recorded by [HK] Ashli Robledo, OT 01/22/19 1531 [CD] Jo Cooper, PT 01/22/19 1340     Row Name 01/22/19 1340             Dynamic Sitting Balance    Level of [place], Reaches Outside Midline (Sitting, Dynamic Balance)  supervision  -HK      Sitting Position, Reaches Outside  Midline (Sitting, Dynamic Balance)  sitting on edge of bed  -HK      Comment, Reaches Outside Midline (Sitting, Dynamic Balance)  completing grooming at EOB.   -HK      Recorded by [HK] Ashli Robledo, OT 01/22/19 1531      Row Name 01/22/19 1121             Static Standing Balance    Level of [place] (Supported Standing, Static Balance)  minimal assist, 75% patient effort;2 person assist  -CD      Time Able to Maintain Position (Supported Standing, Static Balance)  30 to 45 seconds  -CD      Assistive Device Utilized (Supported Standing, Static Balance)  walker, rolling  -CD      Comment (Supported Standing, Static Balance)  STOOD AT R WALKER FOR HYGIENE AFTER TOILETING. DEPENDENT WITH HYGIENE.   -CD      Recorded by [CD] Jo Cooper, PT 01/22/19 1340      Row Name 01/22/19 1340 01/22/19 1121          Positioning and Restraints    Pre-Treatment Position  in bed  -HK  sitting in chair/recliner  -CD     Post Treatment Position  bed  -HK  bed  -CD     In Bed  notified nsg;supine;call light within reach;encouraged to call for assist  -HK  supine;call light within reach;encouraged to call for assist;exit alarm on;with family/caregiver;notified nsg;legs elevated  -CD     Recorded by [HK] Ashli Robledo, OT 01/22/19 1531 [CD] Jo Cooper, PT 01/22/19 1340     Row Name 01/22/19 1340             Pain Assessment    Additional Documentation  Pain Scale: Numbers Pre/Post-Treatment (Group)  -HK      Recorded by [HK] Ashli Robledo, OT 01/22/19 1531      Row Name 01/22/19 1340 01/22/19 1121          Pain Scale: Numbers Pre/Post-Treatment    Pain Scale: Numbers, Pretreatment  0/10 - no pain  -HK  0/10 - no pain  -CD     Pain Scale: Numbers, Post-Treatment  0/10 - no pain  -HK  0/10 - no pain  -CD     Recorded by [HK] Ashli Robledo, OT 01/22/19 1531 [CD] Jo Cooper, PT 01/22/19 1340     Row Name 01/22/19 1340             Coping    Observed Emotional State  accepting;calm  -HK      Recorded by [HK] Ashli Robledo, OT  01/22/19 1531      Row Name 01/22/19 1340 01/22/19 1121          Plan of Care Review    Plan of Care Reviewed With  patient  -HK  patient  -CD     Recorded by [HK] Ashli Robledo, OT 01/22/19 1531 [CD] Jo Cooper, PT 01/22/19 1340     Row Name 01/22/19 1340             Outcome Summary/Treatment Plan (OT)    Daily Summary of Progress (OT)  progress toward functional goals is good  -HK      Recorded by [HK] Ashli Robledo, OT 01/22/19 1531      Row Name 01/22/19 1121             Outcome Summary/Treatment Plan (PT)    Daily Summary of Progress (PT)  progress toward functional goals is good  -CD      Anticipated Discharge Disposition (PT)  inpatient rehabilitation facility  -CD      Recorded by [CD] Jo Cooper, PT 01/22/19 1340        User Key  (r) = Recorded By, (t) = Taken By, (c) = Cosigned By    Initials Name Effective Dates Discipline    CD Jo Cooper, PT 06/19/15 -  PT    HK Ashli Robledo, OT 03/07/18 -  OT             Occupational Therapy Education     Title: PT OT SLP Therapies (In Progress)     Topic: Occupational Therapy (In Progress)     Point: ADL training (In Progress)     Description: Instruct learner(s) on proper safety adaptation and remediation techniques during self care or transfers.   Instruct in proper use of assistive devices.    Learning Progress Summary           Patient Acceptance, E, VU by HK at 1/22/2019  3:31 PM    Comment:  Pt educated on ADL retraining with grooming, safety precautions, and appropriate body mechanics.    Acceptance, E, VU by EM at 1/22/2019  3:28 AM    EVELYNE Ashraf, NR by  at 1/19/2019 10:28 AM    Comment:  Educated pt and family regarding HEP    Acceptance, E, NR by CL at 1/17/2019  3:59 PM    Comment:  Pt educated on appropriate safety precautions, t/f techniques, role of OT, positioning, AE use for self feeding, HEP, and benefits of therapy.   Family EVELYNE Ashraf, NR by  at 1/19/2019 10:28 AM    Comment:  Educated pt and family regarding HEP                    Point: Home exercise program (In Progress)     Description: Instruct learner(s) on appropriate technique for monitoring, assisting and/or progressing therapeutic exercises/activities.    Learning Progress Summary           Patient Acceptance, E, VU by EM at 1/22/2019  3:28 AM    Eager, E, NR by JR at 1/19/2019 10:28 AM    Comment:  Educated pt and family regarding HEP    Acceptance, E, NR by CL at 1/17/2019  3:59 PM    Comment:  Pt educated on appropriate safety precautions, t/f techniques, role of OT, positioning, AE use for self feeding, HEP, and benefits of therapy.   Family Eager, E, NR by JR at 1/19/2019 10:28 AM    Comment:  Educated pt and family regarding HEP                   Point: Precautions (Done)     Description: Instruct learner(s) on prescribed precautions during self-care and functional transfers.    Learning Progress Summary           Patient Acceptance, E, VU by HK at 1/22/2019  3:31 PM    Comment:  Pt educated on ADL retraining with grooming, safety precautions, and appropriate body mechanics.    Acceptance, E, VU by EM at 1/22/2019  3:28 AM    Acceptance, E, NR by CL at 1/17/2019  3:59 PM    Comment:  Pt educated on appropriate safety precautions, t/f techniques, role of OT, positioning, AE use for self feeding, HEP, and benefits of therapy.                   Point: Body mechanics (Done)     Description: Instruct learner(s) on proper positioning and spine alignment during self-care, functional mobility activities and/or exercises.    Learning Progress Summary           Patient Acceptance, E, VU by HK at 1/22/2019  3:31 PM    Comment:  Pt educated on ADL retraining with grooming, safety precautions, and appropriate body mechanics.    Acceptance, E, VU by EM at 1/22/2019  3:28 AM    Acceptance, E, NR by CL at 1/17/2019  3:59 PM    Comment:  Pt educated on appropriate safety precautions, t/f techniques, role of OT, positioning, AE use for self feeding, HEP, and benefits of therapy.                                User Key     Initials Effective Dates Name Provider Type Discipline     06/22/15 -  Subha Braga, OT Occupational Therapist OT    CL 04/03/18 -  Jazmine Acuña OT Occupational Therapist OT    EM 06/12/17 -  Wendy Hayes, RN Registered Nurse Nurse    HK 03/07/18 -  Ashli Robledo, OT Occupational Therapist OT                OT Recommendation and Plan  Outcome Summary/Treatment Plan (OT)  Daily Summary of Progress (OT): progress toward functional goals is good  Daily Summary of Progress (OT): progress toward functional goals is good  Plan of Care Review  Plan of Care Reviewed With: patient  Plan of Care Reviewed With: patient  Outcome Summary: Pt requires Lexx for bed mobility. Pt sat at EOB to complete grooming sink side. Pt required Lexx to put toothpaste on toothbrush and comb back of hair. Continue IP OT per POC.   Outcome Measures     Row Name 01/22/19 1340 01/22/19 1121          How much help from another person do you currently need...    Turning from your back to your side while in flat bed without using bedrails?  --  3  -CD     Moving from lying on back to sitting on the side of a flat bed without bedrails?  --  2  -CD     Moving to and from a bed to a chair (including a wheelchair)?  --  2  -CD     Standing up from a chair using your arms (e.g., wheelchair, bedside chair)?  --  2  -CD     Climbing 3-5 steps with a railing?  --  1  -CD     To walk in hospital room?  --  2  -CD     AM-PAC 6 Clicks Score  --  12  -CD        How much help from another is currently needed...    Putting on and taking off regular lower body clothing?  1  -HK  --     Bathing (including washing, rinsing, and drying)  2  -HK  --     Toileting (which includes using toilet bed pan or urinal)  2  -HK  --     Putting on and taking off regular upper body clothing  2  -HK  --     Taking care of personal grooming (such as brushing teeth)  3  -HK  --     Eating meals  3  -HK  --     Score  13  -HK  --        Functional  Assessment    Outcome Measure Options  AM-PAC 6 Clicks Daily Activity (OT)  -HK  AM-PAC 6 Clicks Basic Mobility (PT);Modified [place]  -CD       User Key  (r) = Recorded By, (t) = Taken By, (c) = Cosigned By    Initials Name Provider Type    Jo Rodrigez, PT Physical Therapist    HK Ashli Robledo, OT Occupational Therapist           Time Calculation:   Time Calculation- OT     Row Name 01/22/19 1340             Time Calculation- OT    OT Start Time  1340  -HK      OT Received On  01/22/19  -         Timed Charges    78063 - OT Therapeutic Exercise Minutes  5  -HK      83292 - OT Self Care/Mgmt Minutes  19  -HK        User Key  (r) = Recorded By, (t) = Taken By, (c) = Cosigned By    Initials Name Provider Type     Ashli Robledo, OT Occupational Therapist           Therapy Suggested Charges     Code   Minutes Charges    99306 (CPT®) Hc Ot Neuromusc Re Education Ea 15 Min      83622 (CPT®) Hc Ot Ther Proc Ea 15 Min 5 1    04492 (CPT®) Hc Ot Therapeutic Act Ea 15 Min      30628 (CPT®) Hc Ot Manual Therapy Ea 15 Min      15619 (CPT®) Hc Ot Iontophoresis Ea 15 Min      23283 (CPT®) Hc Ot Elec Stim Ea-Per 15 Min      91691 (CPT®) Hc Ot Ultrasound Ea 15 Min      56537 (CPT®) Hc Ot Self Care/Mgmt/Train Ea 15 Min 19 1    Total  24 2        Therapy Charges for Today     Code Description Service Date Service Provider Modifiers Qty    60731715621 HC OT SELF CARE/MGMT/TRAIN EA 15 MIN 1/22/2019 Ashli Robledo, OT GO 1    [id number] HC OT THER PROC EA 15 MIN 1/22/2019 Ashli Robledo, OT GO 1               Ashli Robledo OT  1/22/2019

## 2019-01-22 NOTE — PLAN OF CARE
Problem: Patient Care Overview  Goal: Plan of Care Review  Outcome: Ongoing (interventions implemented as appropriate)   01/22/19 8666   Coping/Psychosocial   Plan of Care Reviewed With patient   Plan of Care Review   Progress improving   OTHER   Outcome Summary Pt requires Lexx for bed mobility. Pt sat at EOB to complete grooming sink side. Pt required Lexx to put toothpaste on toothbrush and comb back of hair. Continue IP OT per POC.

## 2019-01-22 NOTE — PLAN OF CARE
Problem: Patient Care Overview  Goal: Plan of Care Review  Outcome: Ongoing (interventions implemented as appropriate)   01/21/19 1809 01/21/19 2000   Coping/Psychosocial   Plan of Care Reviewed With --  patient   Plan of Care Review   Progress no change --    OTHER   Outcome Summary Pt VSS throughout day, still requring 2 assist and is weak. Eating most of meals. Request prn pain meds around the clock. Pt to go to  pending insurance.  --      Goal: Individualization and Mutuality  Outcome: Ongoing (interventions implemented as appropriate)    Goal: Discharge Needs Assessment  Outcome: Ongoing (interventions implemented as appropriate)   01/13/19 0000 01/14/19 1345   Discharge Needs Assessment   Concerns to be Addressed --  no discharge needs identified   Patient/Family Anticipates Transition to --  home with family   Patient/Family Anticipated Services at Transition  --    Transportation Concerns --  car, none   Transportation Anticipated --  family or friend will provide   Anticipated Changes Related to Illness --  none   Equipment Needed After Discharge --  none   Disability   Equipment Currently Used at Home --  oxygen;bipap/cpap;nebulizer;rollator  (Home O2 through [place] at 2L/NC at night and PRN)     Goal: Interprofessional Rounds/Family Conf  Outcome: Ongoing (interventions implemented as appropriate)   01/22/19 0326   Interdisciplinary Rounds/Family Conf   Participants ;family;pharmacy       Problem: Fall Risk (Adult)  Goal: Absence of Fall  Outcome: Ongoing (interventions implemented as appropriate)   01/21/19 1809   Fall Risk (Adult)   Absence of Fall making progress toward outcome       Problem: Chronic Obstructive Pulmonary Disease (Adult)  Goal: Signs and Symptoms of Listed Potential Problems Will be Absent, Minimized or Managed (Chronic Obstructive Pulmonary Disease)  Outcome: Ongoing (interventions implemented as appropriate)   01/21/19 1809   Goal/Outcome Evaluation    Problems Assessed (Chronic Obstructive Pulmonary Disease (COPD)) all   Problems Present (COPD, Bronch/Emphy) respiratory compromise       Problem: Wound (Includes Pressure Injury) (Adult)  Goal: Signs and Symptoms of Listed Potential Problems Will be Absent, Minimized or Managed (Wound)  Outcome: Ongoing (interventions implemented as appropriate)   01/21/19 1809   Goal/Outcome Evaluation   Problems Assessed (Wound) all   Problems Present (Wound) pain;delayed wound healing;none      01/21/19 1809   Goal/Outcome Evaluation   Problems Assessed (Wound) all   Problems Present (Wound) pain;delayed wound healing;none       Problem: Mobility, Physical Impaired (Adult)  Goal: Enhanced Mobility Skills  Outcome: Ongoing (interventions implemented as appropriate)   01/21/19 1809   Mobility, Physical Impaired (Adult)   Enhanced Mobility Skills making progress toward outcome     Goal: Enhanced Functional Ability  Outcome: Ongoing (interventions implemented as appropriate)   01/21/19 1809   Mobility, Physical Impaired (Adult)   Enhanced Functional Ability making progress toward outcome       Problem: Pain, Chronic (Adult)  Goal: Acceptable Pain/Comfort Level and Functional Ability  Outcome: Ongoing (interventions implemented as appropriate)   01/21/19 1809   Pain, Chronic (Adult)   Acceptable Pain/Comfort Level and Functional Ability making progress toward outcome       Problem: Skin Injury Risk (Adult)  Goal: Skin Health and Integrity  Outcome: Ongoing (interventions implemented as appropriate)   01/21/19 1809   Skin Injury Risk (Adult)   Skin Health and Integrity making progress toward outcome       Problem: Guillain-Barré Syndrome (Adult)  Goal: Signs and Symptoms of Listed Potential Problems Will be Absent, Minimized or Managed (Guillain-Barré Syndrome)  Outcome: Ongoing (interventions implemented as appropriate)   01/21/19 1809   Goal/Outcome Evaluation   Problems Assessed (Guillain-Barré Syndrome) all   Problems Present  (Guillain-Barré Syn) autonomic nervous system dysfunction;motor weakness;undernutrition

## 2019-01-23 PROCEDURE — 97116 GAIT TRAINING THERAPY: CPT

## 2019-01-23 PROCEDURE — 97530 THERAPEUTIC ACTIVITIES: CPT

## 2019-01-23 PROCEDURE — 63710000001 PREDNISONE PER 1 MG: Performed by: PHYSICIAN ASSISTANT

## 2019-01-23 PROCEDURE — 94640 AIRWAY INHALATION TREATMENT: CPT

## 2019-01-23 PROCEDURE — 94799 UNLISTED PULMONARY SVC/PX: CPT

## 2019-01-23 PROCEDURE — 94760 N-INVAS EAR/PLS OXIMETRY 1: CPT

## 2019-01-23 PROCEDURE — 25010000002 ENOXAPARIN PER 10 MG: Performed by: PSYCHIATRY & NEUROLOGY

## 2019-01-23 PROCEDURE — 63710000001 PREDNISONE PER 5 MG: Performed by: PHYSICIAN ASSISTANT

## 2019-01-23 RX ADMIN — LISINOPRIL 10 MG: 10 TABLET ORAL at 08:24

## 2019-01-23 RX ADMIN — IPRATROPIUM BROMIDE AND ALBUTEROL SULFATE 3 ML: 2.5; .5 SOLUTION RESPIRATORY (INHALATION) at 07:48

## 2019-01-23 RX ADMIN — GUAIFENESIN 200 MG: 200 SOLUTION ORAL at 20:19

## 2019-01-23 RX ADMIN — Medication 5 MG: at 20:20

## 2019-01-23 RX ADMIN — HYDROCODONE BITARTRATE AND ACETAMINOPHEN 1 TABLET: 7.5; 325 TABLET ORAL at 12:12

## 2019-01-23 RX ADMIN — HYDROCODONE BITARTRATE AND ACETAMINOPHEN 1 TABLET: 7.5; 325 TABLET ORAL at 06:38

## 2019-01-23 RX ADMIN — IPRATROPIUM BROMIDE AND ALBUTEROL SULFATE 3 ML: 2.5; .5 SOLUTION RESPIRATORY (INHALATION) at 19:54

## 2019-01-23 RX ADMIN — ENOXAPARIN SODIUM 40 MG: 40 INJECTION SUBCUTANEOUS at 20:19

## 2019-01-23 RX ADMIN — GUAIFENESIN 200 MG: 200 SOLUTION ORAL at 12:12

## 2019-01-23 RX ADMIN — LEVOTHYROXINE SODIUM 100 MCG: 100 TABLET ORAL at 06:32

## 2019-01-23 RX ADMIN — HYDROXYZINE HYDROCHLORIDE 25 MG: 25 TABLET, FILM COATED ORAL at 20:20

## 2019-01-23 RX ADMIN — GABAPENTIN 300 MG: 300 CAPSULE ORAL at 16:56

## 2019-01-23 RX ADMIN — GUAIFENESIN 200 MG: 200 SOLUTION ORAL at 16:56

## 2019-01-23 RX ADMIN — ASPIRIN 81 MG CHEWABLE TABLET 81 MG: 81 TABLET CHEWABLE at 08:24

## 2019-01-23 RX ADMIN — GUAIFENESIN 200 MG: 200 SOLUTION ORAL at 08:23

## 2019-01-23 RX ADMIN — ESCITALOPRAM OXALATE 10 MG: 20 TABLET, FILM COATED ORAL at 08:24

## 2019-01-23 RX ADMIN — HYDROCODONE BITARTRATE AND ACETAMINOPHEN 1 TABLET: 7.5; 325 TABLET ORAL at 18:23

## 2019-01-23 RX ADMIN — PREDNISONE 30 MG: 10 TABLET ORAL at 08:24

## 2019-01-23 RX ADMIN — GABAPENTIN 300 MG: 300 CAPSULE ORAL at 08:24

## 2019-01-23 RX ADMIN — GABAPENTIN 300 MG: 300 CAPSULE ORAL at 20:20

## 2019-01-23 RX ADMIN — HYDROCHLOROTHIAZIDE 25 MG: 25 TABLET ORAL at 08:23

## 2019-01-23 RX ADMIN — NYSTATIN 500000 UNITS: 500000 SUSPENSION ORAL at 08:24

## 2019-01-23 RX ADMIN — ACETAMINOPHEN 650 MG: 325 TABLET ORAL at 16:56

## 2019-01-23 RX ADMIN — METOPROLOL SUCCINATE 25 MG: 25 TABLET, EXTENDED RELEASE ORAL at 08:24

## 2019-01-23 RX ADMIN — IPRATROPIUM BROMIDE AND ALBUTEROL SULFATE 3 ML: 2.5; .5 SOLUTION RESPIRATORY (INHALATION) at 16:15

## 2019-01-23 RX ADMIN — IPRATROPIUM BROMIDE AND ALBUTEROL SULFATE 3 ML: 2.5; .5 SOLUTION RESPIRATORY (INHALATION) at 11:56

## 2019-01-23 NOTE — PLAN OF CARE
Problem: Patient Care Overview  Goal: Plan of Care Review  Outcome: Ongoing (interventions implemented as appropriate)   01/23/19 6841   Coping/Psychosocial   Plan of Care Reviewed With patient   Plan of Care Review   Progress improving   OTHER   Outcome Summary Pt rested comfortably throughout the night, VSS. BiPAP worn while sleeping. Cont with generalized weakness. Pt hopeful for d/c to rehab today.

## 2019-01-23 NOTE — PLAN OF CARE
Problem: Patient Care Overview  Goal: Plan of Care Review  Outcome: Ongoing (interventions implemented as appropriate)   01/23/19 1775   Coping/Psychosocial   Plan of Care Reviewed With patient   Plan of Care Review   Progress improving   OTHER   Outcome Summary PT IS IMPROVING WITH ALL MOBILITY, ENDURANCE. ABLE TO INITIATE GAIT X 20 FEET WITH R WALKER AND MOD ASSIST. NEEDS FREQUENT REST BREAKS. PT IS GOOD REHAB CANDIDATE.

## 2019-01-23 NOTE — PROGRESS NOTES
Case Management Discharge Note    Final Note: Per Dorcas with [facility], patient's insurance has approved her admission and she will have a bed on the Spinal Cord Unit on Thursday, 1/24/19, if medically ready. Updated patient in the room and spoke with her daughter, Hayder, by phone. She states she and her sister will transport their mother by private car and will be here early in the morning. [facility] has requested that patient arrive in the morning if possible. CM will fax the DC summary when available to [phone]. RN, please call report to [phone], and please send a copy of the DC summary/AVS in the discharge packet. Thank you.     Destination - Selection Complete      Service Provider Request Status Selected Services Address Phone Number Fax Number    [facility] Selected Inpatient Rehabilitation [address] [id number] [phone]      Durable Medical Equipment      No service has been selected for the patient.      Dialysis/Infusion      No service has been selected for the patient.      Home Medical Care      No service has been selected for the patient.      Community Resources      No service has been selected for the patient.             Final Discharge Disposition Code: 62 - inpatient rehab facility

## 2019-01-23 NOTE — THERAPY TREATMENT NOTE
Acute Care - Physical Therapy Treatment Note  [facility]     Patient Name: Kristina Heck  : 1960  MRN: [id number]  Today's Date: 2019  Onset of Illness/Injury or Date of Surgery: 19  Date of Referral to PT: 19  Referring Physician: MD Roxana    Admit Date: 2019    Visit Dx:    ICD-10-CM ICD-9-CM   1. Left ureter dilated N28.82 593.89   2. Hypoxia R09.02 799.02   3. COPD exacerbation (CMS/HCC) J44.1 491.21   4. Weakness R53.1 780.79   5. Impaired functional mobility, balance, gait, and endurance Z74.09 V49.89   6. Acute on chronic respiratory failure with hypoxia (CMS/HCC) J96.21 518.84     799.02   7. Pharyngeal dysphagia R13.13 787.23   8. Impaired mobility and ADLs Z74.09 799.89     Patient Active Problem List   Diagnosis   • Acute on chronic respiratory failure with hypoxia (2L @ baseline)   • COPD with acute exacerbation (CMS/HCC)   • Hypothyroidism (acquired)   • Essential hypertension   • Depression   • left renal collecting system obstruction   • Guillain-[place] syndrome (CMS/HCC)   • Dysphagia   • Tobacco abuse   • JURGEN on CPAP   • Left nephrolithiasis       Therapy Treatment    Rehabilitation Treatment Summary     Row Name 19 1520             Treatment Time/Intention    Discipline  physical therapist  -CD      Document Type  therapy note (daily note)  -CD      Subjective Information  complains of;weakness EXCITED TO GO TO REHAB TOMORROW.   -CD      Mode of Treatment  physical therapy  -CD      Patient/Family Observations  PT SITTING UIC UPON ARRIVAL ON RA.   -CD      Care Plan Review  care plan/treatment goals reviewed  -CD      Therapy Frequency (PT Clinical Impression)  daily  -CD      Patient Effort  good  -CD      Existing Precautions/Restrictions  fall GENERALIZED WEAKNESS, DECREASED SENSATION.   -CD      Patient Response to Treatment  PT MOTIVATED TO WORK WITH THERAPY.   -CD      Recorded by [CD] Jo Cooper, PT 19 6151      Row Name 19 1521              Vital Signs    Pre Systolic BP Rehab  -- VSS. NSG CLEARED FOR TREATMENT.   -CD      Recorded by [CD] Jo Cooper, PT 01/23/19 1607      Row Name 01/23/19 1520             Cognitive Assessment/Intervention- PT/OT    Affect/Mental Status (Cognitive)  WFL  -CD      Orientation Status (Cognition)  oriented x 4  -CD      Follows Commands (Cognition)  does not follow one step commands;over 90% accuracy;verbal cues/prompting required  -CD      Personal Safety Interventions  fall prevention program maintained;gait belt;muscle strengthening facilitated;nonskid shoes/slippers when out of bed;supervised activity  -CD      Recorded by [CD] Jo Cooper, PT 01/23/19 1607      Row Name 01/23/19 1520             Safety Issues, Functional Mobility    Safety Issues Affecting Function (Mobility)  insight into deficits/self awareness  -CD      Impairments Affecting Function (Mobility)  balance;endurance/activity tolerance;sensation/sensory awareness;shortness of breath  -CD      Recorded by [CD] Jo Cooper, PT 01/23/19 1607      Row Name 01/23/19 1520             Bed Mobility Assessment/Treatment    Scooting/Bridging [place] (Bed Mobility)  minimum assist (75% patient effort);2 person assist;verbal cues  -CD      Supine-Sit [place] (Bed Mobility)  minimum assist (75% patient effort)  -CD      Bed Mobility, Safety Issues  decreased use of legs for bridging/pushing  -CD      Assistive Device (Bed Mobility)  head of bed elevated  -CD      Comment (Bed Mobility)  PT FATIGUED AFTER WALK IN [place].   -CD      Recorded by [CD] Jo Cooper, PT 01/23/19 1607      Row Name 01/23/19 1520             Transfer Assessment/Treatment    Transfer Assessment/Treatment  sit-stand transfer;stand-sit transfer  -CD      Comment (Transfers)  CUES FOR SEQUENCING, FOOT PLACEMENT AND POSTURE ONCE STANDING.   -CD      Recorded by [CD] Jo Cooper, PT 01/23/19 1607      Row Name 01/23/19 1520             Sit-Stand Transfer     Sit-Stand [place] (Transfers)  minimum assist (75% patient effort);verbal cues  -CD      Assistive Device (Sit-Stand Transfers)  walker, front-wheeled  -CD      Recorded by [CD] Jo Cooper, PT 01/23/19 1607      Row Name 01/23/19 1520             Stand-Sit Transfer    Stand-Sit [place] (Transfers)  minimum assist (75% patient effort);verbal cues  -CD      Assistive Device (Stand-Sit Transfers)  walker, front-wheeled  -CD      Recorded by [CD] Jo Cooper, PT 01/23/19 1607      Row Name 01/23/19 1520             Stand Pivot/Stand Step Transfer    Stand Pivot/Stand Step [place]  verbal cues;moderate assist (50% patient effort)  -CD      Assistive Device (Stand Pivot Stand Step Transfer)  walker, front-wheeled  -CD      Recorded by [CD] Jo Cooper, PT 01/23/19 1607      Row Name 01/23/19 1520             Gait/Stairs Assessment/Training    44117 - Gait Training Minutes   15  -CD      Gait/Stairs Assessment/Training  gait/ambulation independence;gait/ambulation assistive device;distance ambulated  -CD      [place] Level (Gait)  moderate assist (50% patient effort)  -CD      Assistive Device (Gait)  walker, front-wheeled  -CD      Distance in Feet (Gait)  20 FEET WITH ONE REST BREAK.   -CD      Pattern (Gait)  step-through  -CD      Deviations/Abnormal Patterns (Gait)  ataxic;base of support, wide;gait speed decreased;stride length decreased  -CD      Bilateral Gait Deviations  forward flexed posture;knee buckling, bilateral RELIES HEAVILY ON WALKER.   -CD      Recorded by [CD] Jo Cooper, PT 01/23/19 1607      Row Name 01/23/19 1520             Motor Skills Assessment/Interventions    Additional Documentation  Balance (Group);Balance Interventions (Group)  -CD      Recorded by [CD] Jo Cooper, PT 01/23/19 1607      Row Name 01/23/19 1520             Therapeutic Exercise    48861 - PT Therapeutic Activity Minutes  15  -CD      Recorded by [CD] Jo Cooper, PT 01/23/19 1607       Row Name 01/23/19 1520             Balance    Balance  dynamic standing balance;static standing balance  -CD      Recorded by [CD] Jo Cooper, PT 01/23/19 1607      Row Name 01/23/19 1520             Static Sitting Balance    Level of [place] (Unsupported Sitting, Static Balance)  independent  -CD      Sitting Position (Unsupported Sitting, Static Balance)  sitting on edge of bed  -CD      Time Able to Maintain Position (Unsupported Sitting, Static Balance)  more than 5 minutes  -CD      Recorded by [CD] Jo Cooper, PT 01/23/19 1607      Row Name 01/23/19 1520             Static Standing Balance    Level of [place] (Supported Standing, Static Balance)  contact guard assist  -CD      Time Able to Maintain Position (Supported Standing, Static Balance)  1 to 2 minutes  -CD      Assistive Device Utilized (Supported Standing, Static Balance)  walker, rolling  -CD      Recorded by [CD] Jo Coopre, PT 01/23/19 1607      Row Name 01/23/19 1520             Dynamic Standing Balance    Level of [place], Reaches Outside Midline (Standing, Dynamic Balance)  moderate assist, 50 to 74% patient effort WITH R WALKER.   -CD      Recorded by [CD] Jo Cooper, PT 01/23/19 1607      Row Name 01/23/19 1520             Positioning and Restraints    Pre-Treatment Position  sitting in chair/recliner  -CD      Post Treatment Position  bed  -CD      In Bed  supine;call light within reach;encouraged to call for assist;exit alarm on  -CD      Recorded by [CD] Jo Cooper, PT 01/23/19 1607      Row Name 01/23/19 1520             Pain Scale: Numbers Pre/Post-Treatment    Pain Scale: Numbers, Pretreatment  0/10 - no pain  -CD      Pain Scale: Numbers, Post-Treatment  0/10 - no pain  -CD      Recorded by [CD] Jo Cooper, PT 01/23/19 1607      Row Name 01/23/19 1520             Plan of Care Review    Plan of Care Reviewed With  patient  -CD      Recorded by [CD] Jo Cooper, PT 01/23/19 1607      Row Name  01/23/19 1520             Outcome Summary/Treatment Plan (PT)    Daily Summary of Progress (PT)  progress toward functional goals is good  -CD      Anticipated Discharge Disposition (PT)  inpatient rehabilitation facility  -CD      Recorded by [CD] Jo Cooper, PT 01/23/19 1607        User Key  (r) = Recorded By, (t) = Taken By, (c) = Cosigned By    Initials Name Effective Dates Discipline    CD Jo Cooper, PT 06/19/15 -  PT                   Physical Therapy Education     Title: PT OT SLP Therapies (In Progress)     Topic: Physical Therapy (Done)     Point: Mobility training (Done)     Learning Progress Summary           Patient Acceptance, E, VU,NR by CD at 1/23/2019  4:07 PM    Comment:  BENEFITS OF OOB ACTIVITY, SAFETY WITH MOBILITY, GAIT TRAINING, TRANSFER TRAINING,    Acceptance, E, VU,NR by CD at 1/22/2019  1:40 PM    Comment:  TRANSFER TRAINING , THER EX/HEP, BENEFITS OF OOB ACTIVITY, PROGRESSION OF POC, D/C PLANNING.    Acceptance, E, VU by EM at 1/22/2019  3:28 AM    Acceptance, E,D, VU,DU by KARLOS at 1/19/2019 11:18 AM    Acceptance, E,D, VU,NR by RIO at 1/18/2019 10:35 AM    Acceptance, E,D,H, VU,NR by RIO at 1/17/2019 11:15 AM    Acceptance, E, VU by REYNALDO at 1/14/2019 10:07 AM    Acceptance, E,D, VU,NR by MB at 1/13/2019  9:55 AM    Comment:  log roll technique, transfer mechanics/safety, POC progression, benefits of mobility, home safety   Family Acceptance, E,D, VU,DU by KARLOS at 1/19/2019 11:18 AM    Acceptance, E,D, VU,NR by LS at 1/18/2019 10:35 AM    Acceptance, E,D,H, VU,NR by LS at 1/17/2019 11:15 AM                   Point: Home exercise program (Done)     Learning Progress Summary           Patient Acceptance, E, VU,NR by CD at 1/23/2019  4:07 PM    Comment:  BENEFITS OF OOB ACTIVITY, SAFETY WITH MOBILITY, GAIT TRAINING, TRANSFER TRAINING,    Acceptance, E, VU,NR by CD at 1/22/2019  1:40 PM    Comment:  TRANSFER TRAINING , THER EX/HEP, BENEFITS OF OOB ACTIVITY, PROGRESSION OF POC, D/C PLANNING.     Acceptance, E, VU by EM at 1/22/2019  3:28 AM    Acceptance, E,D, VU,DU by SJ at 1/19/2019 11:18 AM    Acceptance, E,D, VU,NR by LS at 1/18/2019 10:35 AM    Acceptance, E,D,H, VU,NR by LS at 1/17/2019 11:15 AM    Acceptance, E, VU by REYNALDO at 1/14/2019 10:07 AM    Acceptance, E,D, VU,NR by MB at 1/13/2019  9:55 AM    Comment:  log roll technique, transfer mechanics/safety, POC progression, benefits of mobility, home safety   Family Acceptance, E,D, VU,DU by SJ at 1/19/2019 11:18 AM    Acceptance, E,D, VU,NR by RIO at 1/18/2019 10:35 AM    Acceptance, E,D,H, VU,NR by LS at 1/17/2019 11:15 AM                   Point: Body mechanics (Done)     Learning Progress Summary           Patient Acceptance, E, VU,NR by CD at 1/23/2019  4:07 PM    Comment:  BENEFITS OF OOB ACTIVITY, SAFETY WITH MOBILITY, GAIT TRAINING, TRANSFER TRAINING,    Acceptance, E, VU,NR by CD at 1/22/2019  1:40 PM    Comment:  TRANSFER TRAINING , THER EX/HEP, BENEFITS OF OOB ACTIVITY, PROGRESSION OF POC, D/C PLANNING.    Acceptance, E, VU by EM at 1/22/2019  3:28 AM    Acceptance, E,D, VU,DU by KARLOS at 1/19/2019 11:18 AM    Acceptance, E,D, VU,NR by LS at 1/18/2019 10:35 AM    Acceptance, E,D,H, VU,NR by LS at 1/17/2019 11:15 AM    Acceptance, E, VU by REYNALDO at 1/14/2019 10:07 AM    Acceptance, E,D, VU,NR by MB at 1/13/2019  9:55 AM    Comment:  log roll technique, transfer mechanics/safety, POC progression, benefits of mobility, home safety   Family Acceptance, E,D, VU,DU by SJ at 1/19/2019 11:18 AM    Acceptance, E,D, VU,NR by LS at 1/18/2019 10:35 AM    Acceptance, E,D,H, VU,NR by LS at 1/17/2019 11:15 AM                   Point: Precautions (Done)     Learning Progress Summary           Patient Acceptance, E, RUBI,NR by CD at 1/23/2019  4:07 PM    Comment:  BENEFITS OF OOB ACTIVITY, SAFETY WITH MOBILITY, GAIT TRAINING, TRANSFER TRAINING,    Acceptance, E, RUBI,NR by CD at 1/22/2019  1:40 PM    Comment:  TRANSFER TRAINING , THER EX/HEP, BENEFITS OF OOB  ACTIVITY, PROGRESSION OF POC, D/C PLANNING.    Acceptance, E, VU by EM at 1/22/2019  3:28 AM    Acceptance, E,D, VU,DU by SJ at 1/19/2019 11:18 AM    Acceptance, E,D, VU,NR by LS at 1/18/2019 10:35 AM    Acceptance, E,D,H, VU,NR by LS at 1/17/2019 11:15 AM    Acceptance, E, VU by KM at 1/14/2019 10:07 AM    Acceptance, E,D, VU,NR by MB at 1/13/2019  9:55 AM    Comment:  log roll technique, transfer mechanics/safety, POC progression, benefits of mobility, home safety   Family Acceptance, E,D, VU,DU by SJ at 1/19/2019 11:18 AM    Acceptance, E,D, VU,NR by LS at 1/18/2019 10:35 AM    Acceptance, E,D,H, VU,NR by LS at 1/17/2019 11:15 AM                               User Key     Initials Effective Dates Name Provider Type Discipline    CD 06/19/15 -  Jo Cooper, PT Physical Therapist PT     06/19/15 -  Tere Cardoso, PT Physical Therapist PT     06/19/15 -  Magaly Ireland, PT Physical Therapist PT     06/19/15 -  Gege Malik, PT Physical Therapist PT    MB 03/14/16 -  Bronwyn Calvillo, PT Physical Therapist PT    EM 06/12/17 -  Wendy Hayes RN Registered Nurse Nurse                PT Recommendation and Plan  Anticipated Discharge Disposition (PT): inpatient rehabilitation facility  Therapy Frequency (PT Clinical Impression): daily  Outcome Summary/Treatment Plan (PT)  Daily Summary of Progress (PT): progress toward functional goals is good  Anticipated Discharge Disposition (PT): inpatient rehabilitation facility  Plan of Care Reviewed With: patient  Progress: improving  Outcome Summary: PT IS IMPROVING WITH ALL MOBILITY, ENDURANCE. ABLE TO INITIATE GAIT X 20 FEET WITH R WALKER AND MOD ASSIST. NEEDS FREQUENT REST BREAKS. PT IS GOOD REHAB CANDIDATE.   Outcome Measures     Row Name 01/23/19 1520 01/22/19 1340 01/22/19 1121       How much help from another person do you currently need...    Turning from your back to your side while in flat bed without using bedrails?  3  -CD  --  3  -CD     Moving from lying on back to sitting on the side of a flat bed without bedrails?  2  -CD  --  2  -CD    Moving to and from a bed to a chair (including a wheelchair)?  2  -CD  --  2  -CD    Standing up from a chair using your arms (e.g., wheelchair, bedside chair)?  3  -CD  --  2  -CD    Climbing 3-5 steps with a railing?  2  -CD  --  1  -CD    To walk in hospital room?  2  -CD  --  2  -CD    AM-PAC 6 Clicks Score  14  -CD  --  12  -CD       How much help from another is currently needed...    Putting on and taking off regular lower body clothing?  --  1  -HK  --    Bathing (including washing, rinsing, and drying)  --  2  -HK  --    Toileting (which includes using toilet bed pan or urinal)  --  2  -HK  --    Putting on and taking off regular upper body clothing  --  2  -HK  --    Taking care of personal grooming (such as brushing teeth)  --  3  -HK  --    Eating meals  --  3  -HK  --    Score  --  13  -HK  --       Functional Assessment    Outcome Measure Options  AM-PAC 6 Clicks Basic Mobility (PT);Modified Анна  -CD  AM-PAC 6 Clicks Daily Activity (OT)  -HK  AM-[facility] 6 Clicks Basic Mobility (PT);Modified [place]  -      User Key  (r) = Recorded By, (t) = Taken By, (c) = Cosigned By    Initials Name Provider Type    CD Jo Cooper, PT Physical Therapist    HK Ashli Robledo, OT Occupational Therapist         Time Calculation:   PT Charges     Row Name 01/23/19 1520             Time Calculation    Start Time  1520  -CD      PT Received On  01/23/19  -CD      PT Goal Re-Cert Due Date  01/27/19  -CD         Time Calculation- PT    Total Timed Code Minutes- PT  30 minute(s)  -CD         Timed Charges    91270 - Gait Training Minutes   15  -CD      63767 - PT Therapeutic Activity Minutes  15  -CD        User Key  (r) = Recorded By, (t) = Taken By, (c) = Cosigned By    Initials Name Provider Type    CD Jo Cooper, PT Physical Therapist        Therapy Suggested Charges     Code   Minutes Charges    48294 (CPT®) Hc  Pt Neuromusc Re Education Ea 15 Min      10910 (CPT®) Hc Pt Ther Proc Ea 15 Min      97130 (CPT®) Hc Gait Training Ea 15 Min 15 1    24794 (CPT®) Hc Pt Therapeutic Act Ea 15 Min 15 1    72451 (CPT®) Hc Pt Manual Therapy Ea 15 Min      34533 (CPT®) Hc Pt Iontophoresis Ea 15 Min      34938 (CPT®) Hc Pt Elec Stim Ea-Per 15 Min      70882 (CPT®) Hc Pt Ultrasound Ea 15 Min      19327 (CPT®) Hc Pt Self Care/Mgmt/Train Ea 15 Min      21172 (CPT®) Hc Pt Prosthetic (S) Train Initial Encounter, Each 15 Min      23564 (CPT®) Hc Pt Orthotic(S)/Prosthetic(S) Encounter, Each 15 Min      21596 (CPT®) Hc Orthotic(S) Mgmt/Train Initial Encounter, Each 15min      Total  30 2        Therapy Charges for Today     Code Description Service Date Service Provider Modifiers Qty    69778573434 HC PT THER PROC EA 15 MIN 1/22/2019 Jo Cooper, PT GP 1    94117551142 HC PT THERAPEUTIC ACT EA 15 MIN 1/22/2019 Jo Cooper, PT GP 2    79505321757 HC PT THER SUPP EA 15 MIN 1/22/2019 Jo Cooper, PT GP 3    68263404714 HC GAIT TRAINING EA 15 MIN 1/23/2019 Jo Cooper, PT GP 1    32667903747 HC PT THERAPEUTIC ACT EA 15 MIN 1/23/2019 Jo Cooper, PT GP 1    55994167836 HC PT THER SUPP EA 15 MIN 1/23/2019 oJ Cooper, PT GP 2          PT G-Codes  Outcome Measure Options: AM-PAC 6 Clicks Basic Mobility (PT), Modified Анна  AM-PAC 6 Clicks Score: 14  Score: 13    Jo Cooper, PT  1/23/2019

## 2019-01-24 VITALS
HEIGHT: 67 IN | TEMPERATURE: 97.8 F | WEIGHT: 214.6 LBS | OXYGEN SATURATION: 87 % | RESPIRATION RATE: 20 BRPM | HEART RATE: 81 BPM | SYSTOLIC BLOOD PRESSURE: 109 MMHG | BODY MASS INDEX: 33.68 KG/M2 | DIASTOLIC BLOOD PRESSURE: 59 MMHG

## 2019-01-24 PROBLEM — J44.1 COPD WITH ACUTE EXACERBATION (HCC): Status: RESOLVED | Noted: 2019-01-12 | Resolved: 2019-01-24

## 2019-01-24 PROBLEM — J96.21 ACUTE ON CHRONIC RESPIRATORY FAILURE WITH HYPOXIA (HCC): Status: RESOLVED | Noted: 2019-01-12 | Resolved: 2019-01-24

## 2019-01-24 PROBLEM — R13.10 DYSPHAGIA: Status: RESOLVED | Noted: 2019-01-16 | Resolved: 2019-01-24

## 2019-01-24 PROBLEM — J96.11 CHRONIC RESPIRATORY FAILURE WITH HYPOXIA (HCC): Status: ACTIVE | Noted: 2019-01-24

## 2019-01-24 PROCEDURE — 94640 AIRWAY INHALATION TREATMENT: CPT

## 2019-01-24 PROCEDURE — 63710000001 PREDNISONE PER 1 MG: Performed by: PHYSICIAN ASSISTANT

## 2019-01-24 PROCEDURE — 63710000001 PREDNISONE PER 5 MG: Performed by: PHYSICIAN ASSISTANT

## 2019-01-24 PROCEDURE — 99239 HOSP IP/OBS DSCHRG MGMT >30: CPT | Performed by: PHYSICIAN ASSISTANT

## 2019-01-24 RX ORDER — GABAPENTIN 300 MG/1
300 CAPSULE ORAL 3 TIMES DAILY
Start: 2019-01-24

## 2019-01-24 RX ORDER — NICOTINE 21 MG/24HR
1 PATCH, TRANSDERMAL 24 HOURS TRANSDERMAL EVERY 24 HOURS
Start: 2019-01-24

## 2019-01-24 RX ORDER — PSEUDOEPHEDRINE HCL 30 MG
100 TABLET ORAL 2 TIMES DAILY PRN
Start: 2019-01-24

## 2019-01-24 RX ORDER — HYDROCODONE BITARTRATE AND ACETAMINOPHEN 7.5; 325 MG/1; MG/1
1 TABLET ORAL EVERY 6 HOURS PRN
Start: 2019-01-24

## 2019-01-24 RX ORDER — PREDNISONE 10 MG/1
TABLET ORAL
Start: 2019-01-24 | End: 2019-01-28

## 2019-01-24 RX ORDER — LISINOPRIL 5 MG/1
5 TABLET ORAL
Start: 2019-01-24

## 2019-01-24 RX ORDER — METOPROLOL SUCCINATE 25 MG/1
25 TABLET, EXTENDED RELEASE ORAL DAILY
Start: 2019-01-24

## 2019-01-24 RX ORDER — ACETAMINOPHEN 325 MG/1
650 TABLET ORAL EVERY 4 HOURS PRN
Start: 2019-01-24

## 2019-01-24 RX ORDER — IBUPROFEN 200 MG
200 TABLET ORAL EVERY 6 HOURS PRN
Start: 2019-01-24

## 2019-01-24 RX ADMIN — GUAIFENESIN 200 MG: 200 SOLUTION ORAL at 08:50

## 2019-01-24 RX ADMIN — LEVOTHYROXINE SODIUM 100 MCG: 100 TABLET ORAL at 06:54

## 2019-01-24 RX ADMIN — IPRATROPIUM BROMIDE AND ALBUTEROL SULFATE 3 ML: 2.5; .5 SOLUTION RESPIRATORY (INHALATION) at 08:08

## 2019-01-24 RX ADMIN — HYDROCODONE BITARTRATE AND ACETAMINOPHEN 1 TABLET: 7.5; 325 TABLET ORAL at 00:09

## 2019-01-24 RX ADMIN — ASPIRIN 81 MG CHEWABLE TABLET 81 MG: 81 TABLET CHEWABLE at 08:51

## 2019-01-24 RX ADMIN — HYDROCHLOROTHIAZIDE 25 MG: 25 TABLET ORAL at 08:52

## 2019-01-24 RX ADMIN — LISINOPRIL 10 MG: 10 TABLET ORAL at 08:52

## 2019-01-24 RX ADMIN — PREDNISONE 30 MG: 10 TABLET ORAL at 08:50

## 2019-01-24 RX ADMIN — GABAPENTIN 300 MG: 300 CAPSULE ORAL at 08:53

## 2019-01-24 RX ADMIN — HYDROCODONE BITARTRATE AND ACETAMINOPHEN 1 TABLET: 7.5; 325 TABLET ORAL at 06:54

## 2019-01-24 RX ADMIN — METOPROLOL SUCCINATE 25 MG: 25 TABLET, EXTENDED RELEASE ORAL at 08:52

## 2019-01-24 RX ADMIN — ESCITALOPRAM OXALATE 10 MG: 20 TABLET, FILM COATED ORAL at 08:51

## 2019-01-24 NOTE — PROGRESS NOTES
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   PSxH: She  has no past surgical history on file.        Reported/Observed/Food/Nutrition Related History   Reports appetite improved,good.  Staff helping with cutting up foods for ease of eating. Per CM notes pt to transfer to [facility] today 1/24.      Anthropometrics   Height: 67\"  Weight: 214lbs 12.8 oz -bed weight 1/21  BMI: 33.63  BMI classification: Obese Class I: 30-34.9kg/m2       Labs reviewed   Labs reviewed: Yes  Results from last 7 days   Lab Units 01/20/19  0427 01/18/19  0535   SODIUM mmol/L 130* 130*   POTASSIUM mmol/L 4.4 4.3   CHLORIDE mmol/L 98* 97*   CO2 mmol/L 29.0 29.0   BUN mg/dL 43* 39*   CREATININE mg/dL 0.72 0.79   GLUCOSE mg/dL 124* 101*   CALCIUM mg/dL 8.5* 8.4*   MAGNESIUM mg/dL 2.0 2.0     Results from last 7 days   Lab Units 01/20/19  0427 01/18/19  0535   WBC 10*3/mm3 9.54 6.32   ALBUMIN g/dL 2.88* 3.06*         Lab Results   Lab Value Date/Time    HGBA1C 6.40 (H) 01/13/2019 0718     Medications reviewed   Medications reviewed: Yes      Intake/Ouptut 24 hrs (7:00AM - 6:59 AM)     Intake & Output (last day)       01/23 0701 - 01/24 0700 01/24 0701 - 01/25 0700    P.O. 1760     Total Intake(mL/kg) " 1760 (18.1)     Urine (mL/kg/hr) 550 (0.2)     Stool 0     Total Output 550     Net +1210           Urine Unmeasured Occurrence 2 x     Stool Unmeasured Occurrence 1 x              Current Nutrition Prescription   PO: Diet Dysphagia; IV - Mechanical Soft No Mixed Consistencies; Thin; No Straws  Oral Nutrition Supplement:  Boost Glucose control 3x/day  Snacks 2pm and HS      Evaluation of Received Nutrient/Fluid Intake:  4 Days: 71% of 7 meals       Nutrition Diagnosis   1/21  Problem Malnutrition -Severe Acute Illness Malnutrition    Etiology Poor PO intake, unintentional wt loss   Signs/Symptoms 7% weight loss x 4 weeks, minimal PO intake x 4 weeks per family report   ongoing  1/24-resolved with increased intake  Problem Inadequate oral intake   Etiology Guillain-[place], difficulty swallowing, weakness   Signs/Symptoms Intake 71% of 7 meals       Intervention   Intervention: Follow treatment progress, Care plan reviewed, Interview for preferences, Encourage intake      Goal:   General: Nutrition support treatment  PO: Maintain intake, Continue positive trend, Advace diet as medically appropriate        Monitoring/Evaluation:       Monitoring/Evaluation: Per protocol, I&O, PO intake, Supplement intake, GI status, Symptoms, Swallow function  Will Continue to follow per protocol  Tere San RD  Time Spent: 20 min

## 2019-01-24 NOTE — DISCHARGE SUMMARY
[facility] Medicine Services  DISCHARGE SUMMARY    Patient Name: Kristina Heck  : 1960  MRN: [id number]    Date of Admission: 2019  Date of Discharge:  2019  Primary Care Physician: Gio Henderson MD    Consults     Date and Time Order Name Status Description    2019 1413 Inpatient Urology Consult Completed     2019 1602 Inpatient Neurology Consult Completed         Hospital Course     Presenting Problem:   Acute on chronic respiratory failure with hypoxia (CMS/HCC) [J96.21]    Active Hospital Problems    Diagnosis Date Noted   • **Guillain-[place] syndrome (CMS/HCC) [G61.0] 2019   • Chronic respiratory failure with hypoxia (CMS/HCC) [J96.11] 2019   • Left nephrolithiasis [N20.0] 2019   • Tobacco abuse [Z72.0] 2019   • JURGEN on CPAP [G47.33, Z99.89] 2019   • Hypothyroidism (acquired) [E03.9] 2019   • Essential hypertension [I10] 2019   • Depression [F32.9] 2019   • left renal collecting system obstruction [N13.5] 2019      Resolved Hospital Problems    Diagnosis Date Noted Date Resolved   • Dysphagia [R13.10] 2019   • Acute on chronic respiratory failure with hypoxia (2L @ baseline) [J96.21] 2019   • COPD with acute exacerbation (CMS/HCC) [J44.1] 2019      Hospital Course:  Ms. Kristina Heck is a 57yo female with PMH significant for HTN, hypothyroidism, chronic back pain on prescription opiate therapy, COPD, ongoing tobacco use, JURGEN on CPAP and depression. She was admitted to [facility] on  due to bilateral pneumonia. At discharge, she was feeling better but a few days later, she developed upper and lower extremity weakness. She presented to [facility] ED on 1/10/19 for evaluation. MRI head/neck ruled out CVA and she was diagnosed with a pinched nerve. She stated that she was evaluated by a neurologist and discharged home. Following discharge,  she developed progressive extremity weakness to the point she could not walk. She also noted increasing dyspnea and presented to [facility] ED on 1/24/2019.     ABG in the ED showed hypoxia and hypercapnia. CXR unremarkable. CTA chest with no evidence of pneumonia or pulmonary embolism. CTA also noted a left renal collecting system obstruction. She was admitted to the hospital medicine service for COPD exacerbation and neurology was consulted.     COPD exacerbation treated with IV Solumedrol, scheduled nebs and Doxycycline.     RPR, TSH, B12, NIF and VC normal. MRI T-spine and C-spine were unremarkable. LP with CSF glucose of 95 protein 66. and EMG/NCS showed severe mixed axonal-demyelinating peripheral neuropathy, compatible with Radha-Cecilton syndrome. Given worsening respiratory status, she was transferred to the ICU for monitoring and respiratory support. She did not require intubation. IV Zosyn added to antibiotic regimen. She was started on IVIG x 5 doses. She was given gabapentin for peripheral neuropathy.     Urology evaluated Ms. Heck and giver her normal creatinine and no evidence of UTI, they recommended observation only. She can follow up with [facility] on an outpatient basis, where she has sought care previously.     PT/OT followed patient and she is making slow improvements. She will transfer to [facility] for continued rehabilitation on 1/24/2019.     Follow up with [facility] Dr. Serra in 1 month    Day of Discharge     HPI:   Up eating breakfast. She has no complaints. Has been using CPAP about half of the night, feels smothered in it. Encouraged her to discuss a nose mask with her [employer] company. No other issues, anxious to get to rehab.     Review of Systems  Gen- No fevers, chills  CV- No chest pain, palpitations  Resp- No cough, dyspnea  GI- No N/V/D, abd pain    Otherwise ROS is negative except as mentioned in the HPI.    Vital Signs:   Temp:  [97.5 °F (36.4 °C)-99.2 °F (37.3 °C)]  97.8 °F (36.6 °C)  Heart Rate:  [62-82] 82  Resp:  [18-20] 20  BP: (102-116)/(52-75) 116/75     Physical Exam:  Constitutional: No acute distress, awake, alert and conversant.   HENT: NCAT, mucous membranes moist  Respiratory: Clear to auscultation bilaterally, respiratory effort normal. 86% on room air, quickly back into the 90's on her baseline 2L NC.   Cardiovascular: RRR, no murmurs, rubs, or gallops, palpable pedal pulses bilaterally  Gastrointestinal: Positive bowel sounds, soft, nontender, nondistended  Musculoskeletal: No bilateral ankle edema  Psychiatric: Appropriate affect, cooperative  Neurologic: Oriented x 3. Generalized weakness, able to lift legs against gravity in bed. Strength symmetric in all extremities, Cranial Nerves grossly intact to confrontation, speech clear  Skin: No rashes    Pertinent  and/or Most Recent Results     Results from last 7 days   Lab Units 01/20/19  0427 01/18/19  0535   WBC 10*3/mm3 9.54 6.32   HEMOGLOBIN g/dL 15.7* 16.1*   HEMATOCRIT % 47.4* 49.0*   PLATELETS 10*3/mm3 177 152   SODIUM mmol/L 130* 130*   POTASSIUM mmol/L 4.4 4.3   CHLORIDE mmol/L 98* 97*   CO2 mmol/L 29.0 29.0   BUN mg/dL 43* 39*   CREATININE mg/dL 0.72 0.79   GLUCOSE mg/dL 124* 101*   CALCIUM mg/dL 8.5* 8.4*     Results from last 7 days   Lab Units 01/20/19  0427 01/18/19  0535   BILIRUBIN mg/dL 1.4* 1.6*   ALK PHOS U/L 63 64   ALT (SGPT) U/L 131* 122*   AST (SGOT) U/L 49* 49*     Brief Urine Lab Results  (Last result in the past 365 days)      Color   Clarity   Blood   Leuk Est   Nitrite   Protein   CREAT   Urine HCG        01/12/19 1341 Yellow Clear Negative Negative Negative Negative             Microbiology Results Abnormal     Procedure Component Value - Date/Time    Respiratory Culture - Sputum, Cough [[id number]]  (Abnormal) Collected:  01/18/19 0819    Lab Status:  Final result Specimen:  Sputum from Cough Updated:  01/20/19 1359     Respiratory Culture Light growth (2+) Normal Respiratory Angelica       Scant growth (1+) Yeast isolated     Gram Stain Moderate (3+) WBCs per low power field      Moderate (3+) Epithelial cells per low power field      Few (2+) Yeast      Few (2+) Gram positive cocci in pairs and chains      Few (2+) Gram variable bacilli        Imaging Results (all)     Procedure Component Value Units Date/Time    Fiberoptic Endo (fees) [[id number]] Resulted:  01/16/19 1457     Updated:  01/16/19 1457    Narrative:       This procedure was auto-finalized with no dictation required.    XR Chest 1 View [[id number]] Collected:  01/16/19 0906     Updated:  01/16/19 0917    Narrative:       EXAMINATION: XR CHEST 1 VW-01/16/2019:      INDICATION: Dyspnea; N28.82-Megaloureter; R09.02-Hypoxemia;  J44.1-Chronic obstructive pulmonary disease with (acute) exacerbation;  R53.1-Weakness; Z74.09-Other reduced mobility; J96.21-Acute and chronic  respiratory failure with hypoxia.     TECHNIQUE:  Single view frontal chest.     COMPARISONS: 01/12/2019.     FINDINGS:  Lungs are without focal abnormality aside from subsegmental  atelectasis of the left lung base. No sizable pleural effusion or  pneumothorax. Cardiomediastinal silhouette is within normal limits.       Impression:       No significant interval change.     D:  01/16/2019  E:  01/16/2019     This report was finalized on 1/16/2019 9:15 AM by Javier Connors.       IR Lumbar Puncture Diag/Thera [[id number]] Collected:  01/15/19 1512     Updated:  01/15/19 1524    Narrative:       EXAMINATION: IR LUMBAR PUNCTURE DIAG/THERA-     INDICATION: generalized weakness, r/o GBS; N28.82-Megaloureter;  R09.02-Hypoxemia; J44.1-Chronic obstructive pulmonary disease with  (acute) exacerbation; R53.1-Weakness; Z74.09-Other reduced mobility;  J96.21-Acute and chronic respiratory failure with hypoxia     TECHNIQUE: 28 seconds of fluoroscopic time was used for this procedure.  2 associated images were saved. Informed consent was obtained from the  patient's daughter. The patient was  placed in the prone position on the  table. The back was prepped and draped in a sterile fashion. 1%  lidocaine was used as a local anesthetic to the skin and subcutaneous  tissues. Under fluoroscopic guidance a 20-gauge spinal needle was  advanced at the L2-L3 level to the CSF space. Approximately 8 cc of  clear and colorless CSF was returned and collected in 4 numbered vials.  Sample vials were labeled and sent to pathology for further analysis.  The needle was removed.     COMPARISON: NONE     FINDINGS: The patient tolerated the procedure well, and no immediate  complications occurred.          Impression:       Successful fluoroscopic guided lumbar puncture as above with  no immediate complications.         This report was finalized on 1/15/2019 3:22 PM by Javier Connors.       Fiberoptic Endo (fees) [[id number]] Resulted:  01/15/19 1354     Updated:  01/15/19 1354    Narrative:       This procedure was auto-finalized with no dictation required.    CT Abdomen Pelvis With & Without Contrast [[id number]] Collected:  01/15/19 0946     Updated:  01/15/19 1312    Narrative:       EXAMINATION: CT ABDOMEN AND PELVIS W WO CONTRAST-      INDICATION: Left renal collecting system obstruction;  N28.82-Megaloureter; R09.02-Hypoxemia; J44.1-Chronic obstructive  pulmonary disease with (acute) exacerbation; R53.1-Weakness;  Z74.09-Other reduced mobility; J96.21-Acute and chronic respiratory  failure with hypoxia.     TECHNIQUE: CT scan of abdomen and pelvis performed with and without  intravenous contrast.     The radiation dose reduction device was turned on for each scan per the  ALARA (As Low as Reasonably Achievable) protocol.     COMPARISON: None.     FINDINGS: The most superior images demonstrate bibasilar airspace  disease, left greater than right.      The liver and spleen are normal.  There is no adrenal mass. The pancreas  is normal.  There are small bilateral renal parenchymal stones. More  importantly, there is a 1 cm  stone in the left ureteropelvic junction  producing moderately severe obstruction. Also there is a 1.2 cm area of  low density within the left renal pelvis. This is not calcified on  images obtained prior to contrast and may represent clot. There are also  simple cysts in both kidneys. There is no ascites, aneurysm or  retroperitoneal lymphadenopathy. There is no pelvic mass or fluid.   There is no bladder stone.       Impression:       1. There is a 1 cm stone in the left ureteropelvic junction producing  moderately severe obstruction of the left kidney. There is also a 12 mm  low density filling defect in the renal pelvis. On the unenhanced  examination this is not a calcified stone this may represent clot  related to the ureteral stone and obstruction and less likely a  noncalcified stone.   2. Lastly there is bibasilar airspace disease, left greater than right.     D:  01/15/2019  E:  01/15/2019     This report was finalized on 1/15/2019 1:10 PM by Dr. Cyril Lanza MD.       MRI Thoracic Spine With & Without Contrast [[id number]] Collected:  01/15/19 1032     Updated:  01/15/19 1041    Narrative:       EXAMINATION: MRI THORACIC SPINE W WO CONTRAST-, MRI CERVICAL SPINE W WO  CONTRAST-      INDICATION: T/L-spine trauma, significant injury suspected, neurologic  deficits.     TECHNIQUE:  Multiplanar multisequence MRI of the cervical and thoracic  spine was performed without and with contrast.     COMPARISONS:  CT chest 01/12/2019, CT abdomen/pelvis 01/14/2019.     FINDINGS: CERVICAL SPINE: Sagittal images cover from clivus through T3  caudally.     The cervicomedullary junction is unremarkable.  The cervical cord is  normal in caliber and signal.  There is no abnormal enhancement in the  spinal canal or in the paraspinal soft tissues.  No paraspinal edema to  suggest ligamentous injury.     With regard to the marrow space, vertebral body heights are maintained.   There is maintenance of the usual lordosis without  significant  spondylolisthesis.  Background marrow signal is normal.      DEGENERATIVE CHANGES ARE AS FOLLOWS:     C2-C3: No significant foraminal or spinal canal stenosis.     C3-C4: Shallow posterior disc osteophyte complex without significant  foraminal or spinal canal stenosis.     C4-C5: Shallow posterior disc osteophyte complex and right-sided  uncovertebral arthropathy. Mild right foraminal stenosis. Mild spinal  canal stenosis.     C5-C6: Prominent disc osteophyte complex and right greater than left  uncovertebral arthropathy. Moderate right foraminal stenosis. Moderate  spinal canal stenosis.     C6-C7: Prominent asymmetric disc osteophyte complex towards the right  and right greater than left uncovertebral arthropathy. Mild right  foraminal stenosis. Mild spinal canal stenosis.     C7-T1: No significant foraminal or spinal canal stenosis.     Incidental imaging of the head and cervical soft tissues is  unremarkable. Bilateral minor mastoid effusions.     THORACIC SPINE: Sagittal imaging covers from C7 through the superior  half of the L2 body caudally.     The imaged spinal cord is normal in caliber without intrinsic cord  signal abnormality identified.  There is no abnormal enhancement in the  spinal canal or in the paraspinal soft tissues. No paraspinal edema to  suggest ligamentous injury.     With regard to the marrow space, vertebral body heights are maintained.   There is maintenance of the usual kyphosis without significant  spondylolisthesis. Background marrow signal is normal.     As is commonly the case in the thoracic spine, degenerative changes are  negligible.  There is no significant foraminal or spinal canal stenosis  at any imaged thoracic level.     Incidental imaging of the thoracic soft tissues is unrevealing.  Obstructed left renal collecting system again noted.       Impression:       1. No evidence of acute osseous or ligamentous injury.  2. Moderate cervical spine degenerative  change. No significant thoracic  spine degenerative change.  3. No abnormal enhancement regarding the cervical or thoracic spine.     D:  01/15/2019  E:  01/15/2019     This report was finalized on 1/15/2019 10:39 AM by Javier Connors.       MRI Cervical Spine With & Without Contrast [[id number]] Collected:  01/15/19 1032     Updated:  01/15/19 1041    Narrative:       EXAMINATION: MRI THORACIC SPINE W WO CONTRAST-, MRI CERVICAL SPINE W WO  CONTRAST-      INDICATION: T/L-spine trauma, significant injury suspected, neurologic  deficits.     TECHNIQUE:  Multiplanar multisequence MRI of the cervical and thoracic  spine was performed without and with contrast.     COMPARISONS:  CT chest 01/12/2019, CT abdomen/pelvis 01/14/2019.     FINDINGS: CERVICAL SPINE: Sagittal images cover from clivus through T3  caudally.     The cervicomedullary junction is unremarkable.  The cervical cord is  normal in caliber and signal.  There is no abnormal enhancement in the  spinal canal or in the paraspinal soft tissues.  No paraspinal edema to  suggest ligamentous injury.     With regard to the marrow space, vertebral body heights are maintained.   There is maintenance of the usual lordosis without significant  spondylolisthesis.  Background marrow signal is normal.      DEGENERATIVE CHANGES ARE AS FOLLOWS:     C2-C3: No significant foraminal or spinal canal stenosis.     C3-C4: Shallow posterior disc osteophyte complex without significant  foraminal or spinal canal stenosis.     C4-C5: Shallow posterior disc osteophyte complex and right-sided  uncovertebral arthropathy. Mild right foraminal stenosis. Mild spinal  canal stenosis.     C5-C6: Prominent disc osteophyte complex and right greater than left  uncovertebral arthropathy. Moderate right foraminal stenosis. Moderate  spinal canal stenosis.     C6-C7: Prominent asymmetric disc osteophyte complex towards the right  and right greater than left uncovertebral arthropathy. Mild  right  foraminal stenosis. Mild spinal canal stenosis.     C7-T1: No significant foraminal or spinal canal stenosis.     Incidental imaging of the head and cervical soft tissues is  unremarkable. Bilateral minor mastoid effusions.     THORACIC SPINE: Sagittal imaging covers from C7 through the superior  half of the L2 body caudally.     The imaged spinal cord is normal in caliber without intrinsic cord  signal abnormality identified.  There is no abnormal enhancement in the  spinal canal or in the paraspinal soft tissues. No paraspinal edema to  suggest ligamentous injury.     With regard to the marrow space, vertebral body heights are maintained.   There is maintenance of the usual kyphosis without significant  spondylolisthesis. Background marrow signal is normal.     As is commonly the case in the thoracic spine, degenerative changes are  negligible.  There is no significant foraminal or spinal canal stenosis  at any imaged thoracic level.     Incidental imaging of the thoracic soft tissues is unrevealing.  Obstructed left renal collecting system again noted.       Impression:       1. No evidence of acute osseous or ligamentous injury.  2. Moderate cervical spine degenerative change. No significant thoracic  spine degenerative change.  3. No abnormal enhancement regarding the cervical or thoracic spine.     D:  01/15/2019  E:  01/15/2019     This report was finalized on 1/15/2019 10:39 AM by Javier Connors.       CT Angiogram Chest With Contrast [[id number]] Collected:  01/12/19 1705     Updated:  01/12/19 1724    Narrative:       EXAMINATION: CT ANGIOGRAM CHEST W/CONTRAST - 1/12/2019      INDICATION: Evaluate for pulmonary embolus.      TECHNIQUE:  Axial CT data of the chest were obtained helically per PE  protocol following IV contrast administration.  Multiplanar reformatted  images and 2D reconstructions (MIPs) were generated and reviewed.   Computer aided detection was utilized in the interpretation.  The  radiation dose reduction device was turned on for each scan per the  ALARA (As Low as Reasonably Achievable) protocol     COMPARISONS:  None.     FINDINGS:   No acute pulmonary embolism is seen. Central airways are  patent without endobronchial lesion or debris.  Heart size is within  normal limits. No suspicious lymph node. The lungs are clear without  focal opacity aside from a couple of calcified granulomas in the left  lower lobe.  No pleural effusion or pneumothorax. Chest wall soft  tissues are normal. Thoracic vertebral body heights are normal. There is  left-sided hydronephrosis and proximal hydroureter, moderate in degree.  There is a nonobstructing right renal calculus and a tiny cyst in the  superior posterior interpolar cortex of the right kidney.       Impression:       1. No evidence for pulmonary embolism or other acute cardiopulmonary  findings.  2. Left renal collecting system obstruction. Correlation with urinalysis  and CT urogram recommended after the contrast from this bolus has  cleared.     DICTATED:   1/12/2019  EDITED/ls :   1/12/2019      This report was finalized on 1/12/2019 5:22 PM by Javier Connors.       XR Chest 1 View [[id number]] Collected:  01/12/19 1419     Updated:  01/12/19 1508    Narrative:       EXAMINATION: XR CHEST 1 VW - 1/12/2019     INDICATION: Shortness of air.     TECHNIQUE: Single-view frontal chest.     COMPARISONS: None.     FINDINGS:  Lungs are without focal abnormality. No pleural effusion or  pneumothorax. Cardiomediastinal silhouette is within normal limits.       Impression:       No acute finding.     DICTATED:   1/12/2019  EDITED/ls :   1/12/2019      This report was finalized on 1/12/2019 3:06 PM by Javier Connors.           Discharge Details        Discharge Medications      New Medications      Instructions Start Date   acetaminophen 325 MG tablet  Commonly known as:  TYLENOL   650 mg, Oral, Every 4 Hours PRN      docusate sodium 100 MG capsule   100 mg,  Oral, 2 Times Daily PRN      gabapentin 300 MG capsule  Commonly known as:  NEURONTIN   300 mg, Oral, 3 Times Daily      guaiFENesin 100 MG/5ML solution oral solution  Commonly known as:  ROBITUSSIN   200 mg, Oral, Every 6 Hours PRN      HYDROcodone-acetaminophen 7.5-325 MG per tablet  Commonly known as:  NORCO   1 tablet, Oral, Every 6 Hours PRN      ibuprofen 200 MG tablet  Commonly known as:  ADVIL,MOTRIN   200 mg, Oral, Every 6 Hours PRN      lisinopril 5 MG tablet  Commonly known as:  PRINIVIL,ZESTRIL   5 mg, Oral, Every 24 Hours Scheduled      melatonin 5 MG sublingual tablet sublingual tablet   5 mg, Sublingual, Nightly PRN      nicotine 14 MG/24HR patch  Commonly known as:  NICODERM CQ   1 patch, Transdermal, Every 24 Hours      polyethylene glycol pack packet  Commonly known as:  MIRALAX   17 g, Oral, Daily      predniSONE 10 MG tablet  Commonly known as:  DELTASONE   Take 2 tablets by mouth Daily for 2 days, THEN 1 tablet Daily for 2 days.   Start Date:  1/24/2019        Continue These Medications      Instructions Start Date   albuterol sulfate  (90 Base) MCG/ACT inhaler  Commonly known as:  PROVENTIL HFA;VENTOLIN HFA;PROAIR HFA   2 puffs, Inhalation, Every 4 Hours PRN      aspirin 81 MG chewable tablet   81 mg, Oral, Daily      BREO ELLIPTA 200-25 MCG/INH aerosol powder  inhaler  Generic drug:  Fluticasone Furoate-Vilanterol   1 puff, Inhalation, Daily      escitalopram 10 MG tablet  Commonly known as:  LEXAPRO   10 mg, Oral, Daily      hydrochlorothiazide 25 MG tablet  Commonly known as:  HYDRODIURIL   25 mg, Oral, Daily      hydrOXYzine 25 MG tablet  Commonly known as:  ATARAX   25 mg, Oral, Every 6 Hours PRN      INCRUSE ELLIPTA 62.5 MCG/INH aerosol powder   Generic drug:  Umeclidinium Bromide   1 puff, Inhalation, Daily      levothyroxine 100 MCG tablet  Commonly known as:  SYNTHROID, LEVOTHROID   100 mcg, Oral, Daily      metoprolol succinate XL 25 MG 24 hr tablet  Commonly known as:   TOPROL-XL   25 mg, Oral, Daily         Stop These Medications    HYDROcodone-ibuprofen 7.5-200 MG per tablet  Commonly known as:  VICOPROFEN     metoprolol tartrate 25 MG tablet  Commonly known as:  LOPRESSOR     potassium chloride 20 MEQ CR tablet  Commonly known as:  K-DUR,KLOR-CON          No Known Allergies    Discharge Disposition:  Skilled Nursing Facility (DC - External)    Discharge Diet:  Diet Order   Procedures   • Diet Dysphagia; IV - Mechanical Soft No Mixed Consistencies; Thin; No Straws     Discharge Activity:   Activity Instructions     Activity as Tolerated          CODE STATUS:    Code Status and Medical Interventions:   Ordered at: 01/12/19 6949     Level Of Support Discussed With:    Patient     Code Status:    CPR     Medical Interventions (Level of Support Prior to Arrest):    Full     No future appointments.    Additional Instructions for the Follow-ups that You Need to Schedule     Discharge Follow-up with Specified Provider: [facility] - Dr. Serra in 1 month   As directed      To:  [facility] - Dr. Serra in 1 month             Time Spent on Discharge:  45 minutes    Electronically signed by Sindhu Winters PA-C, 01/24/19, 8:40 AM.

## 2019-01-24 NOTE — DISCHARGE PLACEMENT REQUEST
"Kristina Heck (58 y.o. Female)     From [place],   [phone]      Date of Birth Social Security Number Address Home Phone MRN    1960  [address] [place] [id number] [phone] [id number]    Jain Marital Status          None        Admission Date Admission Type Admitting Provider Attending Provider Department, Room/Bed    19 Emergency Kirsten Lawler MD Mini, Jocelyn, MD [facility] 3E, S336/1    Discharge Date Discharge Disposition Discharge Destination         Skilled Nursing Facility ([place] - External)              Attending Provider:  Kirsten Lawler MD    Allergies:  No Known Allergies    Isolation:  None   Infection:  None   Code Status:  CPR    Ht:  170.2 cm (67\")   Wt:  97.3 kg (214 lb 9.6 oz)    Admission Cmt:  None   Principal Problem:  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]                 Active Insurance as of 2019     Primary Coverage     Payor Plan Insurance Group Employer/Plan Group    PASSPORT PASSPORT MEDICAID     Payor Plan Address Payor Plan Phone Number Payor Plan Fax Number Effective Dates    [address] [phone]  1997 - None Entered    [facility] [id number]       Subscriber Name Subscriber Birth Date Member ID       KRISTINA HECK [id number]                 Emergency Contacts      (Rel.) Home Phone Work Phone Mobile Phone    ZAIDA LORAE (Daughter) [phone] -- [phone]    Pattie Heck (Daughter) -- -- [phone]                 Discharge Summary      Sindhu Winters PA-C at 2019  7:56 AM              [facility] Medicine Services  DISCHARGE SUMMARY    Patient Name: Kristina Heck  : 1960  MRN: [id number]    Date of Admission: 2019  Date of Discharge:  2019  Primary Care Physician: Gio Henderson MD    Consults     Date and Time Order Name Status Description    2019 1413 Inpatient Urology Consult Completed     " 1/14/2019 1602 Inpatient Neurology Consult Completed         Hospital Course     Presenting Problem:   Acute on chronic respiratory failure with hypoxia (CMS/HCC) [J96.21]    Active Hospital Problems    Diagnosis Date Noted   • **Guillain-Dugsp[place] syndrome (CMS/HCC) [G61.0] 01/16/2019   • Chronic respiratory failure with hypoxia (CMS/HCC) [J96.11] 01/24/2019   • Left nephrolithiasis [N20.0] 01/18/2019   • Tobacco abuse [Z72.0] 01/16/2019   • JURGEN on CPAP [G47.33, Z99.89] 01/16/2019   • Hypothyroidism (acquired) [E03.9] 01/12/2019   • Essential hypertension [I10] 01/12/2019   • Depression [F32.9] 01/12/2019   • left renal collecting system obstruction [N13.5] 01/12/2019      Resolved Hospital Problems    Diagnosis Date Noted Date Resolved   • Dysphagia [R13.10] 01/16/2019 01/24/2019   • Acute on chronic respiratory failure with hypoxia (2L @ baseline) [J96.21] 01/12/2019 01/24/2019   • COPD with acute exacerbation (CMS/HCC) [J44.1] 01/12/2019 01/24/2019      Hospital Course:  Ms. Kristina Heck is a 57yo female with PMH significant for HTN, hypothyroidism, chronic back pain on prescription opiate therapy, COPD, ongoing tobacco use, JURGEN on CPAP and depression. She was admitted to [facility] on 12/25 due to bilateral pneumonia. At discharge, she was feeling better but a few days later, she developed upper and lower extremity weakness. She presented to [facility] ED on 1/10/19 for evaluation. MRI head/neck ruled out CVA and she was diagnosed with a pinched nerve. She stated that she was evaluated by a neurologist and discharged home. Following discharge, she developed progressive extremity weakness to the point she could not walk. She also noted increasing dyspnea and presented to [facility] ED on 1/24/2019.     ABG in the ED showed hypoxia and hypercapnia. CXR unremarkable. CTA chest with no evidence of pneumonia or pulmonary embolism. CTA also noted a left renal collecting system obstruction. She was  admitted to the hospital medicine service for COPD exacerbation and neurology was consulted.     COPD exacerbation treated with IV Solumedrol, scheduled nebs and Doxycycline.     RPR, TSH, B12, NIF and VC normal. MRI T-spine and C-spine were unremarkable. LP with CSF glucose of 95 protein 66. and EMG/NCS showed severe mixed axonal-demyelinating peripheral neuropathy, compatible with Radha-Ahwahnee syndrome. Given worsening respiratory status, she was transferred to the ICU for monitoring and respiratory support. She did not require intubation. IV Zosyn added to antibiotic regimen. She was started on IVIG x 5 doses. She was given gabapentin for peripheral neuropathy.     Urology evaluated Ms. Heck and giver her normal creatinine and no evidence of UTI, they recommended observation only. She can follow up with [facility] on an outpatient basis, where she has sought care previously.     PT/OT followed patient and she is making slow improvements. She will transfer to [facility] for continued rehabilitation on 1/24/2019.     Follow up with [facility] Dr. Serra in 1 month    Day of Discharge     HPI:   Up eating breakfast. She has no complaints. Has been using CPAP about half of the night, feels smothered in it. Encouraged her to discuss a nose mask with her [employer] company. No other issues, anxious to get to rehab.     Review of Systems  Gen- No fevers, chills  CV- No chest pain, palpitations  Resp- No cough, dyspnea  GI- No N/V/D, abd pain    Otherwise ROS is negative except as mentioned in the HPI.    Vital Signs:   Temp:  [97.5 °F (36.4 °C)-99.2 °F (37.3 °C)] 97.8 °F (36.6 °C)  Heart Rate:  [62-82] 82  Resp:  [18-20] 20  BP: (102-116)/(52-75) 116/75     Physical Exam:  Constitutional: No acute distress, awake, alert and conversant.   HENT: NCAT, mucous membranes moist  Respiratory: Clear to auscultation bilaterally, respiratory effort normal. 86% on room air, quickly back into the 90's on her baseline 2L NC.    Cardiovascular: RRR, no murmurs, rubs, or gallops, palpable pedal pulses bilaterally  Gastrointestinal: Positive bowel sounds, soft, nontender, nondistended  Musculoskeletal: No bilateral ankle edema  Psychiatric: Appropriate affect, cooperative  Neurologic: Oriented x 3. Generalized weakness, able to lift legs against gravity in bed. Strength symmetric in all extremities, Cranial Nerves grossly intact to confrontation, speech clear  Skin: No rashes    Pertinent  and/or Most Recent Results     Results from last 7 days   Lab Units 01/20/19  0427 01/18/19  0535   WBC 10*3/mm3 9.54 6.32   HEMOGLOBIN g/dL 15.7* 16.1*   HEMATOCRIT % 47.4* 49.0*   PLATELETS 10*3/mm3 177 152   SODIUM mmol/L 130* 130*   POTASSIUM mmol/L 4.4 4.3   CHLORIDE mmol/L 98* 97*   CO2 mmol/L 29.0 29.0   BUN mg/dL 43* 39*   CREATININE mg/dL 0.72 0.79   GLUCOSE mg/dL 124* 101*   CALCIUM mg/dL 8.5* 8.4*     Results from last 7 days   Lab Units 01/20/19  0427 01/18/19  0535   BILIRUBIN mg/dL 1.4* 1.6*   ALK PHOS U/L 63 64   ALT (SGPT) U/L 131* 122*   AST (SGOT) U/L 49* 49*     Brief Urine Lab Results  (Last result in the past 365 days)      Color   Clarity   Blood   Leuk Est   Nitrite   Protein   CREAT   Urine HCG        01/12/19 1341 Yellow Clear Negative Negative Negative Negative             Microbiology Results Abnormal     Procedure Component Value - Date/Time    Respiratory Culture - Sputum, Cough [[id number]]  (Abnormal) Collected:  01/18/19 0819    Lab Status:  Final result Specimen:  Sputum from Cough Updated:  01/20/19 1359     Respiratory Culture Light growth (2+) Normal Respiratory Angelica      Scant growth (1+) Yeast isolated     Gram Stain Moderate (3+) WBCs per low power field      Moderate (3+) Epithelial cells per low power field      Few (2+) Yeast      Few (2+) Gram positive cocci in pairs and chains      Few (2+) Gram variable bacilli        Imaging Results (all)     Procedure Component Value Units Date/Time    Fiberoptic Endo (fees)  [[id number]] Resulted:  01/16/19 1457     Updated:  01/16/19 1457    Narrative:       This procedure was auto-finalized with no dictation required.    XR Chest 1 View [[id number]] Collected:  01/16/19 0906     Updated:  01/16/19 0917    Narrative:       EXAMINATION: XR CHEST 1 VW-01/16/2019:      INDICATION: Dyspnea; N28.82-Megaloureter; R09.02-Hypoxemia;  J44.1-Chronic obstructive pulmonary disease with (acute) exacerbation;  R53.1-Weakness; Z74.09-Other reduced mobility; J96.21-Acute and chronic  respiratory failure with hypoxia.     TECHNIQUE:  Single view frontal chest.     COMPARISONS: 01/12/2019.     FINDINGS:  Lungs are without focal abnormality aside from subsegmental  atelectasis of the left lung base. No sizable pleural effusion or  pneumothorax. Cardiomediastinal silhouette is within normal limits.       Impression:       No significant interval change.     D:  01/16/2019  E:  01/16/2019     This report was finalized on 1/16/2019 9:15 AM by Javier Connors.       IR Lumbar Puncture Diag/Thera [[id number]] Collected:  01/15/19 1512     Updated:  01/15/19 1524    Narrative:       EXAMINATION: IR LUMBAR PUNCTURE DIAG/THERA-     INDICATION: generalized weakness, r/o GBS; N28.82-Megaloureter;  R09.02-Hypoxemia; J44.1-Chronic obstructive pulmonary disease with  (acute) exacerbation; R53.1-Weakness; Z74.09-Other reduced mobility;  J96.21-Acute and chronic respiratory failure with hypoxia     TECHNIQUE: 28 seconds of fluoroscopic time was used for this procedure.  2 associated images were saved. Informed consent was obtained from the  patient's daughter. The patient was placed in the prone position on the  table. The back was prepped and draped in a sterile fashion. 1%  lidocaine was used as a local anesthetic to the skin and subcutaneous  tissues. Under fluoroscopic guidance a 20-gauge spinal needle was  advanced at the L2-L3 level to the CSF space. Approximately 8 cc of  clear and colorless CSF was returned and  collected in 4 numbered vials.  Sample vials were labeled and sent to pathology for further analysis.  The needle was removed.     COMPARISON: NONE     FINDINGS: The patient tolerated the procedure well, and no immediate  complications occurred.          Impression:       Successful fluoroscopic guided lumbar puncture as above with  no immediate complications.         This report was finalized on 1/15/2019 3:22 PM by Javier Connors.       Fiberoptic Endo (fees) [[id number]] Resulted:  01/15/19 1354     Updated:  01/15/19 1354    Narrative:       This procedure was auto-finalized with no dictation required.    CT Abdomen Pelvis With & Without Contrast [[id number]] Collected:  01/15/19 0946     Updated:  01/15/19 1312    Narrative:       EXAMINATION: CT ABDOMEN AND PELVIS W WO CONTRAST-      INDICATION: Left renal collecting system obstruction;  N28.82-Megaloureter; R09.02-Hypoxemia; J44.1-Chronic obstructive  pulmonary disease with (acute) exacerbation; R53.1-Weakness;  Z74.09-Other reduced mobility; J96.21-Acute and chronic respiratory  failure with hypoxia.     TECHNIQUE: CT scan of abdomen and pelvis performed with and without  intravenous contrast.     The radiation dose reduction device was turned on for each scan per the  ALARA (As Low as Reasonably Achievable) protocol.     COMPARISON: None.     FINDINGS: The most superior images demonstrate bibasilar airspace  disease, left greater than right.      The liver and spleen are normal.  There is no adrenal mass. The pancreas  is normal.  There are small bilateral renal parenchymal stones. More  importantly, there is a 1 cm stone in the left ureteropelvic junction  producing moderately severe obstruction. Also there is a 1.2 cm area of  low density within the left renal pelvis. This is not calcified on  images obtained prior to contrast and may represent clot. There are also  simple cysts in both kidneys. There is no ascites, aneurysm or  retroperitoneal  lymphadenopathy. There is no pelvic mass or fluid.   There is no bladder stone.       Impression:       1. There is a 1 cm stone in the left ureteropelvic junction producing  moderately severe obstruction of the left kidney. There is also a 12 mm  low density filling defect in the renal pelvis. On the unenhanced  examination this is not a calcified stone this may represent clot  related to the ureteral stone and obstruction and less likely a  noncalcified stone.   2. Lastly there is bibasilar airspace disease, left greater than right.     D:  01/15/2019  E:  01/15/2019     This report was finalized on 1/15/2019 1:10 PM by Dr. Cyril Lanza MD.       MRI Thoracic Spine With & Without Contrast [[id number]] Collected:  01/15/19 1032     Updated:  01/15/19 1041    Narrative:       EXAMINATION: MRI THORACIC SPINE W WO CONTRAST-, MRI CERVICAL SPINE W WO  CONTRAST-      INDICATION: T/L-spine trauma, significant injury suspected, neurologic  deficits.     TECHNIQUE:  Multiplanar multisequence MRI of the cervical and thoracic  spine was performed without and with contrast.     COMPARISONS:  CT chest 01/12/2019, CT abdomen/pelvis 01/14/2019.     FINDINGS: CERVICAL SPINE: Sagittal images cover from clivus through T3  caudally.     The cervicomedullary junction is unremarkable.  The cervical cord is  normal in caliber and signal.  There is no abnormal enhancement in the  spinal canal or in the paraspinal soft tissues.  No paraspinal edema to  suggest ligamentous injury.     With regard to the marrow space, vertebral body heights are maintained.   There is maintenance of the usual lordosis without significant  spondylolisthesis.  Background marrow signal is normal.      DEGENERATIVE CHANGES ARE AS FOLLOWS:     C2-C3: No significant foraminal or spinal canal stenosis.     C3-C4: Shallow posterior disc osteophyte complex without significant  foraminal or spinal canal stenosis.     C4-C5: Shallow posterior disc osteophyte complex and  right-sided  uncovertebral arthropathy. Mild right foraminal stenosis. Mild spinal  canal stenosis.     C5-C6: Prominent disc osteophyte complex and right greater than left  uncovertebral arthropathy. Moderate right foraminal stenosis. Moderate  spinal canal stenosis.     C6-C7: Prominent asymmetric disc osteophyte complex towards the right  and right greater than left uncovertebral arthropathy. Mild right  foraminal stenosis. Mild spinal canal stenosis.     C7-T1: No significant foraminal or spinal canal stenosis.     Incidental imaging of the head and cervical soft tissues is  unremarkable. Bilateral minor mastoid effusions.     THORACIC SPINE: Sagittal imaging covers from C7 through the superior  half of the L2 body caudally.     The imaged spinal cord is normal in caliber without intrinsic cord  signal abnormality identified.  There is no abnormal enhancement in the  spinal canal or in the paraspinal soft tissues. No paraspinal edema to  suggest ligamentous injury.     With regard to the marrow space, vertebral body heights are maintained.   There is maintenance of the usual kyphosis without significant  spondylolisthesis. Background marrow signal is normal.     As is commonly the case in the thoracic spine, degenerative changes are  negligible.  There is no significant foraminal or spinal canal stenosis  at any imaged thoracic level.     Incidental imaging of the thoracic soft tissues is unrevealing.  Obstructed left renal collecting system again noted.       Impression:       1. No evidence of acute osseous or ligamentous injury.  2. Moderate cervical spine degenerative change. No significant thoracic  spine degenerative change.  3. No abnormal enhancement regarding the cervical or thoracic spine.     D:  01/15/2019  E:  01/15/2019     This report was finalized on 1/15/2019 10:39 AM by Javier Connors.       MRI Cervical Spine With & Without Contrast [[id number]] Collected:  01/15/19 1032     Updated:  01/15/19  1041    Narrative:       EXAMINATION: MRI THORACIC SPINE W WO CONTRAST-, MRI CERVICAL SPINE W WO  CONTRAST-      INDICATION: T/L-spine trauma, significant injury suspected, neurologic  deficits.     TECHNIQUE:  Multiplanar multisequence MRI of the cervical and thoracic  spine was performed without and with contrast.     COMPARISONS:  CT chest 01/12/2019, CT abdomen/pelvis 01/14/2019.     FINDINGS: CERVICAL SPINE: Sagittal images cover from clivus through T3  caudally.     The cervicomedullary junction is unremarkable.  The cervical cord is  normal in caliber and signal.  There is no abnormal enhancement in the  spinal canal or in the paraspinal soft tissues.  No paraspinal edema to  suggest ligamentous injury.     With regard to the marrow space, vertebral body heights are maintained.   There is maintenance of the usual lordosis without significant  spondylolisthesis.  Background marrow signal is normal.      DEGENERATIVE CHANGES ARE AS FOLLOWS:     C2-C3: No significant foraminal or spinal canal stenosis.     C3-C4: Shallow posterior disc osteophyte complex without significant  foraminal or spinal canal stenosis.     C4-C5: Shallow posterior disc osteophyte complex and right-sided  uncovertebral arthropathy. Mild right foraminal stenosis. Mild spinal  canal stenosis.     C5-C6: Prominent disc osteophyte complex and right greater than left  uncovertebral arthropathy. Moderate right foraminal stenosis. Moderate  spinal canal stenosis.     C6-C7: Prominent asymmetric disc osteophyte complex towards the right  and right greater than left uncovertebral arthropathy. Mild right  foraminal stenosis. Mild spinal canal stenosis.     C7-T1: No significant foraminal or spinal canal stenosis.     Incidental imaging of the head and cervical soft tissues is  unremarkable. Bilateral minor mastoid effusions.     THORACIC SPINE: Sagittal imaging covers from C7 through the superior  half of the L2 body caudally.     The imaged spinal  cord is normal in caliber without intrinsic cord  signal abnormality identified.  There is no abnormal enhancement in the  spinal canal or in the paraspinal soft tissues. No paraspinal edema to  suggest ligamentous injury.     With regard to the marrow space, vertebral body heights are maintained.   There is maintenance of the usual kyphosis without significant  spondylolisthesis. Background marrow signal is normal.     As is commonly the case in the thoracic spine, degenerative changes are  negligible.  There is no significant foraminal or spinal canal stenosis  at any imaged thoracic level.     Incidental imaging of the thoracic soft tissues is unrevealing.  Obstructed left renal collecting system again noted.       Impression:       1. No evidence of acute osseous or ligamentous injury.  2. Moderate cervical spine degenerative change. No significant thoracic  spine degenerative change.  3. No abnormal enhancement regarding the cervical or thoracic spine.     D:  01/15/2019  E:  01/15/2019     This report was finalized on 1/15/2019 10:39 AM by Javier Connors.       CT Angiogram Chest With Contrast [[id number]] Collected:  01/12/19 1705     Updated:  01/12/19 1724    Narrative:       EXAMINATION: CT ANGIOGRAM CHEST W/CONTRAST - 1/12/2019      INDICATION: Evaluate for pulmonary embolus.      TECHNIQUE:  Axial CT data of the chest were obtained helically per PE  protocol following IV contrast administration.  Multiplanar reformatted  images and 2D reconstructions (MIPs) were generated and reviewed.   Computer aided detection was utilized in the interpretation. The  radiation dose reduction device was turned on for each scan per the  ALARA (As Low as Reasonably Achievable) protocol     COMPARISONS:  None.     FINDINGS:   No acute pulmonary embolism is seen. Central airways are  patent without endobronchial lesion or debris.  Heart size is within  normal limits. No suspicious lymph node. The lungs are clear without  focal  opacity aside from a couple of calcified granulomas in the left  lower lobe.  No pleural effusion or pneumothorax. Chest wall soft  tissues are normal. Thoracic vertebral body heights are normal. There is  left-sided hydronephrosis and proximal hydroureter, moderate in degree.  There is a nonobstructing right renal calculus and a tiny cyst in the  superior posterior interpolar cortex of the right kidney.       Impression:       1. No evidence for pulmonary embolism or other acute cardiopulmonary  findings.  2. Left renal collecting system obstruction. Correlation with urinalysis  and CT urogram recommended after the contrast from this bolus has  cleared.     DICTATED:   1/12/2019  EDITED/ls :   1/12/2019      This report was finalized on 1/12/2019 5:22 PM by Javier Connors.       XR Chest 1 View [[id number]] Collected:  01/12/19 1419     Updated:  01/12/19 1508    Narrative:       EXAMINATION: XR CHEST 1 VW - 1/12/2019     INDICATION: Shortness of air.     TECHNIQUE: Single-view frontal chest.     COMPARISONS: None.     FINDINGS:  Lungs are without focal abnormality. No pleural effusion or  pneumothorax. Cardiomediastinal silhouette is within normal limits.       Impression:       No acute finding.     DICTATED:   1/12/2019  EDITED/ls :   1/12/2019      This report was finalized on 1/12/2019 3:06 PM by Javier Connors.           Discharge Details        Discharge Medications      New Medications      Instructions Start Date   acetaminophen 325 MG tablet  Commonly known as:  TYLENOL   650 mg, Oral, Every 4 Hours PRN      docusate sodium 100 MG capsule   100 mg, Oral, 2 Times Daily PRN      gabapentin 300 MG capsule  Commonly known as:  NEURONTIN   300 mg, Oral, 3 Times Daily      guaiFENesin 100 MG/5ML solution oral solution  Commonly known as:  ROBITUSSIN   200 mg, Oral, Every 6 Hours PRN      HYDROcodone-acetaminophen 7.5-325 MG per tablet  Commonly known as:  NORCO   1 tablet, Oral, Every 6 Hours PRN      ibuprofen 200 MG  tablet  Commonly known as:  ADVIL,MOTRIN   200 mg, Oral, Every 6 Hours PRN      lisinopril 5 MG tablet  Commonly known as:  PRINIVIL,ZESTRIL   5 mg, Oral, Every 24 Hours Scheduled      melatonin 5 MG sublingual tablet sublingual tablet   5 mg, Sublingual, Nightly PRN      nicotine 14 MG/24HR patch  Commonly known as:  NICODERM CQ   1 patch, Transdermal, Every 24 Hours      polyethylene glycol pack packet  Commonly known as:  MIRALAX   17 g, Oral, Daily      predniSONE 10 MG tablet  Commonly known as:  DELTASONE   Take 2 tablets by mouth Daily for 2 days, THEN 1 tablet Daily for 2 days.   Start Date:  1/24/2019        Continue These Medications      Instructions Start Date   albuterol sulfate  (90 Base) MCG/ACT inhaler  Commonly known as:  PROVENTIL HFA;VENTOLIN HFA;PROAIR HFA   2 puffs, Inhalation, Every 4 Hours PRN      aspirin 81 MG chewable tablet   81 mg, Oral, Daily      BREO ELLIPTA 200-25 MCG/INH aerosol powder  inhaler  Generic drug:  Fluticasone Furoate-Vilanterol   1 puff, Inhalation, Daily      escitalopram 10 MG tablet  Commonly known as:  LEXAPRO   10 mg, Oral, Daily      hydrochlorothiazide 25 MG tablet  Commonly known as:  HYDRODIURIL   25 mg, Oral, Daily      hydrOXYzine 25 MG tablet  Commonly known as:  ATARAX   25 mg, Oral, Every 6 Hours PRN      INCRUSE ELLIPTA 62.5 MCG/INH aerosol powder   Generic drug:  Umeclidinium Bromide   1 puff, Inhalation, Daily      levothyroxine 100 MCG tablet  Commonly known as:  SYNTHROID, LEVOTHROID   100 mcg, Oral, Daily      metoprolol succinate XL 25 MG 24 hr tablet  Commonly known as:  TOPROL-XL   25 mg, Oral, Daily         Stop These Medications    HYDROcodone-ibuprofen 7.5-200 MG per tablet  Commonly known as:  VICOPROFEN     metoprolol tartrate 25 MG tablet  Commonly known as:  LOPRESSOR     potassium chloride 20 MEQ CR tablet  Commonly known as:  K-DUR,KLOR-CON          No Known Allergies    Discharge Disposition:  Skilled Nursing Facility ([place] -  External)    Discharge Diet:  Diet Order   Procedures   • Diet Dysphagia; IV - Mechanical Soft No Mixed Consistencies; Thin; No Straws     Discharge Activity:   Activity Instructions     Activity as Tolerated          CODE STATUS:    Code Status and Medical Interventions:   Ordered at: 01/12/19 2147     Level Of Support Discussed With:    Patient     Code Status:    CPR     Medical Interventions (Level of Support Prior to Arrest):    Full     No future appointments.    Additional Instructions for the Follow-ups that You Need to Schedule     Discharge Follow-up with Specified Provider: [facility] [place]. Serra in 1 month   As directed      To:  [facility] Dr. Serra in 1 month             Time Spent on Discharge:  45 minutes    Electronically signed by Sindhu Winters PA-C, 01/24/19, 8:40 AM.        Electronically signed by Sindhu Winters PA-C at 1/24/2019  8:47 AM       Discharge Order (From admission, onward)    Start     Ordered    01/24/19 0833  Discharge patient  Once     Expected Discharge Date:  01/24/19    Discharge Disposition:  Skilled Nursing Facility (DC - External)    Physician of Record for Attribution - Please select from Treatment Team:  RIC MADRID [[id number]]    Review needed by CMO to determine Physician of Record:  No    Please choose which facility the patient is currently admitted if they are being discharged to another facility or unit.:  [facility] [place]    Mode:  Family       Question Answer Comment   Physician of Record for Attribution - Please select from Treatment Team RIC MADRID    Review needed by CMO to determine Physician of Record No    Please choose which facility the patient is currently admitted if they are being discharged to another facility or unit[facility]    Mode: [facility]        01/24/19 0839

## 2019-01-24 NOTE — PAYOR COMM NOTE
"Discharge date today 19  Currently last date approved was , still need determination on approval for additional days  [facility] # [id number]     Thank You,  Elena Garcia RN  Utilization Review  [phone]  Fax [phone]       Kristina Heck (58 y.o. Female)     Date of Birth Social Security Number Address Home Phone MRN    1960  [address] [address] [phone] [id number]    Gnosticism Marital Status          None        Admission Date Admission Type Admitting Provider Attending Provider Department, Room/Bed    19 Emergency Kirsten Lawler MD  [facility] 3E, S336/1    Discharge Date Discharge Disposition Discharge Destination        2019 Skilled Nursing Facility ([place] - External)              Attending Provider:  (none)   Allergies:  No Known Allergies    Isolation:  None   Infection:  None   Code Status:  Prior    Ht:  170.2 cm (67\")   Wt:  97.3 kg (214 lb 9.6 oz)    Admission Cmt:  None   Principal Problem:  Guillain-[place] syndrome (CMS/HCC) [G61.0]                 Active Insurance as of 2019     Primary Coverage     Payor Plan Insurance Group Employer/Plan Group    PASSPORT PASSPORT MEDICAID     Payor Plan Address Payor Plan Phone Number Payor Plan Fax Number Effective Dates     [address] [phone]  1997 - None Entered    [facility] [id number]       Subscriber Name Subscriber Birth Date Member ID       KRISTINA HECK 1960 [id number]                 Emergency Contacts      (Rel.) Home Phone Work Phone Mobile Phone    JOSUE LORA (Daughter) [phone] -- [phone]    Pattie Heck (Daughter) -- -- [phone]               Discharge Summary      Sindhu Winters PA-C at 2019  7:56 AM              [facility] Medicine Services  DISCHARGE SUMMARY    Patient Name: Kristina Heck  : 1960  MRN: [id number]    Date of Admission: 2019  Date of Discharge:  " 1/19/2019  Primary Care Physician: Gio Henderson MD    Consults     Date and Time Order Name Status Description    1/17/2019 1413 Inpatient Urology Consult Completed     1/14/2019 1602 Inpatient Neurology Consult Completed         Hospital Course     Presenting Problem:   Acute on chronic respiratory failure with hypoxia (CMS/HCC) [J96.21]    Active Hospital Problems    Diagnosis Date Noted   • **Guillain-[place] syndrome (CMS/HCC) [G61.0] 01/16/2019   • Chronic respiratory failure with hypoxia (CMS/HCC) [J96.11] 01/24/2019   • Left nephrolithiasis [N20.0] 01/18/2019   • Tobacco abuse [Z72.0] 01/16/2019   • JURGEN on CPAP [G47.33, Z99.89] 01/16/2019   • Hypothyroidism (acquired) [E03.9] 01/12/2019   • Essential hypertension [I10] 01/12/2019   • Depression [F32.9] 01/12/2019   • left renal collecting system obstruction [N13.5] 01/12/2019      Resolved Hospital Problems    Diagnosis Date Noted Date Resolved   • Dysphagia [R13.10] 01/16/2019 01/24/2019   • Acute on chronic respiratory failure with hypoxia (2L @ baseline) [J96.21] 01/12/2019 01/24/2019   • COPD with acute exacerbation (CMS/HCC) [J44.1] 01/12/2019 01/24/2019      Hospital Course:  Ms. Kristina Heck is a 59yo female with PMH significant for HTN, hypothyroidism, chronic back pain on prescription opiate therapy, COPD, ongoing tobacco use, JURGEN on CPAP and depression. She was admitted to [facility] on 12/25 due to bilateral pneumonia. At discharge, she was feeling better but a few days later, she developed upper and lower extremity weakness. She presented to [facility] ED on 1/10/19 for evaluation. MRI head/neck ruled out CVA and she was diagnosed with a pinched nerve. She stated that she was evaluated by a neurologist and discharged home. Following discharge, she developed progressive extremity weakness to the point she could not walk. She also noted increasing dyspnea and presented to [facility] ED on 1/24/2019.     ABG in the ED  showed hypoxia and hypercapnia. CXR unremarkable. CTA chest with no evidence of pneumonia or pulmonary embolism. CTA also noted a left renal collecting system obstruction. She was admitted to the hospital medicine service for COPD exacerbation and neurology was consulted.     COPD exacerbation treated with IV Solumedrol, scheduled nebs and Doxycycline.     RPR, TSH, B12, NIF and VC normal. MRI T-spine and C-spine were unremarkable. LP with CSF glucose of 95 protein 66. 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 Given worsening respiratory status, she was transferred to the ICU for monitoring and respiratory support. She did not require intubation. IV Zosyn added to antibiotic regimen. She was started on IVIG x 5 doses. She was given gabapentin for peripheral neuropathy.     Urology evaluated Ms. Heck and giver her normal creatinine and no evidence of UTI, they recommended observation only. She can follow up with [facility] on an outpatient basis, where she has sought care previously.     PT/OT followed patient and she is making slow improvements. She will transfer to [facility] for continued rehabilitation on 1/24/2019.     Follow up with [facility] Dr. Serra in 1 month    Day of Discharge     HPI:   Up eating breakfast. She has no complaints. Has been using CPAP about half of the night, feels smothered in it. Encouraged her to discuss a nose mask with her [employer]. No other issues, anxious to get to rehab.     Review of Systems  Gen- No fevers, chills  CV- No chest pain, palpitations  Resp- No cough, dyspnea  GI- No N/V/D, abd pain    Otherwise ROS is negative except as mentioned in the HPI.    Vital Signs:   Temp:  [97.5 °F (36.4 °C)-99.2 °F (37.3 °C)] 97.8 °F (36.6 °C)  Heart Rate:  [62-82] 82  Resp:  [18-20] 20  BP: (102-116)/(52-75) 116/75     Physical Exam:  Constitutional: No acute distress, awake, alert and conversant.   HENT: NCAT,  mucous membranes moist  Respiratory: Clear to auscultation bilaterally, respiratory effort normal. 86% on room air, quickly back into the 90's on her baseline 2L NC.   Cardiovascular: RRR, no murmurs, rubs, or gallops, palpable pedal pulses bilaterally  Gastrointestinal: Positive bowel sounds, soft, nontender, nondistended  Musculoskeletal: No bilateral ankle edema  Psychiatric: Appropriate affect, cooperative  Neurologic: Oriented x 3. Generalized weakness, able to lift legs against gravity in bed. Strength symmetric in all extremities, Cranial Nerves grossly intact to confrontation, speech clear  Skin: No rashes    Pertinent  and/or Most Recent Results     Results from last 7 days   Lab Units 01/20/19  0427 01/18/19  0535   WBC 10*3/mm3 9.54 6.32   HEMOGLOBIN g/dL 15.7* 16.1*   HEMATOCRIT % 47.4* 49.0*   PLATELETS 10*3/mm3 177 152   SODIUM mmol/L 130* 130*   POTASSIUM mmol/L 4.4 4.3   CHLORIDE mmol/L 98* 97*   CO2 mmol/L 29.0 29.0   BUN mg/dL 43* 39*   CREATININE mg/dL 0.72 0.79   GLUCOSE mg/dL 124* 101*   CALCIUM mg/dL 8.5* 8.4*     Results from last 7 days   Lab Units 01/20/19  0427 01/18/19  0535   BILIRUBIN mg/dL 1.4* 1.6*   ALK PHOS U/L 63 64   ALT (SGPT) U/L 131* 122*   AST (SGOT) U/L 49* 49*     Brief Urine Lab Results  (Last result in the past 365 days)      Color   Clarity   Blood   Leuk Est   Nitrite   Protein   CREAT   Urine HCG        01/12/19 1341 Yellow Clear Negative Negative Negative Negative             Microbiology Results Abnormal     Procedure Component Value - Date/Time    Respiratory Culture - Sputum, Cough [[id number]]  (Abnormal) Collected:  01/18/19 0819    Lab Status:  Final result Specimen:  Sputum from Cough Updated:  01/20/19 1359     Respiratory Culture Light growth (2+) Normal Respiratory Angelica      Scant growth (1+) Yeast isolated     Gram Stain Moderate (3+) WBCs per low power field      Moderate (3+) Epithelial cells per low power field      Few (2+) Yeast      Few (2+) Gram  positive cocci in pairs and chains      Few (2+) Gram variable bacilli        Imaging Results (all)     Procedure Component Value Units Date/Time    Fiberoptic Endo (fees) [[id number]] Resulted:  01/16/19 1457     Updated:  01/16/19 1457    Narrative:       This procedure was auto-finalized with no dictation required.    XR Chest 1 View [[id number]] Collected:  01/16/19 0906     Updated:  01/16/19 0917    Narrative:       EXAMINATION: XR CHEST 1 VW-01/16/2019:      INDICATION: Dyspnea; N28.82-Megaloureter; R09.02-Hypoxemia;  J44.1-Chronic obstructive pulmonary disease with (acute) exacerbation;  R53.1-Weakness; Z74.09-Other reduced mobility; J96.21-Acute and chronic  respiratory failure with hypoxia.     TECHNIQUE:  Single view frontal chest.     COMPARISONS: 01/12/2019.     FINDINGS:  Lungs are without focal abnormality aside from subsegmental  atelectasis of the left lung base. No sizable pleural effusion or  pneumothorax. Cardiomediastinal silhouette is within normal limits.       Impression:       No significant interval change.     D:  01/16/2019  E:  01/16/2019     This report was finalized on 1/16/2019 9:15 AM by Javier Connors.       IR Lumbar Puncture Diag/Thera [[id number]] Collected:  01/15/19 1512     Updated:  01/15/19 1524    Narrative:       EXAMINATION: IR LUMBAR PUNCTURE DIAG/THERA-     INDICATION: generalized weakness, r/o GBS; N28.82-Megaloureter;  R09.02-Hypoxemia; J44.1-Chronic obstructive pulmonary disease with  (acute) exacerbation; R53.1-Weakness; Z74.09-Other reduced mobility;  J96.21-Acute and chronic respiratory failure with hypoxia     TECHNIQUE: 28 seconds of fluoroscopic time was used for this procedure.  2 associated images were saved. Informed consent was obtained from the  patient's daughter. The patient was placed in the prone position on the  table. The back was prepped and draped in a sterile fashion. 1%  lidocaine was used as a local anesthetic to the skin and subcutaneous  tissues.  Under fluoroscopic guidance a 20-gauge spinal needle was  advanced at the L2-L3 level to the CSF space. Approximately 8 cc of  clear and colorless CSF was returned and collected in 4 numbered vials.  Sample vials were labeled and sent to pathology for further analysis.  The needle was removed.     COMPARISON: NONE     FINDINGS: The patient tolerated the procedure well, and no immediate  complications occurred.          Impression:       Successful fluoroscopic guided lumbar puncture as above with  no immediate complications.         This report was finalized on 1/15/2019 3:22 PM by Javier Connors.       Fiberoptic Endo (fees) [[id number]] Resulted:  01/15/19 1354     Updated:  01/15/19 1354    Narrative:       This procedure was auto-finalized with no dictation required.    CT Abdomen Pelvis With & Without Contrast [[id number]] Collected:  01/15/19 0946     Updated:  01/15/19 1312    Narrative:       EXAMINATION: CT ABDOMEN AND PELVIS W WO CONTRAST-      INDICATION: Left renal collecting system obstruction;  N28.82-Megaloureter; R09.02-Hypoxemia; J44.1-Chronic obstructive  pulmonary disease with (acute) exacerbation; R53.1-Weakness;  Z74.09-Other reduced mobility; J96.21-Acute and chronic respiratory  failure with hypoxia.     TECHNIQUE: CT scan of abdomen and pelvis performed with and without  intravenous contrast.     The radiation dose reduction device was turned on for each scan per the  ALARA (As Low as Reasonably Achievable) protocol.     COMPARISON: None.     FINDINGS: The most superior images demonstrate bibasilar airspace  disease, left greater than right.      The liver and spleen are normal.  There is no adrenal mass. The pancreas  is normal.  There are small bilateral renal parenchymal stones. More  importantly, there is a 1 cm stone in the left ureteropelvic junction  producing moderately severe obstruction. Also there is a 1.2 cm area of  low density within the left renal pelvis. This is not calcified  on  images obtained prior to contrast and may represent clot. There are also  simple cysts in both kidneys. There is no ascites, aneurysm or  retroperitoneal lymphadenopathy. There is no pelvic mass or fluid.   There is no bladder stone.       Impression:       1. There is a 1 cm stone in the left ureteropelvic junction producing  moderately severe obstruction of the left kidney. There is also a 12 mm  low density filling defect in the renal pelvis. On the unenhanced  examination this is not a calcified stone this may represent clot  related to the ureteral stone and obstruction and less likely a  noncalcified stone.   2. Lastly there is bibasilar airspace disease, left greater than right.     D:  01/15/2019  E:  01/15/2019     This report was finalized on 1/15/2019 1:10 PM by Dr. Cyril Lanza MD.       MRI Thoracic Spine With & Without Contrast [[id number]] Collected:  01/15/19 1032     Updated:  01/15/19 1041    Narrative:       EXAMINATION: MRI THORACIC SPINE W WO CONTRAST-, MRI CERVICAL SPINE W WO  CONTRAST-      INDICATION: T/L-spine trauma, significant injury suspected, neurologic  deficits.     TECHNIQUE:  Multiplanar multisequence MRI of the cervical and thoracic  spine was performed without and with contrast.     COMPARISONS:  CT chest 01/12/2019, CT abdomen/pelvis 01/14/2019.     FINDINGS: CERVICAL SPINE: Sagittal images cover from clivus through T3  caudally.     The cervicomedullary junction is unremarkable.  The cervical cord is  normal in caliber and signal.  There is no abnormal enhancement in the  spinal canal or in the paraspinal soft tissues.  No paraspinal edema to  suggest ligamentous injury.     With regard to the marrow space, vertebral body heights are maintained.   There is maintenance of the usual lordosis without significant  spondylolisthesis.  Background marrow signal is normal.      DEGENERATIVE CHANGES ARE AS FOLLOWS:     C2-C3: No significant foraminal or spinal canal stenosis.      C3-C4: Shallow posterior disc osteophyte complex without significant  foraminal or spinal canal stenosis.     C4-C5: Shallow posterior disc osteophyte complex and right-sided  uncovertebral arthropathy. Mild right foraminal stenosis. Mild spinal  canal stenosis.     C5-C6: Prominent disc osteophyte complex and right greater than left  uncovertebral arthropathy. Moderate right foraminal stenosis. Moderate  spinal canal stenosis.     C6-C7: Prominent asymmetric disc osteophyte complex towards the right  and right greater than left uncovertebral arthropathy. Mild right  foraminal stenosis. Mild spinal canal stenosis.     C7-T1: No significant foraminal or spinal canal stenosis.     Incidental imaging of the head and cervical soft tissues is  unremarkable. Bilateral minor mastoid effusions.     THORACIC SPINE: Sagittal imaging covers from C7 through the superior  half of the L2 body caudally.     The imaged spinal cord is normal in caliber without intrinsic cord  signal abnormality identified.  There is no abnormal enhancement in the  spinal canal or in the paraspinal soft tissues. No paraspinal edema to  suggest ligamentous injury.     With regard to the marrow space, vertebral body heights are maintained.   There is maintenance of the usual kyphosis without significant  spondylolisthesis. Background marrow signal is normal.     As is commonly the case in the thoracic spine, degenerative changes are  negligible.  There is no significant foraminal or spinal canal stenosis  at any imaged thoracic level.     Incidental imaging of the thoracic soft tissues is unrevealing.  Obstructed left renal collecting system again noted.       Impression:       1. No evidence of acute osseous or ligamentous injury.  2. Moderate cervical spine degenerative change. No significant thoracic  spine degenerative change.  3. No abnormal enhancement regarding the cervical or thoracic spine.     D:  01/15/2019  E:  01/15/2019     This report  was finalized on 1/15/2019 10:39 AM by Javier Connors.       MRI Cervical Spine With & Without Contrast [[id number]] Collected:  01/15/19 1032     Updated:  01/15/19 1041    Narrative:       EXAMINATION: MRI THORACIC SPINE W WO CONTRAST-, MRI CERVICAL SPINE W WO  CONTRAST-      INDICATION: T/L-spine trauma, significant injury suspected, neurologic  deficits.     TECHNIQUE:  Multiplanar multisequence MRI of the cervical and thoracic  spine was performed without and with contrast.     COMPARISONS:  CT chest 01/12/2019, CT abdomen/pelvis 01/14/2019.     FINDINGS: CERVICAL SPINE: Sagittal images cover from clivus through T3  caudally.     The cervicomedullary junction is unremarkable.  The cervical cord is  normal in caliber and signal.  There is no abnormal enhancement in the  spinal canal or in the paraspinal soft tissues.  No paraspinal edema to  suggest ligamentous injury.     With regard to the marrow space, vertebral body heights are maintained.   There is maintenance of the usual lordosis without significant  spondylolisthesis.  Background marrow signal is normal.      DEGENERATIVE CHANGES ARE AS FOLLOWS:     C2-C3: No significant foraminal or spinal canal stenosis.     C3-C4: Shallow posterior disc osteophyte complex without significant  foraminal or spinal canal stenosis.     C4-C5: Shallow posterior disc osteophyte complex and right-sided  uncovertebral arthropathy. Mild right foraminal stenosis. Mild spinal  canal stenosis.     C5-C6: Prominent disc osteophyte complex and right greater than left  uncovertebral arthropathy. Moderate right foraminal stenosis. Moderate  spinal canal stenosis.     C6-C7: Prominent asymmetric disc osteophyte complex towards the right  and right greater than left uncovertebral arthropathy. Mild right  foraminal stenosis. Mild spinal canal stenosis.     C7-T1: No significant foraminal or spinal canal stenosis.     Incidental imaging of the head and cervical soft tissues  is  unremarkable. Bilateral minor mastoid effusions.     THORACIC SPINE: Sagittal imaging covers from C7 through the superior  half of the L2 body caudally.     The imaged spinal cord is normal in caliber without intrinsic cord  signal abnormality identified.  There is no abnormal enhancement in the  spinal canal or in the paraspinal soft tissues. No paraspinal edema to  suggest ligamentous injury.     With regard to the marrow space, vertebral body heights are maintained.   There is maintenance of the usual kyphosis without significant  spondylolisthesis. Background marrow signal is normal.     As is commonly the case in the thoracic spine, degenerative changes are  negligible.  There is no significant foraminal or spinal canal stenosis  at any imaged thoracic level.     Incidental imaging of the thoracic soft tissues is unrevealing.  Obstructed left renal collecting system again noted.       Impression:       1. No evidence of acute osseous or ligamentous injury.  2. Moderate cervical spine degenerative change. No significant thoracic  spine degenerative change.  3. No abnormal enhancement regarding the cervical or thoracic spine.     D:  01/15/2019  E:  01/15/2019     This report was finalized on 1/15/2019 10:39 AM by Javier Connors.       CT Angiogram Chest With Contrast [[id number]] Collected:  01/12/19 1705     Updated:  01/12/19 1724    Narrative:       EXAMINATION: CT ANGIOGRAM CHEST W/CONTRAST - 1/12/2019      INDICATION: Evaluate for pulmonary embolus.      TECHNIQUE:  Axial CT data of the chest were obtained helically per PE  protocol following IV contrast administration.  Multiplanar reformatted  images and 2D reconstructions (MIPs) were generated and reviewed.   Computer aided detection was utilized in the interpretation. The  radiation dose reduction device was turned on for each scan per the  ALARA (As Low as Reasonably Achievable) protocol     COMPARISONS:  None.     FINDINGS:   No acute pulmonary  embolism is seen. Central airways are  patent without endobronchial lesion or debris.  Heart size is within  normal limits. No suspicious lymph node. The lungs are clear without  focal opacity aside from a couple of calcified granulomas in the left  lower lobe.  No pleural effusion or pneumothorax. Chest wall soft  tissues are normal. Thoracic vertebral body heights are normal. There is  left-sided hydronephrosis and proximal hydroureter, moderate in degree.  There is a nonobstructing right renal calculus and a tiny cyst in the  superior posterior interpolar cortex of the right kidney.       Impression:       1. No evidence for pulmonary embolism or other acute cardiopulmonary  findings.  2. Left renal collecting system obstruction. Correlation with urinalysis  and CT urogram recommended after the contrast from this bolus has  cleared.     DICTATED:   1/12/2019  EDITED/ls :   1/12/2019      This report was finalized on 1/12/2019 5:22 PM by Javier Connors.       XR Chest 1 View [[id number]] Collected:  01/12/19 1419     Updated:  01/12/19 1508    Narrative:       EXAMINATION: XR CHEST 1 VW - 1/12/2019     INDICATION: Shortness of air.     TECHNIQUE: Single-view frontal chest.     COMPARISONS: None.     FINDINGS:  Lungs are without focal abnormality. No pleural effusion or  pneumothorax. Cardiomediastinal silhouette is within normal limits.       Impression:       No acute finding.     DICTATED:   1/12/2019  EDITED/ls :   1/12/2019      This report was finalized on 1/12/2019 3:06 PM by Javier Connors.           Discharge Details        Discharge Medications      New Medications      Instructions Start Date   acetaminophen 325 MG tablet  Commonly known as:  TYLENOL   650 mg, Oral, Every 4 Hours PRN      docusate sodium 100 MG capsule   100 mg, Oral, 2 Times Daily PRN      gabapentin 300 MG capsule  Commonly known as:  NEURONTIN   300 mg, Oral, 3 Times Daily      guaiFENesin 100 MG/5ML solution oral solution  Commonly known  as:  ROBITUSSIN   200 mg, Oral, Every 6 Hours PRN      HYDROcodone-acetaminophen 7.5-325 MG per tablet  Commonly known as:  NORCO   1 tablet, Oral, Every 6 Hours PRN      ibuprofen 200 MG tablet  Commonly known as:  ADVIL,MOTRIN   200 mg, Oral, Every 6 Hours PRN      lisinopril 5 MG tablet  Commonly known as:  PRINIVIL,ZESTRIL   5 mg, Oral, Every 24 Hours Scheduled      melatonin 5 MG sublingual tablet sublingual tablet   5 mg, Sublingual, Nightly PRN      nicotine 14 MG/24HR patch  Commonly known as:  NICODERM CQ   1 patch, Transdermal, Every 24 Hours      polyethylene glycol pack packet  Commonly known as:  MIRALAX   17 g, Oral, Daily      predniSONE 10 MG tablet  Commonly known as:  DELTASONE   Take 2 tablets by mouth Daily for 2 days, THEN 1 tablet Daily for 2 days.   Start Date:  1/24/2019        Continue These Medications      Instructions Start Date   albuterol sulfate  (90 Base) MCG/ACT inhaler  Commonly known as:  PROVENTIL HFA;VENTOLIN HFA;PROAIR HFA   2 puffs, Inhalation, Every 4 Hours PRN      aspirin 81 MG chewable tablet   81 mg, Oral, Daily      BREO ELLIPTA 200-25 MCG/INH aerosol powder  inhaler  Generic drug:  Fluticasone Furoate-Vilanterol   1 puff, Inhalation, Daily      escitalopram 10 MG tablet  Commonly known as:  LEXAPRO   10 mg, Oral, Daily      hydrochlorothiazide 25 MG tablet  Commonly known as:  HYDRODIURIL   25 mg, Oral, Daily      hydrOXYzine 25 MG tablet  Commonly known as:  ATARAX   25 mg, Oral, Every 6 Hours PRN      INCRUSE ELLIPTA 62.5 MCG/INH aerosol powder   Generic drug:  Umeclidinium Bromide   1 puff, Inhalation, Daily      levothyroxine 100 MCG tablet  Commonly known as:  SYNTHROID, LEVOTHROID   100 mcg, Oral, Daily      metoprolol succinate XL 25 MG 24 hr tablet  Commonly known as:  TOPROL-XL   25 mg, Oral, Daily         Stop These Medications    HYDROcodone-ibuprofen 7.5-200 MG per tablet  Commonly known as:  VICOPROFEN     metoprolol tartrate 25 MG tablet  Commonly  known as:  LOPRESSOR     potassium chloride 20 MEQ CR tablet  Commonly known as:  K-DUR,KLOR-CON          No Known Allergies    Discharge Disposition:  Skilled Nursing Facility ([place] - External)    Discharge Diet:  Diet Order   Procedures   • Diet Dysphagia; IV - Mechanical Soft No Mixed Consistencies; Thin; No Straws     Discharge Activity:   Activity Instructions     Activity as Tolerated          CODE STATUS:    Code Status and Medical Interventions:   Ordered at: 01/12/19 2148     Level Of Support Discussed With:    Patient     Code Status:    CPR     Medical Interventions (Level of Support Prior to Arrest):    Full     No future appointments.    Additional Instructions for the Follow-ups that You Need to Schedule     Discharge Follow-up with Specified Provider: [facility] - Dr. Serra in 1 month   As directed      To:  [facility] Judd Serra in 1 month             Time Spent on Discharge:  45 minutes    Electronically signed by Sindhu Winters PA-C, 01/24/19, 8:40 AM.        Electronically signed by Sindhu Winters PA-C at 1/24/2019  8:47 AM       Discharge Order (From admission, onward)    Start     Ordered    01/24/19 0833  Discharge patient  Once     Expected Discharge Date:  01/24/19    Discharge Disposition:  Skilled Nursing Facility (DC - External)    Physician of Record for Attribution - Please select from Treatment Team:  RIC MADRID [[id number]]    Review needed by CMO to determine Physician of Record:  No    Please choose which facility the patient is currently admitted if they are being discharged to another facility or unit.:  [facility]    Interfacility:  [facility]    Mode:  Family       Question Answer Comment   Physician of Record for Attribution - Please select from Treatment Team RIC MADRID    Review needed by CMO to determine Physician of Record No    Please choose which facility the patient is currently admitted if they are being discharged to another  facility or unit. [facility]    Interfacility [facility]    Mode: Family        01/24/19 0896

## 2019-02-13 ENCOUNTER — READMISSION MANAGEMENT (OUTPATIENT)
Dept: CALL CENTER | Facility: HOSPITAL | Age: 59
End: 2019-02-13

## 2019-02-13 NOTE — OUTREACH NOTE
Prep Survey      Responses   Facility patient discharged from?  [facility]   Is patient eligible?  Yes   Discharge diagnosis  Guillain-[place] syndrome, COPD with acute exacerbation    Does the patient have one of the following disease processes/diagnoses(primary or secondary)?  COPD/Pneumonia   Does the patient have Home health ordered?  Yes   What is the Home health agency?   HH to be arranged post rehab   Is there a DME ordered?  No   General alerts for this patient  DC from sub acute care on 2/13   Prep survey completed?  Yes          Latesha Abdi RN

## 2019-02-15 ENCOUNTER — READMISSION MANAGEMENT (OUTPATIENT)
Dept: CALL CENTER | Facility: HOSPITAL | Age: 59
End: 2019-02-15

## 2019-02-15 NOTE — OUTREACH NOTE
"COPD/PN Week 3 Survey      Responses   Facility patient discharged from?  [place]   Does the patient have one of the following disease processes/diagnoses(primary or secondary)?  COPD/Pneumonia   Was the primary reason for admission:  COPD exacerbation   Week 3 attempt successful?  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  Yes   Does the patient have all medications ordered at discharge?  N/A   Is the patient taking all medications as directed (includes completed medication regime)?  N/A   Medication comments  Pt has been home from [facility] for 1 wk. She was not sent home with any new medications from [facility]   Does the patient have a primary care provider?   Yes   Does the patient have an appointment with their PCP or pulmonologist within 7 days of discharge?  Greater than 7 days   Has the patient kept scheduled appointments due by today?  N/A   Comments  Pt will see Dr on 2/20   Psychosocial issues?  No   Did the patient receive a copy of their discharge instructions?  No   What is the patient's perception of their health status since discharge?  Improving   Nursing Interventions  Nurse provided patient education   Additional teach back comments  Pt is doin \"very Good\" since home from [facility]. No SOB or cough   Patient reports what zone on this call?  Green Zone   Green Zone  Reports doing well, Breathing without shortness of breath, Usual amount of phlegm/mucus without difficulty coughing up, Sleeping well, Appetite is good   Green Zone interventions:  Take daily medications, Avoid indoor/outdoor triggers   Week 3 call completed?  Yes          Savannah Pryor RN  "

## 2019-02-22 ENCOUNTER — READMISSION MANAGEMENT (OUTPATIENT)
Dept: CALL CENTER | Facility: HOSPITAL | Age: 59
End: 2019-02-22

## 2019-02-22 NOTE — OUTREACH NOTE
COPD/PN Week 4 Survey      Responses   Facility patient discharged from?  [place]   Does the patient have one of the following disease processes/diagnoses(primary or secondary)?  COPD/Pneumonia   Was the primary reason for admission:  COPD exacerbation   Week 4 attempt successful?  Yes   Call start time  1526   Call end time  1527   Is patient permission given to speak with other caregiver?  Yes   Person spoke with today (if not patient) and relationship  daughter   Meds reviewed with patient/caregiver?  Yes   Is the patient taking all medications as directed (includes completed medication regime)?  N/A   Has the patient kept scheduled appointments due by today?  N/A   Is the patient still receiving Home Health Services?  N/A   Psychosocial issues?  No   What is the patient's perception of their health status since discharge?  Improving   Nursing Interventions  Nurse provided patient education   Are the patient's immunizations up to date?   Yes   Nursing interventions  Educated on importance of maintaining up to date immunizations as advised by provider   Additional teach back comments  Dtr says she is doing well.   Is the patient able to teach back COPD zones?  Yes   Nursing interventions  Education provided on various zones   Patient reports what zone on this call?  Green Zone   Green Zone  Reports doing well, Breathing without shortness of breath, Usual amount of phlegm/mucus without difficulty coughing up, Sleeping well, Appetite is good, Usual activity and exercise level   Green Zone interventions:  Take daily medications, Continue regular exercise/diet plan   Week 4 call completed?  Yes   Would the patient like one additional call?  No   Graduated  Yes   Did the patient feel the follow up calls were helpful during their recovery period?  Yes   Was the number of calls appropriate?  Yes          Guerda Desai RN

## 2019-09-04 NOTE — PLAN OF CARE
Family updated on pt condition and care Problem: Patient Care Overview  Goal: Plan of Care Review  Outcome: Ongoing (interventions implemented as appropriate)   01/15/19 5275   Coping/Psychosocial   Plan of Care Reviewed With patient   Plan of Care Review   Progress no change   OTHER   Outcome Summary VSS, LP done today, tolerated, IGG initiated- tolerating. swallowing test tomorrow.      Goal: Discharge Needs Assessment  Outcome: Ongoing (interventions implemented as appropriate)    Goal: Interprofessional Rounds/Family Conf  Outcome: Ongoing (interventions implemented as appropriate)      Problem: Fall Risk (Adult)  Goal: Identify Related Risk Factors and Signs and Symptoms  Outcome: Ongoing (interventions implemented as appropriate)    Goal: Absence of Fall  Outcome: Ongoing (interventions implemented as appropriate)      Problem: Wound (Includes Pressure Injury) (Adult)  Goal: Signs and Symptoms of Listed Potential Problems Will be Absent, Minimized or Managed (Wound)  Outcome: Ongoing (interventions implemented as appropriate)